# Patient Record
Sex: MALE | Race: OTHER | Employment: OTHER | URBAN - METROPOLITAN AREA
[De-identification: names, ages, dates, MRNs, and addresses within clinical notes are randomized per-mention and may not be internally consistent; named-entity substitution may affect disease eponyms.]

---

## 2018-01-07 ENCOUNTER — HOSPITAL ENCOUNTER (EMERGENCY)
Age: 68
Discharge: HOME | End: 2018-01-07
Attending: STUDENT IN AN ORGANIZED HEALTH CARE EDUCATION/TRAINING PROGRAM
Payer: MEDICARE

## 2018-01-07 VITALS
OXYGEN SATURATION: 94 % | HEART RATE: 96 BPM | BODY MASS INDEX: 37.1 KG/M2 | SYSTOLIC BLOOD PRESSURE: 131 MMHG | HEIGHT: 71 IN | TEMPERATURE: 99.1 F | WEIGHT: 265 LBS | DIASTOLIC BLOOD PRESSURE: 87 MMHG | RESPIRATION RATE: 16 BRPM

## 2018-01-07 DIAGNOSIS — R04.0 EPISTAXIS: Primary | ICD-10-CM

## 2018-01-07 DIAGNOSIS — D69.6 THROMBOCYTOPENIA (HCC): ICD-10-CM

## 2018-01-07 LAB
BASOPHILS # BLD: 0 K/UL (ref 0–0.1)
BASOPHILS NFR BLD: 0 % (ref 0–1)
EOSINOPHIL # BLD: 0 K/UL (ref 0–0.4)
EOSINOPHIL NFR BLD: 0 % (ref 0–7)
ERYTHROCYTE [DISTWIDTH] IN BLOOD BY AUTOMATED COUNT: 14.4 % (ref 11.5–14.5)
HCT VFR BLD AUTO: 46.5 % (ref 36.6–50.3)
HGB BLD-MCNC: 16.4 G/DL (ref 12.1–17)
INR PPP: 1.3 (ref 0.9–1.1)
LYMPHOCYTES # BLD: 1.2 K/UL (ref 0.8–3.5)
LYMPHOCYTES NFR BLD: 9 % (ref 12–49)
MCH RBC QN AUTO: 29.8 PG (ref 26–34)
MCHC RBC AUTO-ENTMCNC: 35.3 G/DL (ref 30–36.5)
MCV RBC AUTO: 84.4 FL (ref 80–99)
MONOCYTES # BLD: 1.3 K/UL (ref 0–1)
MONOCYTES NFR BLD: 9 % (ref 5–13)
NEUTS SEG # BLD: 11.3 K/UL (ref 1.8–8)
NEUTS SEG NFR BLD: 82 % (ref 32–75)
PLATELET # BLD AUTO: 99 K/UL (ref 150–400)
PROTHROMBIN TIME: 13.1 SEC (ref 9–11.1)
RBC # BLD AUTO: 5.51 M/UL (ref 4.1–5.7)
WBC # BLD AUTO: 13.8 K/UL (ref 4.1–11.1)

## 2018-01-07 PROCEDURE — 85025 COMPLETE CBC W/AUTO DIFF WBC: CPT | Performed by: PHYSICIAN ASSISTANT

## 2018-01-07 PROCEDURE — 99283 EMERGENCY DEPT VISIT LOW MDM: CPT

## 2018-01-07 PROCEDURE — 36415 COLL VENOUS BLD VENIPUNCTURE: CPT | Performed by: PHYSICIAN ASSISTANT

## 2018-01-07 PROCEDURE — 85610 PROTHROMBIN TIME: CPT | Performed by: PHYSICIAN ASSISTANT

## 2018-01-07 NOTE — ED PROVIDER NOTES
HPI Comments: Alondra Howard is a 74 y.o. male with PMH significant for Afib on Eliquis (has not taken today's dose yet) presents to emergency room ambulatory with wife for evaluation of R sided epistaxis which began at 0300. He states he awoke with epistaxis. Last nose bleed was \"years ago\" which resulted in nasal packing. Hx of nasal polypectomy \"years ago\" at [facility] where he is followed but has not seen them in a while. HE called their on call ENT provider who told them to apply pressure and spray Afrin which he states he tried 5 different times but still was bleeding. He presents with tissue twisted in his nose but not holding pressure and some dark blood only around the nare, not involving the remaining tissue. Denies SOB, CP, lightheadedness or other bleeding. He states he's had nasal congestion and cough recently. No fever. Uses CPAP at night. PCP: Marcos Tai MD  ENT: [facility]    Surgical hx- nasal polypectomy \"years ago\" at [address] [facility] hx- no tobacco, no ETOH; uses CPAP at night    The patient has no other complaints at this time. Patient is a 74 y.o. male presenting with epistaxis. The history is provided by the patient and the spouse. Epistaxis           Past Medical History:   Diagnosis Date    Atrial fibrillation [facility])        History reviewed. No pertinent surgical history. History reviewed. No pertinent family history. Social History     Social History    Marital status:      Spouse name: N/A    Number of children: N/A    Years of education: N/A     Occupational History    Not on file. Social History Main Topics    Smoking status: Never Smoker    Smokeless tobacco: Never Used    Alcohol use No    Drug use: Not on file    Sexual activity: Not on file     Other Topics Concern    Not on file     Social History Narrative    No narrative on file         ALLERGIES: Pcn [penicillins]    Review of Systems   Constitutional: Negative.   Negative for activity change, chills, fatigue and unexpected weight change. HENT: Positive for nosebleeds, sinus pain and sinus pressure. Respiratory: Negative for cough, chest tightness, shortness of breath and wheezing. Cardiovascular: Negative. Negative for chest pain and palpitations. Gastrointestinal: Negative. Negative for abdominal pain, diarrhea, nausea and vomiting. Genitourinary: Negative. Negative for dysuria, flank pain, frequency and hematuria. Musculoskeletal: Negative. Negative for arthralgias, back pain, neck pain and neck stiffness. Skin: Negative. Negative for color change and rash. Neurological: Negative. Negative for dizziness, numbness and headaches. Psychiatric/Behavioral: Negative. Negative for confusion. All other systems reviewed and are negative. Vitals:    01/07/18 1126 01/07/18 1240 01/07/18 1300   BP: 143/85 111/84 131/87   Pulse: (!) 108 81    Resp: 18 18    Temp: 98.7 °F (37.1 °C) 99.1 °F (37.3 °C)    SpO2: 96% 94% 94%   Weight: 120.2 kg (265 lb)     Height: 5' 11\" (1.803 m)              Physical Exam   Constitutional: He is oriented to person, place, and time. He appears well-developed and well-nourished. He is active. Non-toxic appearance. No distress. HENT:   Head: Normocephalic and atraumatic. Right Ear: External ear normal.   Left Ear: External ear normal.   Mouth/Throat: Oropharynx is clear and moist. No oropharyngeal exudate. R nare with dried blood and large clot removed when I removed tissue patient had placed. No active bleeding noted. L nare congested with clear-white fluid. No posterior oropharyngeal blood noted. Eyes: Conjunctivae are normal. Pupils are equal, round, and reactive to light. Right eye exhibits no discharge. Left eye exhibits no discharge. Neck: Normal range of motion and full passive range of motion without pain. Neck supple. No tracheal tenderness present.    Cardiovascular: Normal rate, regular rhythm, normal heart sounds, intact distal pulses and normal pulses. Exam reveals no gallop and no friction rub. No murmur heard. Pulmonary/Chest: Effort normal and breath sounds normal. No respiratory distress. He has no wheezes. He has no rales. He exhibits no tenderness. Abdominal: Soft. Bowel sounds are normal. He exhibits no distension. There is no tenderness. There is no rebound and no guarding. Musculoskeletal: Normal range of motion. He exhibits no edema or tenderness. Neurological: He is alert and oriented to person, place, and time. He has normal strength. No cranial nerve deficit or sensory deficit. Coordination normal.   Skin: Skin is warm, dry and intact. No abrasion and no rash noted. He is not diaphoretic. No erythema. Psychiatric: He has a normal mood and affect. His speech is normal and behavior is normal. Cognition and memory are normal.   Nursing note and vitals reviewed. MDM  Number of Diagnoses or Management Options  Epistaxis:   Thrombocytopenia ([facility] [address]):   Diagnosis management comments:   Ddx: epistaxis- anterior / posterior, blood loss, thrombocytopenia       Amount and/or Complexity of Data Reviewed  Clinical lab tests: ordered and reviewed  Discuss the patient with other providers: yes (ER attending)    Patient Progress  Patient progress: stable    ED Course       Procedures    I discussed patient's PMH, exam findings as well as careplan with the ER attending who agrees with care plan. Amrita Johnson PA-C      12:30pm   Nose clamp removed. No active epistaxis. No posterior bleeding noted on re-assessment. Will continue to monitor. Amrita Johnosn PA-C    1:55 PM  Patient was re-assessed, a dried spot of blood noted to his external nare and was not fresh blood. Patient was unaware that this even occurred and no active bleeding noted on my re-exam. Discussed if he is not actively bleeding there is no indication for nasal packing at this time. Will continue to monitor.  Discussed to hold today's Eliquis dose as he has not yet taken it today and patient agrees. Devang Aviles PA-C    2:50 PM  Patient has been re-assessed, still no epistaxis. Feels better. Requesting to go home. Discussed not blowing nose for 48 hours, advised to call ENT tomorrow for follow-up and further management. Devang Aviles PA-C          DISCHARGE NOTE:  2:54 PM  The patient's results have been reviewed with them and/or available family. Patient and/or family verbally conveyed their understanding and agreement of the patient's signs, symptoms, diagnosis, treatment and prognosis and additionally agree to follow up as recommended in the discharge instructions or to return to the Emergency Room should their condition change prior to their follow-up appointment. The patient/family verbally agrees with the care-plan and verbally conveys that all of their questions have been answered. The discharge instructions have also been provided to the patient and/or family with some educational information regarding the patient's diagnosis as well a list of reasons why the patient would want to return to the ER prior to their follow-up appointment, should their condition change. Plan:  1. F/U with ENT tomorrow  2. Hold today's dose of Eliquis  3.  Epistaxis precautions discussed  Return precautions discussed and advised to return to ER if worse

## 2018-01-07 NOTE — DISCHARGE INSTRUCTIONS
Nosebleeds: Care Instructions  Your Care Instructions    Nosebleeds are common, especially if you have colds or allergies. Many things can cause a nosebleed. Some nosebleeds stop on their own with pressure. Others need packing. Some get cauterized (sealed). If you have gauze or other packing materials in your nose, you will need to follow up with your doctor to have the packing removed. You may need more treatment if you get nosebleeds a lot. The doctor has checked you carefully, but problems can develop later. If you notice any problems or new symptoms, get medical treatment right away. Follow-up care is a key part of your treatment and safety. Be sure to make and go to all appointments, and call your doctor if you are having problems. It's also a good idea to know your test results and keep a list of the medicines you take. How can you care for yourself at home? · If you get another nosebleed:  ¨ Sit up and tilt your head slightly forward. This keeps blood from going down your throat. ¨ Use your thumb and index finger to pinch your nose shut for 10 minutes. Use a clock. Do not check to see if the bleeding has stopped before the 10 minutes are up. If the bleeding has not stopped, pinch your nose shut for another 10 minutes. ¨ When the bleeding has stopped, try not to pick, rub, or blow your nose for 12 hours. Avoiding these things helps keep your nose from bleeding again. · If your doctor prescribed antibiotics, take them as directed. Do not stop taking them just because you feel better. You need to take the full course of antibiotics. To prevent nosebleeds  · Do not blow your nose too hard. · Try not to lift or strain after a nosebleed. · Raise your head on a pillow while you sleep. · Put a thin layer of a saline- or water-based nasal gel, such as NasoGel, inside your nose. Put it on the septum, which divides your nostrils. This will prevent dryness that can cause nosebleeds.   · Use a vaporizer or humidifier to add moisture to your bedroom. Follow the directions for cleaning the machine. · Do not use aspirin, ibuprofen (Advil, Motrin), or naproxen (Aleve) for 36 to 48 hours after a nosebleed unless your doctor tells you to. You can use acetaminophen (Tylenol) for pain relief. · Talk to your doctor about stopping any other medicines you are taking. Some medicines may make you more likely to get a nosebleed. · Do not use cold medicines or nasal sprays without first talking to your doctor. They can make your nose dry. When should you call for help? Call 911 anytime you think you may need emergency care. For example, call if:  ? · You passed out (lost consciousness). ?Call your doctor now or seek immediate medical care if:  ? · You get another nosebleed and your nose is still bleeding after you have applied pressure 3 times for 10 minutes each time (30 minutes total). ? · There is a lot of blood running down the back of your throat even after you pinch your nose and tilt your head forward. ? · You have a fever. ? · You have sinus pain. ? Watch closely for changes in your health, and be sure to contact your doctor if:  ? · You get nosebleeds often, even if they stop. ? · You do not get better as expected. Where can you learn more? Go to http://jacob-summer.info/. Enter S156 in the search box to learn more about \"Nosebleeds: Care Instructions. \"  Current as of: March 20, 2017  Content Version: 11.4  © 6059-1792 [employer]. Care instructions adapted under license by [employer] (which disclaims liability or warranty for this information). If you have questions about a medical condition or this instruction, always ask your healthcare professional. [address] any warranty or liability for your use of this information.

## 2018-01-07 NOTE — ED TRIAGE NOTES
Pt reports having nose bleed since 0300 this morning. Pt called his ENT @ 0500 this morning ans was told to use affrin to stop the bleeding. Pt states he is currently taking Eliquis for atrial fib.

## 2021-10-24 ENCOUNTER — HOSPITAL ENCOUNTER (INPATIENT)
Age: 71
LOS: 6 days | Discharge: HOME | DRG: 603 | End: 2021-10-31
Attending: EMERGENCY MEDICINE | Admitting: INTERNAL MEDICINE
Payer: MEDICARE

## 2021-10-24 ENCOUNTER — APPOINTMENT (OUTPATIENT)
Dept: ULTRASOUND IMAGING | Age: 71
DRG: 603 | End: 2021-10-24
Attending: EMERGENCY MEDICINE
Payer: MEDICARE

## 2021-10-24 DIAGNOSIS — I48.20 CHRONIC ATRIAL FIBRILLATION (HCC): ICD-10-CM

## 2021-10-24 DIAGNOSIS — L03.116 CELLULITIS OF LEFT LOWER EXTREMITY: Primary | ICD-10-CM

## 2021-10-24 DIAGNOSIS — D72.829 LEUKOCYTOSIS, UNSPECIFIED TYPE: ICD-10-CM

## 2021-10-24 DIAGNOSIS — D69.6 THROMBOCYTOPENIA (HCC): ICD-10-CM

## 2021-10-24 DIAGNOSIS — M50.90 CERVICAL DISC DISORDER: ICD-10-CM

## 2021-10-24 DIAGNOSIS — N18.5 CKD (CHRONIC KIDNEY DISEASE), STAGE V (HCC): ICD-10-CM

## 2021-10-24 DIAGNOSIS — Z88.0 HISTORY OF PENICILLIN ALLERGY: ICD-10-CM

## 2021-10-24 PROBLEM — E87.5 HYPERKALEMIA: Status: ACTIVE | Noted: 2021-10-24

## 2021-10-24 LAB
ALBUMIN SERPL-MCNC: 2.3 G/DL (ref 3.5–5)
ALBUMIN/GLOB SERPL: 0.6 {RATIO} (ref 1.1–2.2)
ALP SERPL-CCNC: 51 U/L (ref 45–117)
ALT SERPL-CCNC: 20 U/L (ref 12–78)
AMPHET UR QL SCN: NEGATIVE
ANION GAP SERPL CALC-SCNC: 12 MMOL/L (ref 5–15)
APPEARANCE UR: CLEAR
AST SERPL-CCNC: 8 U/L (ref 15–37)
BACTERIA URNS QL MICRO: NEGATIVE /HPF
BARBITURATES UR QL SCN: NEGATIVE
BASOPHILS # BLD: 0 K/UL (ref 0–0.1)
BASOPHILS NFR BLD: 0 % (ref 0–1)
BENZODIAZ UR QL: NEGATIVE
BILIRUB SERPL-MCNC: 0.3 MG/DL (ref 0.2–1)
BILIRUB UR QL: NEGATIVE
BUN SERPL-MCNC: 103 MG/DL (ref 6–20)
BUN/CREAT SERPL: 18 (ref 12–20)
CALCIUM SERPL-MCNC: 8.4 MG/DL (ref 8.5–10.1)
CANNABINOIDS UR QL SCN: NEGATIVE
CHLORIDE SERPL-SCNC: 107 MMOL/L (ref 97–108)
CO2 SERPL-SCNC: 21 MMOL/L (ref 21–32)
COCAINE UR QL SCN: NEGATIVE
COLOR UR: ABNORMAL
CREAT SERPL-MCNC: 5.63 MG/DL (ref 0.7–1.3)
CRP SERPL-MCNC: 11.51 MG/DL (ref 0–0.6)
DIFFERENTIAL METHOD BLD: ABNORMAL
DRUG SCRN COMMENT,DRGCM: NORMAL
EOSINOPHIL # BLD: 0 K/UL (ref 0–0.4)
EOSINOPHIL NFR BLD: 0 % (ref 0–7)
EPITH CASTS URNS QL MICRO: ABNORMAL /LPF
ERYTHROCYTE [DISTWIDTH] IN BLOOD BY AUTOMATED COUNT: 15.2 % (ref 11.5–14.5)
GLOBULIN SER CALC-MCNC: 3.8 G/DL (ref 2–4)
GLUCOSE SERPL-MCNC: 155 MG/DL (ref 65–100)
GLUCOSE UR STRIP.AUTO-MCNC: NEGATIVE MG/DL
HCT VFR BLD AUTO: 33.3 % (ref 36.6–50.3)
HGB BLD-MCNC: 10.8 G/DL (ref 12.1–17)
HGB UR QL STRIP: ABNORMAL
IMM GRANULOCYTES # BLD AUTO: 0 K/UL
IMM GRANULOCYTES NFR BLD AUTO: 0 %
KETONES UR QL STRIP.AUTO: NEGATIVE MG/DL
LACTATE SERPL-SCNC: 1 MMOL/L (ref 0.4–2)
LEUKOCYTE ESTERASE UR QL STRIP.AUTO: NEGATIVE
LYMPHOCYTES # BLD: 0.1 K/UL (ref 0.8–3.5)
LYMPHOCYTES NFR BLD: 1 % (ref 12–49)
MCH RBC QN AUTO: 28.7 PG (ref 26–34)
MCHC RBC AUTO-ENTMCNC: 32.4 G/DL (ref 30–36.5)
MCV RBC AUTO: 88.6 FL (ref 80–99)
METHADONE UR QL: NEGATIVE
MONOCYTES # BLD: 1 K/UL (ref 0–1)
MONOCYTES NFR BLD: 7 % (ref 5–13)
MUCOUS THREADS URNS QL MICRO: ABNORMAL /LPF
NEUTS BAND NFR BLD MANUAL: 1 % (ref 0–6)
NEUTS SEG # BLD: 12.8 K/UL (ref 1.8–8)
NEUTS SEG NFR BLD: 91 % (ref 32–75)
NITRITE UR QL STRIP.AUTO: NEGATIVE
NRBC # BLD: 0 K/UL (ref 0–0.01)
NRBC BLD-RTO: 0 PER 100 WBC
OPIATES UR QL: NEGATIVE
PCP UR QL: NEGATIVE
PH UR STRIP: 5.5 [PH] (ref 5–8)
PHOSPHATE SERPL-MCNC: 6.1 MG/DL (ref 2.6–4.7)
PLATELET # BLD AUTO: 102 K/UL (ref 150–400)
PMV BLD AUTO: 12.9 FL (ref 8.9–12.9)
POTASSIUM SERPL-SCNC: 5.6 MMOL/L (ref 3.5–5.1)
PROCALCITONIN SERPL-MCNC: 0.31 NG/ML
PROT SERPL-MCNC: 6.1 G/DL (ref 6.4–8.2)
PROT UR STRIP-MCNC: 100 MG/DL
RBC # BLD AUTO: 3.76 M/UL (ref 4.1–5.7)
RBC #/AREA URNS HPF: ABNORMAL /HPF (ref 0–5)
RBC MORPH BLD: ABNORMAL
SODIUM SERPL-SCNC: 140 MMOL/L (ref 136–145)
SP GR UR REFRACTOMETRY: 1.02 (ref 1–1.03)
URATE SERPL-MCNC: 9.8 MG/DL (ref 3.5–7.2)
UROBILINOGEN UR QL STRIP.AUTO: 0.2 EU/DL (ref 0.2–1)
WBC # BLD AUTO: 13.9 K/UL (ref 4.1–11.1)
WBC URNS QL MICRO: ABNORMAL /HPF (ref 0–4)

## 2021-10-24 PROCEDURE — 36415 COLL VENOUS BLD VENIPUNCTURE: CPT

## 2021-10-24 PROCEDURE — 74011000258 HC RX REV CODE- 258: Performed by: INTERNAL MEDICINE

## 2021-10-24 PROCEDURE — 99284 EMERGENCY DEPT VISIT MOD MDM: CPT

## 2021-10-24 PROCEDURE — 84550 ASSAY OF BLOOD/URIC ACID: CPT

## 2021-10-24 PROCEDURE — 86140 C-REACTIVE PROTEIN: CPT

## 2021-10-24 PROCEDURE — 96376 TX/PRO/DX INJ SAME DRUG ADON: CPT

## 2021-10-24 PROCEDURE — 85025 COMPLETE CBC W/AUTO DIFF WBC: CPT

## 2021-10-24 PROCEDURE — 99218 HC RM OBSERVATION: CPT

## 2021-10-24 PROCEDURE — 84145 PROCALCITONIN (PCT): CPT

## 2021-10-24 PROCEDURE — 74011000250 HC RX REV CODE- 250: Performed by: INTERNAL MEDICINE

## 2021-10-24 PROCEDURE — 87086 URINE CULTURE/COLONY COUNT: CPT

## 2021-10-24 PROCEDURE — 84100 ASSAY OF PHOSPHORUS: CPT

## 2021-10-24 PROCEDURE — 96375 TX/PRO/DX INJ NEW DRUG ADDON: CPT

## 2021-10-24 PROCEDURE — 80053 COMPREHEN METABOLIC PANEL: CPT

## 2021-10-24 PROCEDURE — 83605 ASSAY OF LACTIC ACID: CPT

## 2021-10-24 PROCEDURE — 93971 EXTREMITY STUDY: CPT

## 2021-10-24 PROCEDURE — 87040 BLOOD CULTURE FOR BACTERIA: CPT

## 2021-10-24 PROCEDURE — 80307 DRUG TEST PRSMV CHEM ANLYZR: CPT

## 2021-10-24 PROCEDURE — 96374 THER/PROPH/DIAG INJ IV PUSH: CPT

## 2021-10-24 PROCEDURE — 96365 THER/PROPH/DIAG IV INF INIT: CPT

## 2021-10-24 PROCEDURE — 81001 URINALYSIS AUTO W/SCOPE: CPT

## 2021-10-24 PROCEDURE — 74011250636 HC RX REV CODE- 250/636: Performed by: INTERNAL MEDICINE

## 2021-10-24 RX ORDER — CLONIDINE HYDROCHLORIDE 0.2 MG/1
0.2 TABLET ORAL 2 TIMES DAILY
COMMUNITY

## 2021-10-24 RX ORDER — FLUTICASONE PROPIONATE 50 MCG
2 SPRAY, SUSPENSION (ML) NASAL DAILY
Status: DISCONTINUED | OUTPATIENT
Start: 2021-10-25 | End: 2021-10-31 | Stop reason: HOSPADM

## 2021-10-24 RX ORDER — SODIUM CHLORIDE 0.9 % (FLUSH) 0.9 %
5-40 SYRINGE (ML) INJECTION AS NEEDED
Status: DISCONTINUED | OUTPATIENT
Start: 2021-10-24 | End: 2021-10-31 | Stop reason: HOSPADM

## 2021-10-24 RX ORDER — FUROSEMIDE 40 MG/1
40 TABLET ORAL 2 TIMES DAILY
COMMUNITY
End: 2021-10-31

## 2021-10-24 RX ORDER — ACETAMINOPHEN 325 MG/1
650 TABLET ORAL
Status: DISCONTINUED | OUTPATIENT
Start: 2021-10-24 | End: 2021-10-31 | Stop reason: HOSPADM

## 2021-10-24 RX ORDER — CARVEDILOL 12.5 MG/1
12.5 TABLET ORAL 2 TIMES DAILY WITH MEALS
COMMUNITY

## 2021-10-24 RX ORDER — CLONIDINE HYDROCHLORIDE 0.1 MG/1
0.2 TABLET ORAL 2 TIMES DAILY
Status: DISCONTINUED | OUTPATIENT
Start: 2021-10-25 | End: 2021-10-31 | Stop reason: HOSPADM

## 2021-10-24 RX ORDER — FLUTICASONE PROPIONATE 50 MCG
2 SPRAY, SUSPENSION (ML) NASAL DAILY
COMMUNITY

## 2021-10-24 RX ORDER — ONDANSETRON 4 MG/1
4 TABLET, ORALLY DISINTEGRATING ORAL
Status: DISCONTINUED | OUTPATIENT
Start: 2021-10-24 | End: 2021-10-31 | Stop reason: HOSPADM

## 2021-10-24 RX ORDER — POLYETHYLENE GLYCOL 3350 17 G/17G
17 POWDER, FOR SOLUTION ORAL DAILY PRN
Status: DISCONTINUED | OUTPATIENT
Start: 2021-10-24 | End: 2021-10-31 | Stop reason: HOSPADM

## 2021-10-24 RX ORDER — NIFEDIPINE 30 MG/1
30 TABLET, EXTENDED RELEASE ORAL DAILY
Status: DISCONTINUED | OUTPATIENT
Start: 2021-10-25 | End: 2021-10-26

## 2021-10-24 RX ORDER — CALCITRIOL 0.25 UG/1
0.25 CAPSULE ORAL DAILY
COMMUNITY

## 2021-10-24 RX ORDER — NIFEDIPINE 30 MG/1
30 TABLET, FILM COATED, EXTENDED RELEASE ORAL DAILY
COMMUNITY

## 2021-10-24 RX ORDER — ACETAMINOPHEN 650 MG/1
650 SUPPOSITORY RECTAL
Status: DISCONTINUED | OUTPATIENT
Start: 2021-10-24 | End: 2021-10-31 | Stop reason: HOSPADM

## 2021-10-24 RX ORDER — PREDNISONE 10 MG/1
10 TABLET ORAL
Status: ON HOLD | COMMUNITY
End: 2021-10-31 | Stop reason: SDUPTHER

## 2021-10-24 RX ORDER — CARVEDILOL 12.5 MG/1
12.5 TABLET ORAL 2 TIMES DAILY WITH MEALS
Status: DISCONTINUED | OUTPATIENT
Start: 2021-10-25 | End: 2021-10-31 | Stop reason: HOSPADM

## 2021-10-24 RX ORDER — SODIUM CHLORIDE 0.9 % (FLUSH) 0.9 %
5-40 SYRINGE (ML) INJECTION EVERY 8 HOURS
Status: DISCONTINUED | OUTPATIENT
Start: 2021-10-24 | End: 2021-10-31 | Stop reason: HOSPADM

## 2021-10-24 RX ORDER — ONDANSETRON 2 MG/ML
4 INJECTION INTRAMUSCULAR; INTRAVENOUS
Status: DISCONTINUED | OUTPATIENT
Start: 2021-10-24 | End: 2021-10-31 | Stop reason: HOSPADM

## 2021-10-24 RX ORDER — CALCITRIOL 0.25 UG/1
0.25 CAPSULE ORAL DAILY
Status: DISCONTINUED | OUTPATIENT
Start: 2021-10-25 | End: 2021-10-31 | Stop reason: HOSPADM

## 2021-10-24 RX ADMIN — CEFEPIME HYDROCHLORIDE 1 G: 1 INJECTION, POWDER, FOR SOLUTION INTRAMUSCULAR; INTRAVENOUS at 16:58

## 2021-10-24 RX ADMIN — DOXYCYCLINE 100 MG: 100 INJECTION, POWDER, LYOPHILIZED, FOR SOLUTION INTRAVENOUS at 17:01

## 2021-10-24 NOTE — H&P
[facility]    Hospitalist Admission Note      NAME:  Yamileth Cui   :   1950   MRN:  [id number]     PCP:  Jordyn Cifuentes MD     Date/Time of service:  10/24/2021 5:20 PM          Subjective:     CHIEF COMPLAINT: red leg    HISTORY OF PRESENT ILLNESS:     Mr. jAay Olivia is a 78 y.o.  male who presented to the Emergency Department complaining of red leg. Noted 10 days ago, and Rx Abx by a clinic, with plan for have outpatient [address]. Leg worse since then despite abx. ER workup here is unremarkable, he does not meet SIRS criteria, CRP somewhat elevated. LE dopplers ruled out DVT. He has severe end stage renal disease and so far has refused HD. We will admit him for observation. Past Medical History:   Diagnosis Date    Atrial fibrillation ([address]     Cervical disc disorder     C5,C6 fractured disc and lumbar disc slipped    Chronic atrial fibrillation (HCC)     [facility]    ESRD (end stage renal disease) on dialysis ([address]     Dr Abby Beard with [facility]    Heart failure ([address]     Hypertension     VIK on CPAP     Pulmonary hypertension ([address]     Ramon NP at [facility]        Past Surgical History:   Procedure Laterality Date    HX HEENT      right sinus polyp removal    HX ORTHOPAEDIC      left ACL replacement    HX OTHER SURGICAL      cardioversion for AFIB    HX UROLOGICAL  2015    renal gland removal left       Social History     Tobacco Use    Smoking status: Never Smoker    Smokeless tobacco: Never Used   Substance Use Topics    Alcohol use: No        History reviewed. No pertinent family history. Family hx cannot be fully assessed, since the patient cannot provide information    Allergies   Allergen Reactions    Levofloxacin Other (comments)     Insombnia, negative thoughts. [place] like I was going to die.  Pcn [Penicillins] Rash        Prior to Admission medications    Medication Sig Start Date End Date Taking?  Authorizing Provider   calcitRIOL (ROCALTROL) 0.25 mcg capsule Take 0.25 mcg by mouth daily. Yes Jamee, MD Saurav   carvediloL (COREG) 12.5 mg tablet Take 12.5 mg by mouth two (2) times daily (with meals). Yes Jamee, MD Saurav   furosemide (Lasix) 40 mg tablet Take 40 mg by mouth two (2) times a day. Yes Jamee, MD Saurav   cloNIDine HCL (CATAPRES) 0.2 mg tablet Take 0.2 mg by mouth two (2) times a day. Yes Jamee, MD Saurav   apixaban (Eliquis) 5 mg tablet Take 5 mg by mouth two (2) times a day. Yes Jamee, MD Saurav   NIFEdipine ER (ADALAT CC) 30 mg ER tablet Take 30 mg by mouth daily. Yes Saurav Mancuso MD   predniSONE (DELTASONE) 10 mg tablet Take 10 mg by mouth. Taken as needed for inflammation may have up to 40mg a day   Yes Jamee, MD Saurav   fluticasone propionate (FLONASE) 50 mcg/actuation nasal spray 2 Sprays by Both Nostrils route daily.    Yes Jamee, MD Saurav       Review of Systems:  (bold if positive, if negative)    Gen:  Eyes:  ENT:  CVS:  Pulm:  GI:  :  MS:  Pain, weakness, swelling, arthritisSkin:  erythemaPsych:  Endo:  Hem:  Renal:  Neuro:        Objective:      VITALS:    Vital signs reviewed; most recent are:    Visit Vitals  BP (!) 145/92   Pulse 81   Temp 98.1 °F (36.7 °C)   Resp 16   Ht 5' 10.98\" (1.803 m)   Wt 113.3 kg (249 lb 12.5 oz)   SpO2 99%   BMI 34.85 kg/m²     SpO2 Readings from Last 6 Encounters:   10/24/21 99%   01/07/18 94%        No intake or output data in the 24 hours ending 10/24/21 1720     Exam:     Physical Exam:    Gen:  Obese, in no acute distress  HEENT:  Pink conjunctivae, PERRL, hearing intact to voice, moist mucous membranes  Neck:  Supple, without masses, thyroid non-tender  Resp:  No accessory muscle use, clear breath sounds without wheezes rales or rhonchi  Card:  No murmurs, normal S1, S2 without thrills, bruits or peripheral edema  Abd:  Soft, non-tender, non-distended, normoactive bowel sounds are present, no mass  Lymph:  No cervical or inguinal adenopathy  Musc:  No cyanosis or clubbing  Skin:  Leg with edema and erythema L>R, skin turgor is good  Neuro:  Cranial nerves are grossly intact, no focal motor weakness, follows commands appropriately  Psych:  Poor insight, oriented to person, place and time, alert     Labs:    Recent Labs     10/24/21  1257   WBC 13.9*   HGB 10.8*   HCT 33.3*   *     Recent Labs     10/24/21  1257      K 5.6*      CO2 21   *   *   CREA 5.63*   CA 8.4*   PHOS 6.1*   ALB 2.3*   TBILI 0.3   ALT 20     No results found for: GLUCPOC  No results for input(s): PH, PCO2, PO2, HCO3, FIO2 in the last 72 hours. No results for input(s): INR, INREXT in the last 72 hours. All Micro Results     Procedure Component Value Units Date/Time    CULTURE, BLOOD, PERIPHERAL [[id number]] Collected: 10/24/21 1637    Order Status: Completed Specimen: Blood Updated: 10/24/21 1650    CULTURE, BLOOD, PERIPHERAL [[id number]] Collected: 10/24/21 1628    Order Status: Completed Specimen: Blood Updated: 10/24/21 1649    CULTURE, URINE [[id number]]     Order Status: Sent Specimen: Urine from Clean catch     RESPIRATORY VIRUS PANEL W/COVID-19, PCR [[id number]]     Order Status: Sent Specimen: NASOPHARYNGEAL SWAB           I have reviewed previous records       Assessment and Plan:      Leg erythema - POA. No trauma or wound. I doubt bacterial infection. Does not meet SIRS criteria. Minimal elevation of procalcitonin, CRP and WBC. His leg appears to be suffering from progressive dermatitis related to untreated ESRD, and edema related to his pulmonary HTN. He has always had worse issue with L more than right leg. For now we will give doxycycline, awaiting further labs and cx. US ruled out DVT. ESRD (end stage renal disease) not on dialysis / Hyperkalemia - Followed by Dr Suhail Waters with [facility]. He has elevated BUN and K. He needs dialysis, but doesn't want it. Continue clacitriol    Chronic atrial fibrillation / Chronic anticoagulation - Followed by Dr José Wilhelm at [facility]. Stable.   Continue coreg, nifedipine and Eliquis    Anemia / thrombocytopenia - POA, likely chronic issues related to ESRD and vascular congestion. Monitor. Heart failure / Pulmonary hypertension / Hypertension - Unclear type and severity of CHF, but followed by Ramon PEREZ at [facility]. Fluid control should be managed with HD. Continue corgeg, clonidine, nifedipine    VIK on CPAP - May continue CPAP    Cervical disc disorder - tylenol prn    Telemetry reviewed:   AFIB    Risk of deterioration: high      Total time spent with patient: 46 Minutes I personally reviewed chart, notes, data and current medications in the medical record. I have personally examined and treated the patient at bedside during this period.                  Care Plan discussed with: Patient, Nursing Staff and >50% of time spent in counseling and coordination of care    Discussed:  Care Plan       ___________________________________________________    Attending Physician: Gibran Salcedo MD

## 2021-10-24 NOTE — ED PROVIDER NOTES
63-year-old male with a history of chronic kidney disease presents with leg pain and swelling. [facility] Friday. He has not had fevers but his leg has worsened in appearance and swelling. He did a Doppler but never was able to get it. He has chronic kidney disease and is followed at [facility]. Leg Pain     Skin Problem          Past Medical History:   Diagnosis Date    Atrial fibrillation ([facility] [address]     Chronic kidney disease     [facility] Sonja Alonzo [facility] is nephrologist    Heart failure ([facility] [address]     Hypertension     Ill-defined condition     AFIB cardiologist Dr Rosa M Koo with [facility]    Ill-defined condition     pulmonary HTN, Ramon NP at [address] condition     sleep apnea uses CPAP    Ill-defined condition     C5,C6 fractured disc and lumbar disc slipped       Past Surgical History:   Procedure Laterality Date    HX HEENT      right sinus polyp removal    HX ORTHOPAEDIC  2000    left ACL replacement    HX OTHER SURGICAL      cardioversion for AFIB    HX UROLOGICAL  08/2015    renal gland removal left         History reviewed. No pertinent family history. Social History     Socioeconomic History    Marital status:      Spouse name: Not on file    Number of children: Not on file    Years of education: Not on file    Highest education level: Not on file   Occupational History    Not on file   Tobacco Use    Smoking status: Never Smoker    Smokeless tobacco: Never Used   Substance and Sexual Activity    Alcohol use: No    Drug use: Never    Sexual activity: Not on file   Other Topics Concern    Not on file   Social History Narrative    Not on file     Social Determinants of Health     Financial Resource Strain:     Difficulty of Paying Living Expenses:    Food Insecurity:     Worried About Running Out of Food in the Last Year:     [address] in the Last Year:    Transportation Needs:     Lack of Transportation (Medical):      Lack of Transportation (Non-Medical):    Physical Activity:     Days of Exercise per Week:     Minutes of Exercise per Session:    Stress:     Feeling of Stress :    Social Connections:     Frequency of Communication with Friends and Family:     Frequency of Social Gatherings with Friends and Family:     Attends Je[employer] witness Services:     Active Member of Clubs or Organizations:     Attends Club or Organization Meetings:     Marital Status:    Intimate Partner Violence:     Fear of Current or Ex-Partner:     Emotionally Abused:     Physically Abused:     Sexually Abused: ALLERGIES: Levofloxacin and Pcn [penicillins]    Review of Systems   All other systems reviewed and are negative. Vitals:    10/24/21 1145   BP: (!) 150/86   Pulse: 77   Resp: 18   Temp: 98.1 °F (36.7 °C)   SpO2: 99%   Weight: 113.3 kg (249 lb 12.5 oz)            Physical Exam  Vitals and nursing note reviewed. Constitutional:       General: He is not in acute distress. HENT:      Head: Normocephalic and atraumatic. Eyes:      General: No scleral icterus. Conjunctiva/sclera: Conjunctivae normal.      Pupils: Pupils are equal, round, and reactive to light. Neck:      Trachea: No tracheal deviation. Cardiovascular:      Rate and Rhythm: Normal rate and regular rhythm. Pulmonary:      Effort: Pulmonary effort is normal. No respiratory distress. Breath sounds: Normal breath sounds. No stridor. Abdominal:      General: There is no distension. Palpations: Abdomen is soft. Tenderness: There is no abdominal tenderness. Genitourinary:     Comments: deferred  Musculoskeletal:         General: No deformity. Cervical back: No rigidity. Skin:     General: Skin is warm and dry. Neurological:      General: No focal deficit present. Mental Status: He is alert.    Psychiatric:         Mood and Affect: Mood normal.         Behavior: Behavior normal.              MDM       Procedures          Perfect Serve Consult for Admission  3:24 PM    ED Room Number: WER04/04  Patient Name and age:  Mardene Fothergill 78 y.o.  male  Working Diagnosis:   1. Cellulitis of left lower extremity        COVID-19 Suspicion:  no  Sepsis present:  no  Reassessment needed: N/A  Code Status:  Full Code  Readmission: no  Isolation Requirements:  no  Recommended Level of Care:  med/surg  Department:[facility] ED - [phone]  Other:  Failed outpt treatment.

## 2021-10-24 NOTE — ED TRIAGE NOTES
Pt ambulated to the treatment area with a slight limp accompanied by his wife. \"Pt states \"On Friday I was seen and treated at [facility] urgent care for a cellulitis of my left lower leg. They gave me antibiotics and also a prescription for and U/S to rule out a blood clot. I still need the Ultra sound and my leg is much more swollen now and the redness is spreading up the back of my leg and I have pain in the ankle and calf. \" Pt denies fevers.

## 2021-10-25 PROBLEM — L03.90 CELLULITIS: Status: ACTIVE | Noted: 2021-10-25

## 2021-10-25 LAB
ALBUMIN SERPL-MCNC: 2 G/DL (ref 3.5–5)
ALBUMIN/GLOB SERPL: 0.6 {RATIO} (ref 1.1–2.2)
ALP SERPL-CCNC: 44 U/L (ref 45–117)
ALT SERPL-CCNC: 16 U/L (ref 12–78)
ANION GAP SERPL CALC-SCNC: 13 MMOL/L (ref 5–15)
AST SERPL-CCNC: 6 U/L (ref 15–37)
B PERT DNA SPEC QL NAA+PROBE: NOT DETECTED
BACTERIA SPEC CULT: NORMAL
BILIRUB SERPL-MCNC: 0.3 MG/DL (ref 0.2–1)
BORDETELLA PARAPERTUSSIS PCR, BORPAR: NOT DETECTED
BUN SERPL-MCNC: 98 MG/DL (ref 6–20)
BUN/CREAT SERPL: 21 (ref 12–20)
C PNEUM DNA SPEC QL NAA+PROBE: NOT DETECTED
CALCIUM SERPL-MCNC: 7.6 MG/DL (ref 8.5–10.1)
CHLORIDE SERPL-SCNC: 110 MMOL/L (ref 97–108)
CO2 SERPL-SCNC: 18 MMOL/L (ref 21–32)
CREAT SERPL-MCNC: 4.72 MG/DL (ref 0.7–1.3)
ERYTHROCYTE [DISTWIDTH] IN BLOOD BY AUTOMATED COUNT: 15.3 % (ref 11.5–14.5)
FLUAV H1 2009 PAND RNA SPEC QL NAA+PROBE: NOT DETECTED
FLUAV H1 RNA SPEC QL NAA+PROBE: NOT DETECTED
FLUAV H3 RNA SPEC QL NAA+PROBE: NOT DETECTED
FLUAV SUBTYP SPEC NAA+PROBE: NOT DETECTED
FLUBV RNA SPEC QL NAA+PROBE: NOT DETECTED
GLOBULIN SER CALC-MCNC: 3.5 G/DL (ref 2–4)
GLUCOSE SERPL-MCNC: 98 MG/DL (ref 65–100)
HADV DNA SPEC QL NAA+PROBE: NOT DETECTED
HCOV 229E RNA SPEC QL NAA+PROBE: NOT DETECTED
HCOV HKU1 RNA SPEC QL NAA+PROBE: NOT DETECTED
HCOV NL63 RNA SPEC QL NAA+PROBE: NOT DETECTED
HCOV OC43 RNA SPEC QL NAA+PROBE: NOT DETECTED
HCT VFR BLD AUTO: 34 % (ref 36.6–50.3)
HGB BLD-MCNC: 11.2 G/DL (ref 12.1–17)
HMPV RNA SPEC QL NAA+PROBE: NOT DETECTED
HPIV1 RNA SPEC QL NAA+PROBE: NOT DETECTED
HPIV2 RNA SPEC QL NAA+PROBE: NOT DETECTED
HPIV3 RNA SPEC QL NAA+PROBE: NOT DETECTED
HPIV4 RNA SPEC QL NAA+PROBE: NOT DETECTED
M PNEUMO DNA SPEC QL NAA+PROBE: NOT DETECTED
MAGNESIUM SERPL-MCNC: 2.3 MG/DL (ref 1.6–2.4)
MCH RBC QN AUTO: 29.1 PG (ref 26–34)
MCHC RBC AUTO-ENTMCNC: 32.9 G/DL (ref 30–36.5)
MCV RBC AUTO: 88.3 FL (ref 80–99)
NRBC # BLD: 0.02 K/UL (ref 0–0.01)
NRBC BLD-RTO: 0.2 PER 100 WBC
PLATELET # BLD AUTO: 118 K/UL (ref 150–400)
PMV BLD AUTO: 12.7 FL (ref 8.9–12.9)
POTASSIUM SERPL-SCNC: 4.5 MMOL/L (ref 3.5–5.1)
PROT SERPL-MCNC: 5.5 G/DL (ref 6.4–8.2)
RBC # BLD AUTO: 3.85 M/UL (ref 4.1–5.7)
RSV RNA SPEC QL NAA+PROBE: NOT DETECTED
RV+EV RNA SPEC QL NAA+PROBE: NOT DETECTED
SARS-COV-2 PCR, COVPCR: NOT DETECTED
SERVICE CMNT-IMP: NORMAL
SODIUM SERPL-SCNC: 141 MMOL/L (ref 136–145)
WBC # BLD AUTO: 10.2 K/UL (ref 4.1–11.1)

## 2021-10-25 PROCEDURE — 0202U NFCT DS 22 TRGT SARS-COV-2: CPT

## 2021-10-25 PROCEDURE — 96376 TX/PRO/DX INJ SAME DRUG ADON: CPT

## 2021-10-25 PROCEDURE — 74011250636 HC RX REV CODE- 250/636: Performed by: INTERNAL MEDICINE

## 2021-10-25 PROCEDURE — 99218 HC RM OBSERVATION: CPT

## 2021-10-25 PROCEDURE — 36415 COLL VENOUS BLD VENIPUNCTURE: CPT

## 2021-10-25 PROCEDURE — 83735 ASSAY OF MAGNESIUM: CPT

## 2021-10-25 PROCEDURE — 96375 TX/PRO/DX INJ NEW DRUG ADDON: CPT

## 2021-10-25 PROCEDURE — 80053 COMPREHEN METABOLIC PANEL: CPT

## 2021-10-25 PROCEDURE — 2709999900 HC NON-CHARGEABLE SUPPLY

## 2021-10-25 PROCEDURE — 74011000258 HC RX REV CODE- 258: Performed by: INTERNAL MEDICINE

## 2021-10-25 PROCEDURE — 77030021668 HC NEB PREFIL KT VYRM -A

## 2021-10-25 PROCEDURE — 74011250637 HC RX REV CODE- 250/637: Performed by: INTERNAL MEDICINE

## 2021-10-25 PROCEDURE — 85027 COMPLETE CBC AUTOMATED: CPT

## 2021-10-25 PROCEDURE — 65270000029 HC RM PRIVATE

## 2021-10-25 RX ORDER — HYDROMORPHONE HYDROCHLORIDE 1 MG/ML
0.2 INJECTION, SOLUTION INTRAMUSCULAR; INTRAVENOUS; SUBCUTANEOUS
Status: DISCONTINUED | OUTPATIENT
Start: 2021-10-25 | End: 2021-10-28

## 2021-10-25 RX ORDER — MORPHINE SULFATE 2 MG/ML
0.5 INJECTION, SOLUTION INTRAMUSCULAR; INTRAVENOUS ONCE
Status: COMPLETED | OUTPATIENT
Start: 2021-10-25 | End: 2021-10-25

## 2021-10-25 RX ORDER — MORPHINE SULFATE 10 MG/ML
INJECTION, SOLUTION INTRAMUSCULAR; INTRAVENOUS
Status: DISCONTINUED
Start: 2021-10-25 | End: 2021-10-25 | Stop reason: WASHOUT

## 2021-10-25 RX ORDER — CLINDAMYCIN HYDROCHLORIDE 150 MG/1
450 CAPSULE ORAL EVERY 8 HOURS
Status: DISCONTINUED | OUTPATIENT
Start: 2021-10-25 | End: 2021-10-25

## 2021-10-25 RX ADMIN — CALCITRIOL CAPSULES 0.25 MCG 0.25 MCG: 0.25 CAPSULE ORAL at 10:00

## 2021-10-25 RX ADMIN — APIXABAN 5 MG: 5 TABLET, FILM COATED ORAL at 08:36

## 2021-10-25 RX ADMIN — CARVEDILOL 12.5 MG: 12.5 TABLET, FILM COATED ORAL at 08:47

## 2021-10-25 RX ADMIN — Medication 10 ML: at 22:23

## 2021-10-25 RX ADMIN — ACETAMINOPHEN 650 MG: 325 TABLET ORAL at 15:17

## 2021-10-25 RX ADMIN — DOXYCYCLINE 100 MG: 100 INJECTION, POWDER, LYOPHILIZED, FOR SOLUTION INTRAVENOUS at 03:41

## 2021-10-25 RX ADMIN — MORPHINE SULFATE 0.5 MG: 2 INJECTION, SOLUTION INTRAMUSCULAR; INTRAVENOUS at 03:23

## 2021-10-25 RX ADMIN — CLONIDINE HYDROCHLORIDE 0.2 MG: 0.1 TABLET ORAL at 17:04

## 2021-10-25 RX ADMIN — ACETAMINOPHEN 650 MG: 325 TABLET ORAL at 01:52

## 2021-10-25 RX ADMIN — FLUTICASONE PROPIONATE 2 SPRAY: 50 SPRAY, METERED NASAL at 12:34

## 2021-10-25 RX ADMIN — HYDROMORPHONE HYDROCHLORIDE 0.2 MG: 1 INJECTION, SOLUTION INTRAMUSCULAR; INTRAVENOUS; SUBCUTANEOUS at 17:04

## 2021-10-25 RX ADMIN — CLONIDINE HYDROCHLORIDE 0.2 MG: 0.1 TABLET ORAL at 08:37

## 2021-10-25 RX ADMIN — APIXABAN 5 MG: 5 TABLET, FILM COATED ORAL at 17:05

## 2021-10-25 RX ADMIN — CARVEDILOL 12.5 MG: 12.5 TABLET, FILM COATED ORAL at 17:05

## 2021-10-25 RX ADMIN — NIFEDIPINE 30 MG: 30 TABLET, FILM COATED, EXTENDED RELEASE ORAL at 08:36

## 2021-10-25 RX ADMIN — VANCOMYCIN HYDROCHLORIDE 2500 MG: 10 INJECTION, POWDER, LYOPHILIZED, FOR SOLUTION INTRAVENOUS at 12:32

## 2021-10-25 RX ADMIN — MORPHINE SULFATE 0.5 MG: 2 INJECTION, SOLUTION INTRAMUSCULAR; INTRAVENOUS at 03:44

## 2021-10-25 NOTE — PROGRESS NOTES
Nathanael Bharath [facility] 79  380 [facility], [address] [place] [address]  [phone]      Medical Progress Note      NAME: Yamilka Rolon   :  1950  MRM:  [id number]    Date/Time: 10/25/2021  4:32 PM           Assessment / Plan:     #LLE Cellulitis: P/w leukocytosis, elevated CRP to 11.5 and tender erythema. Placed on cefepime and doxy after failing clinda as an outpt. WBC improved today, but pt notes worsened erythema ascending medial thigh, which is indeed tender. U/S reassuring. Low c/f developing Fornier's but will be cognizant of this   - Change to Vanc   - Elevate   - No NSAIDs in s/o renal disease   - Hold on further imaging for now. #CKD5: Still making urine, K reassuring. Not volume overloaded. No indication for HD at this time. Today he says he agrees to HD. Has followed with Dr. Debra Pollard, will establish with Dr. Zuleima Chun next week. - Renally dose meds, avoid nephrotoxins    #HTN: Cont home nifed, clon, carv    #Afib:    - Cont carvedilol, apix    #CHF? Volume mgmt with HD. Followed closely at [facility] apparently    #VIK: CPAP                   Care Plan discussed with: Patient and Family    Discussed:  Care Plan    Prophylaxis:  apix    Disposition:  Home w/Family           ___________________________________________________    Attending Physician: Morse Boxer, MD        Subjective:     Chief Complaint:  [place] Marky, pain slightly better. Redness worse on L inner thigh, somewhat better distally    ROS:  (bold if positive, if negative)    Tolerating PT  Tolerating Diet          Objective:       Vitals:          Last 24hrs VS reviewed since prior progress note.  Most recent are:    Visit Vitals  BP (!) 160/92 (BP 1 Location: Left lower arm, BP Patient Position: Sitting)   Pulse 76   Temp 98.5 °F (36.9 °C)   Resp 17   Ht 5' 10.98\" (1.803 m)   Wt 113.3 kg (249 lb 12.5 oz)   SpO2 95%   BMI 34.85 kg/m²     SpO2 Readings from Last 6 Encounters:   10/25/21 95%   18 94%        No intake or output data in the 24 hours ending 10/25/21 9662       Exam:     Physical Exam:    Gen:  Well-developed, well-nourished, in no acute distress  HEENT:  Pink conjunctivae, PERRL, hearing intact to voice, moist mucous membranes  Neck:  Supple, without masses, thyroid non-tender  Resp:  No accessory muscle use, clear breath sounds without wheezes rales or rhonchi  Card:  No murmurs, normal S1, S2 without thrills, bruits or peripheral edema  Abd:  Soft, non-tender, non-distended, normoactive bowel sounds are present  Musc:  No cyanosis or clubbing  Skin:  LLE larger than RLE; erythema with weeping; warmth, ttp to distal thigh; no crepitus  Neuro:  Cranial nerves 3-12 are grossly intact,  strength is 5/5 bilaterally and dorsi / plantarflexion is 5/5 bilaterally, follows commands appropriately  Psych:  Good insight, oriented to person, place and time, alert       Medications Reviewed: (see below)    Lab Data Reviewed: (see below)    ______________________________________________________________________    Medications:     Current Facility-Administered Medications   Medication Dose Route Frequency    Vancomycin - Pharmacy to Dose by Levels   Other Rx Dosing/Monitoring    HYDROmorphone (DILAUDID) syringe 0.2 mg  0.2 mg IntraVENous Q3H PRN    apixaban (ELIQUIS) tablet 5 mg  5 mg Oral BID    calcitRIOL (ROCALTROL) capsule 0.25 mcg  0.25 mcg Oral DAILY    carvediloL (COREG) tablet 12.5 mg  12.5 mg Oral BID WITH MEALS    cloNIDine HCL (CATAPRES) tablet 0.2 mg  0.2 mg Oral BID    fluticasone propionate (FLONASE) 50 mcg/actuation nasal spray 2 Spray  2 Spray Both Nostrils DAILY    NIFEdipine ER (PROCARDIA XL) tablet 30 mg  30 mg Oral DAILY    sodium chloride (NS) flush 5-40 mL  5-40 mL IntraVENous Q8H    sodium chloride (NS) flush 5-40 mL  5-40 mL IntraVENous PRN    acetaminophen (TYLENOL) tablet 650 mg  650 mg Oral Q6H PRN    Or    acetaminophen (TYLENOL) suppository 650 mg  650 mg Rectal Q6H PRN    polyethylene glycol (MIRALAX) packet 17 g  17 g Oral DAILY PRN    ondansetron (ZOFRAN ODT) tablet 4 mg  4 mg Oral Q8H PRN    Or    ondansetron (ZOFRAN) injection 4 mg  4 mg IntraVENous Q6H PRN            Lab Review:     Recent Labs     10/25/21  0325 10/24/21  1257   WBC 10.2 13.9*   HGB 11.2* 10.8*   HCT 34.0* 33.3*   * 102*     Recent Labs     10/25/21  0325 10/24/21  1257    140   K 4.5 5.6*   * 107   CO2 18* 21   GLU 98 155*   BUN 98* 103*   CREA 4.72* 5.63*   CA 7.6* 8.4*   MG 2.3  --    PHOS  --  6.1*   ALB 2.0* 2.3*   ALT 16 20     No components found for: [place]

## 2021-10-25 NOTE — ED NOTES
Pt being transferred to [address] [place] [address] Dr. David Marion [facility] [place] by [facility] Pt alert and oriented at transfer.

## 2021-10-25 NOTE — PROGRESS NOTES
Marleni 55 [address] [facility]  YOB: 1950          Assessment & Plan:   CKD 5, stable  LLE cellulitis  HTN  SHPT    Rec:  No acute indication HD  Continue Abx. Resume furosemide in a day or so. RLE with minimal edema. Avoid nephrotoxins. Has f/u with Dr. Felton Donahue in a couple weeks       Subjective:   CC: f/u CKD  HPI: Patient with CKD 5 known to us. He saw Dr. Felton Donahue in August. He has followed with Dr. Meagan Webb at [facility] and is being worked up for transplant there. He is admitted with one week h/o LLE swelling, redness and pain that did not respond to an oral antibiotic. He is being started on IV abx. At home he is on furosemide 40 mg bid and reports good control of edema.    ROS: as above, also no n/v/sob  Current Facility-Administered Medications   Medication Dose Route Frequency    vancomycin (VANCOCIN) 2500 mg in  mL infusion  2,500 mg IntraVENous ONCE    Vancomycin - Pharmacy to Dose by Levels   Other Rx Dosing/Monitoring    apixaban (ELIQUIS) tablet 5 mg  5 mg Oral BID    calcitRIOL (ROCALTROL) capsule 0.25 mcg  0.25 mcg Oral DAILY    carvediloL (COREG) tablet 12.5 mg  12.5 mg Oral BID WITH MEALS    cloNIDine HCL (CATAPRES) tablet 0.2 mg  0.2 mg Oral BID    fluticasone propionate (FLONASE) 50 mcg/actuation nasal spray 2 Spray  2 Spray Both Nostrils DAILY    NIFEdipine ER (PROCARDIA XL) tablet 30 mg  30 mg Oral DAILY    sodium chloride (NS) flush 5-40 mL  5-40 mL IntraVENous Q8H    sodium chloride (NS) flush 5-40 mL  5-40 mL IntraVENous PRN    acetaminophen (TYLENOL) tablet 650 mg  650 mg Oral Q6H PRN    Or    acetaminophen (TYLENOL) suppository 650 mg  650 mg Rectal Q6H PRN    polyethylene glycol (MIRALAX) packet 17 g  17 g Oral DAILY PRN    ondansetron (ZOFRAN ODT) tablet 4 mg  4 mg Oral Q8H PRN    Or    ondansetron (ZOFRAN) injection 4 mg  4 mg IntraVENous Q6H PRN          Objective:     Vitals:  Blood pressure (!) 174/101, pulse 84, temperature 98.1 °F (36.7 °C), resp. rate 18, height 5' 10.98\" (1.803 m), weight 113.3 kg (249 lb 12.5 oz), SpO2 96 %. Temp (24hrs), Av.1 °F (36.7 °C), Min:98.1 °F (36.7 °C), Max:98.2 °F (36.8 °C)      Intake and Output:  No intake/output data recorded. No intake/output data recorded. Physical Exam:               GENERAL ASSESSMENT: NAD  HEENT: Nontraumatic   CHEST: CTA  HEART: S1S2  ABDOMEN: Soft,NT  EXTREMITY: LLE EDEMA, LLE erythema and pain  NEURO: Grossly non focal          ECG/rhythm:    Data Review      No results for input(s): TNIPOC in the last 72 hours. No lab exists for component: ITNL   No results for input(s): CPK, CKMB, TROIQ in the last 72 hours. Recent Labs     10/25/21  0325 10/24/21  1257    140   K 4.5 5.6*   * 107   CO2 18* 21   BUN 98* 103*   CREA 4.72* 5.63*   GLU 98 155*   PHOS  --  6.1*   MG 2.3  --    CA 7.6* 8.4*   ALB 2.0* 2.3*   WBC 10.2 13.9*   HGB 11.2* 10.8*   HCT 34.0* 33.3*   * 102*      No results for input(s): INR, PTP, APTT, INREXT in the last 72 hours. Needs: urine analysis, urine sodium, protein and creatinine  No results found for: JING DENT        : Catrachita Holloway MD  10/25/2021        [facility]:  [email or web address]  Hans [place] office:  [address] [place] [address], [address] [place] [address] [address]  [place], [address] [facility]  Phone: [phone]  Fax :     [phone]    [place] office:  [address] [facility], [address]  Phone - [phone]  Fax - [phone]

## 2021-10-25 NOTE — PROGRESS NOTES
[address] [address] [facility] Pharmacy Progress Note: Antimicrobial Stewardship    Consult for antibiotic dosing of vancomycin by Dr. Joanne Rubio  Indication: SSTI  Day of Therapy: 1    Plan:  Vancomycin therapy:   Start with loading dose of vancomycin 2500 mg IV (25 mg/kg, max 2.5 gm)   Follow with maintenance dose of pharmacy to dose by levels   Dose calculated to approximate a   Target AUC/VILMA of N/A  Trough of <15 mcg/mL. Plan:  Pharmacy to initiate dosing by levels for CrCl less than 30 mL/min per protocol. Patient is not currently receiving HD, but it is a possibility that this could be initiated during this admission (nephrology is consulted). Regardless of plan for RRT, dosing by levels is appropriate per protocol. Normally we would order random level with AM labs tomorrow, but I suspect level would be too high for re-dosing based on degree of SCr elevation. Will plan to place the order for random vancomycin level tomorrow after evaluating nephrology plan and tomorrow serum creatinine. Likely will consider either a 24 or 36 hour level if no HD planned. Will order serum creatinine every other day per protocol as no labs ordered at this time. Pharmacy to follow daily and will make changes to dose and/or frequency based on clinical status. Date Dose & Interval Measured (mcg/mL) Extrapolated (mcg/mL)   ? ? ? ?   ? ? ? ?   ? ? ? ? Other Antimicrobial  (not dosed by pharmacist)   None   Cultures     10/24 - Blood - Pending  10/24 - Blood - Pending  10/24 - Urine - Pending     Serum Creatinine     Lab Results   Component Value Date/Time    Creatinine 4.72 (H) 10/25/2021 03:25 AM       Creatinine Clearance Estimated Creatinine Clearance: 19.2 mL/min (A) (based on SCr of 4.72 mg/dL (H)).      Procalcitonin    Lab Results   Component Value Date/Time    Procalcitonin 0.31 10/24/2021 04:27 PM        Temp   98.1 °F (36.7 °C)    WBC   Lab Results   Component Value Date/Time    WBC 10.2 10/25/2021 03:25 AM       For Antifungals, Metronidazole and Nafcillin: Lab Results   Component Value Date/Time    ALT (SGPT) 16 10/25/2021 03:25 AM    AST (SGOT) 6 (L) 10/25/2021 03:25 AM    Alk.  phosphatase 44 (L) 10/25/2021 03:25 AM    Bilirubin, total 0.3 10/25/2021 03:25 AM         Pharmacist: Signed KASSANDRA GuevaraD

## 2021-10-25 NOTE — PROGRESS NOTES
Physical Therapy Note:    Orders acknowledged, chart reviewed, discussed with RN. PT evaluation deferred. Pt declining participation with PT due to continued severe LLE pain (7/10 at rest and 9/10 with even slight mobility). Pt educated regarding role of PT and PT plan as well as possible benefits of assistive device use to assist with pain management. Will continue to follow and proceed with PT evaluation when appropriate.     Orelia Saint, PT, DPT, Alfred Gross

## 2021-10-25 NOTE — ED NOTES
Patient was provided a hospital bed which would be more comfortable since he was waitng for a long time to get transferred to [facility],

## 2021-10-26 LAB
ALBUMIN SERPL-MCNC: 2.2 G/DL (ref 3.5–5)
ALBUMIN/GLOB SERPL: 0.5 {RATIO} (ref 1.1–2.2)
ALP SERPL-CCNC: 58 U/L (ref 45–117)
ALT SERPL-CCNC: 20 U/L (ref 12–78)
ANION GAP SERPL CALC-SCNC: 8 MMOL/L (ref 5–15)
AST SERPL-CCNC: 9 U/L (ref 15–37)
BASOPHILS # BLD: 0 K/UL (ref 0–0.1)
BASOPHILS NFR BLD: 0 % (ref 0–1)
BILIRUB SERPL-MCNC: 0.6 MG/DL (ref 0.2–1)
BUN SERPL-MCNC: 84 MG/DL (ref 6–20)
BUN/CREAT SERPL: 18 (ref 12–20)
CALCIUM SERPL-MCNC: 8.3 MG/DL (ref 8.5–10.1)
CHLORIDE SERPL-SCNC: 112 MMOL/L (ref 97–108)
CO2 SERPL-SCNC: 18 MMOL/L (ref 21–32)
CREAT SERPL-MCNC: 4.64 MG/DL (ref 0.7–1.3)
CRP SERPL-MCNC: 9.92 MG/DL (ref 0–0.6)
DIFFERENTIAL METHOD BLD: ABNORMAL
EOSINOPHIL # BLD: 0.3 K/UL (ref 0–0.4)
EOSINOPHIL NFR BLD: 3 % (ref 0–7)
ERYTHROCYTE [DISTWIDTH] IN BLOOD BY AUTOMATED COUNT: 15.1 % (ref 11.5–14.5)
GLOBULIN SER CALC-MCNC: 4.6 G/DL (ref 2–4)
GLUCOSE SERPL-MCNC: 108 MG/DL (ref 65–100)
HCT VFR BLD AUTO: 37.7 % (ref 36.6–50.3)
HGB BLD-MCNC: 12.2 G/DL (ref 12.1–17)
IMM GRANULOCYTES # BLD AUTO: 0 K/UL
IMM GRANULOCYTES NFR BLD AUTO: 0 %
LYMPHOCYTES # BLD: 0.5 K/UL (ref 0.8–3.5)
LYMPHOCYTES NFR BLD: 5 % (ref 12–49)
MCH RBC QN AUTO: 28.4 PG (ref 26–34)
MCHC RBC AUTO-ENTMCNC: 32.4 G/DL (ref 30–36.5)
MCV RBC AUTO: 87.7 FL (ref 80–99)
METAMYELOCYTES NFR BLD MANUAL: 2 %
MONOCYTES # BLD: 0.5 K/UL (ref 0–1)
MONOCYTES NFR BLD: 5 % (ref 5–13)
MYELOCYTES NFR BLD MANUAL: 5 %
NEUTS SEG # BLD: 7.2 K/UL (ref 1.8–8)
NEUTS SEG NFR BLD: 80 % (ref 32–75)
NRBC # BLD: 0 K/UL (ref 0–0.01)
NRBC BLD-RTO: 0 PER 100 WBC
PLATELET # BLD AUTO: 128 K/UL (ref 150–400)
PMV BLD AUTO: 12.6 FL (ref 8.9–12.9)
POTASSIUM SERPL-SCNC: 5.2 MMOL/L (ref 3.5–5.1)
PROT SERPL-MCNC: 6.8 G/DL (ref 6.4–8.2)
RBC # BLD AUTO: 4.3 M/UL (ref 4.1–5.7)
RBC MORPH BLD: ABNORMAL
SODIUM SERPL-SCNC: 138 MMOL/L (ref 136–145)
WBC # BLD AUTO: 9 K/UL (ref 4.1–11.1)

## 2021-10-26 PROCEDURE — 74011250636 HC RX REV CODE- 250/636: Performed by: INTERNAL MEDICINE

## 2021-10-26 PROCEDURE — 97165 OT EVAL LOW COMPLEX 30 MIN: CPT

## 2021-10-26 PROCEDURE — 74011000250 HC RX REV CODE- 250: Performed by: INTERNAL MEDICINE

## 2021-10-26 PROCEDURE — 97116 GAIT TRAINING THERAPY: CPT

## 2021-10-26 PROCEDURE — 97161 PT EVAL LOW COMPLEX 20 MIN: CPT

## 2021-10-26 PROCEDURE — 97535 SELF CARE MNGMENT TRAINING: CPT

## 2021-10-26 PROCEDURE — 80053 COMPREHEN METABOLIC PANEL: CPT

## 2021-10-26 PROCEDURE — 36415 COLL VENOUS BLD VENIPUNCTURE: CPT

## 2021-10-26 PROCEDURE — 85025 COMPLETE CBC W/AUTO DIFF WBC: CPT

## 2021-10-26 PROCEDURE — 86140 C-REACTIVE PROTEIN: CPT

## 2021-10-26 PROCEDURE — 74011250637 HC RX REV CODE- 250/637: Performed by: INTERNAL MEDICINE

## 2021-10-26 PROCEDURE — 65270000029 HC RM PRIVATE

## 2021-10-26 RX ORDER — NIFEDIPINE 30 MG/1
60 TABLET, EXTENDED RELEASE ORAL DAILY
Status: DISCONTINUED | OUTPATIENT
Start: 2021-10-27 | End: 2021-10-31 | Stop reason: HOSPADM

## 2021-10-26 RX ADMIN — CARVEDILOL 12.5 MG: 12.5 TABLET, FILM COATED ORAL at 18:38

## 2021-10-26 RX ADMIN — HYDROMORPHONE HYDROCHLORIDE 0.2 MG: 1 INJECTION, SOLUTION INTRAMUSCULAR; INTRAVENOUS; SUBCUTANEOUS at 08:02

## 2021-10-26 RX ADMIN — FLUTICASONE PROPIONATE 2 SPRAY: 50 SPRAY, METERED NASAL at 08:03

## 2021-10-26 RX ADMIN — CEFEPIME HYDROCHLORIDE 2 G: 2 INJECTION, POWDER, FOR SOLUTION INTRAVENOUS at 11:39

## 2021-10-26 RX ADMIN — APIXABAN 5 MG: 5 TABLET, FILM COATED ORAL at 08:02

## 2021-10-26 RX ADMIN — NIFEDIPINE 30 MG: 30 TABLET, FILM COATED, EXTENDED RELEASE ORAL at 08:02

## 2021-10-26 RX ADMIN — CLONIDINE HYDROCHLORIDE 0.2 MG: 0.1 TABLET ORAL at 18:38

## 2021-10-26 RX ADMIN — HYDROMORPHONE HYDROCHLORIDE 0.2 MG: 1 INJECTION, SOLUTION INTRAMUSCULAR; INTRAVENOUS; SUBCUTANEOUS at 02:06

## 2021-10-26 RX ADMIN — Medication 10 ML: at 02:07

## 2021-10-26 RX ADMIN — CARVEDILOL 12.5 MG: 12.5 TABLET, FILM COATED ORAL at 08:02

## 2021-10-26 RX ADMIN — APIXABAN 5 MG: 5 TABLET, FILM COATED ORAL at 18:38

## 2021-10-26 RX ADMIN — HYDROMORPHONE HYDROCHLORIDE 0.2 MG: 1 INJECTION, SOLUTION INTRAMUSCULAR; INTRAVENOUS; SUBCUTANEOUS at 18:39

## 2021-10-26 RX ADMIN — CLONIDINE HYDROCHLORIDE 0.2 MG: 0.1 TABLET ORAL at 08:02

## 2021-10-26 RX ADMIN — CALCITRIOL CAPSULES 0.25 MCG 0.25 MCG: 0.25 CAPSULE ORAL at 08:02

## 2021-10-26 NOTE — PROGRESS NOTES
Problem: Falls - Risk of  Goal: *Absence of Falls  Description: Document Lilliana Callesuro Fall Risk and appropriate interventions in the flowsheet.   Outcome: Progressing Towards Goal  Note: Fall Risk Interventions:                                Problem: Patient Education: Go to Patient Education Activity  Goal: Patient/Family Education  Outcome: Progressing Towards Goal

## 2021-10-26 NOTE — PROGRESS NOTES
Bedside, Verbal and Written shift change report given to [address] [place] [address] (oncoming nurse) by Riaz Guzman (offgoing nurse). Report included the following information SBAR, Kardex, ED Summary, Procedure Summary, Intake/Output, MAR, Recent Results and Med Rec Status.

## 2021-10-26 NOTE — PROGRESS NOTES
Nathanael Bharath [facility]  380 [facility], [address] [place] [address]  [phone]      Medical Progress Note      NAME: Bharath Hammond   :  1950  MRM:  [id number]    Date of service: 10/26/2021  8:27 AM       Assessment and Plan:   1. LLE Cellulitis/ leukocytosis/ elevated CRP to 11. 5. was on cefepime and doxy after failing clinda as an outpt. U/S without clots. Currently on vanc. Will add cefepime for gram neg coverage. Elevate leg            2.  CKD5: Still making urine. Not volume overloaded. No indication for HD at this time. Today he says he agrees to HD. Has followed with Dr. Charisse Wagner, will establish with Dr. Tamara Wheeler next week. Renally dose meds, avoid nephrotoxins. Renal evaluation appreciated.      3. HTN: Cont home nifedipine, clonidine, carvedilol. BP not well controlled. Increased nifedipine dose. 4.  Afib: Cont carvedilol, apix     5. VIK: CPAP          Subjective:     Chief Complaint[de-identified] Patient was seen and examined as a follow up for cellulitis. Chart was reviewed. c/o leg pain     ROS:  (bold if positive, if negative)    Tolerating PT  Tolerating Diet        Objective:     Last 24hrs VS reviewed since prior progress note.  Most recent are:    Visit Vitals  BP (!) 171/98 (BP 1 Location: Right upper arm, BP Patient Position: At rest)   Pulse 75   Temp 98 °F (36.7 °C)   Resp 18   Ht 5' 10.98\" (1.803 m)   Wt 113.3 kg (249 lb 12.5 oz)   SpO2 96%   BMI 34.85 kg/m²     SpO2 Readings from Last 6 Encounters:   10/26/21 96%   18 94%            Intake/Output Summary (Last 24 hours) at 10/26/2021 0827  Last data filed at 10/26/2021 0218  Gross per 24 hour   Intake    Output 650 ml   Net -650 ml        Physical Exam:    Gen:  Well-developed, well-nourished, in no acute distress  HEENT:  Pink conjunctivae, PERRL, hearing intact to voice, moist mucous membranes  Neck:  Supple, without masses, thyroid non-tender  Resp:  No accessory muscle use, clear breath sounds without wheezes rales or rhonchi  Card:  No murmurs, normal S1, S2 without thrills, bruits or peripheral edema  Abd:  Soft, non-tender, non-distended, normoactive bowel sounds are present, no palpable organomegaly and no detectable hernias  Lymph:  No cervical or inguinal adenopathy  Musc:  RT leg hyperemia, tender.    Skin:  No rashes or ulcers, skin turgor is good  Neuro:  Cranial nerves are grossly intact, no focal motor weakness, follows commands appropriately  Psych:  Good insight, oriented to person, place and time, alert  __________________________________________________________________  Medications Reviewed: (see below)  Medications:     Current Facility-Administered Medications   Medication Dose Route Frequency    Vancomycin - Pharmacy to Dose by Levels   Other Rx Dosing/Monitoring    HYDROmorphone (DILAUDID) syringe 0.2 mg  0.2 mg IntraVENous Q3H PRN    apixaban (ELIQUIS) tablet 5 mg  5 mg Oral BID    calcitRIOL (ROCALTROL) capsule 0.25 mcg  0.25 mcg Oral DAILY    carvediloL (COREG) tablet 12.5 mg  12.5 mg Oral BID WITH MEALS    cloNIDine HCL (CATAPRES) tablet 0.2 mg  0.2 mg Oral BID    fluticasone propionate (FLONASE) 50 mcg/actuation nasal spray 2 Spray  2 Spray Both Nostrils DAILY    NIFEdipine ER (PROCARDIA XL) tablet 30 mg  30 mg Oral DAILY    sodium chloride (NS) flush 5-40 mL  5-40 mL IntraVENous Q8H    sodium chloride (NS) flush 5-40 mL  5-40 mL IntraVENous PRN    acetaminophen (TYLENOL) tablet 650 mg  650 mg Oral Q6H PRN    Or    acetaminophen (TYLENOL) suppository 650 mg  650 mg Rectal Q6H PRN    polyethylene glycol (MIRALAX) packet 17 g  17 g Oral DAILY PRN    ondansetron (ZOFRAN ODT) tablet 4 mg  4 mg Oral Q8H PRN    Or    ondansetron (ZOFRAN) injection 4 mg  4 mg IntraVENous Q6H PRN        Lab Data Reviewed: (see below)  Lab Review:     Recent Labs     10/26/21  0156 10/25/21  9564 10/24/21  1257   WBC 9.0 10.2 13.9*   HGB 12.2 11.2* 10.8*   HCT 37.7 34.0* 33.3*   * 118* 102* Recent Labs     10/26/21  0156 10/25/21  0325 10/24/21  1257    141 140   K 5.2* 4.5 5.6*   * 110* 107   CO2 18* 18* 21   * 98 155*   BUN 84* 98* 103*   CREA 4.64* 4.72* 5.63*   CA 8.3* 7.6* 8.4*   MG  --  2.3  --    PHOS  --   --  6.1*   ALB 2.2* 2.0* 2.3*   TBILI 0.6 0.3 0.3   ALT 20 16 20     No results found for: GLUCPOC  No results for input(s): PH, PCO2, PO2, HCO3, FIO2 in the last 72 hours. No results for input(s): INR, INREXT in the last 72 hours.   All Micro Results     Procedure Component Value Units Date/Time    CULTURE, BLOOD, PERIPHERAL [[id number]] Collected: 10/24/21 1637    Order Status: Completed Specimen: Blood Updated: 10/26/21 0529     Special Requests: NO SPECIAL REQUESTS        Culture result: NO GROWTH 2 DAYS       CULTURE, BLOOD, PERIPHERAL [[id number]] Collected: 10/24/21 1628    Order Status: Completed Specimen: Blood Updated: 10/26/21 0529     Special Requests: NO SPECIAL REQUESTS        Culture result: NO GROWTH 2 DAYS       CULTURE, URINE [[id number]] Collected: 10/24/21 1807    Order Status: Completed Specimen: Urine from Clean catch Updated: 10/25/21 1845     Special Requests: NO SPECIAL REQUESTS        Culture result: No growth (<1,000 CFU/ML)       RESPIRATORY VIRUS PANEL W/COVID-19, PCR [[id number]] Collected: 10/25/21 0325    Order Status: Completed Specimen: Nasopharyngeal Updated: 10/25/21 0847     Adenovirus Not detected        Coronavirus 229E Not detected        Coronavirus HKU1 Not detected        Coronavirus CVNL63 Not detected        Coronavirus OC43 Not detected        SARS-CoV-2, PCR Not detected        Metapneumovirus Not detected        Rhinovirus and Enterovirus Not detected        Influenza A Not detected        Influenza A, subtype H1 Not detected        Influenza A, subtype H3 Not detected        INFLUENZA A H1N1 PCR Not detected        Influenza B Not detected        Parainfluenza 1 Not detected        Parainfluenza 2 Not detected Parainfluenza 3 Not detected        Parainfluenza virus 4 Not detected        RSV by PCR Not detected        B. parapertussis, PCR Not detected        Bordetella pertussis - PCR Not detected        Chlamydophila pneumoniae DNA, QL, PCR Not detected        Mycoplasma pneumoniae DNA, QL, PCR Not detected             I have reviewed notes of prior 24hr. Other pertinent lab: Total time spent with patient: 28 I personally reviewed chart, notes, data and current medications in the medical record. I have personally examined and treated the patient at bedside during this period.                  Care Plan discussed with: Patient, Nursing Staff and >50% of time spent in counseling and coordination of care    Discussed:  Care Plan    Prophylaxis:  eliquis    Disposition:  Home w/Family           ___________________________________________________    Attending Physician: Logan Monroe MD

## 2021-10-26 NOTE — PROGRESS NOTES
[facility] Dosing Services: Antimicrobial Stewardship Daily Doc    Consult for antibiotic dosing of vancomycin by Dr. Magdalena Miguel  Indication: SSTI - LLE cellulitis  Day of Therapy: 2    Ht Readings from Last 1 Encounters:   10/24/21 180.3 cm (70.98\")        Wt Readings from Last 1 Encounters:   10/24/21 113.3 kg (249 lb 12.5 oz)        Vancomycin therapy:  Current maintenance dose: Pharmacy to dose vancomycin by levels    Dose calculated to approximate a           a. Target AUC/VILMA of N/A          b. Trough of <15 mcg/mL     Assessment/Plan:  SCr likely at baseline now at 4.64 mg/dL (was elevated at 5.63 mg/dL on admission), no leukocytosis, procalcitonin on admission = 0.31, afebrile, no growth on cultures so far, minimal urine output documentation  Continue dosing by levels for now. No plan for HD at this time. I suspect a 24 hour level will still be high after receiving 25 mg/kg dose per protocol, so will order a ~36 hour level with AM labs tomorrow 10/27 (could be slightly more than 36 hours depending on when AM labs are drawn)  SCr every other day per protocol  Dose administration notes:   Doses given appropriately as scheduled    Date Dose & Interval Measured (mcg/mL) Extrapolated (mcg/mL)   ? ? ? ?   ? ? ? ?   ? ? ? ? Other Antimicrobial   (not dosed by pharmacist) None   Cultures 10/24 - Blood - NGTD x 2 days  10/24 - Blood - NGTD x 2 days  10/24 - Urine - No growth   Serum Creatinine Lab Results   Component Value Date/Time    Creatinine 4.64 (H) 10/26/2021 01:56 AM         Creatinine Clearance Estimated Creatinine Clearance: 19.5 mL/min (A) (based on SCr of 4.64 mg/dL (H)).      Temp Temp: 98 °F (36.7 °C)       WBC Lab Results   Component Value Date/Time    WBC 9.0 10/26/2021 01:56 AM        Procalcitonin Lab Results   Component Value Date/Time    Procalcitonin 0.31 10/24/2021 04:27 PM        For Antifungals, Metronidazole and Nafcillin: Lab Results   Component Value Date/Time    ALT (SGPT) 20 10/26/2021 01:56 AM    AST (SGOT) 9 (L) 10/26/2021 01:56 AM    Alk.  phosphatase 58 10/26/2021 01:56 AM    Bilirubin, total 0.6 10/26/2021 01:56 AM        Pharmacist Shruthi Parnell, PHARMD

## 2021-10-26 NOTE — PROGRESS NOTES
OCCUPATIONAL THERAPY EVALUATION/DISCHARGE  Patient: Emre Carrera (72 y.o. male)  Date: 10/26/2021  Primary Diagnosis: ESRD (end stage renal disease) on dialysis ([facility] [address] [N18.6, Z99.2]  Cellulitis [L03.90]       Precautions: fall       ASSESSMENT  Based on the objective data described below, the patient presents with hospital admission secondary to ESRD and cellulitis. Patient with LLE pain and swelling, worse with mobility. Patient reports having been up to bathroom multiple times and notes increased pain. Patient provided with RW by PT and able to perform task with SBA. Patient with difficulty managing distal LE dressing, but reports will have assistance of spouse for tasks as needed. Patient left in chair at end of session, wife present. Patient without need for further acute care OT services. Functional Outcome Measure: The patient scored 75/100 on the Barthel INDex  outcome measure. Other factors to consider for discharge:      PLAN :  Recommend with staff: OOB for meals, commode transfers     Recommendation for discharge: (in order for the patient to meet his/her long term goals)  No skilled occupational therapy/ follow up rehabilitation needs identified at this time. This discharge recommendation:  Has not yet been discussed the attending provider and/or case management    IF patient discharges home will need the following DME: none       SUBJECTIVE:   Patient stated I was doing it earlier with no walker. It hurt but I did it.     OBJECTIVE DATA SUMMARY:   HISTORY:   Past Medical History:   Diagnosis Date    Atrial fibrillation ([facility] [address]     Cervical disc disorder     C5,C6 fractured disc and lumbar disc slipped    Chronic atrial fibrillation (HCC)     [facility]    ESRD (end stage renal disease) on dialysis [facility])     [facility]    Heart failure ([facility] [address]     Hypertension     VIK on CPAP     Pulmonary hypertension ([facility] [address]     Ramon NP at [facility]     Past Surgical History:   Procedure Laterality Date    HX HEENT      right sinus polyp removal    HX ORTHOPAEDIC  2000    left ACL replacement    HX OTHER SURGICAL      cardioversion for AFIB    HX UROLOGICAL  08/2015    renal gland removal left       Prior Level of Function/Environment/Context: independent  Expanded or extensive additional review of patient history:   Home Situation  Home Environment: Apartment  # Steps to Enter: 0 (elevator access)  One/Two Story Residence: One story  Living Alone: No  Support Systems: Spouse/Significant Other  Patient Expects to be Discharged to[de-identified] Apartment  Current DME Used/Available at Home: None  Tub or Shower Type: Tub/Shower combination    Hand dominance: Right    EXAMINATION OF PERFORMANCE DEFICITS:  Cognitive/Behavioral Status:  Neurologic State: Alert  Orientation Level: Oriented X4  Cognition: Appropriate decision making; Follows commands  Perception: Appears intact  Perseveration: No perseveration noted  Safety/Judgement: Awareness of environment    Skin: intact as seen    Edema: LLE    Hearing: Auditory  Auditory Impairment: None    Vision/Perceptual:                                     Range of Motion:  AROM: Within functional limits                         Strength:  Strength:  Within functional limits                Coordination:  Coordination: Within functional limits            Tone & Sensation:  Tone: Normal  Sensation: Intact                      Balance:  Sitting: Intact  Standing: Impaired  Standing - Static: Good;Constant support  Standing - Dynamic : Fair;Constant support    Functional Mobility and Transfers for ADLs:  Bed Mobility:  Rolling: Independent  Supine to Sit: Supervision  Scooting: Supervision    Transfers:  Sit to Stand: Stand-by assistance  Stand to Sit: Stand-by assistance  Bed to Chair: Stand-by assistance  Toilet Transfer : Stand-by assistance  Assistive Device : Walker, rolling    ADL Assessment:  Feeding: Independent    Oral Facial Hygiene/Grooming: Setup    Bathing: Minimum assistance    Upper Body Dressing: Supervision    Lower Body Dressing: Minimum assistance    Toileting: Stand by assistance                ADL Intervention and task modifications:                                     Cognitive Retraining  Safety/Judgement: Awareness of environment    Therapeutic Exercise:     Functional Measure:    Barthel Index:  Bathin  Bladder: 10  Bowels: 10  Groomin  Dressing: 10  Feeding: 10  Mobility: 5  Stairs: 5  Toilet Use: 10  Transfer (Bed to Chair and Back): 10  Total: 75/100      The Barthel ADL Index: Guidelines  1. The index should be used as a record of what a patient does, not as a record of what a patient could do. 2. The main aim is to establish degree of independence from any help, physical or verbal, however minor and for whatever reason. 3. The need for supervision renders the patient not independent. 4. A patient's performance should be established using the best available evidence. Asking the patient, friends/relatives and nurses are the usual sources, but direct observation and common sense are also important. However direct testing is not needed. 5. Usually the patient's performance over the preceding 24-48 hours is important, but occasionally longer periods will be relevant. 6. Middle categories imply that the patient supplies over 50 per cent of the effort. 7. Use of aids to be independent is allowed. Score Interpretation (from [address] [address])    Independent   60-79 Minimally independent   40-59 Partially dependent   20-39 Very dependent   <20 Totally dependent     -Benito Darby., Barthel, D.W. (1965). Functional evaluation: the Barthel Index. [facility] ([address] (1997). The Barthel activities of daily living index: self-reporting versus actual performance in the old (> or = 75 years). Journal of [address] [place] [address] 45(7), [facility], J.J.NAKIA.F, Cathy Zapata., Toy Copeland. (1999). Measuring the change in disability after inpatient rehabilitation; comparison of the responsiveness of the Barthel Index and Functional [place] Measure. Journal of Neurology, Neurosurgery, and Psychiatry, 66(4), 485-584. MAXIMINO Earl, TRACEE Alex, & Trihn Campos M.A. (2004) Assessment of post-stroke quality of life in cost-effectiveness studies: The usefulness of the Barthel Index and the EuroQoL-5D. Quality of Life Research, 15, 526-44         Occupational Therapy Evaluation Charge Determination   History Examination Decision-Making   LOW Complexity : Brief history review  LOW Complexity : 1-3 performance deficits relating to physical, cognitive , or psychosocial skils that result in activity limitations and / or participation restrictions  LOW Complexity : No comorbidities that affect functional and no verbal or physical assistance needed to complete eval tasks       Based on the above components, the patient evaluation is determined to be of the following complexity level: LOW   Pain Rating:      Activity Tolerance:   Good    After treatment patient left in no apparent distress:    Sitting in chair and Call bell within reach    COMMUNICATION/EDUCATION:   The patients plan of care was discussed with: Physical therapist and Registered nurse.      Thank you for this referral.  CHEYENNE Balderrama/ZAC  Time Calculation: 24 mins

## 2021-10-26 NOTE — PROGRESS NOTES
10/26/21  10:36 AM    CM reviewed EMR and met with patient and spouse to complete the initial evaluation. Confirmed charted demographics    Reason for Admission:  ESRD                     RUR Score:    12%                 Plan for utilizing home health:    Has not used home health in the past- [facility]ends outpatient PT in [facility]      PCP: First and Last name:  Frank Willson MD     Name of Practice: [facility]   Are you a current patient: Yes/No: Yes   Approximate date of last visit: within the last three months   Can you participate in a virtual visit with your PCP:                     Current Advanced Directive/Advance Care Plan: Full Code      Healthcare Decision Maker:   Click here to complete [address] including selection of the Healthcare Decision Maker Relationship (ie \"Primary\")  Spouse- Luz Keys:  [phone]                         Transition of Care Plan:                    Home:  Resides in an apartment with an elevator  DME: CPAP machine  PTA:  Independent with ADL's and iADLs- drives, cooks, cleans    1). Patient admitted for medical management  2). Nephrology consulted- outpatient follow up  3). PT consulted- deferred today due to leg pain  4). Spouse will transport at dc  5). CM will follow for dc needs    MICKY Hannah  Care Manager    Care Management Interventions  PCP Verified by CM: Yes (last saw within the three months)  Mode of Transport at Discharge:  Other (see comment) (Spouse will transport at [employer])  Transition of Care Consult (CM Consult): Discharge Planning  Discharge Durable Medical Equipment: No  Physical Therapy Consult: Yes  Occupational Therapy Consult: Yes  Speech Therapy Consult: No  Support Systems: Spouse/Significant Other  Discharge Location  Discharge Placement: Home with outpatient services

## 2021-10-26 NOTE — PROGRESS NOTES
Problem: Mobility Impaired (Adult and Pediatric)  Goal: *Acute Goals and Plan of Care (Insert Text)  Description: FUNCTIONAL STATUS PRIOR TO ADMISSION: Patient was independent and active without use of DME.    HOME SUPPORT PRIOR TO ADMISSION: The patient lived with his wife but did not require assist.    Physical Therapy Goals  Initiated 10/26/2021  1. Patient will move from supine to sit and sit to supine , scoot up and down, and roll side to side in bed with independence within 7 day(s). 2.  Patient will transfer from bed to chair and chair to bed with modified independence using the least restrictive device within 7 day(s). 3.  Patient will perform sit to stand with modified independence within 7 day(s). 4.  Patient will ambulate with modified independence for 150 feet with the least restrictive device within 7 day(s). 5.  Patient will ascend/descend 2 stairs with 2 handrail(s) with modified independence within 7 day(s). Outcome: Not Met   PHYSICAL THERAPY EVALUATION  Patient: Celena Calderon (73 y.o. male)  Date: 10/26/2021  Primary Diagnosis: ESRD (end stage renal disease) on dialysis ([facility] [address] [N18.6, Z99.2]  Cellulitis [L03.90]        Precautions:          ASSESSMENT  Based on the objective data described below, the patient presents with LLE pain and swelling, antalgic gait and decreased standing and activity tolerance limiting his functional mobility from baseline level s/p admission for LLE cellulitis. Patient moving well in bed but slow, reports being up ad carlos enrique to bathroom but gait painful. Instructed patient in safe RW technique, improved antalgia noted with proper sequencing and patient steady without safety concerns. At baseline patient is fully indep and active, plays in a band. Will benefit from 1-2 more visits to work on [facility] training more and improving endurance. RW order placed for home use. Current Level of Function Impacting Discharge (mobility/balance):  SBA for transfers and gait with RW    Functional Outcome Measure: The patient scored Total: 75/100 on the Barthel Index which is indicative of 25% impaired ability to care for basic self needs/dependency on others. Other factors to consider for discharge:      Patient will benefit from skilled therapy intervention to address the above noted impairments. PLAN :  Recommendations and Planned Interventions: bed mobility training, transfer training, gait training, therapeutic exercises, neuromuscular re-education, patient and family training/education, and therapeutic activities      Frequency/Duration: Patient will be followed by physical therapy:  2 times a week to address goals. Recommendation for discharge: (in order for the patient to meet his/her long term goals)  No skilled physical therapy/ follow up rehabilitation needs identified at this time. This discharge recommendation:  Has been made in collaboration with the attending provider and/or case management    IF patient discharges home will need the following DME: rolling walker         SUBJECTIVE:   Patient stated I've never had anything like this before.     OBJECTIVE DATA SUMMARY:   HISTORY:    Past Medical History:   Diagnosis Date    Atrial fibrillation ([facility] [address]     Cervical disc disorder     C5,C6 fractured disc and lumbar disc slipped    Chronic atrial fibrillation (HCC)     [facility]    ESRD (end stage renal disease) on dialysis ([facility] [address]     Dr [facility]iliya Mejia with [facility]    Heart failure ([facility] [address]     Hypertension     VIK on CPAP     Pulmonary hypertension ([facility] [address]     Ramon NP at [facility]     Past Surgical History:   Procedure Laterality Date    HX HEENT      right sinus polyp removal    HX ORTHOPAEDIC  2000    left ACL replacement    HX OTHER SURGICAL      cardioversion for AFIB    HX UROLOGICAL  08/2015    renal gland removal left       Personal factors and/or comorbidities impacting plan of care:     Home Situation  Home Environment: Apartment  # Steps to Enter: 0 (elevator access)  One/Two Story Residence: One story  Living Alone: No  Support Systems: Spouse/Significant Other  Patient Expects to be Discharged to[de-identified] Apartment  Current DME Used/Available at Home: None  Tub or Shower Type: Tub/Shower combination    EXAMINATION/PRESENTATION/DECISION MAKING:   Critical Behavior:  Neurologic State: Alert  Orientation Level: Oriented X4  Cognition: Appropriate decision making, Follows commands  Safety/Judgement: Awareness of environment  Hearing: Auditory  Auditory Impairment: None    Range Of Motion:  AROM: Within functional limits                       Strength:    Strength: Within functional limits                    Tone & Sensation:   Tone: Normal              Sensation: Intact               Coordination:  Coordination: Within functional limits  Vision:      Functional Mobility:  Bed Mobility:  Rolling: Independent  Supine to Sit: Supervision     Scooting: Supervision  Transfers:  Sit to Stand: Stand-by assistance  Stand to Sit: Stand-by assistance        Bed to Chair: Stand-by assistance              Balance:   Sitting: Intact  Standing: Impaired  Standing - Static: Good;Constant support  Standing - Dynamic : Fair;Constant support  Ambulation/Gait Training:  Distance (ft): 100 Feet (ft)  Assistive Device: Gait belt;Walker, rolling  Ambulation - Level of Assistance: Stand-by assistance        Gait Abnormalities: Antalgic        Base of Support: Shift to right     Speed/Elizabeth: Pace decreased (<100 feet/min)  Step Length: Right shortened;Left shortened                  Functional Measure:  Barthel Index:    Bathin  Bladder: 10  Bowels: 10  Groomin  Dressing: 10  Feeding: 10  Mobility: 5  Stairs: 5  Toilet Use: 10  Transfer (Bed to Chair and Back): 10  Total: 75/100       The Barthel ADL Index: Guidelines  1. The index should be used as a record of what a patient does, not as a record of what a patient could do.   2. The main aim is to establish degree of independence from any help, physical or verbal, however minor and for whatever reason. 3. The need for supervision renders the patient not independent. 4. A patient's performance should be established using the best available evidence. Asking the patient, friends/relatives and nurses are the usual sources, but direct observation and common sense are also important. However direct testing is not needed. 5. Usually the patient's performance over the preceding 24-48 hours is important, but occasionally longer periods will be relevant. 6. Middle categories imply that the patient supplies over 50 per cent of the effort. 7. Use of aids to be independent is allowed. Deborah Stern., BarthelLILO. (4629). Functional evaluation: the Barthel Index. [facility] (14)2. DAVEY Andino, Dequan Daley., Chantelle Russell, [place], [facility] (1999). Measuring the change indisability after inpatient rehabilitation; comparison of the responsiveness of the Barthel Index and Functional [place] Measure. Journal of Neurology, Neurosurgery, and Psychiatry, 66(4), 355-645. Alicia Mares, N.J.A, TRACEE Alex, & Gonzalo Pozo, MCortneyA. (2004.) Assessment of post-stroke quality of life in cost-effectiveness studies: The usefulness of the Barthel Index and the EuroQoL-5D.  Quality of Life Research, 15, 572-04        Physical Therapy Evaluation Charge Determination   History Examination Presentation Decision-Making   HIGH Complexity :3+ comorbidities / personal factors will impact the outcome/ POC  MEDIUM Complexity : 3 Standardized tests and measures addressing body structure, function, activity limitation and / or participation in recreation  MEDIUM Complexity : Evolving with changing characteristics  Other outcome measures Barthel 75  LOW       Based on the above components, the patient evaluation is determined to be of the following complexity level: LOW     Pain Rating:  3-4/10 LLE    Activity Tolerance:   Fair and limited by pain    After treatment patient left in no apparent distress:   Sitting in chair, Call bell within reach, and Caregiver / family present    COMMUNICATION/EDUCATION:   The patients plan of care was discussed with: Occupational therapist and Registered nurse. Fall prevention education was provided and the patient/caregiver indicated understanding. and Patient/family agree to work toward stated goals and plan of care.     Thank you for this referral.  Nkechi Ko, PT   Time Calculation: 24 mins

## 2021-10-26 NOTE — PROGRESS NOTES
Marleni 55 [address] [facility]  YOB: 1950          Assessment & Plan:   CKD 5, stable  LLE cellulitis, no improvement yet  HTN  SHPT    Rec:  No acute indication HD  Adding cefepime per Dr. Minesh Frost diet  Avoid nephrotoxins. Has f/u with Dr. Marcelle Agustin in a couple weeks       Subjective:   CC: f/u CKD  HPI: Renal function stable. K minimally high. No improvement in cellulitis, abx being broadened. ROS: no n/v/sob  Current Facility-Administered Medications   Medication Dose Route Frequency    [START ON 10/27/2021] NIFEdipine ER (PROCARDIA XL) tablet 60 mg  60 mg Oral DAILY    [START ON 10/27/2021] Vancomycin - Please draw random level with AM labs on 10/27. Thanks! Other ONCE    cefepime (MAXIPIME) 2 g in sterile water (preservative free) 10 mL IV syringe  2 g IntraVENous Q24H    Vancomycin - Pharmacy to Dose by Levels   Other Rx Dosing/Monitoring    HYDROmorphone (DILAUDID) syringe 0.2 mg  0.2 mg IntraVENous Q3H PRN    apixaban (ELIQUIS) tablet 5 mg  5 mg Oral BID    calcitRIOL (ROCALTROL) capsule 0.25 mcg  0.25 mcg Oral DAILY    carvediloL (COREG) tablet 12.5 mg  12.5 mg Oral BID WITH MEALS    cloNIDine HCL (CATAPRES) tablet 0.2 mg  0.2 mg Oral BID    fluticasone propionate (FLONASE) 50 mcg/actuation nasal spray 2 Spray  2 Spray Both Nostrils DAILY    sodium chloride (NS) flush 5-40 mL  5-40 mL IntraVENous Q8H    sodium chloride (NS) flush 5-40 mL  5-40 mL IntraVENous PRN    acetaminophen (TYLENOL) tablet 650 mg  650 mg Oral Q6H PRN    Or    acetaminophen (TYLENOL) suppository 650 mg  650 mg Rectal Q6H PRN    polyethylene glycol (MIRALAX) packet 17 g  17 g Oral DAILY PRN    ondansetron (ZOFRAN ODT) tablet 4 mg  4 mg Oral Q8H PRN    Or    ondansetron (ZOFRAN) injection 4 mg  4 mg IntraVENous Q6H PRN          Objective:     Vitals:  Blood pressure (!) 141/86, pulse 82, temperature 98 °F (36.7 °C), resp.  rate 18, height 5' 10.98\" (1.803 m), weight 113.3 kg (249 lb 12.5 oz), SpO2 97 %. Temp (24hrs), Av.5 °F (36.9 °C), Min:98 °F (36.7 °C), Max:99 °F (37.2 °C)      Intake and Output:  No intake/output data recorded. 10/24 1901 - 10/26 0700  In: -   Out: 650 [Urine:650]    Physical Exam:               GENERAL ASSESSMENT: NAD  HEENT: Nontraumatic   CHEST: CTA  HEART: S1S2  ABDOMEN: Soft,NT  EXTREMITY: LLE EDEMA, LLE erythema and pain  NEURO: Grossly non focal          ECG/rhythm:    Data Review      No results for input(s): TNIPOC in the last 72 hours. No lab exists for component: ITNL   No results for input(s): CPK, CKMB, TROIQ in the last 72 hours. Recent Labs     10/26/21  0156 10/25/21  0325 10/24/21  1257    141 140   K 5.2* 4.5 5.6*   * 110* 107   CO2 18* 18* 21   BUN 84* 98* 103*   CREA 4.64* 4.72* 5.63*   * 98 155*   PHOS  --   --  6.1*   MG  --  2.3  --    CA 8.3* 7.6* 8.4*   ALB 2.2* 2.0* 2.3*   WBC 9.0 10.2 13.9*   HGB 12.2 11.2* 10.8*   HCT 37.7 34.0* 33.3*   * 118* 102*      No results for input(s): INR, PTP, APTT, INREXT, INREXT in the last 72 hours. Needs: urine analysis, urine sodium, protein and creatinine  No results found for: JING DENT        : Margo Huang MD  10/26/2021        [facility]:  [email or web address]  Farrah King office:  [address] [place] [address], [address] [place] [address] [address]  [place], [address] [place] [address]  Phone: [phone]  Fax :     [phone]    [place] office:  [address] [facility]  [place], [address]  Phone - [phone]  Fax - [phone]

## 2021-10-27 ENCOUNTER — APPOINTMENT (OUTPATIENT)
Dept: CT IMAGING | Age: 71
DRG: 603 | End: 2021-10-27
Attending: INTERNAL MEDICINE
Payer: MEDICARE

## 2021-10-27 ENCOUNTER — APPOINTMENT (OUTPATIENT)
Dept: VASCULAR SURGERY | Age: 71
DRG: 603 | End: 2021-10-27
Attending: NURSE PRACTITIONER
Payer: MEDICARE

## 2021-10-27 LAB
ANION GAP SERPL CALC-SCNC: 9 MMOL/L (ref 5–15)
BASOPHILS # BLD: 0 K/UL (ref 0–0.1)
BASOPHILS NFR BLD: 0 % (ref 0–1)
BUN SERPL-MCNC: 80 MG/DL (ref 6–20)
BUN/CREAT SERPL: 18 (ref 12–20)
CALCIUM SERPL-MCNC: 8.3 MG/DL (ref 8.5–10.1)
CHLORIDE SERPL-SCNC: 111 MMOL/L (ref 97–108)
CO2 SERPL-SCNC: 18 MMOL/L (ref 21–32)
CREAT SERPL-MCNC: 4.47 MG/DL (ref 0.7–1.3)
DIFFERENTIAL METHOD BLD: ABNORMAL
EOSINOPHIL # BLD: 0 K/UL (ref 0–0.4)
EOSINOPHIL NFR BLD: 0 % (ref 0–7)
ERYTHROCYTE [DISTWIDTH] IN BLOOD BY AUTOMATED COUNT: 15 % (ref 11.5–14.5)
GLUCOSE SERPL-MCNC: 133 MG/DL (ref 65–100)
HCT VFR BLD AUTO: 36.4 % (ref 36.6–50.3)
HGB BLD-MCNC: 11.8 G/DL (ref 12.1–17)
IMM GRANULOCYTES # BLD AUTO: 0 K/UL
IMM GRANULOCYTES NFR BLD AUTO: 0 %
LYMPHOCYTES # BLD: 0.6 K/UL (ref 0.8–3.5)
LYMPHOCYTES NFR BLD: 8 % (ref 12–49)
MCH RBC QN AUTO: 28.6 PG (ref 26–34)
MCHC RBC AUTO-ENTMCNC: 32.4 G/DL (ref 30–36.5)
MCV RBC AUTO: 88.1 FL (ref 80–99)
MONOCYTES # BLD: 0.4 K/UL (ref 0–1)
MONOCYTES NFR BLD: 5 % (ref 5–13)
NEUTS SEG # BLD: 6.9 K/UL (ref 1.8–8)
NEUTS SEG NFR BLD: 87 % (ref 32–75)
NRBC # BLD: 0 K/UL (ref 0–0.01)
NRBC BLD-RTO: 0 PER 100 WBC
PLATELET # BLD AUTO: 106 K/UL (ref 150–400)
PMV BLD AUTO: 12.3 FL (ref 8.9–12.9)
POTASSIUM SERPL-SCNC: 5 MMOL/L (ref 3.5–5.1)
RBC # BLD AUTO: 4.13 M/UL (ref 4.1–5.7)
RBC MORPH BLD: ABNORMAL
SODIUM SERPL-SCNC: 138 MMOL/L (ref 136–145)
VANCOMYCIN SERPL-MCNC: 14.2 UG/ML
WBC # BLD AUTO: 7.9 K/UL (ref 4.1–11.1)

## 2021-10-27 PROCEDURE — 74011000250 HC RX REV CODE- 250: Performed by: INTERNAL MEDICINE

## 2021-10-27 PROCEDURE — 74011250636 HC RX REV CODE- 250/636: Performed by: INTERNAL MEDICINE

## 2021-10-27 PROCEDURE — 99223 1ST HOSP IP/OBS HIGH 75: CPT | Performed by: INTERNAL MEDICINE

## 2021-10-27 PROCEDURE — 73700 CT LOWER EXTREMITY W/O DYE: CPT

## 2021-10-27 PROCEDURE — 80202 ASSAY OF VANCOMYCIN: CPT

## 2021-10-27 PROCEDURE — 65270000029 HC RM PRIVATE

## 2021-10-27 PROCEDURE — 93922 UPR/L XTREMITY ART 2 LEVELS: CPT

## 2021-10-27 PROCEDURE — 80048 BASIC METABOLIC PNL TOTAL CA: CPT

## 2021-10-27 PROCEDURE — 36415 COLL VENOUS BLD VENIPUNCTURE: CPT

## 2021-10-27 PROCEDURE — 85025 COMPLETE CBC W/AUTO DIFF WBC: CPT

## 2021-10-27 PROCEDURE — 74011250637 HC RX REV CODE- 250/637: Performed by: INTERNAL MEDICINE

## 2021-10-27 RX ORDER — L. ACIDOPHILUS/L.BULGARICUS 100MM CELL
1 GRANULES IN PACKET (EA) ORAL 2 TIMES DAILY
Status: DISCONTINUED | OUTPATIENT
Start: 2021-10-27 | End: 2021-10-31 | Stop reason: HOSPADM

## 2021-10-27 RX ORDER — VANCOMYCIN 1.75 GRAM/500 ML IN 0.9 % SODIUM CHLORIDE INTRAVENOUS
1750 ONCE
Status: DISCONTINUED | OUTPATIENT
Start: 2021-10-27 | End: 2021-10-27

## 2021-10-27 RX ORDER — CLINDAMYCIN PHOSPHATE 900 MG/50ML
900 INJECTION, SOLUTION INTRAVENOUS EVERY 8 HOURS
Status: DISCONTINUED | OUTPATIENT
Start: 2021-10-27 | End: 2021-10-31 | Stop reason: HOSPADM

## 2021-10-27 RX ADMIN — HYDROMORPHONE HYDROCHLORIDE 0.2 MG: 1 INJECTION, SOLUTION INTRAMUSCULAR; INTRAVENOUS; SUBCUTANEOUS at 17:46

## 2021-10-27 RX ADMIN — CLONIDINE HYDROCHLORIDE 0.2 MG: 0.1 TABLET ORAL at 17:34

## 2021-10-27 RX ADMIN — CEFEPIME HYDROCHLORIDE 2 G: 2 INJECTION, POWDER, FOR SOLUTION INTRAVENOUS at 11:16

## 2021-10-27 RX ADMIN — APIXABAN 5 MG: 5 TABLET, FILM COATED ORAL at 11:06

## 2021-10-27 RX ADMIN — Medication 10 ML: at 04:32

## 2021-10-27 RX ADMIN — Medication 10 ML: at 21:26

## 2021-10-27 RX ADMIN — Medication 10 ML: at 17:36

## 2021-10-27 RX ADMIN — NIFEDIPINE 60 MG: 30 TABLET, FILM COATED, EXTENDED RELEASE ORAL at 11:33

## 2021-10-27 RX ADMIN — HYDROMORPHONE HYDROCHLORIDE 0.2 MG: 1 INJECTION, SOLUTION INTRAMUSCULAR; INTRAVENOUS; SUBCUTANEOUS at 08:48

## 2021-10-27 RX ADMIN — CLINDAMYCIN PHOSPHATE 900 MG: 900 INJECTION, SOLUTION INTRAVENOUS at 12:54

## 2021-10-27 RX ADMIN — CLONIDINE HYDROCHLORIDE 0.2 MG: 0.1 TABLET ORAL at 11:06

## 2021-10-27 RX ADMIN — CARVEDILOL 12.5 MG: 12.5 TABLET, FILM COATED ORAL at 11:06

## 2021-10-27 RX ADMIN — CALCITRIOL CAPSULES 0.25 MCG 0.25 MCG: 0.25 CAPSULE ORAL at 11:06

## 2021-10-27 RX ADMIN — VANCOMYCIN HYDROCHLORIDE 1750 MG: 10 INJECTION, POWDER, LYOPHILIZED, FOR SOLUTION INTRAVENOUS at 11:16

## 2021-10-27 RX ADMIN — CLINDAMYCIN PHOSPHATE 900 MG: 900 INJECTION, SOLUTION INTRAVENOUS at 21:26

## 2021-10-27 RX ADMIN — DAPTOMYCIN 700 MG: 500 INJECTION, POWDER, LYOPHILIZED, FOR SOLUTION INTRAVENOUS at 17:34

## 2021-10-27 RX ADMIN — HYDROMORPHONE HYDROCHLORIDE 0.2 MG: 1 INJECTION, SOLUTION INTRAMUSCULAR; INTRAVENOUS; SUBCUTANEOUS at 23:57

## 2021-10-27 RX ADMIN — FLUTICASONE PROPIONATE 2 SPRAY: 50 SPRAY, METERED NASAL at 11:33

## 2021-10-27 RX ADMIN — HYDROMORPHONE HYDROCHLORIDE 0.2 MG: 1 INJECTION, SOLUTION INTRAMUSCULAR; INTRAVENOUS; SUBCUTANEOUS at 13:00

## 2021-10-27 RX ADMIN — CARVEDILOL 12.5 MG: 12.5 TABLET, FILM COATED ORAL at 17:34

## 2021-10-27 RX ADMIN — APIXABAN 5 MG: 5 TABLET, FILM COATED ORAL at 17:34

## 2021-10-27 RX ADMIN — HYDROMORPHONE HYDROCHLORIDE 0.2 MG: 1 INJECTION, SOLUTION INTRAMUSCULAR; INTRAVENOUS; SUBCUTANEOUS at 04:33

## 2021-10-27 NOTE — CONSULTS
Infectious Disease Consult    Impression/Plan   · LLE cellulitis in the setting of chronic lymphedema and venous stasis. Patient is not a diabetic. No concerning recent animal or water exposures. He does take prednisone on a as needed basis for joint and back pain however it does not appear he takes this often enough to cause significant immunosuppression. CT negative for abscess. Cellulitis and purulence consistent with a streptococcal infection. I suspect he is just slow to respond to the antibiotics. Will change vancomycin to daptomycin with history of advanced CKD. Continue cefepime. Add clindamycin for toxin inhibition. Keep leg elevated. · CKD  · History of penicillin and levofloxacin allergies        Anti-infectives:   1. Daptomycin  2. Cefepime  3. Clindamycin    Subjective:   Date of Consultation:  October 27, 2021  Date of Admission: 10/24/2021   Referring Physician:     Patient is a 78 y.o. male with a past medical history significant for pulmonary hypertension, stage V chronic kidney disease, and atrial fibrillation who is being seen for LLE cellulitis. Patient states that he has a history of chronic lymphedema and discoloration of the lower extremities due to chronic venous stasis disease. Approximately 10 days ago he developed increasing erythema, edema, and pain involving the right leg. He denies any trauma to the extremity. No animal or water exposure. He was started on clindamycin in the outpatient setting with no improvement. Due to progressive symptoms he was seen in the ER and admitted for IV antibiotics. Dopplers are negative for DVT. He has developed progressive erythema despite being on vancomycin and cefepime. The infectious diseases service has been asked to assist with antibiotic management.      Patient Active Problem List   Diagnosis Code    Pulmonary hypertension ([address] I27.20    VIK on CPAP G47.33, Z99.89    Hypertension I10    Heart failure ([facility] [address] I50.9    ESRD (end stage renal disease) on dialysis (HCC) N18.6, Z99.2    Chronic atrial fibrillation (HCC) I48.20    Cervical disc disorder M50.90    Atrial fibrillation (HCC) I48.91    Hyperkalemia E87.5    Cellulitis L03.90     Past Medical History:   Diagnosis Date    Atrial fibrillation (HCC)     Cervical disc disorder     C5,C6 fractured disc and lumbar disc slipped    Chronic atrial fibrillation (HCC)     [facility]    ESRD (end stage renal disease) on dialysis [facility])     [facility] Frazier [facility]    Heart failure ([facility] [address] [facility]     Hypertension     VIK on CPAP     Pulmonary hypertension ([facility] [address]     R[facility]      History reviewed. No pertinent family history. Social History     Tobacco Use    Smoking status: Never Smoker    Smokeless tobacco: Never Used   Substance Use Topics    Alcohol use: No     Past Surgical History:   Procedure Laterality Date    HX HEENT      right sinus polyp removal    HX ORTHOPAEDIC  2000    left ACL replacement    HX OTHER SURGICAL      cardioversion for AFIB    HX UROLOGICAL  08/2015    renal gland removal left      Prior to Admission medications    Medication Sig Start Date End Date Taking? Authorizing Provider   calcitRIOL (ROCALTROL) 0.25 mcg capsule Take 0.25 mcg by mouth daily. Yes Jamee, MD Saurav   carvediloL (COREG) 12.5 mg tablet Take 12.5 mg by mouth two (2) times daily (with meals). Yes Jamee, MD Saurav   furosemide (Lasix) 40 mg tablet Take 40 mg by mouth two (2) times a day. Yes Jamee, MD Saurav   cloNIDine HCL (CATAPRES) 0.2 mg tablet Take 0.2 mg by mouth two (2) times a day. Yes Jamee, MD Saurav   apixaban (Eliquis) 5 mg tablet Take 5 mg by mouth two (2) times a day. Yes Jamee, MD Saurav   NIFEdipine ER (ADALAT CC) 30 mg ER tablet Take 30 mg by mouth daily. Yes Saurav Mancuso MD   predniSONE (DELTASONE) 10 mg tablet Take 10 mg by mouth.  Taken as needed for inflammation may have up to 40mg a day   Yes Jamee, MD Saurav   fluticasone propionate (FLONASE) 50 mcg/actuation nasal spray 2 Sprays by Both Nostrils route daily. Yes Other, MD Saurav     Allergies   Allergen Reactions    Levofloxacin Other (comments)     Insombnia, negative thoughts. [place] like I was going to die.  Pcn [Penicillins] Rash        Review of Systems:  A comprehensive review of systems was negative except for that written in the History of Present Illness. Objective:   Blood pressure (!) 157/91, pulse 77, temperature 98.2 °F (36.8 °C), resp. rate 18, height 5' 10.98\" (1.803 m), weight 249 lb 12.5 oz (113.3 kg), SpO2 98 %. Temp (24hrs), Av.2 °F (36.8 °C), Min:98 °F (36.7 °C), Max:98.4 °F (36.9 °C)       Exam:           General:  Alert, cooperative, well noursished, well developed, appears stated age   Eyes:  Sclera anicteric. Pupils equally round and reactive to light. Mouth/Throat: Mucous membranes normal   Neck: Supple   Lungs:   Clear to auscultation bilaterally   CV:   Irregularly irregular   Abdomen:    Obese, nontender   Extremities:  See image above. That was taken from 10/24.   Patient has developed proximal streaking erythema and edema involving the right inner thigh   Skin:  No rash   Lymph nodes:    Musculoskeletal:  Moves all   Lines/Devices:   Peripheral   Psych: Alert and oriented, normal mood affect given the setting       Data Review:   Recent Results (from the past 24 hour(s))   CBC WITH AUTOMATED DIFF    Collection Time: 10/27/21  4:36 AM   Result Value Ref Range    WBC 7.9 4.1 - 11.1 K/uL    RBC 4.13 4.10 - 5.70 M/uL    HGB 11.8 (L) 12.1 - 17.0 g/dL    HCT 36.4 (L) 36.6 - 50.3 %    MCV 88.1 80.0 - 99.0 FL    MCH 28.6 26.0 - 34.0 PG    MCHC 32.4 30.0 - 36.5 g/dL    RDW 15.0 (H) 11.5 - 14.5 %    PLATELET 228 (L) 368 - 400 K/uL    MPV 12.3 8.9 - 12.9 FL    NRBC 0.0 0  WBC    ABSOLUTE NRBC 0.00 0.00 - 0.01 K/uL    NEUTROPHILS 87 (H) 32 - 75 %    LYMPHOCYTES 8 (L) 12 - 49 %    MONOCYTES 5 5 - 13 %    EOSINOPHILS 0 0 - 7 %    BASOPHILS 0 0 - 1 %    IMMATURE GRANULOCYTES 0 % ABS. NEUTROPHILS 6.9 1.8 - 8.0 K/UL    ABS. LYMPHOCYTES 0.6 (L) 0.8 - 3.5 K/UL    ABS. MONOCYTES 0.4 0.0 - 1.0 K/UL    ABS. EOSINOPHILS 0.0 0.0 - 0.4 K/UL    ABS. BASOPHILS 0.0 0.0 - 0.1 K/UL    ABS. IMM.  GRANS. 0.0 K/UL    DF MANUAL      RBC COMMENTS NORMOCYTIC, NORMOCHROMIC     METABOLIC PANEL, BASIC    Collection Time: 10/27/21  4:36 AM   Result Value Ref Range    Sodium 138 136 - 145 mmol/L    Potassium 5.0 3.5 - 5.1 mmol/L    Chloride 111 (H) 97 - 108 mmol/L    CO2 18 (L) 21 - 32 mmol/L    Anion gap 9 5 - 15 mmol/L    Glucose 133 (H) 65 - 100 mg/dL    BUN 80 (H) 6 - 20 MG/DL    Creatinine 4.47 (H) 0.70 - 1.30 MG/DL    BUN/Creatinine ratio 18 12 - 20      GFR est AA 16 (L) >60 ml/min/1.73m2    GFR est non-AA 13 (L) >60 ml/min/1.73m2    Calcium 8.3 (L) 8.5 - 10.1 MG/DL   VANCOMYCIN, RANDOM    Collection Time: 10/27/21  4:36 AM   Result Value Ref Range    Vancomycin, random 14.2 UG/ML        Microbiology:      Studies:      Signed By: Lisa Pierce DO     October 27, 2021

## 2021-10-27 NOTE — PROGRESS NOTES
[address] [facility]  YOB: 1950          Assessment & Plan:   CKD 5, stable  LLE cellulitis, no improvement yet  HTN  SHPT    Rec:  No acute indication HD  ID and surgery eval pending  Low K diet  Avoid nephrotoxins. Has f/u with Dr. Zuleima Chun in a couple weeks       Subjective:   CC: f/u CKD  HPI: Renal function stable. No improvement in cellulitis. ROS: no n/v/sob  Current Facility-Administered Medications   Medication Dose Route Frequency    vancomycin (VANCOCIN) 1750 mg in  ml infusion  1,750 mg IntraVENous ONCE    NIFEdipine ER (PROCARDIA XL) tablet 60 mg  60 mg Oral DAILY    cefepime (MAXIPIME) 2 g in sterile water (preservative free) 10 mL IV syringe  2 g IntraVENous Q24H    Vancomycin - Pharmacy to Dose by Levels   Other Rx Dosing/Monitoring    HYDROmorphone (DILAUDID) syringe 0.2 mg  0.2 mg IntraVENous Q3H PRN    apixaban (ELIQUIS) tablet 5 mg  5 mg Oral BID    calcitRIOL (ROCALTROL) capsule 0.25 mcg  0.25 mcg Oral DAILY    carvediloL (COREG) tablet 12.5 mg  12.5 mg Oral BID WITH MEALS    cloNIDine HCL (CATAPRES) tablet 0.2 mg  0.2 mg Oral BID    fluticasone propionate (FLONASE) 50 mcg/actuation nasal spray 2 Spray  2 Spray Both Nostrils DAILY    sodium chloride (NS) flush 5-40 mL  5-40 mL IntraVENous Q8H    sodium chloride (NS) flush 5-40 mL  5-40 mL IntraVENous PRN    acetaminophen (TYLENOL) tablet 650 mg  650 mg Oral Q6H PRN    Or    acetaminophen (TYLENOL) suppository 650 mg  650 mg Rectal Q6H PRN    polyethylene glycol (MIRALAX) packet 17 g  17 g Oral DAILY PRN    ondansetron (ZOFRAN ODT) tablet 4 mg  4 mg Oral Q8H PRN    Or    ondansetron (ZOFRAN) injection 4 mg  4 mg IntraVENous Q6H PRN          Objective:     Vitals:  Blood pressure (!) 157/91, pulse 77, temperature 98.2 °F (36.8 °C), resp. rate 18, height 5' 10.98\" (1.803 m), weight 113.3 kg (249 lb 12.5 oz), SpO2 98 %.   Temp (24hrs), Av.2 °F (36.8 °C), Min:98 °F (36.7 °C), Max:98.4 °F (36.9 °C)      Intake and Output:  10/27 0701 - 10/27 1900  In: -   Out: 425 [Urine:425]  10/25 1901 - 10/27 0700  In: -   Out: 719 [Urine:650]    Physical Exam:               GENERAL ASSESSMENT: NAD  HEENT: Nontraumatic   CHEST: unlabored  EXTREMITY: LLE EDEMA, LLE erythema and pain  NEURO: Grossly non focal          ECG/rhythm:    Data Review      No results for input(s): TNIPOC in the last 72 hours. No lab exists for component: ITNL   No results for input(s): CPK, CKMB, TROIQ in the last 72 hours. Recent Labs     10/27/21  0436 10/26/21  0156 10/25/21  0325 10/24/21  1257 10/24/21  1257    138 141   < > 140   K 5.0 5.2* 4.5   < > 5.6*   * 112* 110*   < > 107   CO2 18* 18* 18*   < > 21   BUN 80* 84* 98*   < > 103*   CREA 4.47* 4.64* 4.72*   < > 5.63*   * 108* 98   < > 155*   PHOS  --   --   --   --  6.1*   MG  --   --  2.3  --   --    CA 8.3* 8.3* 7.6*   < > 8.4*   ALB  --  2.2* 2.0*  --  2.3*   WBC 7.9 9.0 10.2   < > 13.9*   HGB 11.8* 12.2 11.2*   < > 10.8*   HCT 36.4* 37.7 34.0*   < > 33.3*   * 128* 118*   < > 102*    < > = values in this interval not displayed. No results for input(s): INR, PTP, APTT, INREXT, INREXT in the last 72 hours. Needs: urine analysis, urine sodium, protein and creatinine  No results found for: JING DENT        : Keith Madrid MD  10/27/2021        [facility]:  [email or web address] [employer]  [place] office:  [address] [place] [address], [address] [place] [address],[address]  [place], [address] [facility]  Phone: [phone]  Fax :     [phone]    [place] office:  [address] [facility], [address]  Phone - [phone]  Fax - [phone]

## 2021-10-27 NOTE — PROGRESS NOTES
Bedside and Verbal shift change report given to Gopi Alfredo (oncoming nurse) by Ratna Cage (offgoing nurse). Report included the following information SBAR, Kardex, Intake/Output, MAR and Recent Results.

## 2021-10-27 NOTE — PROGRESS NOTES
[address] [place] [address], [place], [address] [place] [address]  [phone]      Medical Progress Note      NAME: Damon Chatterjee   :  1950  MRM:  [id number]    Date of service: 10/27/2021  8:27 AM       Assessment and Plan:   1. LLE Cellulitis/ leukocytosis/ elevated CRP to 11. 5. not sure if there is an abscess. Not improving with multiple ABx( per pt, it is getting worse). was on cefepime and doxy after failing clinda as an outpt. U/S without clots. Currently on vanc and cefepime. Elevate leg. Couldn't go CT scan of the leg due to stage 5 CKD. Will consult ID and general surgery             2.  CKD5: Still making urine. Not volume overloaded. No indication for HD at this time. Today he says he agrees to HD. Has followed with Dr. Kim Diana, will establish with Dr. Pancho Mayers next week. Renally dose meds, avoid nephrotoxins. Renal evaluation appreciated.      3. HTN: Cont home nifedipine, clonidine, carvedilol. BP not well controlled. Increased nifedipine dose. 4.  Afib: Cont carvedilol, apix     5. VIK: CPAP          Subjective:     Chief Complaint[de-identified] Patient was seen and examined as a follow up for cellulitis. Chart was reviewed. c/o leg pain and worsening hyperemia     ROS:  (bold if positive, if negative)    Tolerating PT  Tolerating Diet        Objective:     Last 24hrs VS reviewed since prior progress note.  Most recent are:    Visit Vitals  BP (!) 160/103 (BP 1 Location: Left upper arm, BP Patient Position: At rest)   Pulse 77   Temp 98.2 °F (36.8 °C)   Resp 18   Ht 5' 10.98\" (1.803 m)   Wt 113.3 kg (249 lb 12.5 oz)   SpO2 98%   BMI 34.85 kg/m²     SpO2 Readings from Last 6 Encounters:   10/27/21 98%   18 94%            Intake/Output Summary (Last 24 hours) at 10/27/2021 5550  Last data filed at 10/27/2021 0740  Gross per 24 hour   Intake    Output 425 ml   Net -425 ml        Physical Exam:    Gen:  Well-developed, well-nourished, in no acute distress  HEENT:  Pink conjunctivae, PERRL, hearing intact to voice, moist mucous membranes  Neck:  Supple, without masses, thyroid non-tender  Resp:  No accessory muscle use, clear breath sounds without wheezes rales or rhonchi  Card:  No murmurs, normal S1, S2 without thrills, bruits or peripheral edema  Abd:  Soft, non-tender, non-distended, normoactive bowel sounds are present, no palpable organomegaly and no detectable hernias  Lymph:  No cervical or inguinal adenopathy  Musc:  RT leg hyperemia, tender.    Skin:  No rashes or ulcers, skin turgor is good  Neuro:  Cranial nerves are grossly intact, no focal motor weakness, follows commands appropriately  Psych:  Good insight, oriented to person, place and time, alert  __________________________________________________________________  Medications Reviewed: (see below)  Medications:     Current Facility-Administered Medications   Medication Dose Route Frequency    vancomycin (VANCOCIN) 1750 mg in  ml infusion  1,750 mg IntraVENous ONCE    NIFEdipine ER (PROCARDIA XL) tablet 60 mg  60 mg Oral DAILY    cefepime (MAXIPIME) 2 g in sterile water (preservative free) 10 mL IV syringe  2 g IntraVENous Q24H    Vancomycin - Pharmacy to Dose by Levels   Other Rx Dosing/Monitoring    HYDROmorphone (DILAUDID) syringe 0.2 mg  0.2 mg IntraVENous Q3H PRN    apixaban (ELIQUIS) tablet 5 mg  5 mg Oral BID    calcitRIOL (ROCALTROL) capsule 0.25 mcg  0.25 mcg Oral DAILY    carvediloL (COREG) tablet 12.5 mg  12.5 mg Oral BID WITH MEALS    cloNIDine HCL (CATAPRES) tablet 0.2 mg  0.2 mg Oral BID    fluticasone propionate (FLONASE) 50 mcg/actuation nasal spray 2 Spray  2 Spray Both Nostrils DAILY    sodium chloride (NS) flush 5-40 mL  5-40 mL IntraVENous Q8H    sodium chloride (NS) flush 5-40 mL  5-40 mL IntraVENous PRN    acetaminophen (TYLENOL) tablet 650 mg  650 mg Oral Q6H PRN    Or    acetaminophen (TYLENOL) suppository 650 mg  650 mg Rectal Q6H PRN    polyethylene glycol (MIRALAX) packet 17 g  17 g Oral DAILY PRN    ondansetron (ZOFRAN ODT) tablet 4 mg  4 mg Oral Q8H PRN    Or    ondansetron (ZOFRAN) injection 4 mg  4 mg IntraVENous Q6H PRN        Lab Data Reviewed: (see below)  Lab Review:     Recent Labs     10/27/21  0436 10/26/21  0156 10/25/21  0325   WBC 7.9 9.0 10.2   HGB 11.8* 12.2 11.2*   HCT 36.4* 37.7 34.0*   * 128* 118*     Recent Labs     10/27/21  0436 10/26/21  0156 10/25/21  0325 10/24/21  1257 10/24/21  1257    138 141   < > 140   K 5.0 5.2* 4.5   < > 5.6*   * 112* 110*   < > 107   CO2 18* 18* 18*   < > 21   * 108* 98   < > 155*   BUN 80* 84* 98*   < > 103*   CREA 4.47* 4.64* 4.72*   < > 5.63*   CA 8.3* 8.3* 7.6*   < > 8.4*   MG  --   --  2.3  --   --    PHOS  --   --   --   --  6.1*   ALB  --  2.2* 2.0*  --  2.3*   TBILI  --  0.6 0.3  --  0.3   ALT  --  20 16  --  20    < > = values in this interval not displayed. No results found for: GLUCPOC  No results for input(s): PH, PCO2, PO2, HCO3, FIO2 in the last 72 hours. No results for input(s): INR, INREXT, INREXT in the last 72 hours.   All Micro Results     Procedure Component Value Units Date/Time    CULTURE, BLOOD, PERIPHERAL [[id number]] Collected: 10/24/21 1628    Order Status: Completed Specimen: Blood Updated: 10/27/21 0521     Special Requests: NO SPECIAL REQUESTS        Culture result: NO GROWTH 3 DAYS       CULTURE, BLOOD, PERIPHERAL [[id number]] Collected: 10/24/21 1637    Order Status: Completed Specimen: Blood Updated: 10/27/21 0521     Special Requests: NO SPECIAL REQUESTS        Culture result: NO GROWTH 3 DAYS       CULTURE, URINE [[id number]] Collected: 10/24/21 1807    Order Status: Completed Specimen: Urine from Clean catch Updated: 10/25/21 1845     Special Requests: NO SPECIAL REQUESTS        Culture result: No growth (<1,000 CFU/ML)       RESPIRATORY VIRUS PANEL W/COVID-19, PCR [[id number]] Collected: 10/25/21 0325    Order Status: Completed Specimen: Nasopharyngeal Updated: 10/25/21 0847     Adenovirus Not detected        Coronavirus 229E Not detected        Coronavirus HKU1 Not detected        Coronavirus CVNL63 Not detected        Coronavirus OC43 Not detected        SARS-CoV-2, PCR Not detected        Metapneumovirus Not detected        Rhinovirus and Enterovirus Not detected        Influenza A Not detected        Influenza A, subtype H1 Not detected        Influenza A, subtype H3 Not detected        INFLUENZA A H1N1 PCR Not detected        Influenza B Not detected        Parainfluenza 1 Not detected        Parainfluenza 2 Not detected        Parainfluenza 3 Not detected        Parainfluenza virus 4 Not detected        RSV by PCR Not detected        B. parapertussis, PCR Not detected        Bordetella pertussis - PCR Not detected        Chlamydophila pneumoniae DNA, QL, PCR Not detected        Mycoplasma pneumoniae DNA, QL, PCR Not detected             I have reviewed notes of prior 24hr. Other pertinent lab: Total time spent with patient: 28 I personally reviewed chart, notes, data and current medications in the medical record. I have personally examined and treated the patient at bedside during this period.                  Care Plan discussed with: Patient, Nursing Staff and >50% of time spent in counseling and coordination of care    Discussed:  Care Plan    Prophylaxis:  eliquis    Disposition:  Home w/Family           ___________________________________________________    Attending Physician: Ama Magdaleno MD

## 2021-10-27 NOTE — ADVANCED PRACTICE NURSE
Surgery Consult    Subjective:      Emre Carrera is a 78 y.o. male with past medical history of CKD stage V, HTN, A. fib, CHF presented to the ER yesterday with concerns of progressing cellulitis resistant to oral antibiotics. Patient stated that he presented to outpatient facility with concerns of left lower extremity redness and swelling approximately 10 days ago. There was no known trauma to the area. Patient stated that he was given oral antibiotics but after no improvement over a week he decided to come to the ER with concerns that his leg was worsening. Patient noticed that pain, erythema and swelling was not only in his lower left extremity but now extending up to mid medial thigh. Patient with leukocytosis and elevated CRP- was placed on IV antibiotics. General Surgery consulted for evaluation/concern for abscess, cellulitis, other etiology. Past Medical History:   Diagnosis Date    Atrial fibrillation ([address]     Cervical disc disorder     C5,C6 fractured disc and lumbar disc slipped    Chronic [facility] fibrillation (HCC)     [facility]    ESRD (end stage renal disease) on dialysis ([address]     Dr [facility] Spatz with [facility]    Heart failure ([address]     Hypertension     VIK on CPAP     Pulmonary hypertension ([address]     Ramon NP at [facility]     Past Surgical History:   Procedure Laterality Date    HX HEENT      right sinus polyp removal    HX ORTHOPAEDIC  2000    left ACL replacement    HX OTHER SURGICAL      cardioversion for AFIB    HX UROLOGICAL  08/2015    renal gland removal left      History reviewed. No pertinent family history.   Social History     Socioeconomic History    Marital status:      Spouse name: Not on file    Number of children: Not on file    Years of education: Not on file    Highest education level: Not on file   Tobacco Use    Smoking status: Never Smoker    Smokeless tobacco: Never Used   Substance and Sexual Activity    Alcohol use: No    Drug use: Never     Social Determinants of Health     Financial Resource Strain:     Difficulty of Paying Living Expenses:    Food Insecurity:     Worried About Running Out of Food in the Last Year:     [address] in the Last Year:    Transportation Needs:     Lack of Transportation (Medical):      Lack of Transportation (Non-Medical):    Physical Activity:     Days of Exercise per Week:     Minutes of Exercise per Session:    Stress:     Feeling of Stress :    Social Connections:     Frequency of Communication with Friends and Family:     Frequency of Social Gatherings with Friends and Family:     Attends Taoist Services:     Active Member of Clubs or Organizations:     Attends Club or Organization Meetings:     Marital Status:       Current Facility-Administered Medications   Medication Dose Route Frequency    DAPTOmycin (CUBICIN) 700 mg in 0.9% sodium chloride 14 mL IV Syringe  700 mg IntraVENous Q48H    clindamycin (CLEOCIN) 900mg NS 50mL IVPB (premix)  900 mg IntraVENous Q8H    lactobacillus-acidophilus (LACTINEX) 1 Packet  1 Packet Oral BID    NIFEdipine ER (PROCARDIA XL) tablet 60 mg  60 mg Oral DAILY    cefepime (MAXIPIME) 2 g in sterile water (preservative free) 10 mL IV syringe  2 g IntraVENous Q24H    HYDROmorphone (DILAUDID) syringe 0.2 mg  0.2 mg IntraVENous Q3H PRN    apixaban (ELIQUIS) tablet 5 mg  5 mg Oral BID    calcitRIOL (ROCALTROL) capsule 0.25 mcg  0.25 mcg Oral DAILY    carvediloL (COREG) tablet 12.5 mg  12.5 mg Oral BID WITH MEALS    cloNIDine HCL (CATAPRES) tablet 0.2 mg  0.2 mg Oral BID    fluticasone propionate (FLONASE) 50 mcg/actuation nasal spray 2 Spray  2 Spray Both Nostrils DAILY    sodium chloride (NS) flush 5-40 mL  5-40 mL IntraVENous Q8H    sodium chloride (NS) flush 5-40 mL  5-40 mL IntraVENous PRN    acetaminophen (TYLENOL) tablet 650 mg  650 mg Oral Q6H PRN    Or    acetaminophen (TYLENOL) suppository 650 mg  650 mg Rectal Q6H PRN    polyethylene glycol (MIRALAX) packet 17 g  17 g Oral DAILY PRN    ondansetron (ZOFRAN ODT) tablet 4 mg  4 mg Oral Q8H PRN    Or    ondansetron (ZOFRAN) injection 4 mg  4 mg IntraVENous Q6H PRN      Allergies   Allergen Reactions    Levofloxacin Other (comments)     Insombnia, negative thoughts. [place] like I was going to die.  Pcn [Penicillins] Rash       Review of Systems:REVIEW OF SYSTEMS:     []     Unable to obtain  ROS due to  []    mental status change  []    sedated   []    intubated   [x]    Total of 12 systems reviewed as follows:    Constitutional: neg for fevers, chills, weight loss, malaise  Eyes: negative for blurry vision or double vision  Ears, nose, mouth, throat, and face: negative for sore throat, negative for lip swelling  Respiratory: negative for SOB and cough  Cardiovascular: negative for CP, negative for palpitations  Gastrointestinal: negative for nausea, vomiting, abdominal pain, diarrhea, constipation, melena, hematochezia  Genitourinary: negative for dysuria  Integument/breast: negative for skin rash  Hematologic/lymphatic: negative for bruising  Musculoskeletal/ Extrem: see HPI  Neurological: no dizziness or h/a    Objective:        Patient Vitals for the past 8 hrs:   BP Temp Pulse Resp SpO2   10/27/21 1135 (!) 167/108 98.9 °F (37.2 °C) 82 20 97 %   10/27/21 1100 (!) 157/91  77     10/27/21 0915 (!) 153/96       10/27/21 0737 (!) 160/103 98.2 °F (36.8 °C) 77 18 98 %       Temp (24hrs), Av.3 °F (36.8 °C), Min:98 °F (36.7 °C), Max:98.9 °F (37.2 °C)      Physical Exam:  General:  Alert, cooperative, no distress, appears stated age. Eyes:  Conjunctivae/corneas clear. Nose: Nares normal. Septum midline   Mouth/Throat: Lips, mucosa, and tongue normal.    Neck: Supple, symmetrical, trachea midline   Lungs:   Clear to auscultation bilaterally.    Heart:  Regular rate and rhythm   Abdomen:   Soft, non-tender, non-distended, no rebound, no guarding, normal bowel sounds   Extremities: Upper extrem normal, bilat lower extremities appear to be discolored from upper shin to ankles with bilat pitting edema. LLE appearing much more erythematous and swollen as well as some light pink discoloring extends up to mid medial thigh, extrem tenderness throughout LLE up to mid thigh. Skin: Skin color, texture, turgor normal. No rashes or lesions   Neuro: Alert, oriented, speech clear     Labs:   Recent Labs     10/27/21  0436   WBC 7.9   HGB 11.8*   HCT 36.4*   *     Recent Labs     10/27/21  0436 10/26/21  0156 10/26/21  0156 10/25/21  0325 10/25/21  0325      < > 138   < > 141   K 5.0   < > 5.2*   < > 4.5   *   < > 112*   < > 110*   CO2 18*   < > 18*   < > 18*   *   < > 108*   < > 98   BUN 80*   < > 84*   < > 98*   CREA 4.47*   < > 4.64*   < > 4.72*   CA 8.3*   < > 8.3*   < > 7.6*   MG  --   --   --   --  2.3   ALB  --   --  2.2*   < > 2.0*   TBILI  --   --  0.6   < > 0.3   ALT  --   --  20   < > 16    < > = values in this interval not displayed. No results for input(s): INR, INREXT in the last 72 hours. Venous doppler results- neg  Assessment and Plan:     Concern for poss abscess- cellulitis resistant to PO antibx  CT left leg pending  Venous dopplers complete- neg  Obtain SANJEVE/PVR for arterial work-up  Cont IV antibx      Discussed with Dr Dior Allen.      Signed By: Razia Simms NP     October 27, 2021

## 2021-10-27 NOTE — CONSULTS
Surgery Consult     Subjective:      Adarsh Benavides is a 78 y.o. male with past medical history of CKD stage V, HTN, A. fib, CHF presented to the ER yesterday with concerns of progressing cellulitis resistant to oral antibiotics.     Patient stated that he presented to outpatient facility with concerns of left lower extremity redness and swelling approximately 10 days ago. There was no known trauma to the area. Patient stated that he was given oral antibiotics but after no improvement over a week he decided to come to the ER with concerns that his leg was worsening.     Patient noticed that pain, erythema and swelling was not only in his lower left extremity but now extending up to mid medial thigh. Patient with leukocytosis and elevated CRP- was placed on IV antibiotics. General Surgery consulted for evaluation/concern for abscess, cellulitis, other etiology. Pt reports that he has had some BLE edema especially after long car rides. The edema and skin discoloration began after L ACL repair several years ago. He denies any chronic pain or noticing any varicose veins. He does report having \"fragile\" skin with multiple bumps and small scrapes and puncture wounds routinely.   The pain and swelling with the erythema he is now noticing is not typical for him.          Past Medical History:   Diagnosis Date    Atrial fibrillation (HCC)      Cervical disc disorder       C5,C6 fractured disc and lumbar disc slipped    Chronic atrial fibrillation (HCC)       [facility]    ESRD (end stage renal disease) on dialysis [facility])       [facility]    [facility]      Hypertension      VIK on CPAP      Pulmonary hypertension ([facility]       [facility]            Past Surgical History:   Procedure Laterality Date    HX HEENT         right sinus polyp removal    HX ORTHOPAEDIC   2000     left ACL replacement    HX OTHER SURGICAL         cardioversion for AFIB    HX UROLOGICAL   08/2015     renal gland removal left      History reviewed. No pertinent family history. Social History         Socioeconomic History    Marital status:        Spouse name: Not on file    Number of children: Not on file    Years of education: Not on file    Highest education level: Not on file   Tobacco Use    Smoking status: Never Smoker    Smokeless tobacco: Never Used   Substance and Sexual Activity    Alcohol use: No    Drug use: Never      Social Determinants of Health          Financial Resource Strain:     Difficulty of Paying Living Expenses:    Food Insecurity:     Worried About Running Out of Food in the Last Year:     [address] in the Last Year:    Transportation Needs:     Lack of Transportation (Medical):      Lack of Transportation (Non-Medical):    Physical Activity:     Days of Exercise per Week:     Minutes of Exercise per Session:    Stress:     Feeling of Stress :    Social Connections:     Frequency of Communication with Friends and Family:     Frequency of Social Gatherings with Friends and Family:     Attends Advent Services:     Active Member of Clubs or Organizations:     Attends Club or Organization Meetings:     Marital Status:                 Current Facility-Administered Medications   Medication Dose Route Frequency    DAPTOmycin (CUBICIN) 700 mg in 0.9% sodium chloride 14 mL IV Syringe  700 mg IntraVENous Q48H    clindamycin (CLEOCIN) 900mg NS 50mL IVPB (premix)  900 mg IntraVENous Q8H    lactobacillus-acidophilus (LACTINEX) 1 Packet  1 Packet Oral BID    NIFEdipine ER (PROCARDIA XL) tablet 60 mg  60 mg Oral DAILY    cefepime (MAXIPIME) 2 g in sterile water (preservative free) 10 mL IV syringe  2 g IntraVENous Q24H    HYDROmorphone (DILAUDID) syringe 0.2 mg  0.2 mg IntraVENous Q3H PRN    apixaban (ELIQUIS) tablet 5 mg  5 mg Oral BID    calcitRIOL (ROCALTROL) capsule 0.25 mcg  0.25 mcg Oral DAILY    carvediloL (COREG) tablet 12.5 mg  12.5 mg Oral BID WITH MEALS    cloNIDine HCL (CATAPRES) tablet 0.2 mg  0.2 mg Oral BID    fluticasone propionate (FLONASE) 50 mcg/actuation nasal spray 2 Spray  2 Spray Both Nostrils DAILY    sodium chloride (NS) flush 5-40 mL  5-40 mL IntraVENous Q8H    sodium chloride (NS) flush 5-40 mL  5-40 mL IntraVENous PRN    acetaminophen (TYLENOL) tablet 650 mg  650 mg Oral Q6H PRN     Or    acetaminophen (TYLENOL) suppository 650 mg  650 mg Rectal Q6H PRN    polyethylene glycol (MIRALAX) packet 17 g  17 g Oral DAILY PRN    ondansetron (ZOFRAN ODT) tablet 4 mg  4 mg Oral Q8H PRN     Or    ondansetron (ZOFRAN) injection 4 mg  4 mg IntraVENous Q6H PRN            Allergies   Allergen Reactions    Levofloxacin Other (comments)       Insombnia, negative thoughts. [place] like I was going to die.  Pcn [Penicillins] Rash         Review of Systems:REVIEW OF SYSTEMS:     []? Unable to obtain  ROS due to  []?    mental status change  []? sedated   []? intubated   [x]?     Total of 12 systems reviewed as follows:     Constitutional: neg for fevers, chills, weight loss, malaise  Eyes: negative for blurry vision or double vision  Ears, nose, mouth, throat, and face: negative for sore throat, negative for lip swelling  Respiratory: negative for SOB and cough  Cardiovascular: negative for CP, negative for palpitations  Gastrointestinal: negative for nausea, vomiting, abdominal pain, diarrhea, constipation, melena, hematochezia  Genitourinary: negative for dysuria  Integument/breast: negative for skin rash  Hematologic/lymphatic: negative for bruising  Musculoskeletal/ Extrem: see HPI  Neurological: no dizziness or h/a     Objective:         Patient Vitals for the past 8 hrs:    BP Temp Pulse Resp SpO2   10/27/21 1135 (!) 167/108 98.9 °F (37.2 °C) 82 20 97 %   10/27/21 1100 (!) 157/91  77     10/27/21 0915 (!) 153/96       10/27/21 0737 (!) 160/103 98.2 °F (36.8 °C) 77 18 98 %         Temp (24hrs), Av.3 °F (36.8 °C), Min:98 °F (36.7 °C), Max:98.9 °F (37.2 °C)        Physical Exam:  General:  Alert, cooperative, no distress, appears stated age. Eyes:  Conjunctivae/corneas clear. Nose: Nares normal. Septum midline   Mouth/Throat: Lips, mucosa, and tongue normal.    Neck: Supple, symmetrical, trachea midline   Lungs:   Clear to auscultation bilaterally. Heart:  Regular rate and rhythm   Abdomen:   Soft, non-tender, non-distended, no rebound, no guarding, normal bowel sounds   Extremities: Upper extrem normal, bilat lower extremities appear to be discolored from upper shin to ankles with bilat pitting edema. LLE appearing much more erythematous and swollen as well as some light pink discoloring extends up to mid medial thigh, extrem tenderness throughout LLE up to mid thigh.    Skin: Skin color, texture, turgor normal. No rashes or lesions   Neuro: Alert, oriented, speech clear      Labs:       Recent Labs     10/27/21  0436   WBC 7.9   HGB 11.8*   HCT 36.4*   *              Recent Labs     10/27/21  0436 10/26/21  0156 10/26/21  0156 10/25/21  0325 10/25/21  0325      < > 138   < > 141   K 5.0   < > 5.2*   < > 4.5   *   < > 112*   < > 110*   CO2 18*   < > 18*   < > 18*   *   < > 108*   < > 98   BUN 80*   < > 84*   < > 98*   CREA 4.47*   < > 4.64*   < > 4.72*   CA 8.3*   < > 8.3*   < > 7.6*   MG  --   --   --   --  2.3   ALB  --   --  2.2*   < > 2.0*   TBILI  --   --  0.6   < > 0.3   ALT  --   --  20   < > 16    < > = values in this interval not displayed.      No results for input(s): INR, INREXT in the last 72 hours.     Venous doppler results- neg  Assessment and Plan:      Concern for poss abscess- cellulitis resistant to PO antibx  CT left leg with cellulitis no drainable fluid collection  Venous dopplers complete- neg  SANJEEV arterial duplex without insufficiency  Cont IV antibx  Elevate the LLE above the heart  Will continue to monitor with you

## 2021-10-27 NOTE — PROGRESS NOTES
10/27/2021   CARE MANAGEMENT NOTE:  CM reviewed EMR for clinical updates and handoff received from previous  Gaetano Alas). Pt was admitted with CKD5, ESRD, and LLE cellulitis. Reportedly, pt resides with his wife Jay Navarrete ([phone]). RUR 12%    Transition Plan of Care:  1. Nephrology, General Surgery, and ID following for medical management  2. Plan is for pt to return home  3. Rolling walker order for home use received - a referral was sent to [facility] and this was approved. Delivery of RW will be closer to pt's discharge. 4.  Outpt f/u  5. Wife will transport pt home    CM will continue to follow pt until discharged.   Judith

## 2021-10-27 NOTE — PROGRESS NOTES
Physical Therapy    Attempted to see patient this morning but currently MILAGRO for testing. Will f/u as able later today.     Miley Barker, MS, PT

## 2021-10-28 ENCOUNTER — TELEPHONE (OUTPATIENT)
Dept: FAMILY MEDICINE CLINIC | Age: 71
End: 2021-10-28

## 2021-10-28 LAB
ANION GAP SERPL CALC-SCNC: 7 MMOL/L (ref 5–15)
BASOPHILS # BLD: 0.1 K/UL (ref 0–0.1)
BASOPHILS NFR BLD: 1 % (ref 0–1)
BUN SERPL-MCNC: 79 MG/DL (ref 6–20)
BUN/CREAT SERPL: 16 (ref 12–20)
CALCIUM SERPL-MCNC: 8.2 MG/DL (ref 8.5–10.1)
CHLORIDE SERPL-SCNC: 113 MMOL/L (ref 97–108)
CK SERPL-CCNC: 20 U/L (ref 39–308)
CO2 SERPL-SCNC: 18 MMOL/L (ref 21–32)
CREAT SERPL-MCNC: 4.81 MG/DL (ref 0.7–1.3)
DIFFERENTIAL METHOD BLD: ABNORMAL
EOSINOPHIL # BLD: 0 K/UL (ref 0–0.4)
EOSINOPHIL NFR BLD: 0 % (ref 0–7)
ERYTHROCYTE [DISTWIDTH] IN BLOOD BY AUTOMATED COUNT: 14.8 % (ref 11.5–14.5)
GLUCOSE SERPL-MCNC: 120 MG/DL (ref 65–100)
HCT VFR BLD AUTO: 32.2 % (ref 36.6–50.3)
HGB BLD-MCNC: 10.6 G/DL (ref 12.1–17)
IMM GRANULOCYTES # BLD AUTO: 0 K/UL
IMM GRANULOCYTES NFR BLD AUTO: 0 %
LEFT ABI: 1.35
LEFT ANTERIOR TIBIAL: 198 MMHG
LEFT ARM BP: 140 MMHG
LEFT POSTERIOR TIBIAL: 188 MMHG
LEFT TBI: 1.12
LEFT TOE PRESSURE: 164 MMHG
LYMPHOCYTES # BLD: 0.4 K/UL (ref 0.8–3.5)
LYMPHOCYTES NFR BLD: 5 % (ref 12–49)
MCH RBC QN AUTO: 29.4 PG (ref 26–34)
MCHC RBC AUTO-ENTMCNC: 32.9 G/DL (ref 30–36.5)
MCV RBC AUTO: 89.4 FL (ref 80–99)
MONOCYTES # BLD: 0.3 K/UL (ref 0–1)
MONOCYTES NFR BLD: 4 % (ref 5–13)
NEUTS SEG # BLD: 7.4 K/UL (ref 1.8–8)
NEUTS SEG NFR BLD: 90 % (ref 32–75)
NRBC # BLD: 0 K/UL (ref 0–0.01)
NRBC BLD-RTO: 0 PER 100 WBC
PLATELET # BLD AUTO: 110 K/UL (ref 150–400)
PMV BLD AUTO: 12.6 FL (ref 8.9–12.9)
POTASSIUM SERPL-SCNC: 5.1 MMOL/L (ref 3.5–5.1)
RBC # BLD AUTO: 3.6 M/UL (ref 4.1–5.7)
RBC MORPH BLD: ABNORMAL
RIGHT ABI: 1.36
RIGHT ANTERIOR TIBIAL: 189 MMHG
RIGHT ARM BP: 147 MMHG
RIGHT POSTERIOR TIBIAL: 200 MMHG
RIGHT TBI: 1.26
RIGHT TOE PRESSURE: 185 MMHG
SODIUM SERPL-SCNC: 138 MMOL/L (ref 136–145)
WBC # BLD AUTO: 8.2 K/UL (ref 4.1–11.1)

## 2021-10-28 PROCEDURE — 74011250637 HC RX REV CODE- 250/637: Performed by: INTERNAL MEDICINE

## 2021-10-28 PROCEDURE — 85025 COMPLETE CBC W/AUTO DIFF WBC: CPT

## 2021-10-28 PROCEDURE — 36415 COLL VENOUS BLD VENIPUNCTURE: CPT

## 2021-10-28 PROCEDURE — 74011000250 HC RX REV CODE- 250: Performed by: INTERNAL MEDICINE

## 2021-10-28 PROCEDURE — 74011250636 HC RX REV CODE- 250/636: Performed by: INTERNAL MEDICINE

## 2021-10-28 PROCEDURE — 99232 SBSQ HOSP IP/OBS MODERATE 35: CPT | Performed by: INTERNAL MEDICINE

## 2021-10-28 PROCEDURE — 2709999900 HC NON-CHARGEABLE SUPPLY

## 2021-10-28 PROCEDURE — 65270000029 HC RM PRIVATE

## 2021-10-28 PROCEDURE — 80048 BASIC METABOLIC PNL TOTAL CA: CPT

## 2021-10-28 PROCEDURE — 82550 ASSAY OF CK (CPK): CPT

## 2021-10-28 RX ORDER — OXYCODONE AND ACETAMINOPHEN 5; 325 MG/1; MG/1
2 TABLET ORAL
Status: DISCONTINUED | OUTPATIENT
Start: 2021-10-28 | End: 2021-10-31 | Stop reason: HOSPADM

## 2021-10-28 RX ADMIN — HYDROMORPHONE HYDROCHLORIDE 0.2 MG: 1 INJECTION, SOLUTION INTRAMUSCULAR; INTRAVENOUS; SUBCUTANEOUS at 10:27

## 2021-10-28 RX ADMIN — CARVEDILOL 12.5 MG: 12.5 TABLET, FILM COATED ORAL at 17:13

## 2021-10-28 RX ADMIN — Medication 10 ML: at 20:37

## 2021-10-28 RX ADMIN — CALCITRIOL CAPSULES 0.25 MCG 0.25 MCG: 0.25 CAPSULE ORAL at 10:27

## 2021-10-28 RX ADMIN — CLINDAMYCIN PHOSPHATE 900 MG: 900 INJECTION, SOLUTION INTRAVENOUS at 20:36

## 2021-10-28 RX ADMIN — OXYCODONE HYDROCHLORIDE AND ACETAMINOPHEN 2 TABLET: 5; 325 TABLET ORAL at 12:39

## 2021-10-28 RX ADMIN — CLINDAMYCIN PHOSPHATE 900 MG: 900 INJECTION, SOLUTION INTRAVENOUS at 12:39

## 2021-10-28 RX ADMIN — OXYCODONE HYDROCHLORIDE AND ACETAMINOPHEN 2 TABLET: 5; 325 TABLET ORAL at 19:04

## 2021-10-28 RX ADMIN — Medication 10 ML: at 04:15

## 2021-10-28 RX ADMIN — CLONIDINE HYDROCHLORIDE 0.2 MG: 0.1 TABLET ORAL at 10:26

## 2021-10-28 RX ADMIN — CARVEDILOL 12.5 MG: 12.5 TABLET, FILM COATED ORAL at 10:27

## 2021-10-28 RX ADMIN — NIFEDIPINE 60 MG: 30 TABLET, FILM COATED, EXTENDED RELEASE ORAL at 10:26

## 2021-10-28 RX ADMIN — HYDROMORPHONE HYDROCHLORIDE 0.2 MG: 1 INJECTION, SOLUTION INTRAMUSCULAR; INTRAVENOUS; SUBCUTANEOUS at 04:14

## 2021-10-28 RX ADMIN — CEFEPIME HYDROCHLORIDE 2 G: 2 INJECTION, POWDER, FOR SOLUTION INTRAVENOUS at 10:27

## 2021-10-28 RX ADMIN — APIXABAN 5 MG: 5 TABLET, FILM COATED ORAL at 10:27

## 2021-10-28 RX ADMIN — CLINDAMYCIN PHOSPHATE 900 MG: 900 INJECTION, SOLUTION INTRAVENOUS at 04:14

## 2021-10-28 RX ADMIN — FLUTICASONE PROPIONATE 2 SPRAY: 50 SPRAY, METERED NASAL at 10:27

## 2021-10-28 RX ADMIN — APIXABAN 5 MG: 5 TABLET, FILM COATED ORAL at 17:13

## 2021-10-28 RX ADMIN — CLONIDINE HYDROCHLORIDE 0.2 MG: 0.1 TABLET ORAL at 17:13

## 2021-10-28 NOTE — PROGRESS NOTES
Marleni [address] [facility]  YOB: 1950          Assessment & Plan:   CKD 5, stable  LLE cellulitis, On IV abx  HTN  SHPT    Rec:  No acute indication HD  Finally starting to improve  Low K diet  Avoid nephrotoxins. Has f/u with Dr. Jasen Mas in a couple weeks       Subjective:   CC: f/u CKD  HPI: Leg starting to improve. Renal function stable. ROS: no n/v/sob  Current Facility-Administered Medications   Medication Dose Route Frequency    DAPTOmycin (CUBICIN) 700 mg in 0.9% sodium chloride 14 mL IV Syringe  700 mg IntraVENous Q48H    clindamycin (CLEOCIN) 900mg NS 50mL IVPB (premix)  900 mg IntraVENous Q8H    lactobacillus-acidophilus (LACTINEX) 1 Packet  1 Packet Oral BID    NIFEdipine ER (PROCARDIA XL) tablet 60 mg  60 mg Oral DAILY    cefepime (MAXIPIME) 2 g in sterile water (preservative free) 10 mL IV syringe  2 g IntraVENous Q24H    HYDROmorphone (DILAUDID) syringe 0.2 mg  0.2 mg IntraVENous Q3H PRN    apixaban (ELIQUIS) tablet 5 mg  5 mg Oral BID    calcitRIOL (ROCALTROL) capsule 0.25 mcg  0.25 mcg Oral DAILY    carvediloL (COREG) tablet 12.5 mg  12.5 mg Oral BID WITH MEALS    cloNIDine HCL (CATAPRES) tablet 0.2 mg  0.2 mg Oral BID    fluticasone propionate (FLONASE) 50 mcg/actuation nasal spray 2 Spray  2 Spray Both Nostrils DAILY    sodium chloride (NS) flush 5-40 mL  5-40 mL IntraVENous Q8H    sodium chloride (NS) flush 5-40 mL  5-40 mL IntraVENous PRN    acetaminophen (TYLENOL) tablet 650 mg  650 mg Oral Q6H PRN    Or    acetaminophen (TYLENOL) suppository 650 mg  650 mg Rectal Q6H PRN    polyethylene glycol (MIRALAX) packet 17 g  17 g Oral DAILY PRN    ondansetron (ZOFRAN ODT) tablet 4 mg  4 mg Oral Q8H PRN    Or    ondansetron (ZOFRAN) injection 4 mg  4 mg IntraVENous Q6H PRN          Objective:     Vitals:  Blood pressure (!) 145/80, pulse 90, temperature 98.4 °F (36.9 °C), resp.  rate 22, height 5' 10.98\" (1.803 m), weight 113.3 kg (249 lb 12.5 oz), SpO2 96 %. Temp (24hrs), Av.6 °F (37 °C), Min:98 °F (36.7 °C), Max:99.1 °F (37.3 °C)      Intake and Output:  No intake/output data recorded. 10/26 1901 - 10/28 0700  In: 300 [P.O.:200; I.V.:100]  Out: 425 [Urine:425]    Physical Exam:               GENERAL ASSESSMENT: NAD  HEENT: Nontraumatic   CV Reg  CHEST: CTAB  EXTREMITY: LLE EDEMA, LLE erythema and pain  NEURO: Grossly non focal          ECG/rhythm:    Data Review      No results for input(s): TNIPOC in the last 72 hours. No lab exists for component: ITNL   Recent Labs     10/28/21  0428   CPK 20*     Recent Labs     10/28/21  0428 10/27/21  0436 10/26/21  0156    138 138   K 5.1 5.0 5.2*   * 111* 112*   CO2 18* 18* 18*   BUN 79* 80* 84*   CREA 4.81* 4.47* 4.64*   * 133* 108*   CA 8.2* 8.3* 8.3*   ALB  --   --  2.2*   WBC 8.2 7.9 9.0   HGB 10.6* 11.8* 12.2   HCT 32.2* 36.4* 37.7   * 106* 128*      No results for input(s): INR, PTP, APTT, INREXT, INREXT in the last 72 hours. Needs: urine analysis, urine sodium, protein and creatinine  No results found for: JING DENT        : Ivett Santana MD  10/28/2021        [facility]:  [email or web address]  Grahamjeane Dan office:  [address] [place] [address], [address] [facility] [address]  [place], [address] [facility]  Phone: [phone]  Fax :     [phone]    [facility] office:  [address] [facility], [address]  Phone - [phone]  Fax - [phone]

## 2021-10-28 NOTE — PROGRESS NOTES
Bedside and Verbal shift change report given to Julia Graham (oncoming nurse) by Slava Reilly (offgoing nurse). Report included the following information SBAR, Kardex, Intake/Output, MAR and Recent Results.

## 2021-10-28 NOTE — PROGRESS NOTES
10/28/2021   CARE MANAGEMENT NOTE:  CM reviewed EMR for clinical updates and handoff received from previous  Nathaly Flores). Pt was admitted with CKD5, ESRD, and LLE cellulitis. Reportedly, pt resides with his wife Ted Triana ([phone]).    RUR 13%     Transition Plan of Care:  1. Nephrology, General Surgery, and ID following for medical management  2. Plan is for pt to return home  3. Rolling walker was approved by [place] and it will be delivered to pt prior to discharge  4. Outpt f/u  5. Wife will transport pt home     CM will continue to follow pt until discharged.   Judith

## 2021-10-28 NOTE — TELEPHONE ENCOUNTER
PA for Baylee Thao has been submitted: Your information has been submitted to [facility] Medicare Part D. [facility] Medicare Part D will review the request and will issue a decision, typically within 1-3 days from your submission. You can check the updated outcome later by reopening this request.    If [employer] Medicare Part D has not responded in 1-3 days or if you have any questions about your ePA request, please contact [facility] Medicare Part D at [phone]. If you think there may be a problem with your PA request, use our live chat feature at the bottom right.

## 2021-10-28 NOTE — PROGRESS NOTES
Spiritual Care Assessment/Progress Note  [address]      NAME: Emre Carrera      MRN: [id number]  AGE: 78 y.o. SEX: male  Rastafarian Affiliation: Samaritan   Language: English     10/28/2021     Total Time (in minutes): 12     Spiritual Assessment begun in [facility]  MED SURG 2 through conversation with:         [x]Patient        [x] Family    [] Friend(s)        Reason for Consult: Initial/Spiritual assessment, patient floor     Spiritual beliefs: (Please include comment if needed)     [] Identifies with a adria tradition:         [] Supported by a adria community:            [] Claims no spiritual orientation:           [] Seeking spiritual identity:                [] Adheres to an individual form of spirituality:           [x] Not able to assess:                           Identified resources for coping:      [] Prayer                               [] Music                  [] Guided Imagery     [x] Family/friends                 [] Pet visits     [] Devotional reading                         [] Unknown     [] Other:                                           Interventions offered during this visit: (See comments for more details)    Patient Interventions: Catharsis/review of pertinent events in supportive environment           Plan of Care:     [] Support spiritual and/or cultural needs    [] Support AMD and/or advance care planning process      [] Support grieving process   [] Coordinate Rites and/or Rituals    [] Coordination with community clergy   [] No spiritual needs identified at this time   [] Detailed Plan of Care below (See Comments)  [] Make referral to Music Therapy  [] Make referral to Pet Therapy     [] Make referral to Addiction services  [] Make referral to [facility]  [] Make referral to Spiritual Care Partner  [] No future visits requested        [x] Follow up upon further referrals     Comments:  visited Mr. Sedrick Carpenter for an initial spiritual assessment on the Med. Surgical Unit. Mr. Ander Dickerson was awake, alert, and lying in bed when the  came into the room. His wife, Ramon Reyes, was standing next to the bedside.  introduced herself and extended support. Mr. Ander Dickerson briefly shared that he was doing alright and appeared to be moving in the right direction, before politely disengaging in the conversation to sleep. Mr. Ander Dickerson and Ramon Reyes thanked the  for her visit and are aware of the 's availability. 's are available for further support upon referral  Judi Vick. Kelby Lerma.      Paging Service: [phone])

## 2021-10-28 NOTE — PROGRESS NOTES
Nathanael Mesa Badger 81  [address] [facility], [address] [place] [address]  [phone]      Medical Progress Note      NAME: Rosa Saldana   :  1950  MRM:  [id number]    Date of service: 10/28/2021  8:27 AM       Assessment and Plan:   1. LLE Cellulitis/ venous stasis. Not improving with multiple ABx( per pt, it is getting worse). was on cefepime and doxy after failing clinda as an outpt. U/S without clots. Was on vanc and cefepime here. ABx changed to daptomycin, clinda and to continue cefepime per ID recommendation. Elevate leg. CT scan of the leg without contrast: soft tissue swelling due to cellulitis, venous stasis, or fluid overload. No drainable abscess. Appreciated ID and general surgery consult              2. CKD5: Still making urine. Not volume overloaded. No indication for HD at this time. Today he says he agrees to HD. Has followed with Dr. Eris Hull, will establish with Dr. Tatiana Lang next week. Renally dose meds, avoid nephrotoxins. Renal evaluation appreciated.      3. HTN: Cont home nifedipine, clonidine, carvedilol. BP not well controlled. Increased nifedipine dose. 4.  Afib: Cont carvedilol, apix     5. VIK: CPAP          Subjective:     Chief Complaint[de-identified] Patient was seen and examined as a follow up for cellulitis. Chart was reviewed. pt felt better and able to move his leg and thought the swelling is improving     ROS:  (bold if positive, if negative)    Tolerating PT  Tolerating Diet        Objective:     Last 24hrs VS reviewed since prior progress note.  Most recent are:    Visit Vitals  BP (!) 142/80 (BP 1 Location: Right upper arm, BP Patient Position: At rest)   Pulse 77   Temp 98.7 °F (37.1 °C)   Resp 18   Ht 5' 10.98\" (1.803 m)   Wt 113.3 kg (249 lb 12.5 oz)   SpO2 97%   BMI 34.85 kg/m²     SpO2 Readings from Last 6 Encounters:   10/28/21 97%   18 94%            Intake/Output Summary (Last 24 hours) at 10/28/2021 0802  Last data filed at 10/28/2021 7600  Gross per 24 hour   Intake 300 ml   Output 0 ml   Net 300 ml        Physical Exam:    Gen:  Well-developed, well-nourished, in no acute distress  HEENT:  Pink conjunctivae, PERRL, hearing intact to voice, moist mucous membranes  Neck:  Supple, without masses, thyroid non-tender  Resp:  No accessory muscle use, clear breath sounds without wheezes rales or rhonchi  Card:  No murmurs, normal S1, S2 without thrills, bruits or peripheral edema  Abd:  Soft, non-tender, non-distended, normoactive bowel sounds are present, no palpable organomegaly and no detectable hernias  Lymph:  No cervical or inguinal adenopathy  Musc:  RT leg hyperemia, tender.    Skin:  No rashes or ulcers, skin turgor is good  Neuro:  Cranial nerves are grossly intact, no focal motor weakness, follows commands appropriately  Psych:  Good insight, oriented to person, place and time, alert  __________________________________________________________________  Medications Reviewed: (see below)  Medications:     Current Facility-Administered Medications   Medication Dose Route Frequency    DAPTOmycin (CUBICIN) 700 mg in 0.9% sodium chloride 14 mL IV Syringe  700 mg IntraVENous Q48H    clindamycin (CLEOCIN) 900mg NS 50mL IVPB (premix)  900 mg IntraVENous Q8H    lactobacillus-acidophilus (LACTINEX) 1 Packet  1 Packet Oral BID    NIFEdipine ER (PROCARDIA XL) tablet 60 mg  60 mg Oral DAILY    cefepime (MAXIPIME) 2 g in sterile water (preservative free) 10 mL IV syringe  2 g IntraVENous Q24H    HYDROmorphone (DILAUDID) syringe 0.2 mg  0.2 mg IntraVENous Q3H PRN    apixaban (ELIQUIS) tablet 5 mg  5 mg Oral BID    calcitRIOL (ROCALTROL) capsule 0.25 mcg  0.25 mcg Oral DAILY    carvediloL (COREG) tablet 12.5 mg  12.5 mg Oral BID WITH MEALS    cloNIDine HCL (CATAPRES) tablet 0.2 mg  0.2 mg Oral BID    fluticasone propionate (FLONASE) 50 mcg/actuation nasal spray 2 Spray  2 Spray Both Nostrils DAILY    sodium chloride (NS) flush 5-40 mL  5-40 mL IntraVENous Q8H    sodium chloride (NS) flush 5-40 mL  5-40 mL IntraVENous PRN    acetaminophen (TYLENOL) tablet 650 mg  650 mg Oral Q6H PRN    Or    acetaminophen (TYLENOL) suppository 650 mg  650 mg Rectal Q6H PRN    polyethylene glycol (MIRALAX) packet 17 g  17 g Oral DAILY PRN    ondansetron (ZOFRAN ODT) tablet 4 mg  4 mg Oral Q8H PRN    Or    ondansetron (ZOFRAN) injection 4 mg  4 mg IntraVENous Q6H PRN        Lab Data Reviewed: (see below)  Lab Review:     Recent Labs     10/28/21  0428 10/27/21  0436 10/26/21  0156   WBC 8.2 7.9 9.0   HGB 10.6* 11.8* 12.2   HCT 32.2* 36.4* 37.7   * 106* 128*     Recent Labs     10/28/21  0428 10/27/21  0436 10/26/21  0156    138 138   K 5.1 5.0 5.2*   * 111* 112*   CO2 18* 18* 18*   * 133* 108*   BUN 79* 80* 84*   CREA 4.81* 4.47* 4.64*   CA 8.2* 8.3* 8.3*   ALB  --   --  2.2*   TBILI  --   --  0.6   ALT  --   --  20     No results found for: GLUCPOC  No results for input(s): PH, PCO2, PO2, HCO3, FIO2 in the last 72 hours. No results for input(s): INR, INREXT, INREXT in the last 72 hours.   All Micro Results     Procedure Component Value Units Date/Time    CULTURE, BLOOD, PERIPHERAL [[id number]] Collected: 10/24/21 1637    Order Status: Completed Specimen: Blood Updated: 10/28/21 0515     Special Requests: NO SPECIAL REQUESTS        Culture result: NO GROWTH 4 DAYS       CULTURE, BLOOD, PERIPHERAL [[id number]] Collected: 10/24/21 1628    Order Status: Completed Specimen: Blood Updated: 10/28/21 0515     Special Requests: NO SPECIAL REQUESTS        Culture result: NO GROWTH 4 DAYS       CULTURE, URINE [[id number]] Collected: 10/24/21 1807    Order Status: Completed Specimen: Urine from Clean catch Updated: 10/25/21 9292     Special Requests: NO SPECIAL REQUESTS        Culture result: No growth (<1,000 CFU/ML)       RESPIRATORY VIRUS PANEL W/COVID-19, PCR [[id number]] Collected: 10/25/21 7142    Order Status: Completed Specimen: Nasopharyngeal Updated: 10/25/21 0847     Adenovirus Not detected        Coronavirus 229E Not detected        Coronavirus HKU1 Not detected        Coronavirus CVNL63 Not detected        Coronavirus OC43 Not detected        SARS-CoV-2, PCR Not detected        Metapneumovirus Not detected        Rhinovirus and Enterovirus Not detected        Influenza A Not detected        Influenza A, subtype H1 Not detected        Influenza A, subtype H3 Not detected        INFLUENZA A H1N1 PCR Not detected        Influenza B Not detected        Parainfluenza 1 Not detected        Parainfluenza 2 Not detected        Parainfluenza 3 Not detected        Parainfluenza virus 4 Not detected        RSV by PCR Not detected        B. parapertussis, PCR Not detected        Bordetella pertussis - PCR Not detected        Chlamydophila pneumoniae DNA, QL, PCR Not detected        Mycoplasma pneumoniae DNA, QL, PCR Not detected             I have reviewed notes of prior 24hr. Other pertinent lab: Total time spent with patient: 28 I personally reviewed chart, notes, data and current medications in the medical record. I have personally examined and treated the patient at bedside during this period.                  Care Plan discussed with: Patient, Nursing Staff and >50% of time spent in counseling and coordination of care    Discussed:  Care Plan    Prophylaxis:  eliquis    Disposition:  Home w/Family           ___________________________________________________    Attending Physician: Mat Durbin MD

## 2021-10-28 NOTE — PROGRESS NOTES
[facility] Progress Note           Fabiola Nesbitt DO    [phone] Office  [phone]  Fax    10/28/2021      Assessment & Plan:   · LLE cellulitis in the setting of chronic lymphedema and venous stasis. No history of diabetes. Failed clindamycin in the outpatient setting. Treatment options complicated by penicillin and levofloxacin allergies. Unable to take linezolid due to thrombocytopenia. Stage V CKD is also a concern as far as antibiotic options. Improving on daptomycin, cefepime, and high-dose intravenous clindamycin. Will check antibiotic options for omadacycline as a p.o. option at the time of discharge. · Cervical spine and peripheral arthritis. Patient takes prednisone on a as needed basis for joint and back pain however it does not appear he takes this often enough to cause significant immunosuppression. I have advised him to hold steroids while we are treating this infection. · CKD. Stage V  · History of penicillin and levofloxacin allergies          Subjective:     Leg slightly better today. Complaining of bilateral elbow pain    Objective:     Vitals:   Visit Vitals  BP (!) 143/97 (BP 1 Location: Left upper arm, BP Patient Position: At rest)   Pulse 83   Temp 98.8 °F (37.1 °C)   Resp 24   Ht 5' 10.98\" (1.803 m)   Wt 249 lb 12.5 oz (113.3 kg)   SpO2 99%   BMI 34.85 kg/m²        Tmax:  Temp (24hrs), Av.6 °F (37 °C), Min:98 °F (36.7 °C), Max:99.1 °F (37.3 °C)      Exam:   Patient is intubated:  no    Physical Examination:   General:  Alert, cooperative, no distress   Head:  Normocephalic, atraumatic. Eyes:  Conjunctivae clear   Neck: Supple       Lungs:   No distress   Chest wall:     Heart:     Abdomen:   non-distended   Extremities: Moves all. See image above   Skin:  No rash. Neurologic: CNII-XII intact.  Normal strength     Labs:        No lab exists for component: ITNL   Recent Labs     10/28/21  0428   CPK 20*     Recent Labs     10/28/21  0428 10/27/21  0436 10/26/21  0156    138 138   K 5.1 5.0 5.2*   * 111* 112*   CO2 18* 18* 18*   BUN 79* 80* 84*   CREA 4.81* 4.47* 4.64*   * 133* 108*   ALB  --   --  2.2*   WBC 8.2 7.9 9.0   HGB 10.6* 11.8* 12.2   HCT 32.2* 36.4* 37.7   * 106* 128*     No results for input(s): INR, PTP, APTT, INREXT in the last 72 hours.   Needs: urine analysis, urine sodium, protein and creatinine  No results found for: JAILENE, CREAU      Cultures:     No results found for: SDES  Lab Results   Component Value Date/Time    Culture result: No growth (<1,000 CFU/ML) 10/24/2021 06:07 PM    Culture result: NO GROWTH 4 DAYS 10/24/2021 04:37 PM    Culture result: NO GROWTH 4 DAYS 10/24/2021 04:28 PM       Radiology:     Medications       Current Facility-Administered Medications   Medication Dose Route Frequency Last Admin    oxyCODONE-acetaminophen (PERCOCET) 5-325 mg per tablet 2 Tablet  2 Tablet Oral Q6H PRN 2 Tablet at 10/28/21 1239    DAPTOmycin (CUBICIN) 700 mg in 0.9% sodium chloride 14 mL IV Syringe  700 mg IntraVENous Q48H 700 mg at 10/27/21 1734    clindamycin (CLEOCIN) 900mg NS 50mL IVPB (premix)  900 mg IntraVENous Q8H 900 mg at 10/28/21 1239    lactobacillus-acidophilus (LACTINEX) 1 Packet  1 Packet Oral BID      NIFEdipine ER (PROCARDIA XL) tablet 60 mg  60 mg Oral DAILY 60 mg at 10/28/21 1026    cefepime (MAXIPIME) 2 g in sterile water (preservative free) 10 mL IV syringe  2 g IntraVENous Q24H 2 g at 10/28/21 1027    apixaban (ELIQUIS) tablet 5 mg  5 mg Oral BID 5 mg at 10/28/21 1027    calcitRIOL (ROCALTROL) capsule 0.25 mcg  0.25 mcg Oral DAILY 0.25 mcg at 10/28/21 1027    carvediloL (COREG) tablet 12.5 mg  12.5 mg Oral BID WITH MEALS 12.5 mg at 10/28/21 1027    cloNIDine HCL (CATAPRES) tablet 0.2 mg  0.2 mg Oral BID 0.2 mg at 10/28/21 1026    fluticasone propionate (FLONASE) 50 mcg/actuation nasal spray 2 Spray  2 Spray Both Nostrils DAILY 2 Lawrence at 10/28/21 1027    sodium chloride (NS) flush 5-40 mL  5-40 mL IntraVENous Q8H 10 mL at 10/28/21 0415    sodium chloride (NS) flush 5-40 mL  5-40 mL IntraVENous PRN      acetaminophen (TYLENOL) tablet 650 mg  650 mg Oral Q6H  mg at 10/25/21 1517    Or    acetaminophen (TYLENOL) suppository 650 mg  650 mg Rectal Q6H PRN      polyethylene glycol (MIRALAX) packet 17 g  17 g Oral DAILY PRN      ondansetron (ZOFRAN ODT) tablet 4 mg  4 mg Oral Q8H PRN      Or    ondansetron (ZOFRAN) injection 4 mg  4 mg IntraVENous Q6H PRN             Case discussed with:      Alejandra Gaines DO

## 2021-10-28 NOTE — TELEPHONE ENCOUNTER
NUZYRA Tablet  Type of coverage approved: Non-Formulary  This approval authorizes your coverage from 07/30/2021 - 10/28/2022, unless we notify you  otherwise,

## 2021-10-29 LAB
ANION GAP SERPL CALC-SCNC: 9 MMOL/L (ref 5–15)
BASOPHILS # BLD: 0 K/UL (ref 0–0.1)
BASOPHILS NFR BLD: 0 % (ref 0–1)
BUN SERPL-MCNC: 81 MG/DL (ref 6–20)
BUN/CREAT SERPL: 15 (ref 12–20)
CALCIUM SERPL-MCNC: 8 MG/DL (ref 8.5–10.1)
CHLORIDE SERPL-SCNC: 112 MMOL/L (ref 97–108)
CO2 SERPL-SCNC: 17 MMOL/L (ref 21–32)
CREAT SERPL-MCNC: 5.29 MG/DL (ref 0.7–1.3)
DIFFERENTIAL METHOD BLD: ABNORMAL
EOSINOPHIL # BLD: 0.2 K/UL (ref 0–0.4)
EOSINOPHIL NFR BLD: 3 % (ref 0–7)
ERYTHROCYTE [DISTWIDTH] IN BLOOD BY AUTOMATED COUNT: 14.6 % (ref 11.5–14.5)
GLUCOSE BLD STRIP.AUTO-MCNC: 110 MG/DL (ref 65–117)
GLUCOSE SERPL-MCNC: 125 MG/DL (ref 65–100)
HCT VFR BLD AUTO: 30.3 % (ref 36.6–50.3)
HGB BLD-MCNC: 10.1 G/DL (ref 12.1–17)
IMM GRANULOCYTES # BLD AUTO: 0.1 K/UL (ref 0–0.04)
IMM GRANULOCYTES NFR BLD AUTO: 2 % (ref 0–0.5)
LYMPHOCYTES # BLD: 0.4 K/UL (ref 0.8–3.5)
LYMPHOCYTES NFR BLD: 5 % (ref 12–49)
MCH RBC QN AUTO: 29.8 PG (ref 26–34)
MCHC RBC AUTO-ENTMCNC: 33.3 G/DL (ref 30–36.5)
MCV RBC AUTO: 89.4 FL (ref 80–99)
MONOCYTES # BLD: 0.5 K/UL (ref 0–1)
MONOCYTES NFR BLD: 8 % (ref 5–13)
NEUTS SEG # BLD: 5.7 K/UL (ref 1.8–8)
NEUTS SEG NFR BLD: 83 % (ref 32–75)
NRBC # BLD: 0 K/UL (ref 0–0.01)
NRBC BLD-RTO: 0 PER 100 WBC
PLATELET # BLD AUTO: 102 K/UL (ref 150–400)
PMV BLD AUTO: 12.4 FL (ref 8.9–12.9)
POTASSIUM SERPL-SCNC: 5 MMOL/L (ref 3.5–5.1)
RBC # BLD AUTO: 3.39 M/UL (ref 4.1–5.7)
SERVICE CMNT-IMP: NORMAL
SODIUM SERPL-SCNC: 138 MMOL/L (ref 136–145)
WBC # BLD AUTO: 6.9 K/UL (ref 4.1–11.1)

## 2021-10-29 PROCEDURE — 65270000029 HC RM PRIVATE

## 2021-10-29 PROCEDURE — 36415 COLL VENOUS BLD VENIPUNCTURE: CPT

## 2021-10-29 PROCEDURE — 74011250636 HC RX REV CODE- 250/636: Performed by: INTERNAL MEDICINE

## 2021-10-29 PROCEDURE — 82962 GLUCOSE BLOOD TEST: CPT

## 2021-10-29 PROCEDURE — 80048 BASIC METABOLIC PNL TOTAL CA: CPT

## 2021-10-29 PROCEDURE — 74011000250 HC RX REV CODE- 250: Performed by: INTERNAL MEDICINE

## 2021-10-29 PROCEDURE — 74011250637 HC RX REV CODE- 250/637: Performed by: INTERNAL MEDICINE

## 2021-10-29 PROCEDURE — 99232 SBSQ HOSP IP/OBS MODERATE 35: CPT | Performed by: INTERNAL MEDICINE

## 2021-10-29 PROCEDURE — 85025 COMPLETE CBC W/AUTO DIFF WBC: CPT

## 2021-10-29 RX ADMIN — CLINDAMYCIN PHOSPHATE 900 MG: 900 INJECTION, SOLUTION INTRAVENOUS at 03:14

## 2021-10-29 RX ADMIN — CALCITRIOL CAPSULES 0.25 MCG 0.25 MCG: 0.25 CAPSULE ORAL at 08:30

## 2021-10-29 RX ADMIN — CEFEPIME HYDROCHLORIDE 2 G: 2 INJECTION, POWDER, FOR SOLUTION INTRAVENOUS at 09:04

## 2021-10-29 RX ADMIN — OXYCODONE HYDROCHLORIDE AND ACETAMINOPHEN 2 TABLET: 5; 325 TABLET ORAL at 23:34

## 2021-10-29 RX ADMIN — OXYCODONE HYDROCHLORIDE AND ACETAMINOPHEN 2 TABLET: 5; 325 TABLET ORAL at 17:36

## 2021-10-29 RX ADMIN — DAPTOMYCIN 700 MG: 500 INJECTION, POWDER, LYOPHILIZED, FOR SOLUTION INTRAVENOUS at 17:36

## 2021-10-29 RX ADMIN — APIXABAN 5 MG: 5 TABLET, FILM COATED ORAL at 17:26

## 2021-10-29 RX ADMIN — Medication 10 ML: at 21:07

## 2021-10-29 RX ADMIN — CLONIDINE HYDROCHLORIDE 0.2 MG: 0.1 TABLET ORAL at 08:30

## 2021-10-29 RX ADMIN — NIFEDIPINE 60 MG: 30 TABLET, FILM COATED, EXTENDED RELEASE ORAL at 09:03

## 2021-10-29 RX ADMIN — CARVEDILOL 12.5 MG: 12.5 TABLET, FILM COATED ORAL at 08:30

## 2021-10-29 RX ADMIN — APIXABAN 5 MG: 5 TABLET, FILM COATED ORAL at 08:30

## 2021-10-29 RX ADMIN — CARVEDILOL 12.5 MG: 12.5 TABLET, FILM COATED ORAL at 17:26

## 2021-10-29 RX ADMIN — Medication 10 ML: at 03:15

## 2021-10-29 RX ADMIN — CLINDAMYCIN PHOSPHATE 900 MG: 900 INJECTION, SOLUTION INTRAVENOUS at 12:54

## 2021-10-29 RX ADMIN — OXYCODONE HYDROCHLORIDE AND ACETAMINOPHEN 2 TABLET: 5; 325 TABLET ORAL at 08:30

## 2021-10-29 RX ADMIN — CLINDAMYCIN PHOSPHATE 900 MG: 900 INJECTION, SOLUTION INTRAVENOUS at 21:07

## 2021-10-29 RX ADMIN — CLONIDINE HYDROCHLORIDE 0.2 MG: 0.1 TABLET ORAL at 17:26

## 2021-10-29 RX ADMIN — Medication 10 ML: at 17:26

## 2021-10-29 RX ADMIN — FLUTICASONE PROPIONATE 2 SPRAY: 50 SPRAY, METERED NASAL at 09:03

## 2021-10-29 NOTE — PROGRESS NOTES
Assessment / Plan:   Edema and pain improved  No evidence of abscess  Continue IV abx per ID  Will sign off please call with further questions or concerns    Lore Munroe MD  [facility]  [phone]        General Surgery Daily Progress Note      Patient: Frankey Freer MRN: [id number]  SSN: [id number]    YOB: 1950  Age: 78 y.o. Sex: male          Subjective:   Patient feels better    Current Facility-Administered Medications   Medication Dose Route Frequency    oxyCODONE-acetaminophen (PERCOCET) 5-325 mg per tablet 2 Tablet  2 Tablet Oral Q6H PRN    DAPTOmycin (CUBICIN) 700 mg in 0.9% sodium chloride 14 mL IV Syringe  700 mg IntraVENous Q48H    clindamycin (CLEOCIN) 900mg NS 50mL IVPB (premix)  900 mg IntraVENous Q8H    lactobacillus-acidophilus (LACTINEX) 1 Packet  1 Packet Oral BID    NIFEdipine ER (PROCARDIA XL) tablet 60 mg  60 mg Oral DAILY    cefepime (MAXIPIME) 2 g in sterile water (preservative free) 10 mL IV syringe  2 g IntraVENous Q24H    apixaban (ELIQUIS) tablet 5 mg  5 mg Oral BID    calcitRIOL (ROCALTROL) capsule 0.25 mcg  0.25 mcg Oral DAILY    carvediloL (COREG) tablet 12.5 mg  12.5 mg Oral BID WITH MEALS    cloNIDine HCL (CATAPRES) tablet 0.2 mg  0.2 mg Oral BID    fluticasone propionate (FLONASE) 50 mcg/actuation nasal spray 2 Spray  2 Spray Both Nostrils DAILY    sodium chloride (NS) flush 5-40 mL  5-40 mL IntraVENous Q8H    sodium chloride (NS) flush 5-40 mL  5-40 mL IntraVENous PRN    acetaminophen (TYLENOL) tablet 650 mg  650 mg Oral Q6H PRN    Or    acetaminophen (TYLENOL) suppository 650 mg  650 mg Rectal Q6H PRN    polyethylene glycol (MIRALAX) packet 17 g  17 g Oral DAILY PRN    ondansetron (ZOFRAN ODT) tablet 4 mg  4 mg Oral Q8H PRN    Or    ondansetron (ZOFRAN) injection 4 mg  4 mg IntraVENous Q6H PRN        Objective:   No intake/output data recorded. 10/27 1901 - 10/29 0700  In: 1000 [P.O.:700;  I.V.:300]  Out: 300 [Urine:300]  Patient Vitals for the past 8 hrs:   BP Temp Pulse Resp SpO2   10/29/21 0758 110/69 98.1 °F (36.7 °C) 67 18 96 %   10/29/21 0426 113/66 97.6 °F (36.4 °C) 69 18 97 %       Physical Exam:  General: Alert, cooperative, no distress, appears stated age. Neck:  Supple, symmetrical, trachea midline,   Lungs: Aerating well, no distress  Heart:  Regular rate and rhythm  Abdomen: Soft, non tender. Bowel sounds normal. No masses,  No organomegaly.   Extremities: LLE improved edema, pain improved, still with dark maurisio discoloration to knee and posteriorly to thigh  Skin:  Skin color, texture, turgor normal. No rashes or lesions    Labs:   Recent Labs     10/29/21  0312   WBC 6.9   HGB 10.1*   HCT 30.3*   *     Recent Labs     10/29/21  0312      K 5.0   *   CO2 17*   *   BUN 81*   CREA 5.29*   CA 8.0*     ·     Principal Problem:    ESRD (end stage renal disease) on dialysis ([facility]) ()      Overview: Dr Brandon Torres with [facility]    Active Problems:    Pulmonary hypertension ([facility] [address] ()      Overview: Ramon NP at [facility]      VIK on CPAP ()      Hypertension ()      Heart failure ([facility]) ()      Chronic atrial fibrillation ([facility]) ()      Overview: Kendal [facility]      Cervical disc disorder ()      Overview: C5,C6 fractured disc and lumbar disc slipped      Atrial fibrillation ([facility]) ()      Hyperkalemia (10/24/2021)      Cellulitis (10/25/2021)        Problem List Items Addressed This Visit     Chronic atrial fibrillation (HCC)    Relevant Medications    carvediloL (COREG) 12.5 mg tablet    furosemide (Lasix) 40 mg tablet    cloNIDine HCL (CATAPRES) 0.2 mg tablet    apixaban (Eliquis) 5 mg tablet    NIFEdipine ER (ADALAT CC) 30 mg ER tablet    Cervical disc disorder    Relevant Medications    calcitRIOL (ROCALTROL) 0.25 mcg capsule    predniSONE (DELTASONE) 10 mg tablet      Other Visit Diagnoses     Cellulitis of left lower extremity    -  Primary    CKD (chronic kidney disease), stage V ([facility] [address]        Relevant Medications    calcitRIOL (ROCALTROL) 0.25 mcg capsule    furosemide (Lasix) 40 mg tablet    predniSONE (DELTASONE) 10 mg tablet    Leukocytosis, unspecified type        Relevant Medications    calcitRIOL (ROCALTROL) 0.25 mcg capsule    History of penicillin allergy        Thrombocytopenia (HCC)

## 2021-10-29 NOTE — PROGRESS NOTES
Nutrition Assessment     Type and Reason for Visit: Initial, RD nutrition re-screen/LOS    Nutrition Recommendations/Plan:   1. Continue current diet order. 2. No BM x 4 days - consider modification of bowel regimen. 3. Begin Nepro once daily to increase kcal/protein intake (420 kcal, 38 g Carbs, 19 g Protein)     Nutrition Assessment:    Pt is a 78year old male admitted with ESRD on dialysis; Cellulitis. He has a past medical history of Atrial fibrillation, Cervical disc disorder, Chronic atrial fibrillation, Heart failure, Hypertension, and Pulmonary hypertension. Patient states #, with no recent weight or appetite changes. No chewing/swallowing problems. No N/V/D at this time. Voiced acceptance of protein supplement at this time. Patient Vitals for the past 168 hrs:   % Diet Eaten   10/28/21 1441 76 - 100%   10/28/21 1037 51 - 75%   10/28/21 0621 0%       Wt Readings from Last 10 Encounters:   10/24/21 113.3 kg (249 lb 12.5 oz)   01/07/18 120.2 kg (265 lb)       Malnutrition Assessment:  Malnutrition Status:  Mild malnutrition    Context:  Chronic illness     Findings of the 6 clinical characteristics of malnutrition:   Energy Intake:  No significant decrease in energy intake  Weight Loss:  No significant weight loss     Body Fat Loss:  No significant body fat loss,     Muscle Mass Loss:  No significant muscle mass loss,    Fluid Accumulation:  7 - Severe,     Strength:  Normal  strength     Estimated Daily Nutrient Needs:  Energy (kcal):  0576-5562 (25-35)  Protein (g):  113-135 (1.0-1.2)       Fluid (ml/day):  6213-3576    Nutrition Related Findings:    ABD: WNL 10/28  Last BM: 10/25  Edema:  +4 pitting LLE; +2 pitting RUE; +3 pitting RLE noted 10/28    Nutr.  Labs:  Lab Results   Component Value Date/Time    GFR est AA 13 (L) 10/29/2021 03:12 AM    GFR est non-AA 11 (L) 10/29/2021 03:12 AM    Creatinine 5.29 (H) 10/29/2021 03:12 AM    BUN 81 (H) 10/29/2021 03:12 AM    Sodium 138 10/29/2021 03:12 AM    Potassium 5.0 10/29/2021 03:12 AM    Chloride 112 (H) 10/29/2021 03:12 AM    CO2 17 (L) 10/29/2021 03:12 AM     Lab Results   Component Value Date/Time    Glucose 125 (H) 10/29/2021 03:12 AM     No results found for: HBA1C, UAT8SXRW, ENU9IBST, IWO2DUMG    Nutr. Meds:  Eliquis  Coreg  Catapres  Lactinex  Miralax PRN    Current Nutrition Therapies:  ADULT DIET Regular; Low Potassium (Less than 3000 mg/day); Low Phosphorus (Less than 1000 mg); 1500 ml    Anthropometric Measures:  · Height:  5' 10.98\" (180.3 cm)  · Current Body Wt:  113.3 kg (249 lb 12.5 oz)  · BMI: 34.9    Nutrition Diagnosis:   · Mild malnutrition related to renal dysfunction as evidenced by ESRD    Nutrition Intervention:  Food and/or Nutrient Delivery: Continue current diet, Start oral nutrition supplement  Nutrition Education and Counseling: No recommendations at this time  Coordination of Nutrition Care: Continue to monitor while inpatient, Interdisciplinary rounds    Goals:  PO intake continues >/= 75% of all meals and supplements within 5 - 7 days       Nutrition Monitoring and Evaluation:   Behavioral-Environmental Outcomes: None identified  Food/Nutrient Intake Outcomes: Food and nutrient intake, Supplement intake  Physical Signs/Symptoms Outcomes: Biochemical data, Weight    Discharge Planning:     Too soon to determine     Electronically signed by Shelley Yousif RD on 98/38/6025  Contact Number: [phone] or via [employer]

## 2021-10-29 NOTE — PROGRESS NOTES
10/29/2021   CARE MANAGEMENT NOTE:  CM reviewed EMR.  Pt was admitted with CKD5, ESRD, and LLE cellulitis. Reportedly, pt resides with his wife Gonzalez Benitez ([phone]).    RUR 13%     Transition Plan of Care:  1.  Nephrology, General Surgery, and ID following for medical management - per ID pt will discharge home on p.o omadacycline (samples thru pharmaceutical company)  2. [employer] is for pt to return home  3.  Rolling walker was approved by [employer] and it will be delivered to pt prior to discharge  4.  Outpt f/u  5.  Wife will transport pt home     CM will continue to follow pt until discharged.   Judith

## 2021-10-29 NOTE — PROGRESS NOTES
[facility] Special[facility] Progress Note           Gely Steele DO    [phone] Office  [phone]  Fax    10/29/2021      Assessment & Plan:   · LLE cellulitis in the setting of chronic lymphedema and venous stasis. Improving on daptomycin, cefepime, and high-dose intravenous clindamycin. Recommend continuing intravenous antibiotics through 10/30 then discharge tomorrow morning on omadacycline. Patient will be given sample and pharmaceutical company will mail the remaining antibiotics to him. He does not need a prescription  · Cervical spine and peripheral arthritis. Patient takes prednisone on a as needed basis for joint and back pain however it does not appear he takes this often enough to cause significant immunosuppression. I have advised him to hold steroids while we are treating this infection. · CKD. Stage V  · History of penicillin and levofloxacin allergies          Subjective:     Leg slowly improving    Objective:     Vitals:   Visit Vitals  /69 (BP 1 Location: Left upper arm, BP Patient Position: At rest)   Pulse 67   Temp 98.1 °F (36.7 °C)   Resp 18   Ht 5' 10.98\" (1.803 m)   Wt 249 lb 12.5 oz (113.3 kg)   SpO2 96%   BMI 34.85 kg/m²        Tmax:  Temp (24hrs), Av.2 °F (36.8 °C), Min:97.6 °F (36.4 °C), Max:98.8 °F (37.1 °C)      Exam:   Patient is intubated:  no    Physical Examination:   General:  Alert, cooperative, no distress   Head:  Normocephalic, atraumatic. Eyes:  Conjunctivae clear   Neck: Supple       Lungs:   No distress   Chest wall:     Heart:     Abdomen:   non-distended   Extremities: Moves all. LLE erythema and edema improving   Skin:  No rash. Neurologic: CNII-XII intact.  Normal strength     Labs:        No lab exists for component: ITNL   Recent Labs     10/28/21  0428   CPK 20*     Recent Labs     10/29/21  0312 10/28/21  0428 10/27/21  0436    138 138   K 5.0 5.1 5.0   * 113* 111*   CO2 17* 18* 18*   BUN 81* 79* 80*   CREA 5.29* 4.81* 4.47*   * 120* 133*   WBC 6.9 8.2 7.9   HGB 10.1* 10.6* 11.8*   HCT 30.3* 32.2* 36.4*   * 110* 106*     No results for input(s): INR, PTP, APTT, INREXT, INREXT in the last 72 hours.   Needs: urine analysis, urine sodium, protein and creatinine  No results found for: JAILENE, CREAU      Cultures:     No results found for: SDES  Lab Results   Component Value Date/Time    Culture result: No growth (<1,000 CFU/ML) 10/24/2021 06:07 PM    Culture result: NO GROWTH 5 DAYS 10/24/2021 04:37 PM    Culture result: NO GROWTH 5 DAYS 10/24/2021 04:28 PM       Radiology:     Medications       Current Facility-Administered Medications   Medication Dose Route Frequency Last Admin    oxyCODONE-acetaminophen (PERCOCET) 5-325 mg per tablet 2 Tablet  2 Tablet Oral Q6H PRN 2 Tablet at 10/29/21 0830    DAPTOmycin (CUBICIN) 700 mg in 0.9% sodium chloride 14 mL IV Syringe  700 mg IntraVENous Q48H 700 mg at 10/27/21 1734    clindamycin (CLEOCIN) 900mg NS 50mL IVPB (premix)  900 mg IntraVENous Q8H 900 mg at 10/29/21 0314    lactobacillus-acidophilus (LACTINEX) 1 Packet  1 Packet Oral BID      NIFEdipine ER (PROCARDIA XL) tablet 60 mg  60 mg Oral DAILY 60 mg at 10/29/21 0903    cefepime (MAXIPIME) 2 g in sterile water (preservative free) 10 mL IV syringe  2 g IntraVENous Q24H 2 g at 10/29/21 0904    apixaban (ELIQUIS) tablet 5 mg  5 mg Oral BID 5 mg at 10/29/21 0830    calcitRIOL (ROCALTROL) capsule 0.25 mcg  0.25 mcg Oral DAILY 0.25 mcg at 10/29/21 0830    carvediloL (COREG) tablet 12.5 mg  12.5 mg Oral BID WITH MEALS 12.5 mg at 10/29/21 0830    cloNIDine HCL (CATAPRES) tablet 0.2 mg  0.2 mg Oral BID 0.2 mg at 10/29/21 0830    fluticasone propionate (FLONASE) 50 mcg/actuation nasal spray 2 Spray  2 Spray Both Nostrils DAILY 2 [place] at 10/29/21 0903    sodium chloride (NS) flush 5-40 mL  5-40 mL IntraVENous Q8H 10 mL at 10/29/21 0315    sodium chloride (NS) flush 5-40 mL  5-40 mL IntraVENous PRN      acetaminophen (TYLENOL) tablet 650 mg  650 mg Oral Q6H  mg at 10/25/21 1517    Or    acetaminophen (TYLENOL) suppository 650 mg  650 mg Rectal Q6H PRN      polyethylene glycol (MIRALAX) packet 17 g  17 g Oral DAILY PRN      ondansetron (ZOFRAN ODT) tablet 4 mg  4 mg Oral Q8H PRN      Or    ondansetron (ZOFRAN) injection 4 mg  4 mg IntraVENous Q6H PRN             Case discussed with:      Connie Adams DO

## 2021-10-29 NOTE — PROGRESS NOTES
Marleni 55 [address] [facility]  YOB: 1950          Assessment & Plan:   CKD 5, stable  LLE cellulitis, On IV abx  HTN  SHPT    Rec:  No acute indication HD. Cr higher but in baseline range. Cellulitis improving  Low K diet  Avoid nephrotoxins.    Keep f/u with Dr. Sp Medina in a couple weeks if d/c over weekend       Subjective:   CC: f/u CKD  HPI: Cellulitis improving, Cr a bit higher today  ROS: no n/v/sob  Current Facility-Administered Medications   Medication Dose Route Frequency    oxyCODONE-acetaminophen (PERCOCET) 5-325 mg per tablet 2 Tablet  2 Tablet Oral Q6H PRN    DAPTOmycin (CUBICIN) 700 mg in 0.9% sodium chloride 14 mL IV Syringe  700 mg IntraVENous Q48H    clindamycin (CLEOCIN) 900mg NS 50mL IVPB (premix)  900 mg IntraVENous Q8H    lactobacillus-acidophilus (LACTINEX) 1 Packet  1 Packet Oral BID    NIFEdipine ER (PROCARDIA XL) tablet 60 mg  60 mg Oral DAILY    cefepime (MAXIPIME) 2 g in sterile water (preservative free) 10 mL IV syringe  2 g IntraVENous Q24H    apixaban (ELIQUIS) tablet 5 mg  5 mg Oral BID    calcitRIOL (ROCALTROL) capsule 0.25 mcg  0.25 mcg Oral DAILY    carvediloL (COREG) tablet 12.5 mg  12.5 mg Oral BID WITH MEALS    cloNIDine HCL (CATAPRES) tablet 0.2 mg  0.2 mg Oral BID    fluticasone propionate (FLONASE) 50 mcg/actuation nasal spray 2 Spray  2 Spray Both Nostrils DAILY    sodium chloride (NS) flush 5-40 mL  5-40 mL IntraVENous Q8H    sodium chloride (NS) flush 5-40 mL  5-40 mL IntraVENous PRN    acetaminophen (TYLENOL) tablet 650 mg  650 mg Oral Q6H PRN    Or    acetaminophen (TYLENOL) suppository 650 mg  650 mg Rectal Q6H PRN    polyethylene glycol (MIRALAX) packet 17 g  17 g Oral DAILY PRN    ondansetron (ZOFRAN ODT) tablet 4 mg  4 mg Oral Q8H PRN    Or    ondansetron (ZOFRAN) injection 4 mg  4 mg IntraVENous Q6H PRN          Objective:     Vitals:  Blood pressure 110/69, pulse 67, temperature 98.1 °F (36.7 °C), resp. rate 18, height 5' 10.98\" (1.803 m), weight 113.3 kg (249 lb 12.5 oz), SpO2 96 %. Temp (24hrs), Av.2 °F (36.8 °C), Min:97.6 °F (36.4 °C), Max:98.8 °F (37.1 °C)      Intake and Output:  No intake/output data recorded. 10/27 1901 - 10/29 0700  In: 1000 [P.O.:700; I.V.:300]  Out: 300 [Urine:300]    Physical Exam:               GENERAL ASSESSMENT: NAD  HEENT: Nontraumatic   CHEST: No distress, on CPAP  EXTREMITY: LLE EDEMA, LLE erythema and pain  NEURO: Grossly non focal          ECG/rhythm:    Data Review      No results for input(s): TNIPOC in the last 72 hours. No lab exists for component: ITNL   Recent Labs     10/28/21  0428   CPK 20*     Recent Labs     10/29/21  0312 10/28/21  0428 10/27/21  0436    138 138   K 5.0 5.1 5.0   * 113* 111*   CO2 17* 18* 18*   BUN 81* 79* 80*   CREA 5.29* 4.81* 4.47*   * 120* 133*   CA 8.0* 8.2* 8.3*   WBC 6.9 8.2 7.9   HGB 10.1* 10.6* 11.8*   HCT 30.3* 32.2* 36.4*   * 110* 106*      No results for input(s): INR, PTP, APTT, INREXT, INREXT in the last 72 hours. Needs: urine analysis, urine sodium, protein and creatinine  No results found for: JING DENT        : Geetha Santana MD  10/29/2021        [facility]:  [email or web address]  [employer] office:  [address] [place] [address], [address] [place] [address] [address]  [place], [address] [place] [address]  Phone: [phone]  Fax :     [phone]    [place] office:  [address] [facility], [address]  Phone - [phone]  Fax - [phone]

## 2021-10-29 NOTE — PROGRESS NOTES
Nathanael Sinha leonel [address]  [address] [facility], [address] [place] [address]  [phone]      Medical Progress Note      NAME: Rosa Saldana   :  1950  MRM:  [id number]    Date of service: 10/29/2021  8:27 AM       Assessment and Plan:   1. LLE Cellulitis/ venous stasis. Not improving with multiple ABx( per pt, it is getting worse). was on cefepime and doxy after failing clinda as an outpt. U/S without clots. Was on vanc and cefepime here. ABx changed to daptomycin, clinda and to continue cefepime per ID recommendation. Elevate leg. CT scan of the leg without contrast: soft tissue swelling due to cellulitis, venous stasis, or fluid overload. No drainable abscess. Appreciated ID and general surgery consult              2. CKD5: Still making urine. Not volume overloaded. No indication for HD at this time. Today he says he agrees to HD. Has followed with Dr. Eris Hull, will establish with Dr. Tatiana Lang next week. Renally dose meds, avoid nephrotoxins. Renal evaluation appreciated.      3. HTN: Cont home nifedipine, clonidine, carvedilol. BP not well controlled. Increased nifedipine dose. 4.  Afib: Cont carvedilol, apix     5. VIK: CPAP    6. Thrombocytopenia. This is chronic since 2018. Outpatient FU           Subjective:     Chief Complaint[de-identified] Patient was seen and examined as a follow up for cellulitis. Chart was reviewed. thought the swelling is improving and pain is getting better     ROS:  (bold if positive, if negative)    Tolerating PT  Tolerating Diet        Objective:     Last 24hrs VS reviewed since prior progress note.  Most recent are:    Visit Vitals  /66 (BP 1 Location: Left arm, BP Patient Position: At rest)   Pulse 69   Temp 97.6 °F (36.4 °C)   Resp 18   Ht 5' 10.98\" (1.803 m)   Wt 113.3 kg (249 lb 12.5 oz)   SpO2 97%   BMI 34.85 kg/m²     SpO2 Readings from Last 6 Encounters:   10/29/21 97%   18 94%            Intake/Output Summary (Last 24 hours) at 10/29/2021 4877  Last data filed at 10/29/2021 0313  Gross per 24 hour   Intake 700 ml   Output 300 ml   Net 400 ml        Physical Exam:    Gen:  Well-developed, well-nourished, in no acute distress  HEENT:  Pink conjunctivae, PERRL, hearing intact to voice, moist mucous membranes  Neck:  Supple, without masses, thyroid non-tender  Resp:  No accessory muscle use, clear breath sounds without wheezes rales or rhonchi  Card:  No murmurs, normal S1, S2 without thrills, bruits or peripheral edema  Abd:  Soft, non-tender, non-distended, normoactive bowel sounds are present, no palpable organomegaly and no detectable hernias  Lymph:  No cervical or inguinal adenopathy  Musc:  RT leg hyperemia, tender.    Skin:  No rashes or ulcers, skin turgor is good  Neuro:  Cranial nerves are grossly intact, no focal motor weakness, follows commands appropriately  Psych:  Good insight, oriented to person, place and time, alert  __________________________________________________________________  Medications Reviewed: (see below)  Medications:     Current Facility-Administered Medications   Medication Dose Route Frequency    oxyCODONE-acetaminophen (PERCOCET) 5-325 mg per tablet 2 Tablet  2 Tablet Oral Q6H PRN    DAPTOmycin (CUBICIN) 700 mg in 0.9% sodium chloride 14 mL IV Syringe  700 mg IntraVENous Q48H    clindamycin (CLEOCIN) 900mg NS 50mL IVPB (premix)  900 mg IntraVENous Q8H    lactobacillus-acidophilus (LACTINEX) 1 Packet  1 Packet Oral BID    NIFEdipine ER (PROCARDIA XL) tablet 60 mg  60 mg Oral DAILY    cefepime (MAXIPIME) 2 g in sterile water (preservative free) 10 mL IV syringe  2 g IntraVENous Q24H    apixaban (ELIQUIS) tablet 5 mg  5 mg Oral BID    calcitRIOL (ROCALTROL) capsule 0.25 mcg  0.25 mcg Oral DAILY    carvediloL (COREG) tablet 12.5 mg  12.5 mg Oral BID WITH MEALS    cloNIDine HCL (CATAPRES) tablet 0.2 mg  0.2 mg Oral BID    fluticasone propionate (FLONASE) 50 mcg/actuation nasal spray 2 Spray  2 Spray Both Nostrils DAILY    sodium chloride (NS) flush 5-40 mL  5-40 mL IntraVENous Q8H    sodium chloride (NS) flush 5-40 mL  5-40 mL IntraVENous PRN    acetaminophen (TYLENOL) tablet 650 mg  650 mg Oral Q6H PRN    Or    acetaminophen (TYLENOL) suppository 650 mg  650 mg Rectal Q6H PRN    polyethylene glycol (MIRALAX) packet 17 g  17 g Oral DAILY PRN    ondansetron (ZOFRAN ODT) tablet 4 mg  4 mg Oral Q8H PRN    Or    ondansetron (ZOFRAN) injection 4 mg  4 mg IntraVENous Q6H PRN        Lab Data Reviewed: (see below)  Lab Review:     Recent Labs     10/29/21  0312 10/28/21  0428 10/27/21  0436   WBC 6.9 8.2 7.9   HGB 10.1* 10.6* 11.8*   HCT 30.3* 32.2* 36.4*   * 110* 106*     Recent Labs     10/29/21  0312 10/28/21  0428 10/27/21  0436    138 138   K 5.0 5.1 5.0   * 113* 111*   CO2 17* 18* 18*   * 120* 133*   BUN 81* 79* 80*   CREA 5.29* 4.81* 4.47*   CA 8.0* 8.2* 8.3*     No results found for: GLUCPOC  No results for input(s): PH, PCO2, PO2, HCO3, FIO2 in the last 72 hours. No results for input(s): INR, INREXT, INREXT in the last 72 hours.   All Micro Results     Procedure Component Value Units Date/Time    CULTURE, BLOOD, PERIPHERAL [[id number]] Collected: 10/24/21 1628    Order Status: Completed Specimen: Blood Updated: 10/29/21 0531     Special Requests: NO SPECIAL REQUESTS        Culture result: NO GROWTH 5 DAYS       CULTURE, BLOOD, PERIPHERAL [[id number]] Collected: 10/24/21 1637    Order Status: Completed Specimen: Blood Updated: 10/29/21 0531     Special Requests: NO SPECIAL REQUESTS        Culture result: NO GROWTH 5 DAYS       CULTURE, URINE [[id number]] Collected: 10/24/21 1807    Order Status: Completed Specimen: Urine from Clean catch Updated: 10/25/21 1845     Special Requests: NO SPECIAL REQUESTS        Culture result: No growth (<1,000 CFU/ML)       RESPIRATORY VIRUS PANEL W/COVID-19, PCR [[id number]] Collected: 10/25/21 0325    Order Status: Completed Specimen: Nasopharyngeal Updated: 10/25/21 0847     Adenovirus Not detected        Coronavirus 229E Not detected        Coronavirus HKU1 Not detected        Coronavirus CVNL63 Not detected        Coronavirus OC43 Not detected        SARS-CoV-2, PCR Not detected        Metapneumovirus Not detected        Rhinovirus and Enterovirus Not detected        Influenza A Not detected        Influenza A, subtype H1 Not detected        Influenza A, subtype H3 Not detected        INFLUENZA A H1N1 PCR Not detected        Influenza B Not detected        Parainfluenza 1 Not detected        Parainfluenza 2 Not detected        Parainfluenza 3 Not detected        Parainfluenza virus 4 Not detected        RSV by PCR Not detected        B. parapertussis, PCR Not detected        Bordetella pertussis - PCR Not detected        Chlamydophila pneumoniae DNA, QL, PCR Not detected        Mycoplasma pneumoniae DNA, QL, PCR Not detected             I have reviewed notes of prior 24hr. Other pertinent lab: Total time spent with patient: 28 I personally reviewed chart, notes, data and current medications in the medical record. I have personally examined and treated the patient at bedside during this period.                  Care Plan discussed with: Patient, Nursing Staff and >50% of time spent in counseling and coordination of care    Discussed:  Care Plan    Prophylaxis:  eliquis    Disposition:  Home w/Family           ___________________________________________________    Attending Physician: Carter Carmona MD

## 2021-10-29 NOTE — PROGRESS NOTES
Bedside, Verbal and Written shift change report given to [address] (oncoming nurse) by Mariely Rosado (offgoing nurse). Report included the following information SBAR, Kardex, ED Summary, Procedure Summary, Intake/Output, MAR, Recent Results and Med Rec Status.

## 2021-10-30 ENCOUNTER — APPOINTMENT (OUTPATIENT)
Dept: VASCULAR SURGERY | Age: 71
DRG: 603 | End: 2021-10-30
Attending: INTERNAL MEDICINE
Payer: MEDICARE

## 2021-10-30 PROBLEM — E87.5 HYPERKALEMIA: Status: RESOLVED | Noted: 2021-10-24 | Resolved: 2021-10-30

## 2021-10-30 LAB
BACTERIA SPEC CULT: NORMAL
BACTERIA SPEC CULT: NORMAL
CREAT SERPL-MCNC: 5.64 MG/DL (ref 0.7–1.3)
SERVICE CMNT-IMP: NORMAL
SERVICE CMNT-IMP: NORMAL

## 2021-10-30 PROCEDURE — 97530 THERAPEUTIC ACTIVITIES: CPT

## 2021-10-30 PROCEDURE — 74011250637 HC RX REV CODE- 250/637: Performed by: INTERNAL MEDICINE

## 2021-10-30 PROCEDURE — 82565 ASSAY OF CREATININE: CPT

## 2021-10-30 PROCEDURE — 65270000029 HC RM PRIVATE

## 2021-10-30 PROCEDURE — 74011250636 HC RX REV CODE- 250/636: Performed by: INTERNAL MEDICINE

## 2021-10-30 PROCEDURE — 93971 EXTREMITY STUDY: CPT

## 2021-10-30 PROCEDURE — 74011000250 HC RX REV CODE- 250: Performed by: INTERNAL MEDICINE

## 2021-10-30 PROCEDURE — 36415 COLL VENOUS BLD VENIPUNCTURE: CPT

## 2021-10-30 RX ORDER — SODIUM BICARBONATE 650 MG/1
1300 TABLET ORAL 2 TIMES DAILY
Status: DISCONTINUED | OUTPATIENT
Start: 2021-10-30 | End: 2021-10-31 | Stop reason: HOSPADM

## 2021-10-30 RX ADMIN — SODIUM BICARBONATE 1300 MG: 650 TABLET ORAL at 17:36

## 2021-10-30 RX ADMIN — SODIUM BICARBONATE 1300 MG: 650 TABLET ORAL at 14:04

## 2021-10-30 RX ADMIN — Medication 10 ML: at 05:27

## 2021-10-30 RX ADMIN — CLINDAMYCIN PHOSPHATE 900 MG: 900 INJECTION, SOLUTION INTRAVENOUS at 03:40

## 2021-10-30 RX ADMIN — Medication 10 ML: at 20:44

## 2021-10-30 RX ADMIN — CLONIDINE HYDROCHLORIDE 0.2 MG: 0.1 TABLET ORAL at 09:03

## 2021-10-30 RX ADMIN — CLONIDINE HYDROCHLORIDE 0.2 MG: 0.1 TABLET ORAL at 17:36

## 2021-10-30 RX ADMIN — CLINDAMYCIN PHOSPHATE 900 MG: 900 INJECTION, SOLUTION INTRAVENOUS at 12:32

## 2021-10-30 RX ADMIN — CEFEPIME HYDROCHLORIDE 2 G: 2 INJECTION, POWDER, FOR SOLUTION INTRAVENOUS at 09:04

## 2021-10-30 RX ADMIN — APIXABAN 5 MG: 5 TABLET, FILM COATED ORAL at 09:04

## 2021-10-30 RX ADMIN — METHYLPREDNISOLONE SODIUM SUCCINATE 60 MG: 40 INJECTION, POWDER, FOR SOLUTION INTRAMUSCULAR; INTRAVENOUS at 14:04

## 2021-10-30 RX ADMIN — APIXABAN 5 MG: 5 TABLET, FILM COATED ORAL at 17:36

## 2021-10-30 RX ADMIN — ONDANSETRON 4 MG: 2 INJECTION INTRAMUSCULAR; INTRAVENOUS at 09:08

## 2021-10-30 RX ADMIN — FLUTICASONE PROPIONATE 2 SPRAY: 50 SPRAY, METERED NASAL at 09:04

## 2021-10-30 RX ADMIN — CARVEDILOL 12.5 MG: 12.5 TABLET, FILM COATED ORAL at 09:04

## 2021-10-30 RX ADMIN — CARVEDILOL 12.5 MG: 12.5 TABLET, FILM COATED ORAL at 17:36

## 2021-10-30 RX ADMIN — CLINDAMYCIN PHOSPHATE 900 MG: 900 INJECTION, SOLUTION INTRAVENOUS at 20:44

## 2021-10-30 RX ADMIN — NIFEDIPINE 60 MG: 30 TABLET, FILM COATED, EXTENDED RELEASE ORAL at 09:04

## 2021-10-30 RX ADMIN — Medication 10 ML: at 14:07

## 2021-10-30 RX ADMIN — CALCITRIOL CAPSULES 0.25 MCG 0.25 MCG: 0.25 CAPSULE ORAL at 09:04

## 2021-10-30 RX ADMIN — METHYLPREDNISOLONE SODIUM SUCCINATE 40 MG: 40 INJECTION, POWDER, FOR SOLUTION INTRAMUSCULAR; INTRAVENOUS at 20:44

## 2021-10-30 NOTE — PROGRESS NOTES
Problem: Mobility Impaired (Adult and Pediatric)  Goal: *Acute Goals and Plan of Care (Insert Text)  Description: FUNCTIONAL STATUS PRIOR TO ADMISSION: Patient was independent and active without use of DME.    HOME SUPPORT PRIOR TO ADMISSION: The patient lived with his wife but did not require assist.    Physical Therapy Goals  Initiated 10/26/2021  1. Patient will move from supine to sit and sit to supine , scoot up and down, and roll side to side in bed with independence within 7 day(s). 2.  Patient will transfer from bed to chair and chair to bed with modified independence using the least restrictive device within 7 day(s). 3.  Patient will perform sit to stand with modified independence within 7 day(s). 4.  Patient will ambulate with modified independence for 150 feet with the least restrictive device within 7 day(s). 5.  Patient will ascend/descend 2 stairs with 2 handrail(s) with modified independence within 7 day(s). Note:   PHYSICAL THERAPY TREATMENT  Patient: Celena Calderon (52 y.o. male)  Date: 10/30/2021  Diagnosis: [facility]end stage renal disease) [facility] [N18.6, Z99.2]  Cellulitis [L03.90] ESRD (end stage renal disease) on dialysis [facility])       Precautions:    Chart, physical therapy assessment, plan of care and goals were reviewed. ASSESSMENT  Patient continues with skilled PT services and is not progressing towards goals. Patient has regressed over the last 4 days as he was able to amb 100' 4 days ago and today it took 45 minutes to stand and amb 3' forward and backward and returned to chair. Patient is breathless at rest. His SPO2 94-96% and his weight was checked upon standing and he was measured as 251.8 pounds. Patient with BLE edema and edema throughout RUE. Patient with h/o severe gout but has had increased pain unable to have steroid medication due to antibiotic treatment needed for his cellulitis.  He received IV 60 mg steroid this afternoon for severe Right knee pain and B foot pain. Patient has been in bed x 2 days and did not want to get OOB today. Nurse convinced him this afternoon. Nurse requested PT see patient if possible to help with persuading him to get OOB due to potential plan for d/c today. PT received patient in chair and for activity. Patient is interested in very little activity. PT concerned shared with patient and wife as well as nurse, and CM is patient may need to discharge to rehab because he is likely to discharge to home and be sedentary and then have to immediately return to ED. Per nurse patient refuses regular dialysis as well as other txs that have been recommended for his chronic conditions. If patient does not consent to rehab placement then recommended to wife we either pursue rental w/c or she can borrow through her employer that she offered. CM states she will perfect serve message to Dr Tamy Patton and she will speak with patient in the am as to whether he agrees to pursuing placement or plans to discharge home on 10/31/21. PT will follow up in am     Current Level of Function Impacting Discharge (mobility/balance): Min- CG A x 2, breathless, fatigue and unable to amb >7' with RW    Other factors to consider for discharge: wife checking to see if she can borrow W/C through her employer         PLAN :  Patient continues to benefit from skilled intervention to address the above impairments. Continue treatment per established plan of care. to address goals.     Recommendation for discharge: (in order for the patient to meet his/her long term goals)  Therapy up to 5 days/week in SNF setting    This discharge recommendation:  Has been made in collaboration with the attending provider and/or case management    IF patient discharges home will need the following DME: rolling walker received and wheelchair (TBD tomorrow)       SUBJECTIVE:   Patient stated .    OBJECTIVE DATA SUMMARY:   Critical Behavior:  Neurologic State: Alert, Appropriate for age  Orientation Level: Oriented X4  Cognition: Appropriate decision making  Safety/Judgement: Awareness of environment  Functional Mobility Training:  Bed Mobility:  Rolling:  (nursing reports finally convinced patient to get up to chair)  Supine to Sit:  (Up in chair on arrival)              Transfers:  Sit to Stand: Contact guard assistance; Additional time;Assist x2  Stand to Sit: Contact guard assistance; Additional time;Assist x2  Stand Pivot Transfers: Minimum assistance     Bed to Chair: Contact guard assistance; Additional time;Assist x2                    Balance:  Sitting: Intact  Standing: Impaired  Standing - Static: Constant support;Good  Standing - Dynamic : Constant support; Fair  Ambulation/Gait Training:  Distance (ft): 3 Feet (ft)  Assistive Device: Gait belt;Walker, rolling  Ambulation - Level of Assistance: Contact guard assistance; Additional time;Assist x2        Gait Abnormalities: Antalgic              Speed/Elizabeth: Pace decreased (<100 feet/min)       Pain Ratin/10 BLEs    Activity Tolerance:   Poor, SpO2 stable on RA, requires frequent rest breaks, and observed SOB with activity    After treatment patient left in no apparent distress:   Sitting in chair, Call bell within reach, and Caregiver / family present    COMMUNICATION/COLLABORATION:   The patients plan of care was discussed with: Registered nurse, Physician, and Case management.      Jasper Moran PT, DPT   Time Calculation: 65 mins

## 2021-10-30 NOTE — PROGRESS NOTES
Problem: Falls - Risk of  Goal: *Absence of Falls  Description: Document Irena Lobe Fall Risk and appropriate interventions in the flowsheet.   Outcome: Progressing Towards Goal  Note: Fall Risk Interventions:            Medication Interventions: Evaluate medications/consider consulting pharmacy, Patient to call before getting OOB

## 2021-10-30 NOTE — PROGRESS NOTES
Nathanael Sinha [facility] 79  [address] [facility], [address] [place] [address]  [phone]      Medical Progress Note      NAME: Rukhsana Perry   :  1950  MRM:  [id number]    Date of service: 10/30/2021  8:27 AM       Assessment and Plan:   1. LLE Cellulitis/ venous stasis. Not improving with multiple ABx( per pt, it is getting worse). was on cefepime and doxy after failing clinda as an outpt. U/S without clots. Was on vanc and cefepime here. ABx changed to daptomycin, clinda and to continue cefepime per ID recommendation. Elevate leg. CT scan of the leg without contrast: soft tissue swelling due to cellulitis, venous stasis, or fluid overload. No drainable abscess. Appreciated ID and general surgery consult. Sample of omadacycline was given by ID. 2.  CKD5: Still making urine. Not volume overloaded. No indication for HD at this time. Today he says he agrees to HD. Has followed with Dr. Tk Prater, will establish with Dr. Echo Rankin next week. Renally dose meds, avoid nephrotoxins. Renal evaluation appreciated.      3. HTN: Cont home nifedipine, clonidine, carvedilol. BP not well controlled. Increased nifedipine dose. 4.  Afib: Cont carvedilol, apix     5. VIK: CPAP    6. Thrombocytopenia. This is chronic since 2018. Outpatient FU     7.  RT arm swelling/ pain. Hx of gout. Check venous doppler. Advised to resume prednisone. Subjective:     Chief Complaint[de-identified] Patient was seen and examined as a follow up for cellulitis. Chart was reviewed. c/o RT arm swelling      ROS:  (bold if positive, if negative)    Tolerating PT  Tolerating Diet        Objective:     Last 24hrs VS reviewed since prior progress note.  Most recent are:    Visit Vitals  /78 (BP 1 Location: Left upper arm, BP Patient Position: At rest)   Pulse 80   Temp 97.7 °F (36.5 °C)   Resp 18   Ht 5' 10.98\" (1.803 m)   Wt 113.3 kg (249 lb 12.5 oz)   SpO2 97%   BMI 34.85 kg/m²     SpO2 Readings from Last 6 Encounters:   10/30/21 97%   01/07/18 94%            Intake/Output Summary (Last 24 hours) at 10/30/2021 0904  Last data filed at 10/30/2021 0341  Gross per 24 hour   Intake 120 ml   Output 300 ml   Net -180 ml        Physical Exam:    Gen:  Well-developed, well-nourished, in no acute distress  HEENT:  Pink conjunctivae, PERRL, hearing intact to voice, moist mucous membranes  Neck:  Supple, without masses, thyroid non-tender  Resp:  No accessory muscle use, clear breath sounds without wheezes rales or rhonchi  Card:  No murmurs, normal S1, S2 without thrills, bruits or peripheral edema  Abd:  Soft, non-tender, non-distended, normoactive bowel sounds are present, no palpable organomegaly and no detectable hernias  Lymph:  No cervical or inguinal adenopathy  Musc:  RT leg hyperemia, tender.    Skin:  No rashes or ulcers, skin turgor is good  Neuro:  Cranial nerves are grossly intact, no focal motor weakness, follows commands appropriately  Psych:  Good insight, oriented to person, place and time, alert  __________________________________________________________________  Medications Reviewed: (see below)  Medications:     Current Facility-Administered Medications   Medication Dose Route Frequency    oxyCODONE-acetaminophen (PERCOCET) 5-325 mg per tablet 2 Tablet  2 Tablet Oral Q6H PRN    DAPTOmycin (CUBICIN) 700 mg in 0.9% sodium chloride 14 mL IV Syringe  700 mg IntraVENous Q48H    clindamycin (CLEOCIN) 900mg NS 50mL IVPB (premix)  900 mg IntraVENous Q8H    lactobacillus-acidophilus (LACTINEX) 1 Packet  1 Packet Oral BID    NIFEdipine ER (PROCARDIA XL) tablet 60 mg  60 mg Oral DAILY    cefepime (MAXIPIME) 2 g in sterile water (preservative free) 10 mL IV syringe  2 g IntraVENous Q24H    apixaban (ELIQUIS) tablet 5 mg  5 mg Oral BID    calcitRIOL (ROCALTROL) capsule 0.25 mcg  0.25 mcg Oral DAILY    carvediloL (COREG) tablet 12.5 mg  12.5 mg Oral BID WITH MEALS    cloNIDine HCL (CATAPRES) tablet 0.2 mg  0.2 mg Oral BID    fluticasone propionate (FLONASE) 50 mcg/actuation nasal spray 2 Spray  2 Spray Both Nostrils DAILY    sodium chloride (NS) flush 5-40 mL  5-40 mL IntraVENous Q8H    sodium chloride (NS) flush 5-40 mL  5-40 mL IntraVENous PRN    acetaminophen (TYLENOL) tablet 650 mg  650 mg Oral Q6H PRN    Or    acetaminophen (TYLENOL) suppository 650 mg  650 mg Rectal Q6H PRN    polyethylene glycol (MIRALAX) packet 17 g  17 g Oral DAILY PRN    ondansetron (ZOFRAN ODT) tablet 4 mg  4 mg Oral Q8H PRN    Or    ondansetron (ZOFRAN) injection 4 mg  4 mg IntraVENous Q6H PRN        Lab Data Reviewed: (see below)  Lab Review:     Recent Labs     10/29/21  0312 10/28/21  0428   WBC 6.9 8.2   HGB 10.1* 10.6*   HCT 30.3* 32.2*   * 110*     Recent Labs     10/30/21  0452 10/29/21  0312 10/28/21  0428   NA  --  138 138   K  --  5.0 5.1   CL  --  112* 113*   CO2  --  17* 18*   GLU  --  125* 120*   BUN  --  81* 79*   CREA 5.64* 5.29* 4.81*   CA  --  8.0* 8.2*     Lab Results   Component Value Date/Time    Glucose (POC) 110 10/29/2021 09:15 PM     No results for input(s): PH, PCO2, PO2, HCO3, FIO2 in the last 72 hours. No results for input(s): INR, INREXT, INREXT in the last 72 hours.   All Micro Results     Procedure Component Value Units Date/Time    CULTURE, BLOOD, PERIPHERAL [[id number]] Collected: 10/24/21 1628    Order Status: Completed Specimen: Blood Updated: 10/30/21 0608     Special Requests: NO SPECIAL REQUESTS        Culture result: NO GROWTH 6 DAYS       CULTURE, BLOOD, PERIPHERAL [[id number]] Collected: 10/24/21 1637    Order Status: Completed Specimen: Blood Updated: 10/30/21 0608     Special Requests: NO SPECIAL REQUESTS        Culture result: NO GROWTH 6 DAYS       CULTURE, URINE [[id number]] Collected: 10/24/21 1807    Order Status: Completed Specimen: Urine from Clean catch Updated: 10/25/21 1845     Special Requests: NO SPECIAL REQUESTS        Culture result: No growth (<1,000 CFU/ML)       RESPIRATORY VIRUS PANEL W/COVID-19, PCR [[id number]] Collected: 10/25/21 0325    Order Status: Completed Specimen: Nasopharyngeal Updated: 10/25/21 0247     Adenovirus Not detected        Coronavirus 229E Not detected        Coronavirus HKU1 Not detected        Coronavirus CVNL63 Not detected        Coronavirus OC43 Not detected        SARS-CoV-2, PCR Not detected        Metapneumovirus Not detected        Rhinovirus and Enterovirus Not detected        Influenza A Not detected        Influenza A, subtype H1 Not detected        Influenza A, subtype H3 Not detected        INFLUENZA A H1N1 PCR Not detected        Influenza B Not detected        Parainfluenza 1 Not detected        Parainfluenza 2 Not detected        Parainfluenza 3 Not detected        Parainfluenza virus 4 Not detected        RSV by PCR Not detected        B. parapertussis, PCR Not detected        Bordetella pertussis - PCR Not detected        Chlamydophila pneumoniae DNA, QL, PCR Not detected        Mycoplasma pneumoniae DNA, QL, PCR Not detected             I have reviewed notes of prior 24hr. Other pertinent lab: Total time spent with patient: 28 I personally reviewed chart, notes, data and current medications in the medical record. I have personally examined and treated the patient at bedside during this period.                  Care Plan discussed with: Patient, Nursing Staff and >50% of time spent in counseling and coordination of care    Discussed:  Care Plan    Prophylaxis:  eliquis    Disposition:  Home w/Family           ___________________________________________________    Attending Physician: Sangita De La Torre MD

## 2021-10-30 NOTE — PROGRESS NOTES
Bedside and Verbal shift change report given to Mi Burns (oncoming nurse) by Jigar Casas (offgoing nurse). Report included the following information SBAR, Kardex, ED Summary, Intake/Output, MAR, Accordion and Recent Results.

## 2021-10-31 VITALS
BODY MASS INDEX: 34.97 KG/M2 | HEART RATE: 74 BPM | OXYGEN SATURATION: 98 % | HEIGHT: 71 IN | TEMPERATURE: 97.8 F | DIASTOLIC BLOOD PRESSURE: 71 MMHG | RESPIRATION RATE: 18 BRPM | WEIGHT: 249.78 LBS | SYSTOLIC BLOOD PRESSURE: 129 MMHG

## 2021-10-31 PROCEDURE — 74011250637 HC RX REV CODE- 250/637: Performed by: INTERNAL MEDICINE

## 2021-10-31 PROCEDURE — 74011250636 HC RX REV CODE- 250/636: Performed by: INTERNAL MEDICINE

## 2021-10-31 PROCEDURE — 97530 THERAPEUTIC ACTIVITIES: CPT

## 2021-10-31 PROCEDURE — 74011000250 HC RX REV CODE- 250: Performed by: INTERNAL MEDICINE

## 2021-10-31 RX ORDER — PREDNISONE 20 MG/1
10 TABLET ORAL 2 TIMES DAILY
Qty: 60 TABLET | Refills: 0 | Status: SHIPPED
Start: 2021-10-31

## 2021-10-31 RX ADMIN — CEFEPIME HYDROCHLORIDE 2 G: 2 INJECTION, POWDER, FOR SOLUTION INTRAVENOUS at 09:06

## 2021-10-31 RX ADMIN — METHYLPREDNISOLONE SODIUM SUCCINATE 40 MG: 40 INJECTION, POWDER, FOR SOLUTION INTRAMUSCULAR; INTRAVENOUS at 00:30

## 2021-10-31 RX ADMIN — SODIUM BICARBONATE 1300 MG: 650 TABLET ORAL at 09:06

## 2021-10-31 RX ADMIN — CLONIDINE HYDROCHLORIDE 0.2 MG: 0.1 TABLET ORAL at 09:06

## 2021-10-31 RX ADMIN — NIFEDIPINE 60 MG: 30 TABLET, FILM COATED, EXTENDED RELEASE ORAL at 09:06

## 2021-10-31 RX ADMIN — CARVEDILOL 12.5 MG: 12.5 TABLET, FILM COATED ORAL at 09:06

## 2021-10-31 RX ADMIN — CALCITRIOL CAPSULES 0.25 MCG 0.25 MCG: 0.25 CAPSULE ORAL at 09:06

## 2021-10-31 RX ADMIN — Medication 10 ML: at 04:00

## 2021-10-31 RX ADMIN — METHYLPREDNISOLONE SODIUM SUCCINATE 40 MG: 40 INJECTION, POWDER, FOR SOLUTION INTRAMUSCULAR; INTRAVENOUS at 04:00

## 2021-10-31 RX ADMIN — CLINDAMYCIN PHOSPHATE 900 MG: 900 INJECTION, SOLUTION INTRAVENOUS at 04:00

## 2021-10-31 RX ADMIN — APIXABAN 5 MG: 5 TABLET, FILM COATED ORAL at 09:06

## 2021-10-31 NOTE — PROGRESS NOTES
Bedside, Verbal and Written shift change report given to [address] [place] [address] (oncoming nurse) by Binu Hutchins (offgoing nurse). Report included the following information SBAR, Kardex, ED Summary, Procedure Summary, Intake/Output, MAR, Recent Results and Med Rec Status.

## 2021-10-31 NOTE — PROGRESS NOTES
Discharge instructions reviewed with Patient and Family member verbalized understanding. Discharge medications reviewed with Patient and Family member and prescriptions given to patient. Patient and Family member made aware of follow up MD visits. An After Visit Summary was printed and given to the patient, hardcopy signed and placed in chart. Peripheral IV was removed. Patient transported home.

## 2021-10-31 NOTE — PROGRESS NOTES
Nathanael Sinha [facility]  [address] [facility], [place], [address] [place] [address]  [phone]      Medical Progress Note      NAME: Tara Bryant   :  1950  MRM:  [id number]    Date of service: 10/31/2021  8:27 AM       Assessment and Plan:   1. LLE Cellulitis/ venous stasis. Not improving with multiple ABx( per pt, it is getting worse). was on cefepime and doxy after failing clinda as an outpt. U/S without clots. Was on vanc and cefepime here. ABx changed to daptomycin, clinda and to continue cefepime per ID recommendation. Elevate leg. CT scan of the leg without contrast: soft tissue swelling due to cellulitis, venous stasis, or fluid overload. No drainable abscess. Appreciated ID and general surgery consult. Sample of omadacycline was given by ID. 2.  CKD5: Still making urine. Not volume overloaded. No indication for HD at this time. Today he says he agrees to HD. Has followed with Dr. Tameka Park, will establish with Dr. Leticia Garcia next week. Renally dose meds, avoid nephrotoxins. Renal evaluation appreciated.      3. HTN: Cont home nifedipine, clonidine, carvedilol. BP not well controlled. Increased nifedipine dose. 4.  Afib: Cont carvedilol, apix     5. VIK: CPAP    6. Thrombocytopenia. This is chronic since 2018. Outpatient FU     7.  RT arm swelling/ pain. Hx of gout. Negative for venous doppler. 8.  Gout flare. Has multiple joint pain. Started on steroid. Pt takes prednisone at home as needed. Subjective:     Chief Complaint[de-identified] Patient was seen and examined as a follow up for cellulitis. Chart was reviewed. Joint pain is much better      ROS:  (bold if positive, if negative)    Tolerating PT  Tolerating Diet        Objective:     Last 24hrs VS reviewed since prior progress note.  Most recent are:    Visit Vitals  /74 (BP 1 Location: Left arm, BP Patient Position: At rest)   Pulse 77   Temp 97.7 °F (36.5 °C)   Resp 18   Ht 5' 10.98\" (1.803 m)   Wt 113.3 kg (249 lb 12.5 oz)   SpO2 96%   BMI 34.85 kg/m²     SpO2 Readings from Last 6 Encounters:   10/31/21 96%   01/07/18 94%            Intake/Output Summary (Last 24 hours) at 10/31/2021 2500  Last data filed at 10/31/2021 2531  Gross per 24 hour   Intake    Output 400 ml   Net -400 ml        Physical Exam:    Gen:  Well-developed, well-nourished, in no acute distress  HEENT:  Pink conjunctivae, PERRL, hearing intact to voice, moist mucous membranes  Neck:  Supple, without masses, thyroid non-tender  Resp:  No accessory muscle use, clear breath sounds without wheezes rales or rhonchi  Card:  No murmurs, normal S1, S2 without thrills, bruits or peripheral edema  Abd:  Soft, non-tender, non-distended, normoactive bowel sounds are present, no palpable organomegaly and no detectable hernias  Lymph:  No cervical or inguinal adenopathy  Musc:  RT leg hyperemia, tender.    Skin:  No rashes or ulcers, skin turgor is good  Neuro:  Cranial nerves are grossly intact, no focal motor weakness, follows commands appropriately  Psych:  Good insight, oriented to person, place and time, alert  __________________________________________________________________  Medications Reviewed: (see below)  Medications:     Current Facility-Administered Medications   Medication Dose Route Frequency    methylPREDNISolone (PF) (SOLU-MEDROL) injection 30 mg  30 mg IntraVENous Q12H    sodium bicarbonate tablet 1,300 mg  1,300 mg Oral BID    oxyCODONE-acetaminophen (PERCOCET) 5-325 mg per tablet 2 Tablet  2 Tablet Oral Q6H PRN    DAPTOmycin (CUBICIN) 700 mg in 0.9% sodium chloride 14 mL IV Syringe  700 mg IntraVENous Q48H    clindamycin (CLEOCIN) 900mg NS 50mL IVPB (premix)  900 mg IntraVENous Q8H    lactobacillus-acidophilus (LACTINEX) 1 Packet  1 Packet Oral BID    NIFEdipine ER (PROCARDIA XL) tablet 60 mg  60 mg Oral DAILY    cefepime (MAXIPIME) 2 g in sterile water (preservative free) 10 mL IV syringe  2 g IntraVENous Q24H    apixaban (ELIQUIS) tablet 5 mg  5 mg Oral BID    calcitRIOL (ROCALTROL) capsule 0.25 mcg  0.25 mcg Oral DAILY    carvediloL (COREG) tablet 12.5 mg  12.5 mg Oral BID WITH MEALS    cloNIDine HCL (CATAPRES) tablet 0.2 mg  0.2 mg Oral BID    fluticasone propionate (FLONASE) 50 mcg/actuation nasal spray 2 Spray  2 Spray Both Nostrils DAILY    sodium chloride (NS) flush 5-40 mL  5-40 mL IntraVENous Q8H    sodium chloride (NS) flush 5-40 mL  5-40 mL IntraVENous PRN    acetaminophen (TYLENOL) tablet 650 mg  650 mg Oral Q6H PRN    Or    acetaminophen (TYLENOL) suppository 650 mg  650 mg Rectal Q6H PRN    polyethylene glycol (MIRALAX) packet 17 g  17 g Oral DAILY PRN    ondansetron (ZOFRAN ODT) tablet 4 mg  4 mg Oral Q8H PRN    Or    ondansetron (ZOFRAN) injection 4 mg  4 mg IntraVENous Q6H PRN        Lab Data Reviewed: (see below)  Lab Review:     Recent Labs     10/29/21  0312   WBC 6.9   HGB 10.1*   HCT 30.3*   *     Recent Labs     10/30/21  0452 10/29/21  0312   NA  --  138   K  --  5.0   CL  --  112*   CO2  --  17*   GLU  --  125*   BUN  --  81*   CREA 5.64* 5.29*   CA  --  8.0*     Lab Results   Component Value Date/Time    Glucose (POC) 110 10/29/2021 09:15 PM     No results for input(s): PH, PCO2, PO2, HCO3, FIO2 in the last 72 hours. No results for input(s): INR, INREXT, INREXT in the last 72 hours.   All Micro Results     Procedure Component Value Units Date/Time    CULTURE, BLOOD, PERIPHERAL [[id number]] Collected: 10/24/21 1628    Order Status: Completed Specimen: Blood Updated: 10/30/21 0608     Special Requests: NO SPECIAL REQUESTS        Culture result: NO GROWTH 6 DAYS       CULTURE, BLOOD, PERIPHERAL [[id number]] Collected: 10/24/21 1637    Order Status: Completed Specimen: Blood Updated: 10/30/21 0608     Special Requests: NO SPECIAL REQUESTS        Culture result: NO GROWTH 6 DAYS       CULTURE, URINE [[id number]] Collected: 10/24/21 1807    Order Status: Completed Specimen: Urine from Clean catch Updated: 10/25/21 1845     Special Requests: NO SPECIAL REQUESTS        Culture result: No growth (<1,000 CFU/ML)       RESPIRATORY VIRUS PANEL W/COVID-19, PCR [[id number]] Collected: 10/25/21 0325    Order Status: Completed Specimen: Nasopharyngeal Updated: 10/25/21 0847     Adenovirus Not detected        Coronavirus 229E Not detected        Coronavirus HKU1 Not detected        Coronavirus CVNL63 Not detected        Coronavirus OC43 Not detected        SARS-CoV-2, PCR Not detected        Metapneumovirus Not detected        Rhinovirus and Enterovirus Not detected        Influenza A Not detected        Influenza A, subtype H1 Not detected        Influenza A, subtype H3 Not detected        INFLUENZA A H1N1 PCR Not detected        Influenza B Not detected        Parainfluenza 1 Not detected        Parainfluenza 2 Not detected        Parainfluenza 3 Not detected        Parainfluenza virus 4 Not detected        RSV by PCR Not detected        B. parapertussis, PCR Not detected        Bordetella pertussis - PCR Not detected        Chlamydophila pneumoniae DNA, QL, PCR Not detected        Mycoplasma pneumoniae DNA, QL, PCR Not detected             I have reviewed notes of prior 24hr. Other pertinent lab: Total time spent with patient: 28 I personally reviewed chart, notes, data and current medications in the medical record. I have personally examined and treated the patient at bedside during this period.                  Care Plan discussed with: Patient, Nursing Staff and >50% of time spent in counseling and coordination of care    Discussed:  Care Plan    Prophylaxis:  eliquis    Disposition:  Home w/Family           ___________________________________________________    Attending Physician: Raimundo Gavin MD

## 2021-10-31 NOTE — PROGRESS NOTES
Problem: Mobility Impaired (Adult and Pediatric)  Goal: *Acute Goals and Plan of Care (Insert Text)  Description: FUNCTIONAL STATUS PRIOR TO ADMISSION: Patient was independent and active without use of DME.    HOME SUPPORT PRIOR TO ADMISSION: The patient lived with his wife but did not require assist.    Physical Therapy Goals  Initiated 10/26/2021  1. Patient will move from supine to sit and sit to supine , scoot up and down, and roll side to side in bed with independence within 7 day(s). 2.  Patient will transfer from bed to chair and chair to bed with modified independence using the least restrictive device within 7 day(s). 3.  Patient will perform sit to stand with modified independence within 7 day(s). 4.  Patient will ambulate with modified independence for 150 feet with the least restrictive device within 7 day(s). 5.  Patient will ascend/descend 2 stairs with 2 handrail(s) with modified independence within 7 day(s). Note: Physical Therapy - Greeted patient and wife. Discharge orders on chart. Patient appears much improved from yesterday, moving without hesitation sit<>stand and amb few steps in the room with RW. No delay noted with sit<>stand and patient not as breathless when speaking with PT. He and wife deny steps at the house. He states the steroids have kicked in and his pain is much improved and he feels ready for home. He is fully dressed and answering questions and conversation without delay or agitation toward PT. Patient appears better today and ready to D/C.    Oscar Yip, PT, DPT

## 2021-10-31 NOTE — DISCHARGE SUMMARY
Hospitalist Discharge Summary     Patient ID:    Oralia Louis  [id number]  34 y.o.  1950    Admit date of service: 10/24/2021    Discharge date of service: 10/31/2021    Admission Diagnoses: ESRD (end stage renal disease) on dialysis ([facility] [N18.6, Z99.2]  Cellulitis [L03.90]    Chronic Diagnoses:    Problem List as of 10/31/2021 Date Reviewed: 10/24/2021        Codes Class Noted - Resolved    Cellulitis ICD-10-CM: L03.90  ICD-9-CM: 682.9  10/25/2021 - Present        Pulmonary hypertension ([facility] ICD-10-CM: I27.20  ICD-9-CM: 416.8  Unknown - Present    Overview Signed 10/24/2021  3:36 PM by MD Ramon Ludwig NP at [facility]             VIK on CPAP ICD-10-CM: G47.33, Z99.89  ICD-9-CM: 327.23, V46.8  Unknown - Present        Hypertension ICD-10-CM: I10  ICD-9-CM: 401.9  Unknown - Present        Heart failure ([facility] ICD-10-CM: I50.9  ICD-9-CM: 428.9  Unknown - Present        * (Principal) ESRD (end stage renal disease) on dialysis [facility]) ICD-10-CM: N18.6, Z99.2  ICD-9-CM: 585.6, V45.11  Unknown - Present    Overview Signed 10/24/2021  3:36 PM by MD Dr Sonja Ludwig with [facility]             Chronic atrial fibrillation [facility]) ICD-10-CM: I61.52  ICD-9-CM: 427.31  Unknown - Present    Overview Signed 10/24/2021  3:36 PM by MD Kendal Ludwig [facility]             Cervical disc disorder ICD-10-CM: M50.90  ICD-9-CM: 722.91  Unknown - Present    Overview Signed 10/24/2021  3:36 PM by Irena Grayson MD     C5,C6 fractured disc and lumbar disc slipped             Atrial fibrillation ([facility] ICD-10-CM: I48.91  ICD-9-CM: 427.31  Unknown - Present        RESOLVED: Hyperkalemia ICD-10-CM: E87.5  ICD-9-CM: 276.7  10/24/2021 - 10/30/2021              Discharge Medications:   Current Discharge Medication List      CONTINUE these medications which have CHANGED    Details   predniSONE (DELTASONE) 20 mg tablet Take 20 mg by mouth two (2) times a day.  Taken as needed for inflammation may have up to 40mg a day  Qty: 60 Tablet, Refills: 0         CONTINUE these medications which have NOT CHANGED    Details   calcitRIOL (ROCALTROL) 0.25 mcg capsule Take 0.25 mcg by mouth daily. carvediloL (COREG) 12.5 mg tablet Take 12.5 mg by mouth two (2) times daily (with meals). cloNIDine HCL (CATAPRES) 0.2 mg tablet Take 0.2 mg by mouth two (2) times a day. apixaban (Eliquis) 5 mg tablet Take 5 mg by mouth two (2) times a day. NIFEdipine ER (ADALAT CC) 30 mg ER tablet Take 30 mg by mouth daily. fluticasone propionate (FLONASE) 50 mcg/actuation nasal spray 2 Sprays by Both Nostrils route daily. STOP taking these medications       furosemide (Lasix) 40 mg tablet Comments:   Reason for Stopping: Follow up Care:    1. Meghan Pierce MD in 1-2 weeks  2. Nephrology     Diet:  Cardiac Diet and Renal Diet    Disposition:  Home. Advanced Directive:    Discharge Exam:  See today's note. CONSULTATIONS: ID and Nephrology    Significant Diagnostic Studies:   Recent Labs     10/29/21  0312   WBC 6.9   HGB 10.1*   HCT 30.3*   *     Recent Labs     10/30/21  0452 10/29/21  0312   NA  --  138   K  --  5.0   CL  --  112*   CO2  --  17*   BUN  --  81*   CREA 5.64* 5.29*   GLU  --  125*   CA  --  8.0*     No results for input(s): ALT, AP, TBIL, TBILI, TP, ALB, GLOB, GGT, AML, LPSE in the last 72 hours. No lab exists for component: SGOT, GPT, AMYP, HLPSE  No results for input(s): INR, PTP, APTT, INREXT in the last 72 hours. No results for input(s): FE, TIBC, PSAT, FERR in the last 72 hours. No results for input(s): PH, PCO2, PO2 in the last 72 hours. No results for input(s): CPK, CKMB in the last 72 hours. No lab exists for component: TROPONINI  Lab Results   Component Value Date/Time    Glucose (POC) 110 10/29/2021 09:15 PM             HOSPITAL COURSE & TREATMENT RENDERED:   1. LLE Cellulitis/ venous stasis. Not improving with multiple ABx( per pt, it is getting worse).  was on cefepime and doxy after failing clinda as an outpt. U/S without clots. Was on vanc and cefepime here. ABx changed to daptomycin, clinda and to continue cefepime per ID recommendation. Elevate leg. CT scan of the leg without contrast: soft tissue swelling due to cellulitis, venous stasis, or fluid overload. No drainable abscess. Appreciated ID and general surgery consult. Sample of omadacycline was given by ID.          2. CKD5: Still making urine. Not volume overloaded. No indication for HD at this time. Today he says he agrees to HD. Has followed with Dr. Duran Stephens, will establish with Dr. Candice Perales next week. Renally dose meds, avoid nephrotoxins. Renal evaluation appreciated.      3. HTN: Cont home nifedipine, clonidine, carvedilol. BP not well controlled. Increased nifedipine dose. 4.  Afib: Cont carvedilol, apix     5. VIK: CPAP     6. Thrombocytopenia. This is chronic since 2018. Outpatient FU      7.  RT arm swelling/ pain. Hx of gout. Negative for venous doppler.      8.  Gout flare. Has multiple joint pain. Started on steroid. Pt takes prednisone at home as needed. Evaluated by PT and recommended SNF, but pt felt better and doesn't want to go to SNF. He has already established PT as outpatient. Discharged in improved condition.     Spent 35 minutes    Signed:  Kam Lakhani MD  10/31/2021  9:52 AM

## 2021-10-31 NOTE — PROGRESS NOTES
10/31/2021  11:56 AM    RUR:  13%  Risk Level: [x]Low []Moderate []High  Value-based purchasing: [] Yes [x] No  Bundle patient: [] Yes [x] No   Specify:     Transition of care plan:  1. Medically stable with discharge order  2. CM met with patient to discuss SNF - patient declines at this time and feels comfortable discharging and going for outpatient PT. He has been going to [facility].  3. Outpatient follow-up. 4. Pt's family to transport  5. No further CM needs identified    Care Management Interventions  PCP Verified by CM: Yes (last saw within the three months)  Mode of Transport at Discharge:  Other (see comment) (Spouse will transport at [facility])  Transition of Care Consult (CM Consult): Discharge Planning  Discharge Durable Medical Equipment: No  Physical Therapy Consult: Yes  Occupational Therapy Consult: Yes  Speech Therapy Consult: No  Support Systems: Spouse/Significant Other  Discharge Location  Discharge Placement: Home with outpatient services    Daja Oliver RN

## 2021-10-31 NOTE — DISCHARGE INSTRUCTIONS
ACUTE DIAGNOSES:  ESRD (end stage renal disease) on dialysis ([facility] [N18.6, Z99.2]  Cellulitis [L03.90]    CHRONIC MEDICAL DIAGNOSES:  Problem List as of 10/31/2021 Date Reviewed: 10/24/2021        Codes Class Noted - Resolved    Cellulitis ICD-10-CM: L03.90  ICD-9-CM: 682.9  10/25/2021 - Present        Pulmonary hypertension ([facility] ICD-10-CM: I27.20  ICD-9-CM: 416.8  Unknown - Present    Overview Signed 10/24/2021  3:36 PM by MD Ramno Trivedi NP at [facility]             VIK on CPAP ICD-10-CM: G47.33, Z99.89  ICD-9-CM: 327.23, V46.8  Unknown - Present        Hypertension ICD-10-CM: I10  ICD-9-CM: 401.9  Unknown - Present        Heart failure ([facility] ICD-10-CM: I50.9  ICD-9-CM: 428.9  Unknown - Present        * (Principal) ESRD (end stage renal disease) on dialysis [facility]) ICD-10-CM: N18.6, Z99.2  ICD-9-CM: 585.6, V45.11  Unknown - Present    Overview Signed 10/24/2021  3:36 PM by MD Dr Ye Trivedi with [facility]             Chronic atrial fibrillation [facility]) ICD-10-CM: V12.70  ICD-9-CM: 427.31  Unknown - Present    Overview Signed 10/24/2021  3:36 PM by MD Kendal Trivedi [facility]             Cervical disc disorder ICD-10-CM: M50.90  ICD-9-CM: 722.91  Unknown - Present    Overview Signed 10/24/2021  3:36 PM by Jasvir Valverde MD     C5,C6 fractured disc and lumbar disc slipped             Atrial fibrillation ([facility] ICD-10-CM: I48.91  ICD-9-CM: 427.31  Unknown - Present        RESOLVED: Hyperkalemia ICD-10-CM: E87.5  ICD-9-CM: 276.7  10/24/2021 - 10/30/2021              DISCHARGE MEDICATIONS:          · It is important that you take the medication exactly as they are prescribed. · Keep your medication in the bottles provided by the pharmacist and keep a list of the medication names, dosages, and times to be taken in your wallet. · Do not take other medications without consulting your doctor.        DIET:  Renal Diet    ACTIVITY: Activity as tolerated    ADDITIONAL INFORMATION: If you experience any of the following symptoms then please call your primary care physician or return to the emergency room if you cannot get hold of your doctor: Fever, chills, nausea, vomiting, diarrhea, change in mentation, falling, bleeding, shortness of breath. FOLLOW UP CARE:  Dr. Frank Willson MD  you are to call and set up an appointment to see them in 5 days. Follow-up with nephrology      Information obtained by :  I understand that if any problems occur once I am at home I am to contact my physician. I understand and acknowledge receipt of the instructions indicated above.                                                                                                                                            Physician's or R.N.'s Signature                                                                  Date/Time                                                                                                                                              Patient or Representative Signature                                                          Date/Time

## 2021-11-01 ENCOUNTER — TELEPHONE (OUTPATIENT)
Dept: FAMILY MEDICINE CLINIC | Age: 71
End: 2021-11-01

## 2021-11-01 NOTE — TELEPHONE ENCOUNTER
Megan called from [facility] to advise pt was approved for pt assistance and will ship out pt's medication tomorrow.   Roel

## 2021-12-03 ENCOUNTER — APPOINTMENT (OUTPATIENT)
Dept: CT IMAGING | Age: 71
DRG: 871 | End: 2021-12-03
Attending: STUDENT IN AN ORGANIZED HEALTH CARE EDUCATION/TRAINING PROGRAM
Payer: MEDICARE

## 2021-12-03 ENCOUNTER — HOSPITAL ENCOUNTER (INPATIENT)
Age: 71
LOS: 6 days | Discharge: HOME HEALTH | DRG: 871 | End: 2021-12-10
Attending: STUDENT IN AN ORGANIZED HEALTH CARE EDUCATION/TRAINING PROGRAM | Admitting: FAMILY MEDICINE
Payer: MEDICARE

## 2021-12-03 DIAGNOSIS — I48.19 PERSISTENT ATRIAL FIBRILLATION (HCC): ICD-10-CM

## 2021-12-03 DIAGNOSIS — A41.9 SEPSIS WITHOUT ACUTE ORGAN DYSFUNCTION, DUE TO UNSPECIFIED ORGANISM (HCC): Primary | ICD-10-CM

## 2021-12-03 DIAGNOSIS — K92.1 GASTROINTESTINAL HEMORRHAGE WITH MELENA: ICD-10-CM

## 2021-12-03 DIAGNOSIS — M50.90 CERVICAL DISC DISORDER: ICD-10-CM

## 2021-12-03 PROBLEM — R53.1 WEAKNESS GENERALIZED: Status: ACTIVE | Noted: 2021-12-03

## 2021-12-03 PROBLEM — I95.9 HYPOTENSION: Status: ACTIVE | Noted: 2021-12-03

## 2021-12-03 PROBLEM — L03.90 CELLULITIS: Status: RESOLVED | Noted: 2021-10-25 | Resolved: 2021-12-03

## 2021-12-03 PROBLEM — D63.8 ANEMIA, CHRONIC DISEASE: Status: ACTIVE | Noted: 2021-12-03

## 2021-12-03 LAB
ALBUMIN SERPL-MCNC: 2.3 G/DL (ref 3.5–5)
ALBUMIN/GLOB SERPL: 0.7 {RATIO} (ref 1.1–2.2)
ALP SERPL-CCNC: 67 U/L (ref 45–117)
ALT SERPL-CCNC: 24 U/L (ref 12–78)
ANION GAP SERPL CALC-SCNC: 14 MMOL/L (ref 5–15)
AST SERPL-CCNC: 17 U/L (ref 15–37)
BASOPHILS # BLD: 0 K/UL (ref 0–0.1)
BASOPHILS NFR BLD: 0 % (ref 0–1)
BILIRUB SERPL-MCNC: 0.5 MG/DL (ref 0.2–1)
BUN SERPL-MCNC: 130 MG/DL (ref 6–20)
BUN/CREAT SERPL: 24 (ref 12–20)
CALCIUM SERPL-MCNC: 8 MG/DL (ref 8.5–10.1)
CALCULATED R AXIS, ECG10: -3 DEGREES
CALCULATED T AXIS, ECG11: -11 DEGREES
CHLORIDE SERPL-SCNC: 106 MMOL/L (ref 97–108)
CO2 SERPL-SCNC: 19 MMOL/L (ref 21–32)
COMMENT, HOLDF: NORMAL
CREAT SERPL-MCNC: 5.4 MG/DL (ref 0.7–1.3)
DIAGNOSIS, 93000: NORMAL
DIFFERENTIAL METHOD BLD: ABNORMAL
EOSINOPHIL # BLD: 0 K/UL (ref 0–0.4)
EOSINOPHIL NFR BLD: 0 % (ref 0–7)
ERYTHROCYTE [DISTWIDTH] IN BLOOD BY AUTOMATED COUNT: 17.2 % (ref 11.5–14.5)
GLOBULIN SER CALC-MCNC: 3.2 G/DL (ref 2–4)
GLUCOSE SERPL-MCNC: 101 MG/DL (ref 65–100)
HCT VFR BLD AUTO: 29.7 % (ref 36.6–50.3)
HEMOCCULT STL QL: POSITIVE
HGB BLD-MCNC: 9.5 G/DL (ref 12.1–17)
IMM GRANULOCYTES # BLD AUTO: 0 K/UL
IMM GRANULOCYTES NFR BLD AUTO: 0 %
INR PPP: 1.3 (ref 0.9–1.1)
LACTATE SERPL-SCNC: 1.3 MMOL/L (ref 0.4–2)
LACTATE SERPL-SCNC: 2.1 MMOL/L (ref 0.4–2)
LYMPHOCYTES # BLD: 0.1 K/UL (ref 0.8–3.5)
LYMPHOCYTES NFR BLD: 1 % (ref 12–49)
MCH RBC QN AUTO: 28.9 PG (ref 26–34)
MCHC RBC AUTO-ENTMCNC: 32 G/DL (ref 30–36.5)
MCV RBC AUTO: 90.3 FL (ref 80–99)
MONOCYTES # BLD: 0.1 K/UL (ref 0–1)
MONOCYTES NFR BLD: 2 % (ref 5–13)
NEUTS BAND NFR BLD MANUAL: 10 % (ref 0–6)
NEUTS SEG # BLD: 7.1 K/UL (ref 1.8–8)
NEUTS SEG NFR BLD: 87 % (ref 32–75)
NRBC # BLD: 0.03 K/UL (ref 0–0.01)
NRBC BLD-RTO: 0.4 PER 100 WBC
PLATELET # BLD AUTO: 65 K/UL (ref 150–400)
POTASSIUM SERPL-SCNC: 5 MMOL/L (ref 3.5–5.1)
PROT SERPL-MCNC: 5.5 G/DL (ref 6.4–8.2)
PROTHROMBIN TIME: 12.6 SEC (ref 9–11.1)
Q-T INTERVAL, ECG07: 338 MS
QRS DURATION, ECG06: 90 MS
QTC CALCULATION (BEZET), ECG08: 392 MS
RBC # BLD AUTO: 3.29 M/UL (ref 4.1–5.7)
RBC MORPH BLD: ABNORMAL
RBC MORPH BLD: ABNORMAL
SAMPLES BEING HELD,HOLD: NORMAL
SODIUM SERPL-SCNC: 139 MMOL/L (ref 136–145)
TROPONIN-HIGH SENSITIVITY: 81 NG/L (ref 0–76)
VENTRICULAR RATE, ECG03: 81 BPM
WBC # BLD AUTO: 7.3 K/UL (ref 4.1–11.1)

## 2021-12-03 PROCEDURE — C9113 INJ PANTOPRAZOLE SODIUM, VIA: HCPCS | Performed by: STUDENT IN AN ORGANIZED HEALTH CARE EDUCATION/TRAINING PROGRAM

## 2021-12-03 PROCEDURE — G0378 HOSPITAL OBSERVATION PER HR: HCPCS

## 2021-12-03 PROCEDURE — 74176 CT ABD & PELVIS W/O CONTRAST: CPT

## 2021-12-03 PROCEDURE — 84484 ASSAY OF TROPONIN QUANT: CPT

## 2021-12-03 PROCEDURE — 87077 CULTURE AEROBIC IDENTIFY: CPT

## 2021-12-03 PROCEDURE — 36415 COLL VENOUS BLD VENIPUNCTURE: CPT

## 2021-12-03 PROCEDURE — 85025 COMPLETE CBC W/AUTO DIFF WBC: CPT

## 2021-12-03 PROCEDURE — 82272 OCCULT BLD FECES 1-3 TESTS: CPT

## 2021-12-03 PROCEDURE — 96360 HYDRATION IV INFUSION INIT: CPT

## 2021-12-03 PROCEDURE — 70450 CT HEAD/BRAIN W/O DYE: CPT

## 2021-12-03 PROCEDURE — 83605 ASSAY OF LACTIC ACID: CPT

## 2021-12-03 PROCEDURE — 99285 EMERGENCY DEPT VISIT HI MDM: CPT

## 2021-12-03 PROCEDURE — 0202U NFCT DS 22 TRGT SARS-COV-2: CPT

## 2021-12-03 PROCEDURE — 80053 COMPREHEN METABOLIC PANEL: CPT

## 2021-12-03 PROCEDURE — 87040 BLOOD CULTURE FOR BACTERIA: CPT

## 2021-12-03 PROCEDURE — 71250 CT THORAX DX C-: CPT

## 2021-12-03 PROCEDURE — 93005 ELECTROCARDIOGRAM TRACING: CPT

## 2021-12-03 PROCEDURE — 74011250636 HC RX REV CODE- 250/636: Performed by: INTERNAL MEDICINE

## 2021-12-03 PROCEDURE — 96374 THER/PROPH/DIAG INJ IV PUSH: CPT

## 2021-12-03 PROCEDURE — 74011250636 HC RX REV CODE- 250/636: Performed by: STUDENT IN AN ORGANIZED HEALTH CARE EDUCATION/TRAINING PROGRAM

## 2021-12-03 PROCEDURE — 85610 PROTHROMBIN TIME: CPT

## 2021-12-03 PROCEDURE — 87186 SC STD MICRODIL/AGAR DIL: CPT

## 2021-12-03 PROCEDURE — 96361 HYDRATE IV INFUSION ADD-ON: CPT

## 2021-12-03 PROCEDURE — 74011250637 HC RX REV CODE- 250/637: Performed by: INTERNAL MEDICINE

## 2021-12-03 RX ORDER — POLYETHYLENE GLYCOL 3350 17 G/17G
17 POWDER, FOR SOLUTION ORAL DAILY PRN
Status: DISCONTINUED | OUTPATIENT
Start: 2021-12-03 | End: 2021-12-10 | Stop reason: HOSPADM

## 2021-12-03 RX ORDER — CLONIDINE HYDROCHLORIDE 0.1 MG/1
0.1 TABLET ORAL 2 TIMES DAILY
Status: DISCONTINUED | OUTPATIENT
Start: 2021-12-03 | End: 2021-12-10 | Stop reason: HOSPADM

## 2021-12-03 RX ORDER — ONDANSETRON 4 MG/1
4 TABLET, ORALLY DISINTEGRATING ORAL
Status: DISCONTINUED | OUTPATIENT
Start: 2021-12-03 | End: 2021-12-10 | Stop reason: HOSPADM

## 2021-12-03 RX ORDER — ONDANSETRON 2 MG/ML
4 INJECTION INTRAMUSCULAR; INTRAVENOUS
Status: DISCONTINUED | OUTPATIENT
Start: 2021-12-03 | End: 2021-12-10 | Stop reason: HOSPADM

## 2021-12-03 RX ORDER — CARVEDILOL 3.12 MG/1
3.12 TABLET ORAL 2 TIMES DAILY WITH MEALS
Status: DISCONTINUED | OUTPATIENT
Start: 2021-12-04 | End: 2021-12-06

## 2021-12-03 RX ORDER — ACETAMINOPHEN 325 MG/1
650 TABLET ORAL
Status: DISCONTINUED | OUTPATIENT
Start: 2021-12-03 | End: 2021-12-10 | Stop reason: HOSPADM

## 2021-12-03 RX ORDER — SODIUM CHLORIDE 9 MG/ML
75 INJECTION, SOLUTION INTRAVENOUS CONTINUOUS
Status: DISCONTINUED | OUTPATIENT
Start: 2021-12-03 | End: 2021-12-05

## 2021-12-03 RX ORDER — FUROSEMIDE 80 MG/1
80 TABLET ORAL DAILY
COMMUNITY
End: 2021-12-10

## 2021-12-03 RX ORDER — ACETAMINOPHEN 650 MG/1
650 SUPPOSITORY RECTAL
Status: DISCONTINUED | OUTPATIENT
Start: 2021-12-03 | End: 2021-12-10 | Stop reason: HOSPADM

## 2021-12-03 RX ORDER — VANCOMYCIN 2 GRAM/500 ML IN 0.9 % SODIUM CHLORIDE INTRAVENOUS
2000 ONCE
Status: DISCONTINUED | OUTPATIENT
Start: 2021-12-03 | End: 2021-12-03

## 2021-12-03 RX ADMIN — PANTOPRAZOLE SODIUM 40 MG: 40 INJECTION, POWDER, FOR SOLUTION INTRAVENOUS at 19:04

## 2021-12-03 RX ADMIN — CLONIDINE HYDROCHLORIDE 0.1 MG: 0.1 TABLET ORAL at 22:20

## 2021-12-03 RX ADMIN — SODIUM CHLORIDE 75 ML/HR: 9 INJECTION, SOLUTION INTRAVENOUS at 22:20

## 2021-12-03 RX ADMIN — SODIUM CHLORIDE 1000 ML: 9 INJECTION, SOLUTION INTRAVENOUS at 16:20

## 2021-12-03 NOTE — ED TRIAGE NOTES
Pt arrives via EMS they report that pt fell in his bathroom falling back onto the tub hitting his upper back. NO LOC but pt prior to the fall was feeling weak and then after the fall pt felt dizzy. Upon arrival pt is A X O X4 c/o upper back and neck pain. Denies hitting his head. Pt continues to feel weak and states that since yesterday he has had some chills.

## 2021-12-03 NOTE — ED NOTES
Plan of care and all test/meds ordered explained to pt and wife. Good understanding and agreement with plan was verbalized. IVF infusing as ordered. Pt given pillow and blankets for comfort c/o butt pain pt positioned off buttocks on side with pillow behind him. Call bell in reach will monitor.

## 2021-12-03 NOTE — ED PROVIDER NOTES
Patient is a 31-year-old male presenting to the emergency department for hypotension. Patient was in his bathroom when he fell backwards falling onto the bathtub hitting his upper back. Patient denies LOC but states that prior to the fall he was feeling extremely weak and fatigue. Patient says that after the fall he started to have dizziness. Patient is currently complaining of upper back pain and neck pain. Patient denies striking his head. Patient also states that he was having some chills yesterday with fatigue. Patient denies any chest pain, shortness of breath, abdominal pain, nausea or vomiting. Patient states that he has noticed changes in the color of his bowel movements stating that they have been increasingly darker. Past Medical History:   Diagnosis Date    Atrial fibrillation ([address]     Cervical disc disorder     C5,C6 fractured disc and lumbar disc slipped    Chronic atrial fibrillation (HCC)     [facility]    ESRD (end stage renal disease) on dialysis ([facility]     [facility] De[facility] Pollard with [facility]    Heart failure ([facility]     Hypertension     VIK on CPAP     Pulmonary hypertension ([facility]     Ramon NP at [address] [place] [address]       Past Surgical History:   Procedure Laterality Date    HX HEENT      right sinus polyp removal    HX ORTHOPAEDIC  2000    left ACL replacement    HX OTHER SURGICAL      cardioversion for AFIB    HX UROLOGICAL  08/2015    renal gland removal left         History reviewed. No pertinent family history.     Social History     Socioeconomic History    Marital status:      Spouse name: Not on file    Number of children: Not on file    Years of education: Not on file    Highest education level: Not on file   Occupational History    Not on file   Tobacco Use    Smoking status: Never Smoker    Smokeless tobacco: Never Used   Substance and Sexual Activity    Alcohol use: No    Drug use: Never    Sexual activity: Not on file   Other Topics Concern    Not on file   Social History Narrative    Not on file     Social Determinants of Health     Financial Resource Strain:     Difficulty of Paying Living Expenses: Not on file   Food Insecurity:     Worried About Running Out of Food in the Last Year: Not on file    Ray of Food in the Last Year: Not on file   Transportation Needs:     Lack of Transportation (Medical): Not on file    Lack of Transportation (Non-Medical): Not on file   Physical Activity:     Days of Exercise per Week: Not on file    Minutes of Exercise per Session: Not on file   Stress:     Feeling of Stress : Not on file   Social Connections:     Frequency of Communication with Friends and Family: Not on file    Frequency of Social Gatherings with Friends and Family: Not on file    Attends Zoroastrianism Services: Not on file    Active Member of [address] [place] [address] [employer] or Organizations: Not on file    Attends Club or Organization Meetings: Not on file    Marital Status: Not on file   Intimate Partner Violence:     Fear of Current or Ex-Partner: Not on file    Emotionally Abused: Not on file    Physically Abused: Not on file    Sexually Abused: Not on file   Housing Stability:     Unable to Pay for Housing in the Last Year: Not on file    Number of [place] in the Last Year: Not on file    Unstable Housing in the Last Year: Not on file         ALLERGIES: Levofloxacin and Pcn [penicillins]    Review of Systems   Constitutional: Positive for chills and fatigue. Musculoskeletal: Positive for back pain and neck pain. All other systems reviewed and are negative. Vitals:    12/03/21 1530 12/03/21 1545 12/03/21 1605 12/03/21 1615   BP: (!) 87/69 (!) 86/51 (!) 87/63 97/66   Pulse: 88 84 88 88   Resp: 23 18 20 15   Temp:       SpO2: 99% 97% 97% 97%            Physical Exam  Vitals and nursing note reviewed. Constitutional:       Appearance: He is ill-appearing. HENT:      Head: Normocephalic and atraumatic. Mouth/Throat:      Mouth: Mucous membranes are dry.    Eyes: Extraocular Movements: Extraocular movements intact. Pupils: Pupils are equal, round, and reactive to light. Neck:     Cardiovascular:      Rate and Rhythm: Normal rate and regular rhythm. Pulses: Normal pulses. Heart sounds: Normal heart sounds. Pulmonary:      Effort: Pulmonary effort is normal.      Breath sounds: Normal breath sounds. Abdominal:      General: There is distension. Palpations: Abdomen is soft. Musculoskeletal:         General: Normal range of motion. Cervical back: Neck supple. Signs of trauma present. Spinous process tenderness and muscular tenderness present. Thoracic back: Signs of trauma, tenderness and bony tenderness present. Back:    Skin:     General: Skin is warm. Coloration: Skin is pale. Neurological:      General: No focal deficit present. Mental Status: He is alert and oriented to person, place, and time. Psychiatric:         Mood and Affect: Mood normal.         Behavior: Behavior normal.          MDM  Number of Diagnoses or Management Options  Gastrointestinal hemorrhage with melena  Sepsis without acute organ dysfunction, due to unspecified organism [facility])  Diagnosis management comments: A/P: Sepsis, GI bleed. 61-year-old male present emergency department concern for sepsis as patient has been having subjective chills hypotensive on arrival likely orthostatic causing the ground-level fall. Patient without any clear source. Sepsis order set initiated due to patient's end-stage renal disease will not give patient 30 cc/kg of IV fluids due to fluid overload. Will gradually give fluid boluses for maps to maintain at 65. Amount and/or Complexity of Data Reviewed  Clinical lab tests: ordered and reviewed  Tests in the radiology section of CPT®: ordered and reviewed           Procedures    Perfect Serve Consult for Admission  5:54 PM    ED Room Number: WER11/11  Patient Name and age:  Manoj Hameed 78 y.o. male  Working Diagnosis:   1. Sepsis without acute organ dysfunction, due to unspecified organism ([address]    2.  Gastrointestinal hemorrhage with melena        COVID-19 Suspicion:  no  Sepsis present:  yes  Reassessment needed: yes  Code Status:  Full Code  Readmission: yes  Isolation Requirements:  no  Recommended Level of Care:  step down  Department:[facility] - [phone]  Other:        Code Sepsis Reassessment & Plan    - Sepsis order set entered: YES  - Broad Spectrum Antibiotics given: Cefepime  - Repeat lactic acid ordered for time 2000  - Re-assessment performed at time 1815 and clinical condition improving.    - Actions taken: IV ABx.  - Hypotension or Lactic Acidosis present (SBP<90, MAP<65, Lactate >4): NO   - IVF: Limited IVF given (1000) because patient has ESRD with GFR = 11  - Persistent Septic Shock present (Hypotension despite IVF resuscitation): NO  Vasopressors: Not indicated due to septic shock not present  - Disposition: Admit to Step down

## 2021-12-04 ENCOUNTER — APPOINTMENT (OUTPATIENT)
Dept: VASCULAR SURGERY | Age: 71
DRG: 871 | End: 2021-12-04
Attending: FAMILY MEDICINE
Payer: MEDICARE

## 2021-12-04 ENCOUNTER — APPOINTMENT (OUTPATIENT)
Dept: NON INVASIVE DIAGNOSTICS | Age: 71
DRG: 871 | End: 2021-12-04
Attending: INTERNAL MEDICINE
Payer: MEDICARE

## 2021-12-04 PROBLEM — N30.00 ACUTE CYSTITIS WITHOUT HEMATURIA: Status: ACTIVE | Noted: 2021-12-04

## 2021-12-04 PROBLEM — D69.6 THROMBOCYTOPENIA (HCC): Status: ACTIVE | Noted: 2021-12-04

## 2021-12-04 PROBLEM — R78.81 BACTEREMIA: Status: ACTIVE | Noted: 2021-12-04

## 2021-12-04 PROBLEM — R78.81 BACTEREMIA DUE TO GRAM-NEGATIVE BACTERIA: Status: ACTIVE | Noted: 2021-12-04

## 2021-12-04 LAB
ALBUMIN SERPL-MCNC: 2 G/DL (ref 3.5–5)
ALBUMIN/GLOB SERPL: 0.8 {RATIO} (ref 1.1–2.2)
ALP SERPL-CCNC: 60 U/L (ref 45–117)
ALT SERPL-CCNC: 19 U/L (ref 12–78)
ANION GAP SERPL CALC-SCNC: 11 MMOL/L (ref 5–15)
APPEARANCE UR: ABNORMAL
AST SERPL-CCNC: 12 U/L (ref 15–37)
AV R PG: 72.46 MMHG
B PERT DNA SPEC QL NAA+PROBE: NOT DETECTED
BACTERIA URNS QL MICRO: ABNORMAL /HPF
BILIRUB SERPL-MCNC: 0.3 MG/DL (ref 0.2–1)
BILIRUB UR QL: NEGATIVE
BORDETELLA PARAPERTUSSIS PCR, BORPAR: NOT DETECTED
BUN SERPL-MCNC: 129 MG/DL (ref 6–20)
BUN/CREAT SERPL: 24 (ref 12–20)
C PNEUM DNA SPEC QL NAA+PROBE: NOT DETECTED
CALCIUM SERPL-MCNC: 7 MG/DL (ref 8.5–10.1)
CHLORIDE SERPL-SCNC: 112 MMOL/L (ref 97–108)
CO2 SERPL-SCNC: 17 MMOL/L (ref 21–32)
COLOR UR: ABNORMAL
CREAT SERPL-MCNC: 5.28 MG/DL (ref 0.7–1.3)
CRP SERPL-MCNC: 12.4 MG/DL (ref 0–0.6)
ECHO AO ASC DIAM: 4 CM
ECHO AR MAX VEL PISA: 425.63 CM/S
ECHO AV ANNULUS DIAM: 4.36 CM
ECHO AV AREA PEAK VELOCITY: 2.56 CM2
ECHO AV AREA/BSA PEAK VELOCITY: 1.1 CM2/M2
ECHO AV PEAK GRADIENT: 15.91 MMHG
ECHO AV PEAK VELOCITY: 199.41 CM/S
ECHO AV REGURGITANT PHT: 561.21 MS
ECHO IVC PROX: 1.83 CM
ECHO LA AREA 4C: 30.17 CM2
ECHO LA MAJOR AXIS: 4.13 CM
ECHO LA MINOR AXIS: 1.8 CM
ECHO LA VOL 2C: 149.63 ML (ref 18–58)
ECHO LA VOL 4C: 108.41 ML (ref 18–58)
ECHO LA VOL BP: 136.93 ML (ref 18–58)
ECHO LA VOL/BSA BIPLANE: 59.79 ML/M2 (ref 16–28)
ECHO LA VOLUME INDEX A2C: 65.34 ML/M2 (ref 16–28)
ECHO LA VOLUME INDEX A4C: 47.34 ML/M2 (ref 16–28)
ECHO LV E' LATERAL VELOCITY: 8.03 CM/S
ECHO LV E' SEPTAL VELOCITY: 5.25 CM/S
ECHO LV INTERNAL DIMENSION DIASTOLIC: 5.58 CM (ref 4.2–5.9)
ECHO LV INTERNAL DIMENSION SYSTOLIC: 4.01 CM
ECHO LV IVSD: 0.91 CM (ref 0.6–1)
ECHO LV MASS 2D: 211.3 G (ref 88–224)
ECHO LV MASS INDEX 2D: 92.3 G/M2 (ref 49–115)
ECHO LV POSTERIOR WALL DIASTOLIC: 1.04 CM (ref 0.6–1)
ECHO LVOT CARDIAC OUTPUT: 8.15 LITER/MINUTE
ECHO LVOT DIAM: 2.26 CM
ECHO LVOT PEAK GRADIENT: 6.57 MMHG
ECHO LVOT PEAK VELOCITY: 127.58 CM/S
ECHO LVOT SV: 80.9 ML
ECHO LVOT VTI: 20.21 CM
ECHO MV A VELOCITY: 0.49 CM/S
ECHO MV E DECELERATION TIME (DT): 202.89 MS
ECHO MV E VELOCITY: 99.8 CM/S
ECHO MV E/A RATIO: 203.67
ECHO MV E/E' LATERAL: 12.43
ECHO MV E/E' RATIO (AVERAGED): 15.72
ECHO MV E/E' SEPTAL: 19.01
ECHO PV MAX VELOCITY: 83.95 CM/S
ECHO PV PEAK INSTANTANEOUS GRADIENT SYSTOLIC: 2.82 MMHG
ECHO PV REGURGITANT MAX VELOCITY: 100.16 CM/S
ECHO RV INTERNAL DIMENSION: 4.76 CM
ECHO RV TAPSE: 2.95 CM (ref 1.5–2)
ECHO TV REGURGITANT MAX VELOCITY: 309.35 CM/S
ECHO TV REGURGITANT MAX VELOCITY: 533.29 CM/S
ECHO TV REGURGITANT PEAK GRADIENT: 38.35 MMHG
EPITH CASTS URNS QL MICRO: ABNORMAL /LPF
ERYTHROCYTE [DISTWIDTH] IN BLOOD BY AUTOMATED COUNT: 17.3 % (ref 11.5–14.5)
FLUAV H1 2009 PAND RNA SPEC QL NAA+PROBE: NOT DETECTED
FLUAV H1 RNA SPEC QL NAA+PROBE: NOT DETECTED
FLUAV H3 RNA SPEC QL NAA+PROBE: NOT DETECTED
FLUAV SUBTYP SPEC NAA+PROBE: NOT DETECTED
FLUBV RNA SPEC QL NAA+PROBE: NOT DETECTED
GLOBULIN SER CALC-MCNC: 2.6 G/DL (ref 2–4)
GLUCOSE SERPL-MCNC: 129 MG/DL (ref 65–100)
GLUCOSE UR STRIP.AUTO-MCNC: NEGATIVE MG/DL
HADV DNA SPEC QL NAA+PROBE: NOT DETECTED
HCOV 229E RNA SPEC QL NAA+PROBE: NOT DETECTED
HCOV HKU1 RNA SPEC QL NAA+PROBE: NOT DETECTED
HCOV NL63 RNA SPEC QL NAA+PROBE: NOT DETECTED
HCOV OC43 RNA SPEC QL NAA+PROBE: NOT DETECTED
HCT VFR BLD AUTO: 25.6 % (ref 36.6–50.3)
HGB BLD-MCNC: 8.4 G/DL (ref 12.1–17)
HGB UR QL STRIP: ABNORMAL
HMPV RNA SPEC QL NAA+PROBE: NOT DETECTED
HPIV1 RNA SPEC QL NAA+PROBE: NOT DETECTED
HPIV2 RNA SPEC QL NAA+PROBE: NOT DETECTED
HPIV3 RNA SPEC QL NAA+PROBE: NOT DETECTED
HPIV4 RNA SPEC QL NAA+PROBE: NOT DETECTED
KETONES UR QL STRIP.AUTO: NEGATIVE MG/DL
LA VOL DISK BP: 127.3 ML (ref 18–58)
LEUKOCYTE ESTERASE UR QL STRIP.AUTO: ABNORMAL
LVOT MG: 4.07 MMHG
M PNEUMO DNA SPEC QL NAA+PROBE: NOT DETECTED
MAGNESIUM SERPL-MCNC: 2.8 MG/DL (ref 1.6–2.4)
MCH RBC QN AUTO: 29.5 PG (ref 26–34)
MCHC RBC AUTO-ENTMCNC: 32.8 G/DL (ref 30–36.5)
MCV RBC AUTO: 89.8 FL (ref 80–99)
NITRITE UR QL STRIP.AUTO: NEGATIVE
NRBC # BLD: 0.03 K/UL (ref 0–0.01)
NRBC BLD-RTO: 0.3 PER 100 WBC
PH UR STRIP: 5.5 [PH] (ref 5–8)
PLATELET # BLD AUTO: 60 K/UL (ref 150–400)
POTASSIUM SERPL-SCNC: 4.5 MMOL/L (ref 3.5–5.1)
PROCALCITONIN SERPL-MCNC: 283.54 NG/ML
PROT SERPL-MCNC: 4.6 G/DL (ref 6.4–8.2)
PROT UR STRIP-MCNC: 300 MG/DL
RBC # BLD AUTO: 2.85 M/UL (ref 4.1–5.7)
RBC #/AREA URNS HPF: ABNORMAL /HPF (ref 0–5)
RSV RNA SPEC QL NAA+PROBE: NOT DETECTED
RV+EV RNA SPEC QL NAA+PROBE: NOT DETECTED
SARS-COV-2 PCR, COVPCR: NOT DETECTED
SODIUM SERPL-SCNC: 140 MMOL/L (ref 136–145)
SP GR UR REFRACTOMETRY: 1.01 (ref 1–1.03)
UROBILINOGEN UR QL STRIP.AUTO: 0.2 EU/DL (ref 0.2–1)
WBC # BLD AUTO: 10.9 K/UL (ref 4.1–11.1)
WBC URNS QL MICRO: >100 /HPF (ref 0–4)

## 2021-12-04 PROCEDURE — 74011250636 HC RX REV CODE- 250/636: Performed by: INTERNAL MEDICINE

## 2021-12-04 PROCEDURE — 93306 TTE W/DOPPLER COMPLETE: CPT | Performed by: SPECIALIST

## 2021-12-04 PROCEDURE — 84145 PROCALCITONIN (PCT): CPT

## 2021-12-04 PROCEDURE — 83735 ASSAY OF MAGNESIUM: CPT

## 2021-12-04 PROCEDURE — 87077 CULTURE AEROBIC IDENTIFY: CPT

## 2021-12-04 PROCEDURE — 97116 GAIT TRAINING THERAPY: CPT

## 2021-12-04 PROCEDURE — 80053 COMPREHEN METABOLIC PANEL: CPT

## 2021-12-04 PROCEDURE — 65270000029 HC RM PRIVATE

## 2021-12-04 PROCEDURE — C8929 TTE W OR WO FOL WCON,DOPPLER: HCPCS

## 2021-12-04 PROCEDURE — G0378 HOSPITAL OBSERVATION PER HR: HCPCS

## 2021-12-04 PROCEDURE — 85027 COMPLETE CBC AUTOMATED: CPT

## 2021-12-04 PROCEDURE — 81001 URINALYSIS AUTO W/SCOPE: CPT

## 2021-12-04 PROCEDURE — 97161 PT EVAL LOW COMPLEX 20 MIN: CPT

## 2021-12-04 PROCEDURE — 87086 URINE CULTURE/COLONY COUNT: CPT

## 2021-12-04 PROCEDURE — 96375 TX/PRO/DX INJ NEW DRUG ADDON: CPT

## 2021-12-04 PROCEDURE — 74011250636 HC RX REV CODE- 250/636: Performed by: FAMILY MEDICINE

## 2021-12-04 PROCEDURE — 86140 C-REACTIVE PROTEIN: CPT

## 2021-12-04 PROCEDURE — 74011250637 HC RX REV CODE- 250/637: Performed by: INTERNAL MEDICINE

## 2021-12-04 PROCEDURE — 36415 COLL VENOUS BLD VENIPUNCTURE: CPT

## 2021-12-04 PROCEDURE — 87186 SC STD MICRODIL/AGAR DIL: CPT

## 2021-12-04 PROCEDURE — 93971 EXTREMITY STUDY: CPT

## 2021-12-04 PROCEDURE — 74011000250 HC RX REV CODE- 250: Performed by: FAMILY MEDICINE

## 2021-12-04 PROCEDURE — 97530 THERAPEUTIC ACTIVITIES: CPT

## 2021-12-04 RX ORDER — PANTOPRAZOLE SODIUM 40 MG/1
40 TABLET, DELAYED RELEASE ORAL
Status: DISCONTINUED | OUTPATIENT
Start: 2021-12-04 | End: 2021-12-10 | Stop reason: HOSPADM

## 2021-12-04 RX ADMIN — PERFLUTREN 2 ML: 6.52 INJECTION, SUSPENSION INTRAVENOUS at 14:56

## 2021-12-04 RX ADMIN — ACETAMINOPHEN 650 MG: 325 TABLET ORAL at 10:47

## 2021-12-04 RX ADMIN — WATER 1 G: 1 INJECTION INTRAMUSCULAR; INTRAVENOUS; SUBCUTANEOUS at 10:46

## 2021-12-04 RX ADMIN — PANTOPRAZOLE SODIUM 40 MG: 40 TABLET, DELAYED RELEASE ORAL at 14:00

## 2021-12-04 RX ADMIN — CARVEDILOL 3.12 MG: 3.12 TABLET, FILM COATED ORAL at 10:46

## 2021-12-04 RX ADMIN — CLONIDINE HYDROCHLORIDE 0.1 MG: 0.1 TABLET ORAL at 10:47

## 2021-12-04 RX ADMIN — CARVEDILOL 3.12 MG: 3.12 TABLET, FILM COATED ORAL at 17:08

## 2021-12-04 RX ADMIN — CLONIDINE HYDROCHLORIDE 0.1 MG: 0.1 TABLET ORAL at 17:08

## 2021-12-04 NOTE — CONSULTS
Reji Soler. Gary Rios, [address] [place] [address]  [phone] office     Gastroenterology Consultation Note      Admit Date: 12/3/2021  Consult Date: 12/4/2021       Narrative Assessment and Plan   79 yo male with CKD admitted with fall consulted for anemia. Heme positive stools, questionable dark stools. He is unsure if he feels up for endo eval given his body pain from his falls. Rec PPI, avoid NSAIDs. Monitor CBC, will reassess tomorrow if he is up for endo eval EGD or EGD/colonoscopy. Will add iron studies, suspect element of ACD given CKD. Of note on eliquis which is being held. Subjective:     Chief Complaint:     History of Present Illness:   79 yo male with CKD admitted with fall consulted for anemia. Hg 8-9 with heme positive. Baseline constipation, questionable dark stools. Last colonoscopy was at age 50 and normal, has never had EGD. No GI sxs other than occ constipation. No NSAID use. No acid suppression. No FHx GI disease. Has a lot of back pain and neck pain now s/p fall. Is on eliquis, has been held while here. PCP:  Meghan Pierce MD    Past Medical History:   Diagnosis Date    Atrial fibrillation ([address]     Cervical disc disorder     C5,C6 fractured disc and lumbar disc slipped    Chronic atrial fibrillation ([address]     [facility]    ESRD (end stage renal disease) on dialysis [facility])     [facility]    Heart failure ([address]     Hypertension     VIK on CPAP     Pulmonary hypertension ([address]     Ramon NP at [facility]        Past Surgical History:   Procedure Laterality Date    HX HEENT      right sinus polyp removal    HX ORTHOPAEDIC  2000    left ACL replacement    HX OTHER SURGICAL      cardioversion for AFIB    HX UROLOGICAL  08/2015    renal gland removal left       Social History     Tobacco Use    Smoking status: Never Smoker    Smokeless tobacco: Never Used   Substance Use Topics    Alcohol use: No        History reviewed. No pertinent family history.      Allergies   Allergen Reactions    Levofloxacin Other (comments)     Insombnia, negative thoughts. [place] like I was going to die.  Pcn [Penicillins] Rash            Home Medications:  Prior to Admission Medications   Prescriptions Last Dose Informant Patient Reported? Taking? NIFEdipine ER (ADALAT CC) 30 mg ER tablet 12/3/2021 at Unknown time  Yes Yes   Sig: Take 30 mg by mouth daily. apixaban (Eliquis) 5 mg tablet 12/3/2021 at Unknown time  Yes Yes   Sig: Take 5 mg by mouth two (2) times a day. calcitRIOL (ROCALTROL) 0.25 mcg capsule 12/3/2021 at Unknown time  Yes Yes   Sig: Take 0.25 mcg by mouth daily. carvediloL (COREG) 12.5 mg tablet 12/3/2021 at Unknown time  Yes Yes   Sig: Take 12.5 mg by mouth two (2) times daily (with meals). cloNIDine HCL (CATAPRES) 0.2 mg tablet 12/3/2021 at Unknown time  Yes Yes   Sig: Take 0.2 mg by mouth two (2) times a day. fluticasone propionate (FLONASE) 50 mcg/actuation nasal spray 12/3/2021 at Unknown time  Yes Yes   Si Sprays by Both Nostrils route daily. furosemide (LASIX) 80 mg tablet 12/3/2021 at Unknown time  Yes Yes   Sig: Take 80 mg by mouth daily. predniSONE (DELTASONE) 20 mg tablet 12/3/2021 at Unknown time  No Yes   Sig: Take 20 mg by mouth two (2) times a day.  Taken as needed for inflammation may have up to 40mg a day      Facility-Administered Medications: None       Hospital Medications:  Current Facility-Administered Medications   Medication Dose Route Frequency    cefTRIAXone (ROCEPHIN) 1 g in sterile water (preservative free) 10 mL IV syringe  1 g IntraVENous Q24H    cloNIDine HCL (CATAPRES) tablet 0.1 mg  0.1 mg Oral BID    carvediloL (COREG) tablet 3.125 mg  3.125 mg Oral BID WITH MEALS    acetaminophen (TYLENOL) tablet 650 mg  650 mg Oral Q6H PRN    Or    acetaminophen (TYLENOL) suppository 650 mg  650 mg Rectal Q6H PRN    polyethylene glycol (MIRALAX) packet 17 g  17 g Oral DAILY PRN    ondansetron (ZOFRAN ODT) tablet 4 mg  4 mg Oral Q8H PRN    Or    ondansetron (ZOFRAN) injection 4 mg  4 mg IntraVENous Q6H PRN    0.9% sodium chloride infusion  75 mL/hr IntraVENous CONTINUOUS    [Held by provider] apixaban (ELIQUIS) tablet 5 mg  5 mg Oral BID    influenza vaccine 2021-22 (6 mos+)(PF) (FLUARIX/FLULAVAL/FLUZONE QUAD) injection 0.5 mL  1 Each IntraMUSCular PRIOR TO DISCHARGE       Review of Systems: Admission ROS by Krzysztof Castillo MD from 12/3/2021 were reviewed with the patient and changes (other than per HPI) include: negative except per HPI      Objective:     Physical Exam:  Visit Vitals  BP (!) 119/90 (BP 1 Location: Right upper arm)   Pulse 99   Temp 98.3 °F (36.8 °C)   Resp 21   Ht 5' 10.98\" (1.803 m)   SpO2 98%   BMI 34.85 kg/m²     SpO2 Readings from Last 6 Encounters:   12/04/21 98%   10/31/21 98%   01/07/18 94%            Intake/Output Summary (Last 24 hours) at 12/4/2021 1332  Last data filed at 12/4/2021 0830  Gross per 24 hour   Intake 1130 ml   Output 200 ml   Net 930 ml      General: no distress, comfortable  Skin:  No visible rash or palpable dermatologic mass lesions  HEENT: Pupils equal, sclera anicteric  Respiratory:  No abnormal audible breath sounds, normal respiratory effort, no throacic deformity  GI:   Abdomen nondistended, nontender, no mass, no free fluid, no rebound or guarding. Musculoskeletal:  No gross skeletal deformity.   Neurological:  Alert and oriented  Psychiatric:  Normal affect, memory intact,    Laboratory:    Recent Results (from the past 24 hour(s))   EKG, 12 LEAD, INITIAL    Collection Time: 12/03/21  3:44 PM   Result Value Ref Range    Ventricular Rate 81 BPM    QRS Duration 90 ms    Q-T Interval 338 ms    QTC Calculation (Bezet) 392 ms    Calculated R Axis -3 degrees    Calculated T Axis -11 degrees    Diagnosis       Atrial fibrillation  Abnormal ECG  No previous ECGs available  Confirmed by Mahad Caldera MD. ([id number]) on 12/3/2021 6:02:37 PM     CBC WITH AUTOMATED DIFF    Collection Time: 12/03/21  4:01 PM   Result Value Ref Range    WBC 7.3 4.1 - 11.1 K/uL    RBC 3.29 (L) 4.10 - 5.70 M/uL    HGB 9.5 (L) 12.1 - 17.0 g/dL    HCT 29.7 (L) 36.6 - 50.3 %    MCV 90.3 80.0 - 99.0 FL    MCH 28.9 26.0 - 34.0 PG    MCHC 32.0 30.0 - 36.5 g/dL    RDW 17.2 (H) 11.5 - 14.5 %    PLATELET 65 (L) 336 - 400 K/uL    NRBC 0.4 (H) 0.0  WBC    ABSOLUTE NRBC 0.03 (H) 0.00 - 0.01 K/uL    NEUTROPHILS 87 (H) 32 - 75 %    BAND NEUTROPHILS 10 (H) 0 - 6 %    LYMPHOCYTES 1 (L) 12 - 49 %    MONOCYTES 2 (L) 5 - 13 %    EOSINOPHILS 0 0 - 7 %    BASOPHILS 0 0 - 1 %    IMMATURE GRANULOCYTES 0 %    ABS. NEUTROPHILS 7.1 1.8 - 8.0 K/UL    ABS. LYMPHOCYTES 0.1 (L) 0.8 - 3.5 K/UL    ABS. MONOCYTES 0.1 0.0 - 1.0 K/UL    ABS. EOSINOPHILS 0.0 0.0 - 0.4 K/UL    ABS. BASOPHILS 0.0 0.0 - 0.1 K/UL    ABS. IMM. GRANS. 0.0 K/UL    DF MANUAL      RBC COMMENTS ANISOCYTOSIS  1+        RBC COMMENTS OVALOCYTES  PRESENT       METABOLIC PANEL, COMPREHENSIVE    Collection Time: 12/03/21  4:01 PM   Result Value Ref Range    Sodium 139 136 - 145 mmol/L    Potassium 5.0 3.5 - 5.1 mmol/L    Chloride 106 97 - 108 mmol/L    CO2 19 (L) 21 - 32 mmol/L    Anion gap 14 5 - 15 mmol/L    Glucose 101 (H) 65 - 100 mg/dL     (H) 6 - 20 MG/DL    Creatinine 5.40 (H) 0.70 - 1.30 MG/DL    BUN/Creatinine ratio 24 (H) 12 - 20      GFR est AA 13 (L) >60 ml/min/1.73m2    GFR est non-AA 11 (L) >60 ml/min/1.73m2    Calcium 8.0 (L) 8.5 - 10.1 MG/DL    Bilirubin, total 0.5 0.2 - 1.0 MG/DL    ALT (SGPT) 24 12 - 78 U/L    AST (SGOT) 17 15 - 37 U/L    Alk.  phosphatase 67 45 - 117 U/L    Protein, total 5.5 (L) 6.4 - 8.2 g/dL    Albumin 2.3 (L) 3.5 - 5.0 g/dL    Globulin 3.2 2.0 - 4.0 g/dL    A-G Ratio 0.7 (L) 1.1 - 2.2     TROPONIN-HIGH SENSITIVITY    Collection Time: 12/03/21  4:01 PM   Result Value Ref Range    Troponin-High Sensitivity 81 (HH) 0 - 76 ng/L   LACTIC ACID    Collection Time: 12/03/21  4:01 PM   Result Value Ref Range    Lactic acid 2.1 (HH) 0.4 - 2.0 MMOL/L   SAMPLES BEING HELD Collection Time: 12/03/21  4:01 PM   Result Value Ref Range    SAMPLES BEING HELD 1 SST 1 PINK 1 BC     COMMENT        Add-on orders for these samples will be processed based on acceptable specimen integrity and analyte stability, which may vary by analyte.    CULTURE, BLOOD, PAIRED    Collection Time: 12/03/21  4:01 PM    Specimen: Blood   Result Value Ref Range    Special Requests: NO SPECIAL REQUESTS      Culture result: (A)       GRAM NEGATIVE RODS GROWING IN ALL 4 BOTTLES DRAWN (SITES=LAC AND RFA)   PROTHROMBIN TIME + INR    Collection Time: 12/03/21  4:17 PM   Result Value Ref Range    INR 1.3 (H) 0.9 - 1.1      Prothrombin time 12.6 (H) 9.0 - 11.1 sec   OCCULT BLOOD, STOOL    Collection Time: 12/03/21  4:48 PM   Result Value Ref Range    Occult blood, stool Positive     LACTIC ACID    Collection Time: 12/03/21  8:06 PM   Result Value Ref Range    Lactic acid 1.3 0.4 - 2.0 MMOL/L   RESPIRATORY VIRUS PANEL W/COVID-19, PCR    Collection Time: 12/03/21 10:23 PM    Specimen: Nasopharyngeal   Result Value Ref Range    Adenovirus Not detected NOTD      Coronavirus 229E Not detected NOTD      Coronavirus HKU1 Not detected NOTD      Coronavirus CVNL63 Not detected NOTD      Coronavirus OC43 Not detected NOTD      SARS-CoV-2, PCR Not detected NOTD      Metapneumovirus Not detected NOTD      Rhinovirus and Enterovirus Not detected NOTD      Influenza A Not detected NOTD      Influenza A, subtype H1 Not detected NOTD      Influenza A, subtype H3 Not detected NOTD      INFLUENZA A H1N1 PCR Not detected NOTD      Influenza B Not detected NOTD      Parainfluenza 1 Not detected NOTD      Parainfluenza 2 Not detected NOTD      Parainfluenza 3 Not detected NOTD      Parainfluenza virus 4 Not detected NOTD      RSV by PCR Not detected NOTD      B. parapertussis, PCR Not detected NOTD      Bordetella pertussis - PCR Not detected NOTD      Chlamydophila pneumoniae DNA, QL, PCR Not detected NOTD      Mycoplasma pneumoniae DNA, QL, PCR Not detected NOTD     PROCALCITONIN    Collection Time: 12/04/21  5:32 AM   Result Value Ref Range    Procalcitonin 283.54 ng/mL   C REACTIVE PROTEIN, QT    Collection Time: 12/04/21  5:32 AM   Result Value Ref Range    C-Reactive protein 12.40 (H) 0.00 - 0.60 mg/dL   URINALYSIS W/MICROSCOPIC    Collection Time: 12/04/21  5:32 AM   Result Value Ref Range    Color YELLOW/STRAW      Appearance TURBID (A) CLEAR      Specific gravity 1.013 1.003 - 1.030      pH (UA) 5.5 5.0 - 8.0      Protein 300 (A) NEG mg/dL    Glucose Negative NEG mg/dL    Ketone Negative NEG mg/dL    Bilirubin Negative NEG      Blood LARGE (A) NEG      Urobilinogen 0.2 0.2 - 1.0 EU/dL    Nitrites Negative NEG      Leukocyte Esterase LARGE (A) NEG      WBC >100 (H) 0 - 4 /hpf    RBC 10-20 0 - 5 /hpf    Epithelial cells FEW FEW /lpf    Bacteria 2+ (A) NEG /hpf   METABOLIC PANEL, COMPREHENSIVE    Collection Time: 12/04/21  5:32 AM   Result Value Ref Range    Sodium 140 136 - 145 mmol/L    Potassium 4.5 3.5 - 5.1 mmol/L    Chloride 112 (H) 97 - 108 mmol/L    CO2 17 (L) 21 - 32 mmol/L    Anion gap 11 5 - 15 mmol/L    Glucose 129 (H) 65 - 100 mg/dL     (H) 6 - 20 MG/DL    Creatinine 5.28 (H) 0.70 - 1.30 MG/DL    BUN/Creatinine ratio 24 (H) 12 - 20      GFR est AA 13 (L) >60 ml/min/1.73m2    GFR est non-AA 11 (L) >60 ml/min/1.73m2    Calcium 7.0 (L) 8.5 - 10.1 MG/DL    Bilirubin, total 0.3 0.2 - 1.0 MG/DL    ALT (SGPT) 19 12 - 78 U/L    AST (SGOT) 12 (L) 15 - 37 U/L    Alk.  phosphatase 60 45 - 117 U/L    Protein, total 4.6 (L) 6.4 - 8.2 g/dL    Albumin 2.0 (L) 3.5 - 5.0 g/dL    Globulin 2.6 2.0 - 4.0 g/dL    A-G Ratio 0.8 (L) 1.1 - 2.2     MAGNESIUM    Collection Time: 12/04/21  5:32 AM   Result Value Ref Range    Magnesium 2.8 (H) 1.6 - 2.4 mg/dL   CBC W/O DIFF    Collection Time: 12/04/21  5:32 AM   Result Value Ref Range    WBC 10.9 4.1 - 11.1 K/uL    RBC 2.85 (L) 4.10 - 5.70 M/uL    HGB 8.4 (L) 12.1 - 17.0 g/dL    HCT 25.6 (L) 36.6 - 50.3 % MCV 89.8 80.0 - 99.0 FL    MCH 29.5 26.0 - 34.0 PG    MCHC 32.8 30.0 - 36.5 g/dL    RDW 17.3 (H) 11.5 - 14.5 %    PLATELET 60 (L) 170 - 400 K/uL    NRBC 0.3 (H) 0  WBC    ABSOLUTE NRBC 0.03 (H) 0.00 - 0.01 K/uL         Assessment/Plan:     Active Problems:    Pulmonary hypertension (HCC) ()      Overview: [facility] at [facility]      VIK on CPAP ()      Hypertension ()      Heart failure (HCC) ()      ESRD (end stage renal disease) on dialysis ([facility]) ()      Overview: Dr Isis De León with [facility]      Chronic atrial fibrillation ([facility] ()      Overview: [facility]      Cervical disc disorder ()      Overview: C5,C6 fractured disc and lumbar disc slipped      Atrial fibrillation ([facility]) ()      Hypotension (12/3/2021)      Weakness generalized (12/3/2021)      Anemia, chronic disease (12/3/2021)      Acute cystitis without hematuria (12/4/2021)      Bacteremia due to Gram-negative bacteria (12/4/2021)         See above narrative for full detail.

## 2021-12-04 NOTE — ED NOTES
TRANSFER - OUT REPORT:    Verbal report given to EMT with IRLANDA(name) on [facility] Megan Glasgow  being transferred to [address] [place] [address] Dr Alejandro(unit) for routine progression of care       Report consisted of patients Situation, Background, Assessment and   Recommendations(SBAR). Information from the following report(s) SBAR, MAR, telemetry,MAR, all test results was reviewed with the EMT with IRLANDA. Lines:   Peripheral IV 12/03/21 Right Antecubital (Active)   Site Assessment Clean, dry, & intact 12/03/21 1601   Phlebitis Assessment 0 12/03/21 1601   Infiltration Assessment 0 12/03/21 1601   Dressing Status Clean, dry, & intact 12/03/21 1601   Dressing Type Transparent 12/03/21 1601   Hub Color/Line Status Pink 12/03/21 1601       Peripheral IV 12/03/21 Right Forearm (Active)   Site Assessment Clean, dry, & intact 12/03/21 1623   Phlebitis Assessment 0 12/03/21 1623   Infiltration Assessment 0 12/03/21 1623   Dressing Status Clean, dry, & intact 12/03/21 1623   Dressing Type Transparent 12/03/21 1623   Hub Color/Line Status Pink 12/03/21 1623        Opportunity for questions and clarification was provided. Patient transported with: IRLANDA BLS monitoring reassessment at this time is improved BP. Pt is stable for transport as ordered.

## 2021-12-04 NOTE — PROGRESS NOTES
Bedside and Verbal shift change report given to Jessica Cardenas RN (oncoming nurse) by Ev Joy RN (offgoing nurse). Report included the following information SBAR, Kardex, Intake/Output, MAR, Accordion and Recent Results.

## 2021-12-04 NOTE — H&P
[facility]    Hospitalist Admission Note      NAME:  Brady Dowell   :   1950   MRN:  [id number]     PCP:  Marcella Hawley MD     Date/Time of service:  12/3/2021 8:12 PM          Subjective:     CHIEF COMPLAINT: fall     HISTORY OF PRESENT ILLNESS:     Mr. Haroldo Read is a 78 y.o.  male who presented to the Emergency Department complaining of fall. Occurred this afternoon as he was going to the bathroom. He as felt weak ever since discharge a few weeks ago. ER finds dehydration on top of CKD stage 5, with hypotension. We will admit him for observation. Past Medical History:   Diagnosis Date    Atrial fibrillation ([address]     Cervical disc disorder     C5,C6 fractured disc and lumbar disc slipped    Chronic atrial fibrillation (HCC)     [facility]    ESRD (end stage renal disease) on dialysis ([address]     Dr [facility]arnie Massey with [facility]    Heart failure ([address]     Hypertension     VIK on CPAP     Pulmonary hypertension ([address]     [facility]amon NP at [facility]        Past Surgical History:   Procedure Laterality Date    HX HEENT      right sinus polyp removal    HX ORTHOPAEDIC      left ACL replacement    HX OTHER SURGICAL      cardioversion for AFIB    HX UROLOGICAL  2015    renal gland removal left       Social History     Tobacco Use    Smoking status: Never Smoker    Smokeless tobacco: Never Used   Substance Use Topics    Alcohol use: No        History reviewed. No pertinent family history. Family hx cannot be fully assessed, since the patient cannot provide information    Allergies   Allergen Reactions    Levofloxacin Other (comments)     Insombnia, negative thoughts. [place] like I was going to die.  Pcn [Penicillins] Rash        Prior to Admission medications    Medication Sig Start Date End Date Taking? Authorizing Provider   furosemide (LASIX) 80 mg tablet Take 80 mg by mouth daily. Yes Other, MD Saurav   predniSONE (DELTASONE) 20 mg tablet Take 20 mg by mouth two (2) times a day. Taken as needed for inflammation may have up to 40mg a day 10/31/21  Yes Teresa Willson MD   calcitRIOL (ROCALTROL) 0.25 mcg capsule Take 0.25 mcg by mouth daily. Yes Saurav Mancuso MD   carvediloL (COREG) 12.5 mg tablet Take 12.5 mg by mouth two (2) times daily (with meals). Yes Jamee, MD Saurav   cloNIDine HCL (CATAPRES) 0.2 mg tablet Take 0.2 mg by mouth two (2) times a day. Yes Jamee, MD Saurav   apixaban (Eliquis) 5 mg tablet Take 5 mg by mouth two (2) times a day. Yes Jamee, MD Saurav   NIFEdipine ER (ADALAT CC) 30 mg ER tablet Take 30 mg by mouth daily. Yes Saurav Mancuso MD   fluticasone propionate (FLONASE) 50 mcg/actuation nasal spray 2 Sprays by Both Nostrils route daily.    Yes Jamee, MD Saurav       Review of Systems:  (bold if positive, if negative)    Gen:  chillsfatigueEyes:  ENT:  CVS:  dizzinessPulm:  GI:  :  MS:  Pain, weakness, swelling, arthritisSkin:  Psych:  Endo:  Hem:  Renal:  Neuro:  weakness      Objective:      VITALS:    Vital signs reviewed; most recent are:    Visit Vitals  /66   Pulse 88   Temp 99.1 °F (37.3 °C)   Resp 19   Ht 5' 10.98\" (1.803 m)   SpO2 97%   BMI 34.85 kg/m²     SpO2 Readings from Last 6 Encounters:   12/03/21 97%   10/31/21 98%   01/07/18 94%            Intake/Output Summary (Last 24 hours) at 12/3/2021 2012  Last data filed at 12/3/2021 1720  Gross per 24 hour   Intake 1000 ml   Output    Net 1000 ml        Exam:     Physical Exam:    Gen:  Obese, in no acute distress  HEENT:  Pink conjunctivae, PERRL, hearing intact to voice, moist mucous membranes  Neck:  Supple, without masses, thyroid non-tender  Resp:  No accessory muscle use, clear breath sounds without wheezes rales or rhonchi  Card:  No murmurs, normal S1, S2 without thrills, bruits or peripheral edema  Abd:  Soft, non-tender, non-distended, normoactive bowel sounds are present, no mass  Lymph:  No cervical or inguinal adenopathy  Musc:  No cyanosis or clubbing  Skin:  Chronic LE dermatitis, skin turgor is good  Neuro:  Cranial nerves are grossly intact, general motor weakness, follows commands   Psych:  Good insight, oriented to person, place and time, alert     Labs:    Recent Labs     12/03/21  1601   WBC 7.3   HGB 9.5*   HCT 29.7*   PLT 65*     Recent Labs     12/03/21  1601      K 5.0      CO2 19*   *   *   CREA 5.40*   CA 8.0*   ALB 2.3*   TBILI 0.5   ALT 24     Lab Results   Component Value Date/Time    Glucose (POC) 110 10/29/2021 09:15 PM     No results for input(s): PH, PCO2, PO2, HCO3, FIO2 in the last 72 hours. Recent Labs     12/03/21  1617   INR 1.3*     All Micro Results     Procedure Component Value Units Date/Time    CULTURE, URINE [[id number]]     Order Status: Sent Specimen: Urine from Clean catch     RESPIRATORY VIRUS PANEL W/COVID-19, PCR [[id number]]     Order Status: Sent Specimen: NASOPHARYNGEAL SWAB     CULTURE, BLOOD, PAIRED [[id number]]     Order Status: Sent Specimen: Blood           I have reviewed previous records       Assessment and Plan:      Hypotension / Hx Hypertension - POA, likely multifactorial due to IVVD, meds, pulmonary HTN, deconditioning. Hydrate. Reduce doses of coreg and clonidine. Hold lasix and nifedipine. Check orthostatics. Weakness generalized / Cervical disc disorder - Persistent since discharge. Re check PT OT needs. Likely would benefit from [facility] PT    ESRD (end stage renal disease) not on dialysis - Had been followed by Dr Liliya Mejia, but he retired. Consult nephrology. Patient wants to avoid HD. Pulmonary hypertension / Heart failure - Followed by Ramon PEREZ at [facility]. Check ECHO    VIK on CPAP - May use home machine if available. Chronic atrial fibrillation - Cut back rate control as above. Continue Eliquis    Anemia, chronic disease - Monitor. EPO per renal    Hx cellulitis / chills - Leg appears fine. Check RVP. Consult ID to see if they think any Abx are warranted. No suspicion of covid.        Telemetry reviewed: normal sinus rhythm    Risk of deterioration: high      Total time spent with patient: 48 Minutes I personally reviewed chart, notes, data and current medications in the medical record. I have personally examined and treated the patient at bedside during this period.                  Care Plan discussed with: Patient, Family, Nursing Staff and >50% of time spent in counseling and coordination of care    Discussed:  Care Plan       ___________________________________________________    Attending Physician: Catalino Smith MD

## 2021-12-04 NOTE — ROUTINE PROCESS
TRANSFER - OUT REPORT:    Verbal report given to GUSTAVO Schultz Rn(name) on [facility] Lesa Stover  being transferred to [place] [id number]) for routine progression of care       Report consisted of patients Situation, Background, Assessment and   Recommendations(SBAR). Information from the following report(s) SBAR,vitals,telemetry,all test results, MAR  was reviewed with the receiving nurse. Lines:   Peripheral IV 12/03/21 Right Antecubital (Active)   Site Assessment Clean, dry, & intact 12/03/21 1601   Phlebitis Assessment 0 12/03/21 1601   Infiltration Assessment 0 12/03/21 1601   Dressing Status Clean, dry, & intact 12/03/21 1601   Dressing Type Transparent 12/03/21 1601   Hub Color/Line Status Pink 12/03/21 1601       Peripheral IV 12/03/21 Right Forearm (Active)   Site Assessment Clean, dry, & intact 12/03/21 1623   Phlebitis Assessment 0 12/03/21 1623   Infiltration Assessment 0 12/03/21 1623   Dressing Status Clean, dry, & intact 12/03/21 1623   Dressing Type Transparent 12/03/21 1623   Hub Color/Line Status Pink 12/03/21 1623        Opportunity for questions and clarification was provided. Patient transported with: IRLANDA ALS monitoring reassessment at this time is unchanged pt is stable for transport at this time.

## 2021-12-04 NOTE — PROGRESS NOTES
Problem: Mobility Impaired (Adult and Pediatric)  Goal: *Acute Goals and Plan of Care (Insert Text)  Description: FUNCTIONAL STATUS PRIOR TO ADMISSION: Patient was modified independent using a single point cane for functional mobility. HOME SUPPORT PRIOR TO ADMISSION: The patient lived with wife but did not require assist.    Physical Therapy Goals  Initiated 12/4/2021  1. Patient will move from supine to sit and sit to supine  in bed with modified independence within 7 day(s). 2.  Patient will transfer from bed to chair and chair to bed with modified independence using the least restrictive device within 7 day(s). 3.  Patient will perform sit to stand with modified independence within 7 day(s). 4.  Patient will ambulate with modified independence for 150 feet with the least restrictive device within 7 day(s). Outcome: Progressing Towards Goal     PHYSICAL THERAPY EVALUATION  Patient: Rosa Saldana (49 y.o. male)  Date: 12/4/2021  Primary Diagnosis: Hypotension [I95.9]        Precautions: Fall       ASSESSMENT  Based on the objective data described below, the patient presents with decreased activity tolerance, generalized weakness, pain limiting function and presents below his functional baseline, admitted to the acute setting for hypotension and cellulitis and blood cultures found +for growing gram neg rods. Currently pt is requiring Min A for bed mobility and CGA for transfers and gait with RW. Overall gait is steady but slow and guarded d/t pain but pt denies s/s of orthostatic hypotension throughout session and vitals remained WNL in all positions. Pt left sitting up in chair with all needs in reach. Acute PT will continue to follow pt while he remains in the acute setting. Rec HHPT to follow up at discharge. .    Current Level of Function Impacting Discharge (mobility/balance): Min A bed mobility, CGA transfers and gait with RW    Functional Outcome Measure:   The patient scored 55/100 on the Barthel Index outcome measure which is indicative of 45% functional impairment. Other factors to consider for discharge: =     Patient will benefit from skilled therapy intervention to address the above noted impairments. PLAN :  Recommendations and Planned Interventions: bed mobility training, transfer training, gait training, therapeutic exercises, patient and family training/education, and therapeutic activities      Frequency/Duration: Patient will be followed by physical therapy:  5 times a week to address goals. Recommendation for discharge: (in order for the patient to meet his/her long term goals)  Physical therapy at least 2 days/week in the home     This discharge recommendation:  A follow-up discussion with the attending provider and/or case management is planned    IF patient discharges home will need the following DME: patient owns DME required for discharge         SUBJECTIVE:   Patient stated I'm just hurting a lot.     OBJECTIVE DATA SUMMARY:   HISTORY:    Past Medical History:   Diagnosis Date    Atrial fibrillation ([address]     Cervical disc disorder     C5,C6 fractured disc and lumbar disc slipped    Chronic atrial fibrillation (HCC)     [facility]    ESRD (end stage renal disease) on dialysis ([facility])     Dr Blayne Ricardo with [facility]    Heart failure ([address]     Hypertension     VIK on CPAP     Pulmonary hypertension ([address]     Ramon NP at [facility]     Past Surgical History:   Procedure Laterality Date    HX HEENT      right sinus polyp removal    HX ORTHOPAEDIC  2000    left ACL replacement    HX OTHER SURGICAL      cardioversion for AFIB    HX UROLOGICAL  08/2015    renal gland removal left       Personal factors and/or comorbidities impacting plan of care:     Home Situation  Home Environment: Apartment  # Steps to Enter: 0  One/Two Story Residence: One story  Living Alone: No  Support Systems: Spouse/Significant Other (spouse works during the day)  Patient Expects to be Discharged toVF Cor[de-identified]ration  Current DME Used/Available at Home: Walker, rolling, Cane, straight    EXAMINATION/PRESENTATION/DECISION MAKING:   Critical Behavior:              Hearing: Auditory  Auditory Impairment: None  Skin:  Defer to RN notes  Edema: Defer to RN notes  Range Of Motion:  AROM: Within functional limits           PROM: Within functional limits           Strength:    Strength: Generally decreased, functional                    Tone & Sensation:   Tone: Normal              Sensation: Intact               Coordination:  Coordination: Within functional limits  Vision:      Functional Mobility:  Bed Mobility:  Rolling: Minimum assistance  Supine to Sit: Minimum assistance     Scooting: Contact guard assistance  Transfers:  Sit to Stand: Contact guard assistance  Stand to Sit: Contact guard assistance                       Balance:   Sitting: Intact  Standing: Impaired  Standing - Static: Constant support; Good  Standing - Dynamic : Constant support; Good  Ambulation/Gait Training:  Distance (ft): 22 Feet (ft)  Assistive Device: Walker, rolling; Gait belt  Ambulation - Level of Assistance: Contact guard assistance; Additional time     Gait Description (WDL): Exceptions to WDL  Gait Abnormalities: Antalgic; Decreased step clearance; Shuffling gait        Base of Support: Widened     Speed/Elizabeth: Shuffled; Slow                  Functional Measure:  Barthel Index:    Bathin  Bladder: 10  Bowels: 10  Groomin  Dressin  Feeding: 10  Mobility: 0  Stairs: 5  Toilet Use: 0  Transfer (Bed to Chair and Back): 10  Total: 55/100       The Barthel ADL Index: Guidelines  1. The index should be used as a record of what a patient does, not as a record of what a patient could do. 2. The main aim is to establish degree of independence from any help, physical or verbal, however minor and for whatever reason. 3. The need for supervision renders the patient not independent.   4. A patient's performance should be established using the best available evidence. Asking the patient, friends/relatives and nurses are the usual sources, but direct observation and common sense are also important. However direct testing is not needed. 5. Usually the patient's performance over the preceding 24-48 hours is important, but occasionally longer periods will be relevant. 6. Middle categories imply that the patient supplies over 50 per cent of the effort. 7. Use of aids to be independent is allowed. Score Interpretation (from [address] [facility])    Independent   60-79 Minimally independent   40-59 Partially dependent   20-39 Very dependent   <20 Totally dependent     -Benito Darby., Barthel, D.W. (1965). Functional evaluation: the Barthel Index. [address] ([address] (1997). The Barthel activities of daily living index: self-reporting versus actual performance in the old (> or = 75 years). Journal of [address] [place] [address] 45(7), [facility], DAVEY, Joey Russell., Miah uZrita (1999). Measuring the change in disability after inpatient rehabilitation; comparison of the responsiveness of the Barthel Index and Functional [place] Measure. Journal of Neurology, Neurosurgery, and Psychiatry, 66(4), 669-574. Rosemarie Schwartz, N.J.A, TRACEE Alex, & Martha Olsen, MCortneyA. (2004) Assessment of post-stroke quality of life in cost-effectiveness studies: The usefulness of the Barthel Index and the EuroQoL-5D.  Quality of Life Research, 15, 157-32           Physical Therapy Evaluation Charge Determination   History Examination Presentation Decision-Making   MEDIUM  Complexity : 1-2 comorbidities / personal factors will impact the outcome/ POC  LOW Complexity : 1-2 Standardized tests and measures addressing body structure, function, activity limitation and / or participation in recreation  LOW Complexity : Stable, uncomplicated  LOW Complexity : FOTO score of       Based on the above components, the patient evaluation is determined to be of the following complexity level: LOW     Pain Ratin/10 back and neck pain    Activity Tolerance:   Fair and requires rest breaks    After treatment patient left in no apparent distress:   Sitting in chair, Call bell within reach, and Bed / chair alarm activated    COMMUNICATION/EDUCATION:   The patients plan of care was discussed with: Occupational therapist and Registered nurse. Fall prevention education was provided and the patient/caregiver indicated understanding., Patient/family have participated as able in goal setting and plan of care. , and Patient/family agree to work toward stated goals and plan of care.     Thank you for this referral.  Carter Castleman PT, DPT   Time Calculation: 40 mins

## 2021-12-04 NOTE — CONSULTS
[facility]   [address] [facility], [address] [place] [address], [facility]  Phone: [phone] NOTE     Patient: Yamilka Rolon MRN: [id number]  PCP: Lio Amaro MD   :     1950  Age:   78 y.o. Sex:  male      Referring physician: Kofi Perez MD  Reason for consultation: 78 y.o. male with Hypotension [Y74.5] complicated by DRAKE   Admission Date: 12/3/2021  2:46 PM  LOS: 0 days      ASSESSMENT and PLAN :   1 CKD 5 Not yet on HD  2 Fall   3 Hypotension   4 Pulm HTN/HF   5 VIK on CPAP   6 Afib   7  H/o cellulitis   Plan   1 No acute indication for HD   2 gentle hydration for hypotension  3 Check Echo  4 Down titrate Bp meds  If low BP  5 Nep will follow   6 If Bp better and cr stable, he can be dced tomorrow   Care Plan discussed with:  nurse      Thank you for consulting [facility] in the care of your patient. Subjective:   HPI: Yamilka Rolon is a 78 y.o.  male who has been admitted to the hospital for fall. Pt had fall going to bathroom. He was hypotensive on presentation  And started on IVF. Nep has been consulted for CKD 5 not yet on HD. Cr seems to be relativley stable.      Past Medical Hx:   Past Medical History:   Diagnosis Date    Atrial fibrillation ([address]     Cervical disc disorder     C5,C6 fractured disc and lumbar disc slipped    Chronic atrial fibrillation (HCC)     [facility]    ESRD (end stage renal disease) on dialysis ([address]     Dr [facility]bra Pollard with [facility]    Heart failure ([address]     Hypertension     VIK on CPAP     Pulmonary hypertension ([address]     Ramon NP at [facility]        Past Surgical Hx:     Past Surgical History:   Procedure Laterality Date    HX HEENT      right sinus polyp removal    HX ORTHOPAEDIC      left ACL replacement    HX OTHER SURGICAL      cardioversion for AFIB    HX UROLOGICAL  2015    renal gland removal left         Allergies   Allergen Reactions    Levofloxacin Other (comments)     Insombnia, negative thoughts. [place] like I was going to die.  Pcn [Penicillins] Rash       Social Hx:  reports that he has never smoked. He has never used smokeless tobacco. He reports that he does not drink alcohol and does not use drugs. History reviewed. No pertinent family history. Review of Systems:  A thorough twelve point review of system was performed today. Pertinent positives and negatives are mentioned in the HPI. The reminder of the ROS is negative and noncontributory. Objective:    Vitals:    Vitals:    12/03/21 2145 12/03/21 2147 12/03/21 2149 12/03/21 2341   BP: 116/75 114/64 120/73 (!) 160/80   Pulse: 85 84 84 91   Resp: 18   22   Temp: 99 °F (37.2 °C)   98.1 °F (36.7 °C)   SpO2: 97%   99%   Height:         I&O's:  12/03 0701 - 12/04 0700  In: 1000 [I.V.:1000]  Out: -   Visit Vitals  BP (!) 160/80 (BP 1 Location: Left lower arm, BP Patient Position: Sitting)   Pulse 91   Temp 98.1 °F (36.7 °C)   Resp 22   Ht 5' 10.98\" (1.803 m)   SpO2 99%   BMI 34.85 kg/m²       Physical Exam:  General:  No apparent Distress  HEENT: PERRL,  No Pallor , No Icterus  Neck: Supple,no mass palpable  Lungs : CTA  CVS: RRR, S1 S2 normal, No murmur   Abdomen: Soft, NT, BS +  Extremities:  No Edema  Skin: No rash or lesions.   MS: No joint swelling, erythema, warmth  Neurologic: non focal, AAO x 3  Psych: normal affect    Laboratory Results:    Recent Labs     12/04/21  0532 12/03/21  1617 12/03/21  1601     --  139   K 4.5  --  5.0   *  --  106   CO2 17*  --  19*   *  --  101*   *  --  130*   CREA 5.28*  --  5.40*   CA 7.0*  --  8.0*   MG 2.8*  --   --    ALB 2.0*  --  2.3*   ALT 19  --  24   INR  --  1.3*  --      Recent Labs     12/04/21  0532 12/03/21  1601   WBC 10.9 7.3   HGB 8.4* 9.5*   HCT 25.6* 29.7*   PLT 60* 65*     No results found for: SDES  Lab Results   Component Value Date/Time    Culture result:  12/03/2021 04:01 PM     TWO OF FOUR BOTTLES HAVE BEEN FLAGGED POSITIVE BY INSTRUMENT. BOTTLES HAVE BEEN SENT TO [facility]ORATORY TO ASSESS FOR POSSIBLE GROWTH. Culture result: REMAINING BOTTLE(S) HAS/HAVE NO GROWTH SO FAR 12/03/2021 04:01 PM    Culture result: No growth (<1,000 CFU/ML) 10/24/2021 06:07 PM     Recent Results (from the past 24 hour(s))   EKG, 12 LEAD, INITIAL    Collection Time: 12/03/21  3:44 PM   Result Value Ref Range    Ventricular Rate 81 BPM    QRS Duration 90 ms    Q-T Interval 338 ms    QTC Calculation (Bezet) 392 ms    Calculated R Axis -3 degrees    Calculated T Axis -11 degrees    Diagnosis       Atrial fibrillation  Abnormal ECG  No previous ECGs available  Confirmed by Mahad Stein MD. ([id number]) on 12/3/2021 6:02:37 PM     CBC WITH AUTOMATED DIFF    Collection Time: 12/03/21  4:01 PM   Result Value Ref Range    WBC 7.3 4.1 - 11.1 K/uL    RBC 3.29 (L) 4.10 - 5.70 M/uL    HGB 9.5 (L) 12.1 - 17.0 g/dL    HCT 29.7 (L) 36.6 - 50.3 %    MCV 90.3 80.0 - 99.0 FL    MCH 28.9 26.0 - 34.0 PG    MCHC 32.0 30.0 - 36.5 g/dL    RDW 17.2 (H) 11.5 - 14.5 %    PLATELET 65 (L) 749 - 400 K/uL    NRBC 0.4 (H) 0.0  WBC    ABSOLUTE NRBC 0.03 (H) 0.00 - 0.01 K/uL    NEUTROPHILS 87 (H) 32 - 75 %    BAND NEUTROPHILS 10 (H) 0 - 6 %    LYMPHOCYTES 1 (L) 12 - 49 %    MONOCYTES 2 (L) 5 - 13 %    EOSINOPHILS 0 0 - 7 %    BASOPHILS 0 0 - 1 %    IMMATURE GRANULOCYTES 0 %    ABS. NEUTROPHILS 7.1 1.8 - 8.0 K/UL    ABS. LYMPHOCYTES 0.1 (L) 0.8 - 3.5 K/UL    ABS. MONOCYTES 0.1 0.0 - 1.0 K/UL    ABS. EOSINOPHILS 0.0 0.0 - 0.4 K/UL    ABS. BASOPHILS 0.0 0.0 - 0.1 K/UL    ABS. IMM.  GRANS. 0.0 K/UL    DF MANUAL      RBC COMMENTS ANISOCYTOSIS  1+        RBC COMMENTS OVALOCYTES  PRESENT       METABOLIC PANEL, COMPREHENSIVE    Collection Time: 12/03/21  4:01 PM   Result Value Ref Range    Sodium 139 136 - 145 mmol/L    Potassium 5.0 3.5 - 5.1 mmol/L    Chloride 106 97 - 108 mmol/L    CO2 19 (L) 21 - 32 mmol/L    Anion gap 14 5 - 15 mmol/L    Glucose 101 (H) 65 - 100 mg/dL     (H) 6 - 20 MG/DL    Creatinine 5.40 (H) 0.70 - 1.30 MG/DL    BUN/Creatinine ratio 24 (H) 12 - 20      GFR est AA 13 (L) >60 ml/min/1.73m2    GFR est non-AA 11 (L) >60 ml/min/1.73m2    Calcium 8.0 (L) 8.5 - 10.1 MG/DL    Bilirubin, total 0.5 0.2 - 1.0 MG/DL    ALT (SGPT) 24 12 - 78 U/L    AST (SGOT) 17 15 - 37 U/L    Alk. phosphatase 67 45 - 117 U/L    Protein, total 5.5 (L) 6.4 - 8.2 g/dL    Albumin 2.3 (L) 3.5 - 5.0 g/dL    Globulin 3.2 2.0 - 4.0 g/dL    A-G Ratio 0.7 (L) 1.1 - 2.2     TROPONIN-HIGH SENSITIVITY    Collection Time: 12/03/21  4:01 PM   Result Value Ref Range    Troponin-High Sensitivity 81 (HH) 0 - 76 ng/L   LACTIC ACID    Collection Time: 12/03/21  4:01 PM   Result Value Ref Range    Lactic acid 2.1 (HH) 0.4 - 2.0 MMOL/L   SAMPLES BEING HELD    Collection Time: 12/03/21  4:01 PM   Result Value Ref Range    SAMPLES BEING HELD 1 SST 1 PINK 1 BC     COMMENT        Add-on orders for these samples will be processed based on acceptable specimen integrity and analyte stability, which may vary by analyte. CULTURE, BLOOD, PAIRED    Collection Time: 12/03/21  4:01 PM    Specimen: Blood   Result Value Ref Range    Special Requests: NO SPECIAL REQUESTS      Culture result:        TWO OF FOUR BOTTLES HAVE BEEN FLAGGED POSITIVE BY INSTRUMENT. BOTTLES HAVE BEEN SENT TO [facility]ORATORY TO ASSESS FOR POSSIBLE GROWTH.     Culture result: REMAINING BOTTLE(S) HAS/HAVE NO GROWTH SO FAR     PROTHROMBIN TIME + INR    Collection Time: 12/03/21  4:17 PM   Result Value Ref Range    INR 1.3 (H) 0.9 - 1.1      Prothrombin time 12.6 (H) 9.0 - 11.1 sec   OCCULT BLOOD, STOOL    Collection Time: 12/03/21  4:48 PM   Result Value Ref Range    Occult blood, stool Positive     LACTIC ACID    Collection Time: 12/03/21  8:06 PM   Result Value Ref Range    Lactic acid 1.3 0.4 - 2.0 MMOL/L   PROCALCITONIN    Collection Time: 12/04/21  5:32 AM   Result Value Ref Range    Procalcitonin 283.54 ng/mL   C REACTIVE PROTEIN, QT    Collection Time: 12/04/21  5:32 AM   Result Value Ref Range    C-Reactive protein 12.40 (H) 0.00 - 0.60 mg/dL   URINALYSIS W/MICROSCOPIC    Collection Time: 12/04/21  5:32 AM   Result Value Ref Range    Color YELLOW/STRAW      Appearance TURBID (A) CLEAR      Specific gravity 1.013 1.003 - 1.030      pH (UA) 5.5 5.0 - 8.0      Protein 300 (A) NEG mg/dL    Glucose Negative NEG mg/dL    Ketone Negative NEG mg/dL    Bilirubin Negative NEG      Blood LARGE (A) NEG      Urobilinogen 0.2 0.2 - 1.0 EU/dL    Nitrites Negative NEG      Leukocyte Esterase LARGE (A) NEG      WBC >100 (H) 0 - 4 /hpf    RBC 10-20 0 - 5 /hpf    Epithelial cells FEW FEW /lpf    Bacteria 2+ (A) NEG /hpf   METABOLIC PANEL, COMPREHENSIVE    Collection Time: 12/04/21  5:32 AM   Result Value Ref Range    Sodium 140 136 - 145 mmol/L    Potassium 4.5 3.5 - 5.1 mmol/L    Chloride 112 (H) 97 - 108 mmol/L    CO2 17 (L) 21 - 32 mmol/L    Anion gap 11 5 - 15 mmol/L    Glucose 129 (H) 65 - 100 mg/dL     (H) 6 - 20 MG/DL    Creatinine 5.28 (H) 0.70 - 1.30 MG/DL    BUN/Creatinine ratio 24 (H) 12 - 20      GFR est AA 13 (L) >60 ml/min/1.73m2    GFR est non-AA 11 (L) >60 ml/min/1.73m2    Calcium 7.0 (L) 8.5 - 10.1 MG/DL    Bilirubin, total 0.3 0.2 - 1.0 MG/DL    ALT (SGPT) 19 12 - 78 U/L    AST (SGOT) 12 (L) 15 - 37 U/L    Alk.  phosphatase 60 45 - 117 U/L    Protein, total 4.6 (L) 6.4 - 8.2 g/dL    Albumin 2.0 (L) 3.5 - 5.0 g/dL    Globulin 2.6 2.0 - 4.0 g/dL    A-G Ratio 0.8 (L) 1.1 - 2.2     MAGNESIUM    Collection Time: 12/04/21  5:32 AM   Result Value Ref Range    Magnesium 2.8 (H) 1.6 - 2.4 mg/dL   CBC W/O DIFF    Collection Time: 12/04/21  5:32 AM   Result Value Ref Range    WBC 10.9 4.1 - 11.1 K/uL    RBC 2.85 (L) 4.10 - 5.70 M/uL    HGB 8.4 (L) 12.1 - 17.0 g/dL    HCT 25.6 (L) 36.6 - 50.3 %    MCV 89.8 80.0 - 99.0 FL    MCH 29.5 26.0 - 34.0 PG    MCHC 32.8 30.0 - 36.5 g/dL    RDW 17.3 (H) 11.5 - 14.5 %    PLATELET 60 (L) 106 - 400 K/uL    NRBC 0.3 (H) 0 PER 100 WBC    ABSOLUTE NRBC 0.03 (H) 0.00 - 0.01 K/uL         Urine dipstick:   Lab Results   Component Value Date/Time    Color YELLOW/STRAW 12/04/2021 05:32 AM    Appearance TURBID (A) 12/04/2021 05:32 AM    Specific gravity 1.013 12/04/2021 05:32 AM    pH (UA) 5.5 12/04/2021 05:32 AM    Protein 300 (A) 12/04/2021 05:32 AM    Glucose Negative 12/04/2021 05:32 AM    Ketone Negative 12/04/2021 05:32 AM    Bilirubin Negative 12/04/2021 05:32 AM    Urobilinogen 0.2 12/04/2021 05:32 AM    Nitrites Negative 12/04/2021 05:32 AM    Leukocyte Esterase LARGE (A) 12/04/2021 05:32 AM    Epithelial cells FEW 12/04/2021 05:32 AM    Bacteria 2+ (A) 12/04/2021 05:32 AM    WBC >100 (H) 12/04/2021 05:32 AM    RBC 10-20 12/04/2021 05:32 AM       I have reviewed the following: All pertinent labs, microbiology data, radiology imaging for my assessment     Medications list Personally Reviewed   [x]      Yes     []               No       Medications:  Prior to Admission medications    Medication Sig Start Date End Date Taking? Authorizing Provider   furosemide (LASIX) 80 mg tablet Take 80 mg by mouth daily. Yes Jamee, MD Saurav   predniSONE (DELTASONE) 20 mg tablet Take 20 mg by mouth two (2) times a day. Taken as needed for inflammation may have up to 40mg a day 10/31/21  Yes Queta Roman MD   calcitRIOL (ROCALTROL) 0.25 mcg capsule Take 0.25 mcg by mouth daily. Yes Jamee, MD Saurav   carvediloL (COREG) 12.5 mg tablet Take 12.5 mg by mouth two (2) times daily (with meals). Yes Jamee, MD Saurav   cloNIDine HCL (CATAPRES) 0.2 mg tablet Take 0.2 mg by mouth two (2) times a day. Yes Saurav Mancuso MD   apixaban (Eliquis) 5 mg tablet Take 5 mg by mouth two (2) times a day. Yes Saurav Mancuso MD   NIFEdipine ER (ADALAT CC) 30 mg ER tablet Take 30 mg by mouth daily. Yes Other, MD Saurav   fluticasone propionate (FLONASE) 50 mcg/actuation nasal spray 2 Sprays by Both Nostrils route daily.    Yes Other, MD Saurav        Thank you for allowing us to participate in the care of this patient. We will follow patient. Please dont hesitate to call with any questions    Nevin Jc MD  [facility] [address]  Phone - [phone]   Fax - [phone]  [email or web address]

## 2021-12-05 ENCOUNTER — APPOINTMENT (OUTPATIENT)
Dept: GENERAL RADIOLOGY | Age: 71
DRG: 871 | End: 2021-12-05
Attending: STUDENT IN AN ORGANIZED HEALTH CARE EDUCATION/TRAINING PROGRAM
Payer: MEDICARE

## 2021-12-05 LAB
ANION GAP SERPL CALC-SCNC: 11 MMOL/L (ref 5–15)
BASOPHILS # BLD: 0 K/UL (ref 0–0.1)
BASOPHILS NFR BLD: 0 % (ref 0–1)
BUN SERPL-MCNC: 103 MG/DL (ref 6–20)
BUN/CREAT SERPL: 21 (ref 12–20)
CALCIUM SERPL-MCNC: 7.8 MG/DL (ref 8.5–10.1)
CHLORIDE SERPL-SCNC: 115 MMOL/L (ref 97–108)
CO2 SERPL-SCNC: 15 MMOL/L (ref 21–32)
CREAT SERPL-MCNC: 4.88 MG/DL (ref 0.7–1.3)
DIFFERENTIAL METHOD BLD: ABNORMAL
EOSINOPHIL # BLD: 0 K/UL (ref 0–0.4)
EOSINOPHIL NFR BLD: 0 % (ref 0–7)
ERYTHROCYTE [DISTWIDTH] IN BLOOD BY AUTOMATED COUNT: 17.4 % (ref 11.5–14.5)
GLUCOSE SERPL-MCNC: 120 MG/DL (ref 65–100)
HCT VFR BLD AUTO: 29.5 % (ref 36.6–50.3)
HGB BLD-MCNC: 9.6 G/DL (ref 12.1–17)
IMM GRANULOCYTES # BLD AUTO: 0 K/UL
IMM GRANULOCYTES NFR BLD AUTO: 0 %
LYMPHOCYTES # BLD: 0.2 K/UL (ref 0.8–3.5)
LYMPHOCYTES NFR BLD: 2 % (ref 12–49)
MCH RBC QN AUTO: 29.2 PG (ref 26–34)
MCHC RBC AUTO-ENTMCNC: 32.5 G/DL (ref 30–36.5)
MCV RBC AUTO: 89.7 FL (ref 80–99)
MONOCYTES # BLD: 0.3 K/UL (ref 0–1)
MONOCYTES NFR BLD: 3 % (ref 5–13)
MYELOCYTES NFR BLD MANUAL: 1 %
NEUTS BAND NFR BLD MANUAL: 19 % (ref 0–6)
NEUTS SEG # BLD: 8.4 K/UL (ref 1.8–8)
NEUTS SEG NFR BLD: 75 % (ref 32–75)
NRBC # BLD: 0.02 K/UL (ref 0–0.01)
NRBC BLD-RTO: 0.2 PER 100 WBC
PLATELET # BLD AUTO: 62 K/UL (ref 150–400)
POTASSIUM SERPL-SCNC: 4.9 MMOL/L (ref 3.5–5.1)
PROCALCITONIN SERPL-MCNC: 246.54 NG/ML
RBC # BLD AUTO: 3.29 M/UL (ref 4.1–5.7)
RBC MORPH BLD: ABNORMAL
SODIUM SERPL-SCNC: 141 MMOL/L (ref 136–145)
WBC # BLD AUTO: 8.9 K/UL (ref 4.1–11.1)

## 2021-12-05 PROCEDURE — 84145 PROCALCITONIN (PCT): CPT

## 2021-12-05 PROCEDURE — 65270000029 HC RM PRIVATE

## 2021-12-05 PROCEDURE — 74011250636 HC RX REV CODE- 250/636: Performed by: FAMILY MEDICINE

## 2021-12-05 PROCEDURE — 74011000250 HC RX REV CODE- 250: Performed by: STUDENT IN AN ORGANIZED HEALTH CARE EDUCATION/TRAINING PROGRAM

## 2021-12-05 PROCEDURE — 99222 1ST HOSP IP/OBS MODERATE 55: CPT | Performed by: INTERNAL MEDICINE

## 2021-12-05 PROCEDURE — 74011250636 HC RX REV CODE- 250/636: Performed by: STUDENT IN AN ORGANIZED HEALTH CARE EDUCATION/TRAINING PROGRAM

## 2021-12-05 PROCEDURE — 73610 X-RAY EXAM OF ANKLE: CPT

## 2021-12-05 PROCEDURE — 97165 OT EVAL LOW COMPLEX 30 MIN: CPT

## 2021-12-05 PROCEDURE — 85025 COMPLETE CBC W/AUTO DIFF WBC: CPT

## 2021-12-05 PROCEDURE — 87040 BLOOD CULTURE FOR BACTERIA: CPT

## 2021-12-05 PROCEDURE — 74011250637 HC RX REV CODE- 250/637: Performed by: INTERNAL MEDICINE

## 2021-12-05 PROCEDURE — 36415 COLL VENOUS BLD VENIPUNCTURE: CPT

## 2021-12-05 PROCEDURE — 74011000250 HC RX REV CODE- 250: Performed by: FAMILY MEDICINE

## 2021-12-05 PROCEDURE — 74011000250 HC RX REV CODE- 250: Performed by: INTERNAL MEDICINE

## 2021-12-05 PROCEDURE — 97535 SELF CARE MNGMENT TRAINING: CPT

## 2021-12-05 PROCEDURE — 80048 BASIC METABOLIC PNL TOTAL CA: CPT

## 2021-12-05 RX ORDER — OXYCODONE AND ACETAMINOPHEN 5; 325 MG/1; MG/1
1 TABLET ORAL
Status: DISCONTINUED | OUTPATIENT
Start: 2021-12-05 | End: 2021-12-10 | Stop reason: HOSPADM

## 2021-12-05 RX ORDER — MORPHINE SULFATE 2 MG/ML
2 INJECTION, SOLUTION INTRAMUSCULAR; INTRAVENOUS
Status: DISCONTINUED | OUTPATIENT
Start: 2021-12-05 | End: 2021-12-10 | Stop reason: HOSPADM

## 2021-12-05 RX ADMIN — CARVEDILOL 3.12 MG: 3.12 TABLET, FILM COATED ORAL at 17:48

## 2021-12-05 RX ADMIN — SODIUM BICARBONATE: 84 INJECTION, SOLUTION INTRAVENOUS at 15:06

## 2021-12-05 RX ADMIN — WATER 2 G: 1 INJECTION INTRAMUSCULAR; INTRAVENOUS; SUBCUTANEOUS at 10:35

## 2021-12-05 RX ADMIN — CLONIDINE HYDROCHLORIDE 0.1 MG: 0.1 TABLET ORAL at 17:48

## 2021-12-05 RX ADMIN — CARVEDILOL 3.12 MG: 3.12 TABLET, FILM COATED ORAL at 09:05

## 2021-12-05 RX ADMIN — PANTOPRAZOLE SODIUM 40 MG: 40 TABLET, DELAYED RELEASE ORAL at 09:05

## 2021-12-05 RX ADMIN — MORPHINE SULFATE 2 MG: 2 INJECTION, SOLUTION INTRAMUSCULAR; INTRAVENOUS at 14:45

## 2021-12-05 RX ADMIN — CLONIDINE HYDROCHLORIDE 0.1 MG: 0.1 TABLET ORAL at 09:05

## 2021-12-05 RX ADMIN — WATER 1 G: 1 INJECTION INTRAMUSCULAR; INTRAVENOUS; SUBCUTANEOUS at 09:06

## 2021-12-05 NOTE — ROUTINE PROCESS
Bedside shift change report given to Claudine Dumont RN (oncoming nurse) by Markell Shaw RN (offgoing nurse). Report included the following information SBAR, Kardex, Intake/Output, MAR, Accordion and Recent Results.

## 2021-12-05 NOTE — PROGRESS NOTES
1420: Patient c/o L ankle pain; perfect-served Dr. Shawna Crigler requesting pain medication; per MD orders to follow.

## 2021-12-05 NOTE — PROGRESS NOTES
Problem: Self Care Deficits Care Plan (Adult)  Goal: *Acute Goals and Plan of Care (Insert Text)  Description: FUNCTIONAL STATUS PRIOR TO ADMISSION: Patient was modified independent using a single point cane for functional mobility. HOME SUPPORT PRIOR TO ADMISSION: The patient lived with wife but did not require assist.    Occupational Therapy Goals  Initiated 12/5/2021  1. Patient will perform lower body dressing with minimal assistance/contact guard assist within 7 day(s). 2.  Patient will perform bathing with minimal assistance/contact guard assist within 7 day(s). 3.  Patient will perform toilet transfers with supervision/set-up within 7 day(s). 4.  Patient will perform all aspects of toileting with supervision/set-up within 7 day(s). 5.  Patient will participate in upper extremity therapeutic exercise/activities with supervision/set-up for 10 minutes within 7 day(s). 6.  Patient will utilize energy conservation techniques during functional activities with verbal cues within 7 day(s). Outcome: Progressing Towards Goal   OCCUPATIONAL THERAPY EVALUATION  Patient: O[place] (51 y.o. male)  Date: 12/5/2021  Primary Diagnosis: Hypotension [I95.9]  Bacteremia [R78.81]        Precautions: fall       ASSESSMENT  Based on the objective data described below, the patient presents with decreased functional mobility, increased back pain, decreased balance in standing, and overall feeling of weakness. Patient reports return home after hospital admission 2 months ago and with 2 falls at home. Patient now below baseline. He requires min assist for rolling, and mod assist for supine to sit. Once seated, he is able to maintain balance and scoot to EOB without assistance. Patient reports weakness in LEs and room set up for transfer to chair with stand pivot technique. Patient able to stand with assist x 1 and use of RW. He manages a few steps to chair for transfer.   Patient able to manage meal from seated position in chair. Patient desires to return home, but would benefit from continued therapy to maximize function prior to return home. Noted patient with better performance with PT yesterday but unable to complete same tasks this date. Recommend rehab placement. Current Level of Function Impacting Discharge (ADLs/self-care): up to mod assist for ADLs, and only able to tolerate very short distance transfers at this time    Functional Outcome Measure: The patient scored 50/100 on the Barthel INdex  outcome measure. Other factors to consider for discharge: lives with spouse     Patient will benefit from skilled therapy intervention to address the above noted impairments. PLAN :  Recommendations and Planned Interventions: self care training, functional mobility training, therapeutic exercise, balance training, therapeutic activities, endurance activities, patient education, home safety training, and family training/education    Frequency/Duration: Patient will be followed by occupational therapy 5 times a week to address goals. Recommendation for discharge: (in order for the patient to meet his/her long term goals)  Therapy up to 5 days/week in rehab setting    This discharge recommendation:  Has been made in collaboration with the attending provider and/or case management    IF patient discharges home will need the following DME: TBD       SUBJECTIVE:   Patient stated I feel like I need to get stronger.     OBJECTIVE DATA SUMMARY:   HISTORY:   Past Medical History:   Diagnosis Date    Atrial fibrillation ([facility]     Cervical disc disorder     C5,C6 fractured disc and lumbar disc slipped    Chronic atrial fibrillation ([facility])     [facility]    ESRD (end stage renal disease) on dialysis ([facility])     Dr Heaven Ace with [facility]    Heart failure ([facility]     Hypertension     VIK on CPAP     Pulmonary hypertension ([facility]     Ramon NP at [address] [place] [address]     Past Surgical History:   Procedure Laterality Date    HX HEENT      right sinus polyp removal    HX ORTHOPAEDIC  2000    left ACL replacement    HX OTHER SURGICAL      cardioversion for AFIB    HX UROLOGICAL  08/2015    renal gland removal left       Expanded or extensive additional review of patient history:     Home Situation  Home Environment: Apartment  # Steps to Enter: 0  One/Two Story Residence: One story (3rd floor apartment with elevator)  Living Alone: No  Support Systems: Spouse/Significant Other  Patient Expects to be Discharged to[de-identified] Rehabilitation facility  Current DME Used/Available at Home: CPAP, Walker, rolling, Cane, straight  Tub or Shower Type: Tub/Shower combination    Hand dominance: Right    EXAMINATION OF PERFORMANCE DEFICITS:  Cognitive/Behavioral Status:  Neurologic State: Alert  Orientation Level: Oriented X4  Cognition: Follows commands  Perception: Appears intact  Perseveration: No perseveration noted  Safety/Judgement: Awareness of environment    Skin: intact as seen    Edema: none noted     Hearing: Auditory  Auditory Impairment: None    Vision/Perceptual:    Tracking: Able to track stimulus in all quadrants w/o difficulty                                Range of Motion:  AROM: Within functional limits                         Strength:  Strength: Generally decreased, functional                Coordination:  Coordination: Within functional limits  Fine Motor Skills-Upper: Left Intact; Right Intact    Gross Motor Skills-Upper: Left Intact; Right Intact    Tone & Sensation:  Tone: Normal  Sensation: Intact                      Balance:  Sitting: Intact  Standing: Impaired  Standing - Static: Constant support; Fair  Standing - Dynamic : Constant support;  Fair    Functional Mobility and Transfers for ADLs:  Bed Mobility:  Rolling: Minimum assistance  Supine to Sit: Moderate assistance  Scooting: Contact guard assistance    Transfers:  Sit to Stand: Minimum assistance (bed height elevated )  Stand to Sit: Minimum assistance  Bed to Chair: Minimum assistance; Assist x1 (RW, chair set up next to bed )  Toilet Transfer : Minimum assistance  Assistive Device : Walker, rolling    ADL Assessment:  Feeding: Independent    Oral Facial Hygiene/Grooming: Setup    Bathing: Moderate assistance    Upper Body Dressing: Minimum assistance    Lower Body Dressing: Moderate assistance    Toileting: Moderate assistance                ADL Intervention and task modifications:                                     Cognitive Retraining  Safety/Judgement: Awareness of environment    Therapeutic Exercise:     Functional Measure:    Barthel Index:  Bathin  Bladder: 10  Bowels: 10  Groomin  Dressin  Feeding: 10  Mobility: 0  Stairs: 0  Toilet Use: 0  Transfer (Bed to Chair and Back): 10  Total: 50/100      The Barthel ADL Index: Guidelines  1. The index should be used as a record of what a patient does, not as a record of what a patient could do. 2. The main aim is to establish degree of independence from any help, physical or verbal, however minor and for whatever reason. 3. The need for supervision renders the patient not independent. 4. A patient's performance should be established using the best available evidence. Asking the patient, friends/relatives and nurses are the usual sources, but direct observation and common sense are also important. However direct testing is not needed. 5. Usually the patient's performance over the preceding 24-48 hours is important, but occasionally longer periods will be relevant. 6. Middle categories imply that the patient supplies over 50 per cent of the effort. 7. Use of aids to be independent is allowed. Score Interpretation (from [address] [place] [address])    Independent   60-79 Minimally independent   40-59 Partially dependent   20-39 Very dependent   <20 Totally dependent     -Benito Darby., Barthel, D.W. (1965). Functional evaluation: the Barthel Index. [facility] ([address] (1997).  The Barthel activities of daily living index: self-reporting versus actual performance in the old (> or = 75 years). Journal of [address] [place] [address] 45(1), [facility], JMAURISIOF, Jigna Choudhary, Darlene De Los Santos. (1999). Measuring the change in disability after inpatient rehabilitation; comparison of the responsiveness of the Barthel Index and Functional [place] Measure. Journal of Neurology, Neurosurgery, and Psychiatry, 66(4), 580-541. MAXIMINO Cherry, TRACEE Alex, & Nan Vee M.A. (2004) Assessment of post-stroke quality of life in cost-effectiveness studies: The usefulness of the Barthel Index and the EuroQoL-5D. Quality of Life Research, 15, 305-41         Occupational Therapy Evaluation Charge Determination   History Examination Decision-Making   LOW Complexity : Brief history review  LOW Complexity : 1-3 performance deficits relating to physical, cognitive , or psychosocial skils that result in activity limitations and / or participation restrictions  LOW Complexity : No comorbidities that affect functional and no verbal or physical assistance needed to complete eval tasks       Based on the above components, the patient evaluation is determined to be of the following complexity level: LOW   Pain Rating:      Activity Tolerance:   Good    After treatment patient left in no apparent distress:    Sitting in chair, Call bell within reach, Bed / chair alarm activated, and Caregiver / family present    COMMUNICATION/EDUCATION:   The patients plan of care was discussed with: Registered nurse and Case management. Home safety education was provided and the patient/caregiver indicated understanding., Patient/family have participated as able in goal setting and plan of care. , and Patient/family agree to work toward stated goals and plan of care. This patients plan of care is appropriate for delegation to [facility] [place].     Thank you for this referral.  Oralia Penn, OTR/L  Time Calculation: 31 mins

## 2021-12-05 NOTE — PROGRESS NOTES
Bedside and Verbal shift change report given to Regina Natarajan RN (oncoming nurse) by Luis Miguel Lowry RN (offgoing nurse). Report included the following information SBAR, Kardex, Intake/Output, MAR, Accordion, Recent Results, Med Rec Status and Cardiac Rhythm Afib.

## 2021-12-05 NOTE — PROGRESS NOTES
W. Phill Lesches, MD  [phone] office       Gastroenterology Progress Note    December 5, 2021  Admit Date: 12/3/2021         Narrative Assessment and Plan   · 78year old male with CKD admitted with a fall. GI was consulted for anemia, heme positive stools. The patient is asymptomatic from a GI standpoint today and denies any bleeding. His Hgb is stable. He declines an EGD and Colonoscopy for now in favor of completing the rest of his work-up. Would monitor his clinical course. Subjective:   · The patient denies any abdominal pain or GI bleeding. He reports pain in his legs. Current Medications:     Current Facility-Administered Medications   Medication Dose Route Frequency    cefepime (MAXIPIME) 2 g in sterile water (preservative free) 10 mL IV syringe  2 g IntraVENous Q24H    pantoprazole (PROTONIX) tablet 40 mg  40 mg Oral ACB    cloNIDine HCL (CATAPRES) tablet 0.1 mg  0.1 mg Oral BID    carvediloL (COREG) tablet 3.125 mg  3.125 mg Oral BID WITH MEALS    acetaminophen (TYLENOL) tablet 650 mg  650 mg Oral Q6H PRN    Or    acetaminophen (TYLENOL) suppository 650 mg  650 mg Rectal Q6H PRN    polyethylene glycol (MIRALAX) packet 17 g  17 g Oral DAILY PRN    ondansetron (ZOFRAN ODT) tablet 4 mg  4 mg Oral Q8H PRN    Or    ondansetron (ZOFRAN) injection 4 mg  4 mg IntraVENous Q6H PRN    0.9% sodium chloride infusion  75 mL/hr IntraVENous CONTINUOUS    [Held by provider] apixaban (ELIQUIS) tablet 5 mg  5 mg Oral BID    influenza vaccine 2021-22 (6 mos+)(PF) (FLUARIX/FLULAVAL/FLUZONE QUAD) injection 0.5 mL  1 Each IntraMUSCular PRIOR TO DISCHARGE       Objective:     VITALS:   Last 24hrs VS reviewed since prior progress note.  Most recent are:  Visit Vitals  /88 (BP 1 Location: Left upper arm, BP Patient Position: At rest)   Pulse (!) 102   Temp 97.7 °F (36.5 °C)   Resp 20   Ht 5' 10\" (1.778 m)   Wt 113.3 kg (249 lb 12.5 oz)   SpO2 95%   BMI 35.84 kg/m²     Temp (24hrs), Av.2 °F (36.8 °C), Min:97 °F (36.1 °C), Max:99.1 °F (37.3 °C)      Intake/Output Summary (Last 24 hours) at 2021 1329  Last data filed at 2021 0915  Gross per 24 hour   Intake 2722.5 ml   Output 2270 ml   Net 452.5 ml       EXAM:  General: Comfortable, no distress    HEENT: Atraumatic skull, pupils equal  Lungs:  Clear bilaterally. Speaking in complete sentences  Heart:  Regular. Well perfused  Abdomen: Nondistended, nontender, soft. No guarding or rebound  Musc:  No skeletal defects or deformities  Neurologic:  Cranial nerves grossly intact, moves all 4 extremities  Psych:   Good insight. Not anxious nor agitated    Lab Data Reviewed:   Recent Labs     21  1227 21  0532 21  1601   WBC 8.9 10.9 7.3   HGB 9.6* 8.4* 9.5*   HCT 29.5* 25.6* 29.7*   PLT 62* 60* 65*     Recent Labs     21  0532 21  1617 21  1601     --  139   K 4.5  --  5.0   *  --  106   CO2 17*  --  19*   *  --  101*   *  --  130*   CREA 5.28*  --  5.40*   CA 7.0*  --  8.0*   MG 2.8*  --   --    ALB 2.0*  --  2.3*   TBILI 0.3  --  0.5   ALT 19  --  24   INR  --  1.3*  --      Lab Results   Component Value Date/Time    Glucose (POC) 110 10/29/2021 09:15 PM     No results for input(s): PH, PCO2, PO2, HCO3, FIO2 in the last 72 hours.   Recent Labs     21  1617   INR 1.3*           Assessment:   (See above)  Active Problems:    Pulmonary hypertension (HCC) ()      Overview: [facility]      VIK on CPAP ()      Hypertension ()      Heart failure (HCC) ()      ESRD (end stage renal disease) on dialysis [facility]) ()      Overview: Dr Kathe Hernandez with [facility]      Chronic atrial fibrillation ([address] ()      Overview: [facility]      Cervical disc disorder ()      Overview: C5,C6 fractured disc and lumbar disc slipped      Atrial fibrillation (HCC) ()      Hypotension (12/3/2021)      Weakness generalized (12/3/2021)      Anemia, chronic disease (12/3/2021)      Acute cystitis without hematuria (12/4/2021)      Bacteremia due to Gram-negative bacteria (12/4/2021)      Thrombocytopenia ([address] (12/4/2021)      Bacteremia (12/4/2021)        Plan:   (See above)      Signed By: Elton Astudillo MD     12/5/2021  1:29 PM

## 2021-12-05 NOTE — CONSULTS
Jv Pemberton is a 78 y.o. male admitted with fall, weakness, CKD, hypotension noted to have pericardial effusion on CT; echo shows small pericardial effusion without tamponade. Tele shows AF with mild tachycardia.        ACTIVE PROBLEM LIST     Patient Active Problem List    Diagnosis Date Noted    Acute cystitis without hematuria 12/04/2021    Bacteremia due to Gram-negative bacteria 12/04/2021    Thrombocytopenia (HCC) 12/04/2021    Bacteremia 12/04/2021    Hypotension 12/03/2021    Weakness generalized 12/03/2021    Anemia, chronic disease 12/03/2021    Pulmonary hypertension (HCC)     VIK on CPAP     Hypertension     Heart failure (HCC)     ESRD (end stage renal disease) on dialysis (HCC)     Chronic atrial fibrillation (HCC)     Cervical disc disorder     Atrial fibrillation (HCC)           MEDICATIONS     Current Facility-Administered Medications   Medication Dose Route Frequency    cefepime (MAXIPIME) 2 g in sterile water (preservative free) 10 mL IV syringe  2 g IntraVENous Q24H    pantoprazole (PROTONIX) tablet 40 mg  40 mg Oral ACB    cloNIDine HCL (CATAPRES) tablet 0.1 mg  0.1 mg Oral BID    carvediloL (COREG) tablet 3.125 mg  3.125 mg Oral BID WITH MEALS    acetaminophen (TYLENOL) tablet 650 mg  650 mg Oral Q6H PRN    Or    acetaminophen (TYLENOL) suppository 650 mg  650 mg Rectal Q6H PRN    polyethylene glycol (MIRALAX) packet 17 g  17 g Oral DAILY PRN    ondansetron (ZOFRAN ODT) tablet 4 mg  4 mg Oral Q8H PRN    Or    ondansetron (ZOFRAN) injection 4 mg  4 mg IntraVENous Q6H PRN    0.9% sodium chloride infusion  75 mL/hr IntraVENous CONTINUOUS    [Held by provider] apixaban (ELIQUIS) tablet 5 mg  5 mg Oral BID    influenza vaccine 2021-22 (6 mos+)(PF) (FLUARIX/FLULAVAL/FLUZONE QUAD) injection 0.5 mL  1 Each IntraMUSCular PRIOR TO DISCHARGE          PAST MEDICAL HISTORY     Past Medical History:   Diagnosis Date    [facility]     Cervical disc disorder     C5,C6 fractured disc and lumbar disc slipped    Chronic atrial fibrillation (HCC)     [facility]    ESRD (end stage renal disease) on [facility] ([facility])     [facility] Dw[facility] Sumner [facility]    Heart failure ([facility]     Hypertension     VIK on CPAP     Pulmonary hypertension ([facility]     [facility]           PAST SURGICAL HISTORY     Past Surgical History:   Procedure Laterality Date    HX HEENT      right sinus polyp removal    HX ORTHOPAEDIC  2000    left ACL replacement    HX OTHER SURGICAL      cardioversion for AFIB    HX UROLOGICAL  08/2015    renal gland removal left          ALLERGIES     Allergies   Allergen Reactions    Levofloxacin Other (comments)     Insombnia, negative thoughts. [place] like I was going to die.  Pcn [Penicillins] Rash          FAMILY HISTORY     History reviewed. No pertinent family history.  negative for cardiac disease     SOCIAL HISTORY     Social History     Socioeconomic History    Marital status:    Tobacco Use    Smoking status: Never Smoker    Smokeless tobacco: Never Used   Substance and Sexual Activity    Alcohol use: No    Drug use: Never       MEDICATIONS     Current Facility-Administered Medications   Medication Dose Route Frequency    cefepime (MAXIPIME) 2 g in sterile water (preservative free) 10 mL IV syringe  2 g IntraVENous Q24H    pantoprazole (PROTONIX) tablet 40 mg  40 mg Oral ACB    cloNIDine HCL (CATAPRES) tablet 0.1 mg  0.1 mg Oral BID    carvediloL (COREG) tablet 3.125 mg  3.125 mg Oral BID WITH MEALS    acetaminophen (TYLENOL) tablet 650 mg  650 mg Oral Q6H PRN    Or    acetaminophen (TYLENOL) suppository 650 mg  650 mg Rectal Q6H PRN    polyethylene glycol (MIRALAX) packet 17 g  17 g Oral DAILY PRN    ondansetron (ZOFRAN ODT) tablet 4 mg  4 mg Oral Q8H PRN    Or    ondansetron (ZOFRAN) injection 4 mg  4 mg IntraVENous Q6H PRN    0.9% sodium chloride infusion  75 mL/hr IntraVENous CONTINUOUS    [Held by provider] apixaban (ELIQUIS) tablet 5 mg  5 mg Oral BID    influenza vaccine 2021-22 (6 mos+)(PF) (FLUARIX/FLULAVAL/FLUZONE QUAD) injection 0.5 mL  1 Each IntraMUSCular PRIOR TO DISCHARGE       I have reviewed the nurses notes, vitals, problem list, allergy list, medical history, family, social history and medications. REVIEW OF SYMPTOMS      General: Pt denies excessive weight gain or loss. Pt is able to conduct ADL's  HEENT: Denies blurred vision, headaches, hearing loss, epistaxis and difficulty swallowing. Respiratory: Denies cough, congestion, shortness of breath, MENA, wheezing or stridor. Cardiovascular: Denies precordial pain, palpitations, edema or PND  Gastrointestinal: Denies poor appetite, indigestion, abdominal pain or blood in stool  Genitourinary: Denies hematuria, dysuria, increased urinary frequency  Musculoskeletal: Denies joint pain or swelling from muscles or joints  Neurologic: Denies tremor, paresthesias, headache, or sensory motor disturbance  Psychiatric: Denies confusion, insomnia, depression  Integumentray: Denies rash, itching or ulcers. Hematologic: Denies easy bruising, bleeding       PHYSICAL EXAMINATION      Vitals:    12/05/21 0444 12/05/21 0445 12/05/21 0726 12/05/21 0731   BP: (!) 145/96 (!) 149/80  (!) 154/92   Pulse: 87  (!) 103 (!) 103   Resp: 20   20   Temp: 99.1 °F (37.3 °C)   97.9 °F (36.6 °C)   SpO2: 98%   98%   Weight:       Height:         General: Well developed, in no acute distress. HEENT: No jaundice, oral mucosa moist, no oral ulcers  Neck: Supple, no stiffness, no lymphadenopathy, supple  Heart:  irreg irreg, no murmur, gallop or rub, no jugular venous distention  Respiratory: Clear bilaterally x 4, no wheezing or rales  Abdomen:   Soft, non-tender, bowel sounds are active.   Extremities:  No edema, normal cap refill, no cyanosis.   Musculoskeletal: No clubbing, no deformities  Neuro: A&Ox3, speech clear, gait stable, cooperative, no focal neurologic deficits  Skin: Skin color is normal. No rashes or lesions. Non diaphoretic, moist.  Vascular: 2+ pulses symmetric in all extremities       DIAGNOSTIC DATA      TELEMETRY: AF        LABORATORY DATA      Lab Results   Component Value Date/Time    WBC 10.9 12/04/2021 05:32 AM    HGB 8.4 (L) 12/04/2021 05:32 AM    HCT 25.6 (L) 12/04/2021 05:32 AM    PLATELET 60 (L) 27/45/6862 05:32 AM    MCV 89.8 12/04/2021 05:32 AM      Lab Results   Component Value Date/Time    Sodium 140 12/04/2021 05:32 AM    Potassium 4.5 12/04/2021 05:32 AM    Chloride 112 (H) 12/04/2021 05:32 AM    CO2 17 (L) 12/04/2021 05:32 AM    Anion gap 11 12/04/2021 05:32 AM    Glucose 129 (H) 12/04/2021 05:32 AM     (H) 12/04/2021 05:32 AM    Creatinine 5.28 (H) 12/04/2021 05:32 AM    BUN/Creatinine ratio 24 (H) 12/04/2021 05:32 AM    GFR est AA 13 (L) 12/04/2021 05:32 AM    GFR est non-AA 11 (L) 12/04/2021 05:32 AM    Calcium 7.0 (L) 12/04/2021 05:32 AM    Bilirubin, total 0.3 12/04/2021 05:32 AM    Alk. phosphatase 60 12/04/2021 05:32 AM    Protein, total 4.6 (L) 12/04/2021 05:32 AM    Albumin 2.0 (L) 12/04/2021 05:32 AM    Globulin 2.6 12/04/2021 05:32 AM    A-G Ratio 0.8 (L) 12/04/2021 05:32 AM    ALT (SGPT) 19 12/04/2021 05:32 AM           ASSESSMENT      1. Syncope  2. Anemia  3. CKD  4. Pericardial effusion  5. AF       PLAN     Echo shows small effusion without tamponade. Continue monitoring clinically/telemetry.          Carolin John MD  Cardiac Electrophysiology / Cardiology    [facility]  [address], [facility] [place]  [phone] / [phone] Fax   [phone] / [phone] Fax

## 2021-12-05 NOTE — PROGRESS NOTES
Progress Note  Date:2021       Room:[facility] [place][note 3]  Patient Malcolm Garza     YOB: 1950     Age:70 y.o. Subjective    Subjective:     Patient seen today. Still has chills and Knee pain. No fever. Blood and urine culture + GNR's    Review of Systems   Constitutional: Negative for fever. Eyes: Negative. Cardiovascular: Positive for leg swelling. Gastrointestinal: Negative for abdominal pain. Genitourinary: Negative. Musculoskeletal: Positive for arthralgias. Skin: Positive for color change (left leg). Neurological: Negative. Hematological: Negative. Psychiatric/Behavioral: Negative. Objective         Vitals Last 24 Hours:  TEMPERATURE:  Temp  Av °F (36.7 °C)  Min: 97 °F (36.1 °C)  Max: 99.1 °F (37.3 °C)  RESPIRATIONS RANGE: Resp  Av  Min: 20  Max: 20  PULSE OXIMETRY RANGE: SpO2  Av.5 %  Min: 94 %  Max: 98 %  PULSE RANGE: Pulse  Av.6  Min: 87  Max: 106  BLOOD PRESSURE RANGE: Systolic (90HNT), PVB:948 , Min:125 , MEN:900   ; Diastolic (62UFB), BIR:24, Min:80, Max:96    I/O (24Hr): Intake/Output Summary (Last 24 hours) at 2021 1705  Last data filed at 2021 1506  Gross per 24 hour   Intake 1732.5 ml   Output 2070 ml   Net -337.5 ml     Objective:  General Appearance:  Uncomfortable (chills). Vital signs: (most recent): Blood pressure (!) 154/92, pulse (!) 103, temperature 97.9 °F (36.6 °C), resp. rate 20, height 5' 10\" (1.778 m), weight 113.3 kg (249 lb 12.5 oz), SpO2 98 %. No fever. Lungs:  Normal effort and normal respiratory rate. Heart: Normal rate. Regular rhythm. No murmur. Abdomen: Abdomen is soft. Neurological: Patient is alert and oriented to person, place and time. Skin:  There is ecchymosis (left leg).       Labs/Imaging/Diagnostics    Labs:  CBC:  Recent Labs     21  1227 21  0532 21  1601   WBC 8.9 10.9 7.3   RBC 3.29* 2.85* 3.29*   HGB 9.6* 8.4* 9.5* HCT 29.5* 25.6* 29.7*   MCV 89.7 89.8 90.3   RDW 17.4* 17.3* 17.2*   PLT 62* 60* 65*     CHEMISTRIES:  Recent Labs     12/05/21  1227 12/04/21  0532 12/03/21  1601    140 139   K 4.9 4.5 5.0   * 112* 106   CO2 15* 17* 19*   * 129* 130*   CA 7.8* 7.0* 8.0*   MG  --  2.8*  --    PT/INR:  Recent Labs     12/03/21  1617   INR 1.3*     APTT:No results for input(s): APTT in the last 72 hours. LIVER PROFILE:  Recent Labs     12/04/21  0532 12/03/21  1601   AST 12* 17   ALT 19 24     Lab Results   Component Value Date/Time    ALT (SGPT) 19 12/04/2021 05:32 AM    AST (SGOT) 12 (L) 12/04/2021 05:32 AM    Alk. phosphatase 60 12/04/2021 05:32 AM    Bilirubin, total 0.3 12/04/2021 05:32 AM       Imaging Last 24 Hours:  XR ANKLE LT MIN 3 V    Result Date: 12/5/2021  EXAM: XR ANKLE LT MIN 3 V INDICATION: Left lower leg and ankle pain. Recent cellulitis. Now with bacteremia. . COMPARISON: None. FINDINGS: Three views of the left ankle demonstrate no fracture or disruption of the ankle mortise. There is no other acute osseous or articular abnormality. Diffuse vascular calcifications are noted. There is a plantar spur. No acute abnormality.     Assessment//Plan   Active Problems:    Pulmonary hypertension (HCC) ()      Overview: Ramon NP at [facility]      VIK on CPAP ()      Hypertension ()      Heart failure (HCC) ()      ESRD (end stage renal disease) on dialysis [facility]) ()      Overview: Dr Arun Watson with [facility]      Chronic atrial fibrillation ([facility] [address] ()      Overview: [facility]      Cervical disc disorder ()      Overview: C5,C6 fractured disc and lumbar disc slipped      Atrial fibrillation (HCC) ()      Hypotension (12/3/2021)      Weakness generalized (12/3/2021)      Anemia, chronic disease (12/3/2021)      Acute cystitis without hematuria (12/4/2021)      Bacteremia due to Gram-negative bacteria (12/4/2021)      Thrombocytopenia ([facility] [address] (12/4/2021)      Bacteremia (12/4/2021)          Assessment & Plan     GNR Bacteremia   - blood culture on 12/3 ,4/4 blood cx bottles + GNR, Final results pending  - s/p IV Rocephin   - Now on IV Cefepime  -Repeat blood culture on 12/5 pending  - Procal and CRP elevated   - ID consulted . Appreciate input     Syncope / Pericardial effusion  - CT chest shows pericardial effusion  - ECHO reviewed: EF 45%, small pericardial effusion , No tamponade   - Cardiology consulted     Possible UTI   - Continue IV rocephin  - Urine culture , + GNR. Final results pending     Thrombocytopenia   - Eliquis on hold   - need further workup  - repeat cbc in am  - if not improving will consult hematology    Heme positive   - reports recent dark colored stools   - Hgb stable  - On Eliquis for afib, now on hold   - Protonix   - GI consulted . Recommending EGD/Colonoscopy he however declined for now in favour of completing the rest of his work up.     Anemia   - Likely 2/2 Chronic kidney dx  - Occult stool heme positive   - Hold Eliquis  - anemia work up pending  - GI consulted    Hx Afib  - On Eliquis , on hold for now due to thrombocytopenia       ESRD   - patient not on dialysis   - Defer to Nephrology  - Nephrology consulted     Left leg pain and swelling   - Duplex LE US no DVT  - XR left knee - NAD   - Pain control as needed  - Consider CT left knee if not improving     Covid ruled out: Negative , patient is vaccinated x 2 + Booster    Disposition : Possible discharge in 2-3 days       Electronically signed by Tyrone Dang MD on 12/5/2021 at 11:42 AM

## 2021-12-05 NOTE — PROGRESS NOTES
12/5/2021  11:11 AM    Reason for Admission: Fall; hypotension  Assessment completed in person with patient and his wife, Celestino Delarosa    PMH: CKD Stage 5; ESRD not on dialysis; HTN; Afib    Since patient's last admission in late October, he states he has had 2 falls and that he believes he would benefit from rehab placement after this admission. He lives at home with his wife and currently owns a RW, cane, and CPAP. He has no history of HH or rehab but has attended OP PT at [facility]. Pt and wife stated they are interested in IP rehab at [address] [place] [address]. CM met with PT/OT to discuss - OT to eval today and give recommendations. Per PT note yesterday, patient may be too high functioning to qualify. Preferred Rx: [facility]       RUR: 20%  Risk Level: []Low []Moderate [x]High  Value-based purchasing: [] Yes [x] No  Bundle patient: [] Yes [x] No   Specify:     Advance Directive: Full code      Cognition: [x]Intact []Some spheres some of the time []Some spheres all of the time []All spheres all of the time. Concerns:      PCP:  Beau Person   Name of Practice:   Current patient: [x]Yes []No   Approximate date of last visit: 2-3 weeks ago   Access to virtual PCP visits: [x]Yes []No   Concerns:      Financial/Difficulty affording medications: [x]No concerns reported []Yes, describe:         Resources/supports identified by patient/family:  Strong family support from spouse   Concerns:    Transport:  No concerns   Concerns:      Barriers/Additional Concerns: No concerns       Transition of care plan:  1. Nephro, GI, cardiology, and ID consulted; GI recommending EGD vs colonoscopy; ID rec xray L ankle - completed, negative; nephro found no acute need for HD  2. Dispo TBD - pt would like IP rehab vs SNF; OT to eval today, PT rec HH; patient given SNF list  3. Outpatient follow-up  4. Wife to transport  5. CM to continue to follow    Care Management Interventions  PCP Verified by CM:  Yes Doreen Lowe)  Palliative Care Criteria Met (RRAT>21 & CHF Dx)?: No  Mode of Transport at Discharge:  Other (see comment) (spouse)  Transition of Care Consult (CM Consult): Discharge Planning  MyChart Signup: No  Discharge Durable Medical Equipment: No  Physical Therapy Consult: Yes  Occupational Therapy Consult: Yes  Speech Therapy Consult: No  Support Systems: Spouse/Significant Other  Confirm Follow Up Transport: Family  The Plan for Transition of Care is Related to the Following Treatment Goals : fall; hypotension  Discharge Location  Discharge Placement: Unable to determine at this time    Alfonso Anaya RN

## 2021-12-05 NOTE — CONSULTS
Infectious Disease Consult Note    Reason for Consult: Chills  Date of Consultation: December 5, 2021  Date of Admission: 12/3/2021  Referring Physician: Dr. Darcy Olivas      HPI:  Pascale Scott is a 78y.o. year old male with history  significant for pulmonary hypertension, stage V chronic kidney disease, and atrial fibrillation who is being seen for chills. Patient states that he has a history of chronic lymphedema and discoloration of the lower extremities due to chronic venous stasis disease. Recent hx of LLE cellulitis in Oct-Nov 2021; was evaluate by Dr. Chin Lang from ID. Past Medical History:  Past Medical History:   Diagnosis Date    Atrial fibrillation ([address]     Cervical disc disorder     C5,C6 fractured disc and lumbar disc slipped    Chronic atrial fibrillation ([facility])     [facility]    ESRD (end stage renal disease) on dialysis ([facility])     Dr Amrita Hudson with [facility]    Heart failure ([address]     Hypertension     VIK on CPAP     Pulmonary hypertension ([facility] [address]     Palmira Moy NP at [facility]         Surgical History:  Past Surgical History:   Procedure Laterality Date    HX HEENT      right sinus polyp removal    HX ORTHOPAEDIC  2000    left ACL replacement    HX OTHER SURGICAL      cardioversion for AFIB    HX UROLOGICAL  08/2015    renal gland removal left         Family History:   History reviewed. No pertinent family history. Social History:     Lives with his wife. Allergies: Allergies   Allergen Reactions    Levofloxacin Other (comments)     Insombnia, negative thoughts. Lizemores like I was going to die. Pcn [Penicillins] Rash         Review of Systems:     Negative except as in HPI    Medications:  No current facility-administered medications on file prior to encounter. Current Outpatient Medications on File Prior to Encounter   Medication Sig Dispense Refill    furosemide (LASIX) 80 mg tablet Take 80 mg by mouth daily. predniSONE (DELTASONE) 20 mg tablet Take 20 mg by mouth two (2) times a day. Taken as needed for inflammation may have up to 40mg a day 60 Tablet 0    calcitRIOL (ROCALTROL) 0.25 mcg capsule Take 0.25 mcg by mouth daily. carvediloL (COREG) 12.5 mg tablet Take 12.5 mg by mouth two (2) times daily (with meals). cloNIDine HCL (CATAPRES) 0.2 mg tablet Take 0.2 mg by mouth two (2) times a day. apixaban (Eliquis) 5 mg tablet Take 5 mg by mouth two (2) times a day. NIFEdipine ER (ADALAT CC) 30 mg ER tablet Take 30 mg by mouth daily. fluticasone propionate (FLONASE) 50 mcg/actuation nasal spray 2 Sprays by Both Nostrils route daily. Physical Exam:    Vitals:   Patient Vitals for the past 24 hrs:   Temp Pulse Resp BP SpO2   12/05/21 0731 97.9 °F (36.6 °C) (!) 103 20 (!) 154/92 98 %   12/05/21 0726 -- (!) 103 -- -- --   12/05/21 0445 -- -- -- (!) 149/80 --   12/05/21 0444 99.1 °F (37.3 °C) 87 20 (!) 145/96 98 %   12/05/21 0119 -- 99 -- -- --   12/04/21 2318 98.6 °F (37 °C) 98 20 130/87 97 %   12/04/21 2123 97 °F (36.1 °C) 95 20 125/83 97 %   12/04/21 1628 99.1 °F (37.3 °C) 96 21 125/80 96 %   12/04/21 1403 -- -- -- 113/86 --   12/04/21 1130 -- -- -- 113/86 --   12/04/21 1115 -- -- -- 122/88 --   12/04/21 1030 98.3 °F (36.8 °C) 99 21 (!) 119/90 98 %     GEN: NAD but appears uncomfortable  HEENT: Normocephalic, atraumatic, PERRL, no scleral icterus,  CV: HDS  Lungs: Clear to auscultation bilaterally  Abdomen: soft, non distended, non tender  Genitourinary:  no kruger  Extremities: Left ankle and lower leg pain on palpation.    Neuro: Alert, oriented to time, place and situation, moves all extremities to commands, verbal   Skin: no rash  Psych: good affect, good eye contact, non tearful   Lines: ayush catheter      Labs:   Recent Results (from the past 24 hour(s))   ECHO ADULT COMPLETE    Collection Time: 12/04/21  2:55 PM   Result Value Ref Range    IVSd 0.91 0.6 - 1.0 cm    LVIDd 5.58 4.2 - 5.9 cm    LVIDs 4.01 cm    LVOT d 2.26 cm    LVPWd 1.04 (A) 0.6 - 1.0 cm LVOT Cardiac Output 8.15 liter/minute    LVOT Peak Gradient 6.57 mmHg    Left Ventricular Outflow Tract Mean Gradient 4.07 mmHg    LVOT SV 80.9 mL    LVOT Peak Velocity 127.58 cm/s    LVOT VTI 20.21 cm    RVIDd 4.76 cm    Left Atrium Major Axis 4.13 cm    LA Volume 136.93 18 - 58 mL    LA Area 4C 30.17 cm2    LA Vol 2C 149.63 (A) 18 - 58 mL    LA Vol 4C 108.41 (A) 18 - 58 mL    LA Volume DISK .30 18 - 58 mL    AV Annulus 4.36 cm    Aortic Valve Area by Continuity of Peak Velocity 2.56 cm2    AV R PG 72.46 mmHg    Aortic Regurgitant Pressure Half-time 561.21 ms    AR Max Rene 425.63 cm/s    AoV PG 15.91 mmHg    Aortic Valve Systolic Peak Velocity 216.58 cm/s    MV A Rene 0.49 cm/s    Mitral Valve E Wave Deceleration Time 202.89 ms    MV E Rene 99.80 cm/s    E/E' ratio (averaged) 15.72     E/E' lateral 12.43     E/E' septal 19.01     LV E' Lateral Velocity 8.03 cm/s    LV E' Septal Velocity 5.25 cm/s    TR Max Velocity 533.29 cm/s    Pulmonic Regurgitant End Max Velocity 100.16 cm/s    Pulmonic Valve Systolic Peak Instantaneous Gradient 2.82 mmHg    Pulmonic Valve Max Velocity 83.95 cm/s    Tapse 2.95 (A) 1.5 - 2.0 cm    Triscuspid Valve Regurgitation Peak Gradient 38.35 mmHg    TR Max Velocity 309.35 cm/s    AO ASC D 4.00 cm    IVC proximal 1.83 cm    MV E/A 203.67     LV Mass .3 88 - 224 g    LV Mass AL Index 92.3 49 - 115 g/m2    Left Atrium Minor Axis 1.80 cm    LA Vol Index 59.79 16 - 28 ml/m2    LA Vol Index 65.34 16 - 28 ml/m2    LA Vol Index 47.34 16 - 28 ml/m2    BIBI/BSA Pk Rene 1.1 cm2/m2       Microbiology Data: In HPI    Pathology Results:        Assessment:   Having chills on exam.   Left ankle and lower leg pain on palpation. There is concern for ongoing infection and it could be deep. Hx of chronic prednisone on a daily basis for ~3 yrs for \"inflammatory\" arthritis, patient reports \"gout\". 79 yo M with:     - Sepsis due to Klebsiella pneumoniae bacteremia.  Unclear source exactly; probably urine: U/A had pyuria 10/20 cells, and urine cultures with >100K GNR.   - Left ankle and lower leg recurrent cellulitis this admission.   - LLE cellulitis in Oct 2021. Seen by ID at that time - per patient has resolved. - Hx of chronic prednisone on a daily basis for ~3 yrs for \"inflammatory\" arthritis, patient reports \"gout\", by Dr. Marnie Massey at [facility]. - Allergies listed to PCN and levofloxacin. Discussed in depth with patient. - Incidental pericardial effusion, cardiomegaly, pericardial effusion,  cholelithiasis, prostatomegaly noted on CT.   - Hx of pulmonary hypertension, stage V chronic kidney disease, and atrial fibrillation     Patient has home catheter, placed 12/9/2021. Recommendations:  - Stop ceftriaxone and excalate to cefepime renal dosing  - Blood cultures from 12/5/21 need to be sent  - Start with plain xrays for left ankle. Might need CT. Thank for the opportunity to participate in the care of this patient. Please contact with questions or concerns.            Shakila Dove MD  Infectious Diseases

## 2021-12-06 ENCOUNTER — APPOINTMENT (OUTPATIENT)
Dept: MRI IMAGING | Age: 71
DRG: 871 | End: 2021-12-06
Attending: FAMILY MEDICINE
Payer: MEDICARE

## 2021-12-06 LAB
ANION GAP SERPL CALC-SCNC: 8 MMOL/L (ref 5–15)
BASOPHILS # BLD: 0 K/UL (ref 0–0.1)
BASOPHILS NFR BLD: 0 % (ref 0–1)
BUN SERPL-MCNC: 97 MG/DL (ref 6–20)
BUN/CREAT SERPL: 20 (ref 12–20)
CALCIUM SERPL-MCNC: 7.6 MG/DL (ref 8.5–10.1)
CHLORIDE SERPL-SCNC: 114 MMOL/L (ref 97–108)
CO2 SERPL-SCNC: 20 MMOL/L (ref 21–32)
CREAT SERPL-MCNC: 4.92 MG/DL (ref 0.7–1.3)
DIFFERENTIAL METHOD BLD: ABNORMAL
EOSINOPHIL # BLD: 0.1 K/UL (ref 0–0.4)
EOSINOPHIL NFR BLD: 1 % (ref 0–7)
ERYTHROCYTE [DISTWIDTH] IN BLOOD BY AUTOMATED COUNT: 17.1 % (ref 11.5–14.5)
GLUCOSE SERPL-MCNC: 95 MG/DL (ref 65–100)
HCT VFR BLD AUTO: 26.8 % (ref 36.6–50.3)
HGB BLD-MCNC: 8.5 G/DL (ref 12.1–17)
IMM GRANULOCYTES # BLD AUTO: 0 K/UL
IMM GRANULOCYTES NFR BLD AUTO: 0 %
LYMPHOCYTES # BLD: 0.2 K/UL (ref 0.8–3.5)
LYMPHOCYTES NFR BLD: 3 % (ref 12–49)
MCH RBC QN AUTO: 28.5 PG (ref 26–34)
MCHC RBC AUTO-ENTMCNC: 31.7 G/DL (ref 30–36.5)
MCV RBC AUTO: 89.9 FL (ref 80–99)
METAMYELOCYTES NFR BLD MANUAL: 1 %
MONOCYTES # BLD: 0.4 K/UL (ref 0–1)
MONOCYTES NFR BLD: 5 % (ref 5–13)
MYELOCYTES NFR BLD MANUAL: 2 %
NEUTS BAND NFR BLD MANUAL: 7 % (ref 0–6)
NEUTS SEG # BLD: 6.8 K/UL (ref 1.8–8)
NEUTS SEG NFR BLD: 81 % (ref 32–75)
NRBC # BLD: 0 K/UL (ref 0–0.01)
NRBC BLD-RTO: 0 PER 100 WBC
PLATELET # BLD AUTO: 55 K/UL (ref 150–400)
POTASSIUM SERPL-SCNC: 4.9 MMOL/L (ref 3.5–5.1)
RBC # BLD AUTO: 2.98 M/UL (ref 4.1–5.7)
RBC MORPH BLD: ABNORMAL
SODIUM SERPL-SCNC: 142 MMOL/L (ref 136–145)
WBC # BLD AUTO: 7.7 K/UL (ref 4.1–11.1)

## 2021-12-06 PROCEDURE — 99233 SBSQ HOSP IP/OBS HIGH 50: CPT | Performed by: STUDENT IN AN ORGANIZED HEALTH CARE EDUCATION/TRAINING PROGRAM

## 2021-12-06 PROCEDURE — 97116 GAIT TRAINING THERAPY: CPT

## 2021-12-06 PROCEDURE — 74011250637 HC RX REV CODE- 250/637: Performed by: INTERNAL MEDICINE

## 2021-12-06 PROCEDURE — 73721 MRI JNT OF LWR EXTRE W/O DYE: CPT

## 2021-12-06 PROCEDURE — 97530 THERAPEUTIC ACTIVITIES: CPT

## 2021-12-06 PROCEDURE — 74011250637 HC RX REV CODE- 250/637: Performed by: FAMILY MEDICINE

## 2021-12-06 PROCEDURE — 65270000029 HC RM PRIVATE

## 2021-12-06 PROCEDURE — 74011000250 HC RX REV CODE- 250: Performed by: STUDENT IN AN ORGANIZED HEALTH CARE EDUCATION/TRAINING PROGRAM

## 2021-12-06 PROCEDURE — 2709999900 HC NON-CHARGEABLE SUPPLY

## 2021-12-06 PROCEDURE — 80048 BASIC METABOLIC PNL TOTAL CA: CPT

## 2021-12-06 PROCEDURE — 99232 SBSQ HOSP IP/OBS MODERATE 35: CPT | Performed by: INTERNAL MEDICINE

## 2021-12-06 PROCEDURE — 85025 COMPLETE CBC W/AUTO DIFF WBC: CPT

## 2021-12-06 PROCEDURE — 74011000250 HC RX REV CODE- 250: Performed by: INTERNAL MEDICINE

## 2021-12-06 PROCEDURE — 36415 COLL VENOUS BLD VENIPUNCTURE: CPT

## 2021-12-06 PROCEDURE — 74011250636 HC RX REV CODE- 250/636: Performed by: STUDENT IN AN ORGANIZED HEALTH CARE EDUCATION/TRAINING PROGRAM

## 2021-12-06 RX ORDER — HYDRALAZINE HYDROCHLORIDE 20 MG/ML
10 INJECTION INTRAMUSCULAR; INTRAVENOUS
Status: DISCONTINUED | OUTPATIENT
Start: 2021-12-06 | End: 2021-12-10 | Stop reason: HOSPADM

## 2021-12-06 RX ORDER — CARVEDILOL 12.5 MG/1
12.5 TABLET ORAL 2 TIMES DAILY WITH MEALS
Status: DISCONTINUED | OUTPATIENT
Start: 2021-12-06 | End: 2021-12-10 | Stop reason: HOSPADM

## 2021-12-06 RX ORDER — LIDOCAINE HYDROCHLORIDE 10 MG/ML
5 INJECTION, SOLUTION EPIDURAL; INFILTRATION; INTRACAUDAL; PERINEURAL ONCE
Status: DISPENSED | OUTPATIENT
Start: 2021-12-06 | End: 2021-12-07

## 2021-12-06 RX ADMIN — CARVEDILOL 3.12 MG: 3.12 TABLET, FILM COATED ORAL at 08:40

## 2021-12-06 RX ADMIN — OXYCODONE AND ACETAMINOPHEN 1 TABLET: 5; 325 TABLET ORAL at 08:40

## 2021-12-06 RX ADMIN — CLONIDINE HYDROCHLORIDE 0.1 MG: 0.1 TABLET ORAL at 17:31

## 2021-12-06 RX ADMIN — OXYCODONE AND ACETAMINOPHEN 1 TABLET: 5; 325 TABLET ORAL at 20:12

## 2021-12-06 RX ADMIN — PANTOPRAZOLE SODIUM 40 MG: 40 TABLET, DELAYED RELEASE ORAL at 07:03

## 2021-12-06 RX ADMIN — OXYCODONE AND ACETAMINOPHEN 1 TABLET: 5; 325 TABLET ORAL at 00:23

## 2021-12-06 RX ADMIN — WATER 2 G: 1 INJECTION INTRAMUSCULAR; INTRAVENOUS; SUBCUTANEOUS at 10:05

## 2021-12-06 RX ADMIN — SODIUM BICARBONATE: 84 INJECTION, SOLUTION INTRAVENOUS at 10:04

## 2021-12-06 RX ADMIN — CARVEDILOL 12.5 MG: 12.5 TABLET, FILM COATED ORAL at 17:31

## 2021-12-06 RX ADMIN — CLONIDINE HYDROCHLORIDE 0.1 MG: 0.1 TABLET ORAL at 08:40

## 2021-12-06 NOTE — PROGRESS NOTES
10:14am:  Patient maintains his desire for rehab at discharge, stating wife works outside of the home, and he as no assistance during the day. He is motivated to participate in therapy and his preference remains [facility]. Patient understands he may not meet criteria for acute rehab and deferred to wife for SNF choices. CM contacted wife, Muna Hull, by phone to obtain SNF choices. She selected [facility], [facility] and [address] [address]. Referrals have been placed.       Quynh De La Rosa LCSW

## 2021-12-06 NOTE — PROGRESS NOTES
Progress Note  Date:2021       Room:[facility]  Patient Name:Kendall Londono     YOB: 1950     Age:70 y.o. Subjective    Subjective     Patient seen today. He feels much better. Chills have resolved. His Left knee and ankle pain 7/10. For MRI Left knee and ankle today. Blood cx+ Klebsiella Pneumonia     Review of Systems   Constitutional: Negative for fever. Eyes: Negative. Cardiovascular: Positive for leg swelling. Gastrointestinal: Negative for abdominal pain. Genitourinary: Negative. Musculoskeletal: Positive for arthralgias. Skin: Positive for color change (left leg). Neurological: Negative. Hematological: Negative. Psychiatric/Behavioral: Negative. Objective         Vitals Last 24 Hours:  TEMPERATURE:  Temp  Av.3 °F (36.8 °C)  Min: 97.9 °F (36.6 °C)  Max: 98.6 °F (37 °C)  RESPIRATIONS RANGE: Resp  Av.3  Min: 18  Max: 20  PULSE OXIMETRY RANGE: SpO2  Av.6 %  Min: 94 %  Max: 96 %  PULSE RANGE: Pulse  Av.9  Min: 86  Max: 107  BLOOD PRESSURE RANGE: Systolic (97IVY), NCZ:049 , Min:142 , LIP:624   ; Diastolic (72OUN), OFV:163, Min:89, Max:123    I/O (24Hr): Intake/Output Summary (Last 24 hours) at 2021 1816  Last data filed at 2021 0600  Gross per 24 hour   Intake 1695 ml   Output 800 ml   Net 895 ml     Objective:  General Appearance:  Uncomfortable (chills). Vital signs: (most recent): Blood pressure (!) 188/123, pulse 96, temperature 98.2 °F (36.8 °C), resp. rate 20, height 5' 10\" (1.778 m), weight 113.3 kg (249 lb 12.5 oz), SpO2 96 %. No fever. Lungs:  Normal effort and normal respiratory rate. Heart: Normal rate. Regular rhythm. No murmur. Abdomen: Abdomen is soft. Neurological: Patient is alert and oriented to person, place and time. Skin:  There is ecchymosis (left leg).       Labs/Imaging/Diagnostics    Labs:  CBC:  Recent Labs     21  0536 21  1227 21  0532 WBC 7.7 8.9 10.9   RBC 2.98* 3.29* 2.85*   HGB 8.5* 9.6* 8.4*   HCT 26.8* 29.5* 25.6*   MCV 89.9 89.7 89.8   RDW 17.1* 17.4* 17.3*   PLT 55* 62* 60*     CHEMISTRIES:  Recent Labs     12/06/21  0536 12/05/21  1227 12/04/21  0532    141 140   K 4.9 4.9 4.5   * 115* 112*   CO2 20* 15* 17*   BUN 97* 103* 129*   CA 7.6* 7.8* 7.0*   MG  --   --  2.8*   PT/INR:  No results for input(s): INR, INREXT, INREXT in the last 72 hours. No lab exists for component: PROTIME  APTT:No results for input(s): APTT in the last 72 hours. LIVER PROFILE:  Recent Labs     12/04/21  0532   AST 12*   ALT 19     Lab Results   Component Value Date/Time    ALT (SGPT) 19 12/04/2021 05:32 AM    AST (SGOT) 12 (L) 12/04/2021 05:32 AM    Alk. phosphatase 60 12/04/2021 05:32 AM    Bilirubin, total 0.3 12/04/2021 05:32 AM       Imaging Last 24 Hours:  MRI KNEE LT WO CONT    Result Date: 12/6/2021  EXAM: MRI KNEE LT WO CONT INDICATION: Bacteremia, knee pain COMPARISON: Left lower extremity venous duplex 10/24/2021 TECHNIQUE: Axial T2 fat-saturated and proton density fat-saturated; coronal T1 and proton density fat-saturated; and sagittal T2 fat-saturated, proton density fat-saturated, and gradient echo MRI of the left knee . CONTRAST: None. FINDINGS: Bone marrow: Tricompartmental marginal osteophytes. No acute fracture, dislocation, or marrow replacing process. Joint fluid: Large joint effusion with synovitis. 9 mm intra-articular body in posteriorly (3-30). Collateral ligaments and posterior, lateral corner: Partial tear at the femoral attachment of LCL. Thickening and intermediate signal at the femoral attachment of MCL may reflect chronic sprain versus degeneration. Posterior lateral corner is grossly intact Medial meniscus: Complex tear involving the body and posterior horn in length extending to the posterior root. Lateral meniscus: Intact. ACL and PCL: Status post ACL reconstruction with grossly intact fibers. PCL is intact. Tendons: Popliteus insertion mild tendinosis. Muscles: Diffuse atrophy and fatty infiltration, most pronounced in the medial head of the gastrocnemius muscle. Patchy nonspecific edema signal, most pronounced in the anterior proximal calf musculature Patellofemoral alignment: No patellar subluxation/tilt. Trochlear groove is not hypoplastic. TT-TG distance: 17 mm Articular cartilage: Focal high-grade fissuring with subchondral cystic change in the weightbearing medial femoral condyle. Signal heterogeneity and moderate grade fissuring throughout the weightbearing medial surfaces. Focal chondral delamination in the weightbearing lateral femoral condyle. Full-thickness chondral loss with prominent subchondral cystic change/marrow edema in the lateral facet of the patella. High-grade fissuring with patchy subchondral cystic change/marrow edema in the lateral femoral trochlea. Signal heterogeneity and moderate grade fissuring involving the median ridge of the patella. Focal subchondral cystic change in the medial facet of the patella. Soft tissue mass: None. 1.  Large joint effusion with synovitis, cannot exclude infection based on imaging alone. No MRI evidence of acute osteomyelitis. 2.  Complex tear involving the body and posterior horn of the medial meniscus. 3.  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 4.  Partial tear at the femoral attachment of LCL. Thickening and intermediate signal at the femoral attachment of MCL may reflect chronic sprain versus degeneration. 5.  ACL reconstruction grossly intact fibers. MRI ANKLE LT WO CONT    Result Date: 12/6/2021  EXAM: MRI ANKLE LT WO CONT INDICATION: Bacteremia with left ankle pain COMPARISON: Prior day TECHNIQUE: Axial T2 and proton density fat-saturated; coronal T2 fat-saturated; sagittal T1, T2 fat-saturated, and spoiled gradient-echo MRI of the left ankle . CONTRAST: None.  FINDINGS: Bone Marrow: within normal limits. No fracture, dislocation, or marrow replacing process. Joint fluid: None. Tendons: intact. Muscles: Diffuse muscular edema without drainable fluid collection Lateral ligaments: intact. Deltoid and spring ligaments: intact. Sinus tarsi: within normal limits. Plantar fascia: Chronically thickened Articular cartilage: Within normal limits. No osteochondral lesion. No evidence of coalition. Soft tissue mass: Diffuse edema in the subcutaneous fat and fat anterior to the Achilles without drainable fluid collection     1. Diffuse muscular and subcutaneous edema compatible with a nonspecific cellulitis and myositis. No evidence of osteomyelitis or drainable fluid collection    Assessment//Plan   Active Problems:    Pulmonary hypertension ([facility]) ()      Overview: Ramon [facility] at [facility]      VIK on CPAP ()      Hypertension ()      Heart failure ([facility]) ()      ESRD (end stage renal disease) on dialysis ([facility]) ()      Overview: Dr Amalia Vasquez with [facility]      Chronic atrial fibrillation ([facility] [address] ()      Overview: Kendal [facility]      Cervical disc disorder ()      Overview: C5,C6 fractured disc and lumbar disc slipped      Atrial fibrillation ([facility]) ()      Hypotension (12/3/2021)      Weakness generalized (12/3/2021)      Anemia, chronic disease (12/3/2021)      Acute cystitis without hematuria (12/4/2021)      Bacteremia due to Gram-negative bacteria (12/4/2021)      Thrombocytopenia ([facility] [address] (12/4/2021)      Bacteremia (12/4/2021)          Assessment & Plan     Klebsiella Pneumonia Bacteremia   - blood culture on 12/3 ,4/4 blood cx bottles + Klebsiella Pneumonia.  - s/p IV Rocephin   - Now on IV Cefepime  -Repeat blood culture on 12/5 pending  - Procal and CRP elevated   - ID consulted . Appreciate input     Syncope / Pericardial effusion  - CT chest shows pericardial effusion  - ECHO reviewed: EF 45%, small pericardial effusion , No tamponade   - Cardiology consulted      UTI   - Continue IV Cefepime  - Urine culture , + GNR.  Final results pending     Thrombocytopenia   - Eliquis on hold   - need further workup  - repeat cbc in am  - if not improving will consult hematology    GI Bleed  - Heme + stool   - Hgb stable for now  - On Eliquis for afib, now on hold   - Protonix  - Patient holding off on EGD/Colonoscopy per his request.   - GI consulted . Appreciate input. Anemia   - Likely 2/2 Chronic kidney dx  - Occult stool heme positive   - Hold Eliquis  - anemia work up pending  - GI consulted    Hx Afib  - On Eliquis , on hold for now due to thrombocytopenia   - consider switching to coumadin will discuss with GI for clearance       ESRD   - patient not on dialysis   - Defer to Nephrology  - Nephrology consulted     Left Knee pain with effusion and medial meniscal tear   - Duplex LE US no DVT  - XR left knee - NAD   - MRI Left Knee reviewed.      IMPRESSION  1.  Large joint effusion with synovitis, cannot exclude infection based on  imaging alone. No MRI evidence of acute osteomyelitis. 2.  Complex tear involving the body and posterior horn of the medial meniscus. 3.  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 4.  Partial tear at the femoral attachment of LCL. Thickening and intermediate  signal at the femoral attachment of MCL may reflect chronic sprain versus  degeneration. 5.  ACL reconstruction grossly intact fibers  - MRI left ankle reviewed   - Pain control as needed  - Consult Orthopedic surgery for evaluation for artherocentesis , LCL and meniscal repairs.      Covid ruled out: Negative , patient is vaccinated x 2 + Booster    Disposition : TBD       Electronically signed by Derick Ruffin MD on 12/6/2021 at 11:42 AM

## 2021-12-06 NOTE — PROGRESS NOTES
Bedside and Verbal shift change report given to Wilma Watson RN (oncoming nurse) by Agnes Marion RN (offgoing nurse). Report included the following information SBAR, Kardex, Intake/Output, MAR, Accordion, Recent Results and Med Rec Status.

## 2021-12-06 NOTE — PROGRESS NOTES
Cardiology Daily Progress Note      Brady Dowell is a 78 y.o. male with pmh of Afib, HTN, ESRD, admitted with fall. He normally follows with [facility]. Has been rate controlled. Was noted to have small pericardial effusion without temponade.        Visit Vitals  BP (!) 146/98 (BP 1 Location: Right upper arm, BP Patient Position: Sitting) Comment (BP Patient Position): post ambulation   Pulse 94   Temp 98.6 °F (37 °C)   Resp 19   Ht 5' 10\" (1.778 m)   Wt 249 lb 12.5 oz (113.3 kg)   SpO2 94%   BMI 35.84 kg/m²       Intake/Output Summary (Last 24 hours) at 12/6/2021 1458  Last data filed at 12/6/2021 0600  Gross per 24 hour   Intake 2270 ml   Output 800 ml   Net 1470 ml     General appearance - alert, well appearing, and in no distress  Mental status - affect appropriate to mood  Eyes - sclera anicteric, moist mucous membranes  Neck - supple, no significant adenopathy  Chest - clear to auscultation, no wheezes, rales or rhonchi  Heart - normal rate, regular rhythm, normal S1, S2, no murmurs, rubs, clicks or gallops  Abdomen - soft, nontender, nondistended, no masses or organomegaly  Extremities - peripheral pulses normal, no pedal edema    Current Facility-Administered Medications   Medication Dose Route Frequency    hydrALAZINE (APRESOLINE) 20 mg/mL injection 10 mg  10 mg IntraVENous Q6H PRN    carvediloL (COREG) tablet 12.5 mg  12.5 mg Oral BID WITH MEALS    cefepime (MAXIPIME) 2 g in sterile water (preservative free) 10 mL IV syringe  2 g IntraVENous Q24H    morphine injection 2 mg  2 mg IntraVENous Q4H PRN    oxyCODONE-acetaminophen (PERCOCET) 5-325 mg per tablet 1 Tablet  1 Tablet Oral Q6H PRN    sodium bicarbonate (8.4%) 75 mEq in 0.45% sodium chloride 1,000 mL infusion   IntraVENous CONTINUOUS    pantoprazole (PROTONIX) tablet 40 mg  40 mg Oral ACB    cloNIDine HCL (CATAPRES) tablet 0.1 mg  0.1 mg Oral BID    acetaminophen (TYLENOL) tablet 650 mg  650 mg Oral Q6H PRN    Or    acetaminophen (TYLENOL) suppository 650 mg  650 mg Rectal Q6H PRN    polyethylene glycol (MIRALAX) packet 17 g  17 g Oral DAILY PRN    ondansetron (ZOFRAN ODT) tablet 4 mg  4 mg Oral Q8H PRN    Or    ondansetron (ZOFRAN) injection 4 mg  4 mg IntraVENous Q6H PRN    [Held by provider] apixaban (ELIQUIS) tablet 5 mg  5 mg Oral BID    influenza vaccine 2021-22 (6 mos+)(PF) (FLUARIX/FLULAVAL/FLUZONE QUAD) injection 0.5 mL  1 Each IntraMUSCular PRIOR TO DISCHARGE        No specialty comments available. Assessment:    - Afib  - Small pericardial effusion  - Renal failure      Plan:     - Rate controlled. Resume home coreg dose of 12.5mg BID   - Was taking full dose Eliquis prior to admission with severe renal failure. Recommend to switch to coumadin or get clearance from nephrology regarding dosing of Eliquis with GFR of 11.   - Pericardial effusion is small and likely chronic from underlying uremia.   -  No further addition from cardiac standpoint. Will sign off. He will see [facility] cardiology on discharge.            ___________________________________________________    Ruchi Brown MD, [facility]

## 2021-12-06 NOTE — PROGRESS NOTES
Progress Note  Date:2021       Room:[facility]  Patient Name:Kendall Horton     YOB: 1950     Age:70 y.o. Subjective    Subjective:  Symptoms:  Stable. Diet:  Adequate intake. No nausea or vomiting. Activity level: Impaired due to weakness. Pain:  He reports no pain. Review of Systems   Constitutional: Positive for fatigue. Negative for appetite change and fever. Cardiovascular: Negative for leg swelling. Gastrointestinal: Negative for abdominal pain, blood in stool, nausea and vomiting. Objective         Vitals Last 24 Hours:  TEMPERATURE:  Temp  Av.3 °F (36.8 °C)  Min: 97.9 °F (36.6 °C)  Max: 98.6 °F (37 °C)  RESPIRATIONS RANGE: Resp  Av.8  Min: 19  Max: 20  PULSE OXIMETRY RANGE: SpO2  Av.4 %  Min: 94 %  Max: 96 %  PULSE RANGE: Pulse  Av.6  Min: 86  Max: 107  BLOOD PRESSURE RANGE: Systolic (78PHR), APV:152 , Min:127 , LJW:336   ; Diastolic (31TXL), KHR:24, Min:88, Max:123    I/O (24Hr): Intake/Output Summary (Last 24 hours) at 2021 1339  Last data filed at 2021 0600  Gross per 24 hour   Intake 2270 ml   Output 800 ml   Net 1470 ml     Objective:  General Appearance:  Comfortable and not in pain (Appears chronically ill). Vital signs: (most recent): Blood pressure (!) 146/98, pulse 94, temperature 98.6 °F (37 °C), resp. rate 19, height 5' 10\" (1.778 m), weight 113.3 kg (249 lb 12.5 oz), SpO2 94 %. No fever. Output: Producing stool. HEENT: Normal HEENT exam.    Lungs:  Normal effort and normal respiratory rate. Breath sounds clear to auscultation. Heart: Normal rate. Regular rhythm. S1 normal and S2 normal.  No murmur. Abdomen: Abdomen is soft and non-distended. Bowel sounds are normal.   There is no abdominal tenderness. Extremities: Normal range of motion. There is venous stasis and dependent edema. (LLE slightly more edematous than RLE. LLE painful to touch.  Blue/purple discoloration to bilateral LE. )  Pulses: Distal pulses are intact. Neurological: Patient is alert and oriented to person, place and time. Pupils:  Pupils are equal, round, and reactive to light. Skin:  Warm and dry. Labs/Imaging/Diagnostics    Labs:  CBC:  Recent Labs     12/06/21  0536 12/05/21  1227 12/04/21  0532   WBC 7.7 8.9 10.9   RBC 2.98* 3.29* 2.85*   HGB 8.5* 9.6* 8.4*   HCT 26.8* 29.5* 25.6*   MCV 89.9 89.7 89.8   RDW 17.1* 17.4* 17.3*   PLT 55* 62* 60*     CHEMISTRIES:  Recent Labs     12/06/21  0536 12/05/21  1227 12/04/21  0532    141 140   K 4.9 4.9 4.5   * 115* 112*   CO2 20* 15* 17*   BUN 97* 103* 129*   CA 7.6* 7.8* 7.0*   MG  --   --  2.8*   PT/INR:  Recent Labs     12/03/21  1617   INR 1.3*     APTT:No results for input(s): APTT in the last 72 hours. LIVER PROFILE:  Recent Labs     12/04/21  0532 12/03/21  1601   AST 12* 17   ALT 19 24     Lab Results   Component Value Date/Time    ALT (SGPT) 19 12/04/2021 05:32 AM    AST (SGOT) 12 (L) 12/04/2021 05:32 AM    Alk. phosphatase 60 12/04/2021 05:32 AM    Bilirubin, total 0.3 12/04/2021 05:32 AM       Imaging Last 24 Hours:  No results found.   Assessment//Plan   Active Problems:    Pulmonary hypertension ([facility]) ()      Overview: Ramon NP at [facility]      VIK on CPAP ()      Hypertension ()      Heart failure (HCC) ()      ESRD (end stage renal disease) on dialysis [facility]) ()      Overview: Dr Kush Grace with [facility]      Chronic atrial fibrillation ([facility] [address] ()      Overview: Kendal [facility]      Cervical disc disorder ()      Overview: C5,C6 fractured disc and lumbar disc slipped      Atrial fibrillation (H[facility]) ()      Hypotension (12/3/2021)      Weakness generalized (12/3/2021)      Anemia, chronic disease (12/3/2021)      Acute cystitis without hematuria (12/4/2021)      Bacteremia due to Gram-negative bacteria (12/4/2021)      Thrombocytopenia ([facility] [address] (12/4/2021)      Bacteremia (12/4/2021)      Assessment:  (80 y/o male admitted after syncope and fall at home. Found to be anemic with heme+ stool. Multiple comorbid conditions including ESRD, afib on Eliquis (now being held), thrombocytopenia, and GNR bacteremia. He has declined endoscopic work up due to these other conditions and is feeling overwhelmed. CT abd/pelvis without contrast showed mild thrombocytopenia. Last EGD and colonoscopy 18 years ago and were normal per patient. 12/6/2021: Hgb down slightly, 8.5, platelets stable 62. No GI complaints at all. Tolerating diet. Does not want an endoscopic evaluation due to concerns about the prep and being able to make it to the bathroom. He seems more agreeable to EGD. He wants to work on 1 thing at a time rather than undergo multiple tests on various problems. ). Plan:   (- Follow H&H, transfuse prn. Will hold off endoscopic procedures per his request but will continue to follow. - Continue PPI).        Electronically signed by Genny Barker NP on 12/6/2021 at 1:39 PM    I have interviewed and examined patient with addendum to note above and formulation care plan to reflect my evaluation    Gianluca Matthews M.D.

## 2021-12-06 NOTE — PROGRESS NOTES
NEPHROLOGY PROGRESS NOTE         NAME:Kendall Quintana                   OOZ:[id number]   :1950    Chief complaints: f/u CKD stage V    Subjective: no complaints    24 hr interval history: UOP not documented, non oliguric scr 4.9, CO2 20        Objective:  Vitals:    21 1552 21 2016 21 2300 21 0835   BP: 127/88 (!) 142/89  (!) 188/123   Pulse: 94 95 95 96   Resp: 20 20  20   Temp: 97.9 °F (36.6 °C) 98.6 °F (37 °C)  98.2 °F (36.8 °C)   SpO2: 94% 94%  96%   Weight:       Height:           Intake/Output Summary (Last 24 hours) at 2021 1045  Last data filed at 2021 0600  Gross per 24 hour   Intake 2270 ml   Output 800 ml   Net 1470 ml       PHYSICAL EXAM:  GENERAL : Lying down in bed with no acute distress  HEENT: AT NC PEERLA   NECK: Supple no JVP  CVS: S1 S2 RRR, no murmur or gallops heard  RS: CTABL,  ABDOMEN: soft NT ND positive BS  EXTREMITY: No edema clubbing or cyanosis, pedal pulse +  NEUROLOGY: AAA X3, no focal deficit or asterixis      Labs:  CBC:    Lab Results   Component Value Date/Time    WBC 7.7 2021 05:36 AM    HGB 8.5 (L) 2021 05:36 AM    HCT 26.8 (L) 2021 05:36 AM    PLT 55 (L) 2021 05:36 AM     BMP:   Lab Results   Component Value Date/Time     2021 05:36 AM    K 4.9 2021 05:36 AM     (H) 2021 05:36 AM    CO2 20 (L) 2021 05:36 AM    AGAP 8 2021 05:36 AM    GLU 95 2021 05:36 AM    BUN 97 (H) 2021 05:36 AM    CREA 4.92 (H) 2021 05:36 AM    GFRAA 14 (L) 2021 05:36 AM    GFRNA 12 (L) 2021 05:36 AM        Lab Results   Component Value Date/Time    Color YELLOW/STRAW 2021 05:32 AM    Appearance TURBID (A) 2021 05:32 AM    Specific gravity 1.013 2021 05:32 AM    pH (UA) 5.5 2021 05:32 AM    Protein 300 (A) 2021 05:32 AM    Glucose Negative 2021 05:32 AM    Ketone Negative 2021 05:32 AM    Bilirubin Negative 2021 05:32 AM Urobilinogen 0.2 12/04/2021 05:32 AM    Nitrites Negative 12/04/2021 05:32 AM    Leukocyte Esterase LARGE (A) 12/04/2021 05:32 AM    Epithelial cells FEW 12/04/2021 05:32 AM    Bacteria 2+ (A) 12/04/2021 05:32 AM    WBC >100 (H) 12/04/2021 05:32 AM    RBC 10-20 12/04/2021 05:32 AM       Imaging study:    Assessment &Plan    CKD stage V  HTN  Anemia of CKD    -Scr 4.9 same for 2 days  -no documented UOP  -K and C02 level stable  -no urgent indication for RRT at this time  -RFP daily  -continue on bicarb gtt    Care Plan discussed with:   Medical Team    Mary Brooks MD  12/6/2021    [facility]:  [email or web address]. [employer]  Jenell Nyhan office:  [address] [place] [address], [address] [place] [address],[address]  [place], [address] [facility]  Phone: [phone]  Fax :     [phone]     [place] office:  [address] [facility], [address]  Phone - [phone]  Fax - [phone]

## 2021-12-06 NOTE — PROGRESS NOTES
12/6/2021  Case Management Progress Note    12:25 PM  Patient is 78year old male admitted 12/4 with hypotension   Patient's RUR is 19% yellow/moderate risk for readmission  Covid test: negative 12/3   Chart reviewed--patient discussed at IDR rounds   Per rounds patient is not yet ready for discharge, multiple specialties involved including ID, GI, nephrology. Patient requests to go to rehab after hospitalization, appreciate help of Barbara DE SATNIAGO in obtaining choices. Referrals have been sent to [address] [place] [address] (patient preference), [facility], and [facility] (denied). Will continue to follow and update. Transition of Care Plan   1. Continue medical management/treatment  2. IPR at [address] [place] [address] vs. SNF--referrals sent  3. Family will transport as appropriate  4.  CM will continue to follow    MICKY Otero

## 2021-12-06 NOTE — PROGRESS NOTES
Problem: Self Care Deficits Care Plan (Adult)  Goal: *Acute Goals and Plan of Care (Insert Text)  Description: FUNCTIONAL STATUS PRIOR TO ADMISSION: Patient was modified independent using a single point cane for functional mobility. HOME SUPPORT PRIOR TO ADMISSION: The patient lived with wife but did not require assist.    Occupational Therapy Goals  Initiated 12/5/2021  1. Patient will perform lower body dressing with minimal assistance/contact guard assist within 7 day(s). 2.  Patient will perform bathing with minimal assistance/contact guard assist within 7 day(s). 3.  Patient will perform toilet transfers with supervision/set-up within 7 day(s). 4.  Patient will perform all aspects of toileting with supervision/set-up within 7 day(s). 5.  Patient will participate in upper extremity therapeutic exercise/activities with supervision/set-up for 10 minutes within 7 day(s). 6.  Patient will utilize energy conservation techniques during functional activities with verbal cues within 7 day(s). Outcome: Progressing Towards Goal   OCCUPATIONAL THERAPY TREATMENT  Patient: Pascale Scott (71 y.o. male)  Date: 12/6/2021  Diagnosis: Hypotension [I95.9]  Bacteremia [R78.81]   <principal problem not specified>       Precautions:  fall  Chart, occupational therapy assessment, plan of care, and goals were reviewed. ASSESSMENT  Patient continues with skilled OT services and is progressing towards goals. Mr. Travis Vance was received outside the room on the stretcher returning from [place] [address]. Patient agreeable to transfer to the chair and remain OOB for seated activity. Patient required up to mod A to stand and min A for ambulation from stretcher (door threshold) to recliner on the far side of the room using rolling walker. Patient limited by poor endurance and SOB.   SpO2 remained >90% on room air and HR ranging from low 90s to 115 bpm.  Education provided regarding energy conservation, pacing, and pursed lip breathing techniques; He needs max verbal cues for carryover with functional tasks. Patient setup for seated grooming tasks and managed well. He needs increased time and additional rest breaks for all upright activity. Patient would benefit from continued skilled OT to progress towards goals and improve overall independence. Current Level of Function Impacting Discharge (ADLs): Patient required mod A for bed mobility and min A for OOB transfers. Patient required setup for grooming from supported sitting position (chair). PLAN :  Patient continues to benefit from skilled intervention to address the above impairments. Continue treatment per established plan of care to address goals. Recommend with staff:   Recommend patient be OOB to chair as frequently as tolerated; Goal of 3x/day for all meals for 60 minutes at a time. For toileting needs, recommend [facility] or bathroom with staff assist.    Encourage patient involvement in personal care as able. Encourage exercises frequently throughout the day. Recommend next OT session: Continue towards set goals. Recommendation for discharge: (in order for the patient to meet his/her long term goals)  Therapy 3 hours per day 5-7 days per week    This discharge recommendation:  Has been made in collaboration with the attending provider and/or case management    IF patient discharges home will need the following DME: none       SUBJECTIVE:   Patient stated I am willing to do what it takes to get better.     OBJECTIVE DATA SUMMARY:   Cognitive/Behavioral Status:  Neurologic State: Alert  Orientation Level: Oriented X4  Cognition: Appropriate decision making; Appropriate for age attention/concentration;  Appropriate safety awareness  Perception: Appears intact  Perseveration: No perseveration noted  Safety/Judgement: Awareness of environment    Functional Mobility and Transfers for ADLs:  Bed Mobility:  Supine to Sit:  (pt was received up and remained up)  Scooting: Minimum assistance; Assist x1; Additional time    Transfers:  Sit to Stand: Moderate assistance; Assist x1; Additional time  Bed to Chair: Minimum assistance; Assist x1; Additional time    Balance:  Sitting: Intact  Standing: Impaired  Standing - Static: Good  Standing - Dynamic : Fair    ADL Intervention:  Grooming  Grooming Assistance: Set-up  Position Performed: Seated in chair  Washing Face: Set-up  Washing Hands: Set-up  Cues: Verbal cues provided    Cognitive Retraining  Safety/Judgement: Awareness of environment    Provided verbal cuing for education for breathing techniques including pursed lip breathing techniques (PLB) and circulatory breathing. Additionally, patient was educated on energy conservation techniques, including how they specifically apply to functional activities; This includes pacing, rest breaks, and planning. Patient with good verbal understanding however needing additional cuing through out tasks to use techniques. Additional time needed during tasks. Activity Tolerance:   Fair, requires frequent rest breaks, and observed SOB with activity    After treatment patient left in no apparent distress:   Sitting in chair, Call bell within reach, and Bed / chair alarm activated    COMMUNICATION/COLLABORATION:   The patients plan of care was discussed with: Physical therapist, Registered nurse, Case management, and patient .      [place] Saver, OTR/L  Time Calculation: 25 mins

## 2021-12-06 NOTE — PROGRESS NOTES
Infectious Disease Progress Note         Interval:  NAEO    Subjective:   Patient seen this morning. Lying flat on bed. Looks fine. Reports feeling a but better than yesterday; no further chills as of today. Reports pain in left ankle and lower leg after it was examined by me yesterday; requiring pain medications. Appetite improved. No other complaints. Objective:    Vitals:   Reviewed in chart. Physical Exam:  Gen: No apparent distress  HEENT:  Normocephalic, atraumatic, no scleral icterus, no thrush,  CV: HDS  Lungs: Clear to auscultation bilaterally, no wheezing  Abdomen: soft, non tender, non distended  Genitourinary:  no kruger catheter   Skin: no rash   Psych: good affect, good eye contact, non tearful  Neuro: alert, oriented to time,  place, and situation, moves all extremities to commands, verbal  Musculoskeletal:  Left ankle and lower leg are edematous and very tender on superficial palpation. Lines: PIVs        Labs:  Recent Results (from the past 24 hour(s))   CBC WITH AUTOMATED DIFF    Collection Time: 12/05/21 12:27 PM   Result Value Ref Range    WBC 8.9 4.1 - 11.1 K/uL    RBC 3.29 (L) 4.10 - 5.70 M/uL    HGB 9.6 (L) 12.1 - 17.0 g/dL    HCT 29.5 (L) 36.6 - 50.3 %    MCV 89.7 80.0 - 99.0 FL    MCH 29.2 26.0 - 34.0 PG    MCHC 32.5 30.0 - 36.5 g/dL    RDW 17.4 (H) 11.5 - 14.5 %    PLATELET 62 (L) 593 - 400 K/uL    NRBC 0.2 (H) 0  WBC    ABSOLUTE NRBC 0.02 (H) 0.00 - 0.01 K/uL    NEUTROPHILS 75 32 - 75 %    BAND NEUTROPHILS 19 (H) 0 - 6 %    LYMPHOCYTES 2 (L) 12 - 49 %    MONOCYTES 3 (L) 5 - 13 %    EOSINOPHILS 0 0 - 7 %    BASOPHILS 0 0 - 1 %    MYELOCYTES 1 (H) 0 %    IMMATURE GRANULOCYTES 0 %    ABS. NEUTROPHILS 8.4 (H) 1.8 - 8.0 K/UL    ABS. LYMPHOCYTES 0.2 (L) 0.8 - 3.5 K/UL    ABS. MONOCYTES 0.3 0.0 - 1.0 K/UL    ABS. EOSINOPHILS 0.0 0.0 - 0.4 K/UL    ABS. BASOPHILS 0.0 0.0 - 0.1 K/UL    ABS. IMM.  GRANS. 0.0 K/UL    DF MANUAL      RBC COMMENTS ANISOCYTOSIS  1+ METABOLIC PANEL, BASIC    Collection Time: 12/05/21 12:27 PM   Result Value Ref Range    Sodium 141 136 - 145 mmol/L    Potassium 4.9 3.5 - 5.1 mmol/L    Chloride 115 (H) 97 - 108 mmol/L    CO2 15 (LL) 21 - 32 mmol/L    Anion gap 11 5 - 15 mmol/L    Glucose 120 (H) 65 - 100 mg/dL     (H) 6 - 20 MG/DL    Creatinine 4.88 (H) 0.70 - 1.30 MG/DL    BUN/Creatinine ratio 21 (H) 12 - 20      GFR est AA 14 (L) >60 ml/min/1.73m2    GFR est non-AA 12 (L) >60 ml/min/1.73m2    Calcium 7.8 (L) 8.5 - 10.1 MG/DL   PROCALCITONIN    Collection Time: 12/05/21 12:27 PM   Result Value Ref Range    Procalcitonin 246.54 ng/mL   CULTURE, BLOOD    Collection Time: 12/05/21 12:27 PM    Specimen: Blood   Result Value Ref Range    Special Requests: NO SPECIAL REQUESTS      Culture result: NO GROWTH AFTER 19 HOURS     CULTURE, BLOOD    Collection Time: 12/05/21 12:27 PM    Specimen: Blood   Result Value Ref Range    Special Requests: NO SPECIAL REQUESTS      Culture result: NO GROWTH AFTER 18 HOURS     CBC WITH AUTOMATED DIFF    Collection Time: 12/06/21  5:36 AM   Result Value Ref Range    WBC 7.7 4.1 - 11.1 K/uL    RBC 2.98 (L) 4.10 - 5.70 M/uL    HGB 8.5 (L) 12.1 - 17.0 g/dL    HCT 26.8 (L) 36.6 - 50.3 %    MCV 89.9 80.0 - 99.0 FL    MCH 28.5 26.0 - 34.0 PG    MCHC 31.7 30.0 - 36.5 g/dL    RDW 17.1 (H) 11.5 - 14.5 %    PLATELET 55 (L) 828 - 400 K/uL    NRBC 0.0 0  WBC    ABSOLUTE NRBC 0.00 0.00 - 0.01 K/uL    NEUTROPHILS 81 (H) 32 - 75 %    BAND NEUTROPHILS 7 (H) 0 - 6 %    LYMPHOCYTES 3 (L) 12 - 49 %    MONOCYTES 5 5 - 13 %    EOSINOPHILS 1 0 - 7 %    BASOPHILS 0 0 - 1 %    METAMYELOCYTES 1 (H) 0 %    MYELOCYTES 2 (H) 0 %    IMMATURE GRANULOCYTES 0 %    ABS. NEUTROPHILS 6.8 1.8 - 8.0 K/UL    ABS. LYMPHOCYTES 0.2 (L) 0.8 - 3.5 K/UL    ABS. MONOCYTES 0.4 0.0 - 1.0 K/UL    ABS. EOSINOPHILS 0.1 0.0 - 0.4 K/UL    ABS. BASOPHILS 0.0 0.0 - 0.1 K/UL    ABS. IMM.  GRANS. 0.0 K/UL    DF MANUAL      RBC COMMENTS ANISOCYTOSIS  1+ METABOLIC PANEL, BASIC    Collection Time: 12/06/21  5:36 AM   Result Value Ref Range    Sodium 142 136 - 145 mmol/L    Potassium 4.9 3.5 - 5.1 mmol/L    Chloride 114 (H) 97 - 108 mmol/L    CO2 20 (L) 21 - 32 mmol/L    Anion gap 8 5 - 15 mmol/L    Glucose 95 65 - 100 mg/dL    BUN 97 (H) 6 - 20 MG/DL    Creatinine 4.92 (H) 0.70 - 1.30 MG/DL    BUN/Creatinine ratio 20 12 - 20      GFR est AA 14 (L) >60 ml/min/1.73m2    GFR est non-AA 12 (L) >60 ml/min/1.73m2    Calcium 7.6 (L) 8.5 - 10.1 MG/DL             Assessment:  77 yo M with:    - Sepsis due to Klebsiella pneumoniae bacteremia. Unclear source exactly; probably urine: U/A had pyuria 10/20 cells, and urine cultures with >100K GNR.   - Left ankle and lower leg recurrent cellulitis this admission.   - LLE cellulitis in Oct 2021. Seen by ID at that time - per patient has resolved. - Hx of chronic prednisone on a daily basis for ~3 yrs for \"inflammatory\" arthritis, patient reports \"gout\", by Dr. Kush Grace at [address] [place] [address]. - Allergies listed to PCN and levofloxacin. Discussed in depth with patient. - Incidental pericardial effusion, cardiomegaly, pericardial effusion,  cholelithiasis, prostatomegaly noted on CT.   - Hx of pulmonary hypertension, stage V chronic kidney disease, and atrial fibrillation      Recommendations:  - Continue cefepime  - F/u repeat blood cultures from 12/5/21  - F/u urine cultures. - Obtain MRI of left ankle and lower leg to see if any deep-seated infection.   - Dopplers negative for DVT. - TTE was poor study. If persistent bacteremia is seen, AWAIS will be required. Will follow. Thank you for the opportunity to participate in the care of this patient. Please contact with questions or concerns.       Yeison Casper MD  Infectious Diseases

## 2021-12-06 NOTE — PROGRESS NOTES
Problem: Mobility Impaired (Adult and Pediatric)  Goal: *Acute Goals and Plan of Care (Insert Text)  Description: FUNCTIONAL STATUS PRIOR TO ADMISSION: Patient was modified independent using a single point cane for functional mobility. HOME SUPPORT PRIOR TO ADMISSION: The patient lived with wife but did not require assist.    Physical Therapy Goals  Initiated 12/4/2021  1. Patient will move from supine to sit and sit to supine  in bed with modified independence within 7 day(s). 2.  Patient will transfer from bed to chair and chair to bed with modified independence using the least restrictive device within 7 day(s). 3.  Patient will perform sit to stand with modified independence within 7 day(s). 4.  Patient will ambulate with modified independence for 150 feet with the least restrictive device within 7 day(s). Outcome: Progressing Towards Goal   PHYSICAL THERAPY TREATMENT  Patient: Yamilka Rolon (42 y.o. male)  Date: 12/6/2021  Diagnosis: Hypotension [I95.9]  Bacteremia [R78.81]   <principal problem not specified>       Precautions:    Chart, physical therapy assessment, plan of care and goals were reviewed. ASSESSMENT  Patient continues with skilled PT services and is progressing towards goals. Patient this morning reporting improved pain control with morning medications. He tolerates transferring to EOB with modAx1 and increased instructions for sequencing. Patient initially stands with modAx1 with RW and ambulates with step to gait 15 ft in room with minAx1/CGA. Patient noted to have significant MENA with short distance ambulation, however with SpO2 check vitals stable on RA. Upon second standing trial, patient only requiring CGAx1 demonstrating improvement. Patient subjectively speaking to his LE weakness, saying he doesn't know how his legs are going to do until he actually tries to stand up.      Vitals:    12/06/21 1100 12/06/21 1150 12/06/21 1157   BP: (!) 153/98 (!) 147/94 (!) 146/98 BP 1 Location: Left upper arm Left upper arm Right upper arm   BP Patient Position: Semi fowlers Sitting Sitting  Comment: post ambulation   Pulse:  (!) 107 94   Temp:      Resp:      Height:      Weight:      SpO2:  94% 94%       Current Level of Function Impacting Discharge (mobility/balance): modAx1 sit <> Stand    Other factors to consider for discharge: Pt with planned LLE MRI today         PLAN :  Patient continues to benefit from skilled intervention to address the above impairments. Continue treatment per established plan of care. to address goals. Recommendation for discharge: (in order for the patient to meet his/her long term goals)  Therapy 3 hours per day 5-7 days per week    This discharge recommendation:  Has been made in collaboration with the attending provider and/or case management    IF patient discharges home will need the following DME: patient owns DME required for discharge       SUBJECTIVE:   Patient stated I don't know how my legs are going to do until I try to stand up.  re: patient describing subjective LE weakness    OBJECTIVE DATA SUMMARY:   Patient received supine in bed and was agreeable to participate in PT session. Patient was cleared by nursing to participate in PT session. Critical Behavior:  Neurologic State: Alert  Orientation Level: Oriented X4  Cognition: Follows commands  Safety/Judgement: Awareness of environment  Functional Mobility Training:  Bed Mobility:     Supine to Sit: Moderate assistance; Assist x1     Scooting: Contact guard assistance; Assist x1        Transfers:  Sit to Stand: Moderate assistance; Assist x1  Stand to Sit: Minimum assistance; Assist x1        Bed to Chair: Contact guard assistance; Assist x1                    Balance:  Sitting: Intact  Standing: Impaired;  With support  Standing - Static: Good  Standing - Dynamic : Fair  Ambulation/Gait Training:  Distance (ft): 15 Feet (ft)  Assistive Device: Walker, rolling; Gait belt  Ambulation - Level of Assistance: Contact guard assistance; Minimal assistance; Assist x1        Gait Abnormalities: Antalgic; Decreased step clearance; Step to gait              Speed/Elizabeth: Shuffled; Slow                   Pain Rating:  Patient with minimal complaints of pain during therapy; he was premedicated prior to session    Activity Tolerance:   Fair, requires rest breaks, and observed SOB with activity    After treatment patient left in no apparent distress:   Sitting in chair, Heels elevated for pressure relief, Call bell within reach, and Bed / chair alarm activated    COMMUNICATION/COLLABORATION:   The patients plan of care was discussed with: Occupational therapist, Registered nurse, and Certified nursing assistant/patient care technician.      Dimitri Boast, PT   Time Calculation: 23 mins

## 2021-12-07 ENCOUNTER — APPOINTMENT (OUTPATIENT)
Dept: GENERAL RADIOLOGY | Age: 71
DRG: 871 | End: 2021-12-07
Attending: PHYSICIAN ASSISTANT
Payer: MEDICARE

## 2021-12-07 LAB
ANION GAP SERPL CALC-SCNC: 8 MMOL/L (ref 5–15)
BASOPHILS # BLD: 0 K/UL (ref 0–0.1)
BASOPHILS NFR BLD: 0 % (ref 0–1)
BUN SERPL-MCNC: 94 MG/DL (ref 6–20)
BUN/CREAT SERPL: 20 (ref 12–20)
CALCIUM SERPL-MCNC: 7.9 MG/DL (ref 8.5–10.1)
CHLORIDE SERPL-SCNC: 112 MMOL/L (ref 97–108)
CO2 SERPL-SCNC: 19 MMOL/L (ref 21–32)
CREAT SERPL-MCNC: 4.8 MG/DL (ref 0.7–1.3)
DIFFERENTIAL METHOD BLD: ABNORMAL
EOSINOPHIL # BLD: 0.1 K/UL (ref 0–0.4)
EOSINOPHIL NFR BLD: 1 % (ref 0–7)
ERYTHROCYTE [DISTWIDTH] IN BLOOD BY AUTOMATED COUNT: 17.1 % (ref 11.5–14.5)
GLUCOSE SERPL-MCNC: 114 MG/DL (ref 65–100)
HCT VFR BLD AUTO: 26.6 % (ref 36.6–50.3)
HGB BLD-MCNC: 8.7 G/DL (ref 12.1–17)
IMM GRANULOCYTES # BLD AUTO: 0 K/UL
IMM GRANULOCYTES NFR BLD AUTO: 0 %
LYMPHOCYTES # BLD: 0.2 K/UL (ref 0.8–3.5)
LYMPHOCYTES NFR BLD: 2 % (ref 12–49)
MCH RBC QN AUTO: 29.2 PG (ref 26–34)
MCHC RBC AUTO-ENTMCNC: 32.7 G/DL (ref 30–36.5)
MCV RBC AUTO: 89.3 FL (ref 80–99)
METAMYELOCYTES NFR BLD MANUAL: 1 %
MONOCYTES # BLD: 0.8 K/UL (ref 0–1)
MONOCYTES NFR BLD: 10 % (ref 5–13)
MYELOCYTES NFR BLD MANUAL: 1 %
NEUTS SEG # BLD: 6.7 K/UL (ref 1.8–8)
NEUTS SEG NFR BLD: 85 % (ref 32–75)
NRBC # BLD: 0.02 K/UL (ref 0–0.01)
NRBC BLD-RTO: 0.3 PER 100 WBC
PLATELET # BLD AUTO: 63 K/UL (ref 150–400)
PLATELET COMMENTS,PCOM: ABNORMAL
POTASSIUM SERPL-SCNC: 4.6 MMOL/L (ref 3.5–5.1)
RBC # BLD AUTO: 2.98 M/UL (ref 4.1–5.7)
RBC MORPH BLD: ABNORMAL
SODIUM SERPL-SCNC: 139 MMOL/L (ref 136–145)
WBC # BLD AUTO: 7.9 K/UL (ref 4.1–11.1)
WBC MORPH BLD: ABNORMAL

## 2021-12-07 PROCEDURE — 74011250636 HC RX REV CODE- 250/636: Performed by: STUDENT IN AN ORGANIZED HEALTH CARE EDUCATION/TRAINING PROGRAM

## 2021-12-07 PROCEDURE — 74011250637 HC RX REV CODE- 250/637: Performed by: INTERNAL MEDICINE

## 2021-12-07 PROCEDURE — 72040 X-RAY EXAM NECK SPINE 2-3 VW: CPT

## 2021-12-07 PROCEDURE — 74011250636 HC RX REV CODE- 250/636: Performed by: FAMILY MEDICINE

## 2021-12-07 PROCEDURE — 97116 GAIT TRAINING THERAPY: CPT

## 2021-12-07 PROCEDURE — 74011250637 HC RX REV CODE- 250/637: Performed by: FAMILY MEDICINE

## 2021-12-07 PROCEDURE — 65270000029 HC RM PRIVATE

## 2021-12-07 PROCEDURE — 74011000250 HC RX REV CODE- 250: Performed by: STUDENT IN AN ORGANIZED HEALTH CARE EDUCATION/TRAINING PROGRAM

## 2021-12-07 PROCEDURE — 97530 THERAPEUTIC ACTIVITIES: CPT

## 2021-12-07 PROCEDURE — 80048 BASIC METABOLIC PNL TOTAL CA: CPT

## 2021-12-07 PROCEDURE — 99233 SBSQ HOSP IP/OBS HIGH 50: CPT | Performed by: STUDENT IN AN ORGANIZED HEALTH CARE EDUCATION/TRAINING PROGRAM

## 2021-12-07 PROCEDURE — 36415 COLL VENOUS BLD VENIPUNCTURE: CPT

## 2021-12-07 PROCEDURE — 85025 COMPLETE CBC W/AUTO DIFF WBC: CPT

## 2021-12-07 RX ORDER — DOCUSATE SODIUM 100 MG/1
100 CAPSULE, LIQUID FILLED ORAL 2 TIMES DAILY
Status: DISCONTINUED | OUTPATIENT
Start: 2021-12-07 | End: 2021-12-10 | Stop reason: HOSPADM

## 2021-12-07 RX ORDER — HYDRALAZINE HYDROCHLORIDE 20 MG/ML
10 INJECTION INTRAMUSCULAR; INTRAVENOUS
Status: DISCONTINUED | OUTPATIENT
Start: 2021-12-07 | End: 2021-12-07

## 2021-12-07 RX ORDER — HEPARIN SODIUM 5000 [USP'U]/ML
5000 INJECTION, SOLUTION INTRAVENOUS; SUBCUTANEOUS EVERY 12 HOURS
Status: DISCONTINUED | OUTPATIENT
Start: 2021-12-07 | End: 2021-12-10 | Stop reason: HOSPADM

## 2021-12-07 RX ORDER — SODIUM BICARBONATE 650 MG/1
650 TABLET ORAL 3 TIMES DAILY
Status: DISCONTINUED | OUTPATIENT
Start: 2021-12-07 | End: 2021-12-10 | Stop reason: HOSPADM

## 2021-12-07 RX ADMIN — CARVEDILOL 12.5 MG: 12.5 TABLET, FILM COATED ORAL at 17:22

## 2021-12-07 RX ADMIN — SODIUM BICARBONATE 650 MG: 650 TABLET ORAL at 17:23

## 2021-12-07 RX ADMIN — DOCUSATE SODIUM 100 MG: 100 CAPSULE ORAL at 13:34

## 2021-12-07 RX ADMIN — OXYCODONE AND ACETAMINOPHEN 1 TABLET: 5; 325 TABLET ORAL at 13:34

## 2021-12-07 RX ADMIN — CARVEDILOL 12.5 MG: 12.5 TABLET, FILM COATED ORAL at 08:26

## 2021-12-07 RX ADMIN — CEFTRIAXONE 2 G: 2 INJECTION, POWDER, FOR SOLUTION INTRAMUSCULAR; INTRAVENOUS at 13:35

## 2021-12-07 RX ADMIN — CLONIDINE HYDROCHLORIDE 0.1 MG: 0.1 TABLET ORAL at 08:26

## 2021-12-07 RX ADMIN — SODIUM BICARBONATE 650 MG: 650 TABLET ORAL at 10:21

## 2021-12-07 RX ADMIN — HYDRALAZINE HYDROCHLORIDE 10 MG: 20 INJECTION INTRAMUSCULAR; INTRAVENOUS at 08:26

## 2021-12-07 RX ADMIN — CLONIDINE HYDROCHLORIDE 0.1 MG: 0.1 TABLET ORAL at 17:22

## 2021-12-07 RX ADMIN — SODIUM BICARBONATE 650 MG: 650 TABLET ORAL at 21:49

## 2021-12-07 RX ADMIN — PANTOPRAZOLE SODIUM 40 MG: 40 TABLET, DELAYED RELEASE ORAL at 06:24

## 2021-12-07 RX ADMIN — OXYCODONE AND ACETAMINOPHEN 1 TABLET: 5; 325 TABLET ORAL at 06:26

## 2021-12-07 RX ADMIN — WATER 2 G: 1 INJECTION INTRAMUSCULAR; INTRAVENOUS; SUBCUTANEOUS at 10:21

## 2021-12-07 RX ADMIN — HEPARIN SODIUM 5000 UNITS: 5000 INJECTION INTRAVENOUS; SUBCUTANEOUS at 21:49

## 2021-12-07 RX ADMIN — OXYCODONE AND ACETAMINOPHEN 1 TABLET: 5; 325 TABLET ORAL at 21:48

## 2021-12-07 RX ADMIN — DOCUSATE SODIUM 100 MG: 100 CAPSULE ORAL at 17:22

## 2021-12-07 RX ADMIN — HEPARIN SODIUM 5000 UNITS: 5000 INJECTION INTRAVENOUS; SUBCUTANEOUS at 10:21

## 2021-12-07 NOTE — PROGRESS NOTES
Progress Note  Date:2021       Room:[facility]  Patient Name:Kendall Horton     YOB: 1950     Age:70 y.o. Subjective    Subjective     Patient seen today. No fever. Pain in Left knee tolerable. Blood culture  no growth. Review of Systems   Constitutional: Negative for fever. Eyes: Negative. Cardiovascular: Positive for leg swelling. Gastrointestinal: Negative for abdominal pain. Genitourinary: Negative. Musculoskeletal: Positive for arthralgias. Skin: Positive for color change (left leg). Neurological: Negative. Hematological: Negative. Psychiatric/Behavioral: Negative. Objective         Vitals Last 24 Hours:  TEMPERATURE:  Temp  Av.8 °F (36.6 °C)  Min: 96.9 °F (36.1 °C)  Max: 98.7 °F (37.1 °C)  RESPIRATIONS RANGE: Resp  Av.1  Min: 18  Max: 22  PULSE OXIMETRY RANGE: SpO2  Av.7 %  Min: 95 %  Max: 98 %  PULSE RANGE: Pulse  Av  Min: 83  Max: 95  BLOOD PRESSURE RANGE: Systolic (60KXD), XZZ:664 , Min:110 , IFR:874   ; Diastolic (09WWZ), NCP:26, Min:69, Max:101    I/O (24Hr): Intake/Output Summary (Last 24 hours) at 2021 1650  Last data filed at 2021 0617  Gross per 24 hour   Intake 1607 ml   Output 300 ml   Net 1307 ml     Objective:  General Appearance:  Uncomfortable (chills). Vital signs: (most recent): Blood pressure 128/89, pulse 93, temperature 97.4 °F (36.3 °C), resp. rate 19, height 5' 10\" (1.778 m), weight 113.3 kg (249 lb 12.5 oz), SpO2 98 %. No fever. Lungs:  Normal effort and normal respiratory rate. Heart: Normal rate. Regular rhythm. No murmur. Abdomen: Abdomen is soft. Neurological: Patient is alert and oriented to person, place and time. Skin:  There is ecchymosis (left leg).       Labs/Imaging/Diagnostics    Labs:  CBC:  Recent Labs     21  0619 21  0536 21  1227   WBC 7.9 7.7 8.9   RBC 2.98* 2.98* 3.29*   HGB 8.7* 8.5* 9.6*   HCT 26.6* 26.8* 29.5*   MCV 89.3 89.9 89.7   RDW 17.1* 17.1* 17.4*   PLT 63* 55* 62*     CHEMISTRIES:  Recent Labs     12/07/21  0619 12/06/21  0536 12/05/21  1227    142 141   K 4.6 4.9 4.9   * 114* 115*   CO2 19* 20* 15*   BUN 94* 97* 103*   CA 7.9* 7.6* 7.8*   PT/INR:  No results for input(s): INR, INREXT, INREXT in the last 72 hours. No lab exists for component: PROTIME  APTT:No results for input(s): APTT in the last 72 hours. LIVER PROFILE:  No results for input(s): AST, ALT in the last 72 hours. No lab exists for component: Levora Mark Anthony, ALKPHOS  Lab Results   Component Value Date/Time    ALT (SGPT) 19 12/04/2021 05:32 AM    AST (SGOT) 12 (L) 12/04/2021 05:32 AM    Alk. phosphatase 60 12/04/2021 05:32 AM    Bilirubin, total 0.3 12/04/2021 05:32 AM       Imaging Last 24 Hours:  XR SPINE CERV PA LAT ODONT 3 V MAX    Result Date: 12/7/2021  EXAM: XR SPINE CERV PA LAT ODONT 3 V MAX INDICATION: neck pain s/p fall COMPARISON: None. FINDINGS: AP, lateral, and open mouth odontoid views of the cervical spine were obtained. The alignment is normal. The vertebral body heights and disc spaces are well-preserved. There is no fracture or subluxation. The prevertebral soft tissues are normal. The odontoid process is intact and the C1-C2 relationship is normal. DISH is most pronounced at C2-3 and C4-5. The C7 and C7-T1 levels are evaluated for alignment purposes only. Bilateral carotid calcification is seen. No definite fracture on this screening study. .    Assessment//Plan   Active Problems:    Pulmonary hypertension (HCC) ()      Overview: Ramon NP at [facility]      VIK on CPAP ()      Hypertension ()      Heart failure (HCC) ()      ESRD (end stage renal disease) on dialysis [facility]) ()      Overview: Dr Amalia Vasquez with [facility]      Chronic atrial fibrillation ([address] ()      Overview: Kendal SARKAR      Cervical disc disorder ()      Overview: C5,C6 fractured disc and lumbar disc slipped      Atrial fibrillation (HCC) () Hypotension (12/3/2021)      Weakness generalized (12/3/2021)      Anemia, chronic disease (12/3/2021)      Acute cystitis without hematuria (12/4/2021)      Bacteremia due to Gram-negative bacteria (12/4/2021)      Thrombocytopenia ([address] (12/4/2021)      Bacteremia (12/4/2021)          Assessment & Plan     Klebsiella Pneumonia Bacteremia   - blood culture on 12/3 ,4/4 blood cx bottles + Klebsiella Pneumonia. - Repeat blood cx 12/5 , No growth  - s/p IV Rocephin   - On IV Cefepime  - Procal and CRP elevated   - ID consulted . Recommending 2 weeks of IV antibiotics, IV Rocephin 2 gm daily via PICC on discharge. Ok to proceed to PICC ON 12/8    Syncope / Pericardial effusion  - CT chest shows pericardial effusion  - ECHO reviewed: EF 45%, small pericardial effusion , No tamponade   - Cardiology consulted . Signed off. Appreciate input     UTI   - Continue IV Cefepime  - Urine culture , + GNR. Final results pending     Thrombocytopenia   - Eliquis on hold   - On Heparin for now for DVT prophylaxis   - if not improving will consult hematology    GI Bleed  - Heme + stool   - Hgb stable   - On Eliquis for afib, now on hold   - Protonix every day   - Patient holding off on EGD/Colonoscopy per his request.   - GI consulted . Appreciate input. Anemia   - acute on chronic   - likely GI related and CKD   - stable  - Occult stool heme positive   - Hold Eliquis until cleared by GI       Hx Afib  - On Eliquis , on hold for now due to thrombocytopenia   - consider switching to coumadin will discuss with GI for clearance       ESRD   - patient not on dialysis   - Defer to Nephrology  - Nephrology consulted     Left Knee pain with effusion and medial meniscal tear   - Duplex LE US no DVT  - XR left knee - NAD   - MRI Left Knee reviewed.      IMPRESSION  1.  Large joint effusion with synovitis, cannot exclude infection based on  imaging alone. No MRI evidence of acute osteomyelitis.   2.  Complex tear involving the body and posterior horn of the medial meniscus. 3.  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 4.  Partial tear at the femoral attachment of LCL. Thickening and intermediate  signal at the femoral attachment of MCL may reflect chronic sprain versus  degeneration. 5.  ACL reconstruction grossly intact fibers  - MRI left ankle reviewed   - Pain control as needed  - Consult Orthopedic surgery consulted. Appreciate inputs. Covid ruled out: Negative , patient is vaccinated x 2 + Booster    Disposition : Possible discharge in 1-2 days .  PT recommendations pending       Electronically signed by Matt Reyes MD on 12/7/2021 at 11:42 AM

## 2021-12-07 NOTE — PROGRESS NOTES
Orthopedic NP Progress Note      December 7, 2021 10:07 AM     Pascale Scott    Attending Physician: Treatment Team: Attending Provider: Sera Young MD; Consulting Provider: Arleth Andrade MD; Consulting Provider: Ernesto Sparks MD; Consulting Provider: Anand Thompson MD; Consulting Provider: Stephanie Richard MD; Care Manager: Carle Kayser; Consulting Provider: FARZANA Muñoz; Consulting Provider: Melody Hodge MD; Consulting Provider: Gil Reyes MD     Vital Signs:    Patient Vitals for the past 8 hrs:   BP Temp Pulse Resp SpO2   12/07/21 0804 (!) 172/98 98.4 °F (36.9 °C) 86 20 97 %   12/07/21 0803 (!) 164/101       12/07/21 0421 (!) 159/97 97.7 °F (36.5 °C) 88 22 95 %     BMI (calculated): 35.8 (12/04/21 1403)    Intake/Output:  No intake/output data recorded. 12/05 1901 - 12/07 0700  In: 2959 [P. O.:650; I.V.:2652]  Out: 1100 [Urine:1100]    Pain Control:   Pain Assessment  Pain Scale 1: Numeric (0 - 10)  Pain Intensity 1: 4  Pain Onset 1: acute  Pain Location 1: Back, Leg, Neck  Pain Orientation 1: Left (Leg left.)  Pain Description 1: Aching  Pain Intervention(s) 1: Declines    LAB:    Recent Labs     12/07/21 0619   HCT 26.6*   HGB 8.7*     Lab Results   Component Value Date/Time    Sodium 139 12/07/2021 06:19 AM    Potassium 4.6 12/07/2021 06:19 AM    Chloride 112 (H) 12/07/2021 06:19 AM    CO2 19 (L) 12/07/2021 06:19 AM    Glucose 114 (H) 12/07/2021 06:19 AM    BUN 94 (H) 12/07/2021 06:19 AM    Creatinine 4.80 (H) 12/07/2021 06:19 AM    Calcium 7.9 (L) 12/07/2021 06:19 AM       Subjective:  Pascale Scott is a 78 y.o. male resting in chair, no change in chronic neck pain, no knee/ ankle pain . Tolerating diet. Objective: General: alert, cooperative, no distress. Neuro/Vascular: CNS Intact. Sensation stable.  Brisk cap refill, 2+ pulses UE/LE  Musculoskeletal:  +ROM UE/LE, NVI  + edema to bilat LE without erythema or warmth, Chronic skin discoloration c/w venous stasis. Skin:warm and dry     Clarke - n  Drain - n       PT/OT:   Gait:  Gait  Base of Support: Widened  Speed/Elizabeth: Shuffled, Slow  Gait Abnormalities: Antalgic, Decreased step clearance, Step to gait  Ambulation - Level of Assistance: Contact guard assistance, Minimal assistance, Assist x1  Distance (ft): 15 Feet (ft)  Assistive Device: Walker, rolling, Gait belt                 Assessment:    s/p     Active Problems:    Pulmonary hypertension (HCC) ()      Overview: [facility] at [facility]      VIK on CPAP ()      Hypertension ()      Heart failure ([facility]) ()      ESRD (end stage renal disease) on dialysis ([facility]) ()      Overview: Dr Tameka Park with [facility]      Chronic atrial fibrillation ([facility]) ()      Overview: [facility]      Cervical disc disorder ()      Overview: C5,C6 fractured disc and lumbar disc slipped      Atrial fibrillation ([facility]) ()      Hypotension (12/3/2021)      Weakness generalized (12/3/2021)      Anemia, chronic disease (12/3/2021)      Acute cystitis without hematuria (12/4/2021)      Bacteremia due to Gram-negative bacteria (12/4/2021)      Thrombocytopenia ([address] (12/4/2021)      Bacteremia (12/4/2021)         Plan:   Very low suspicion of septic knee given lack of tenderness/erythema combined with excellent AROM. No plans of joint aspiration at this time. I have very low suspicion of septic ankle given no collection/effusion on ankle MRI   Resume compression stockings when able. C spine XR reviewed, no acute fracture noted, DISH noted most serverly at C2-3 & 4-5, advise pain management and follow up with spine surgeon & pain managment    .  I'll defer to ID if he needs MRI of spine based on pain and recent bacteremia. It seems the cervical pain is worse than usual and thoracic pain is new since his fall. We will sign off please reconsultfor any future issues     Dr. Kevin Castorena aware and agree with plan.      Signed By: Don Rebollar NP    Orthopedic Nurse Practitioner

## 2021-12-07 NOTE — PROGRESS NOTES
12/7/2021  Case Management Progress Note    12:27 PM  Patient is 78year old male admitted 12/4 with hypotension   Patient's RUR is 19% yellow/moderate risk for readmission  Covid test: negative 12/3   Chart reviewed--patient discussed at IDR rounds   Spoke with Daysi Taylor at [address] [place] [address] this morning, patient is accepted there for when he is medically stable for discharge. Updated Daysi Taylor that it will likely be another day or two until that time. Will continue to follow and assist with discharge planning as clinical course continues. Transition of Care Plan   1. Continue medical management/treatment  2. [facility] accepted patient, discharge there when ready   3. Transportation to be determined closer to discharge  4.  CM will continue to follow    MICKY Todd

## 2021-12-07 NOTE — PROGRESS NOTES
Infectious Disease Progress Note         Interval:  NAEO    Subjective:   Patient seen this morning. Sitting in chair. Denies fevers or chills in the past 2 days. Reports he is feeling better each day. Hepatitis coming back slowly. Left lower leg pain is stable today. Feels intensity is reducing. Neck and overall back pain is improving as well. Patient is able to weight-bear on his left leg standing up. He denies any left knee pain at all. Objective:    Vitals:   Reviewed in chart. Physical Exam:  Gen: No apparent distress  HEENT:  Normocephalic, atraumatic, no scleral icterus, no thrush,  CV: HDS  Lungs: Clear to auscultation bilaterally, no wheezing  Abdomen: soft, non tender, non distended  Genitourinary:  no kruger catheter   Skin: no rash   Psych: good affect, good eye contact, non tearful  Neuro: alert, oriented to time,  place, and situation, moves all extremities to commands, verbal  Musculoskeletal:  Left ankle and lower leg are edematous and very tender on superficial palpation. Lines: PIVs        Labs:  Recent Results (from the past 24 hour(s))   CBC WITH AUTOMATED DIFF    Collection Time: 12/07/21  6:19 AM   Result Value Ref Range    WBC 7.9 4.1 - 11.1 K/uL    RBC 2.98 (L) 4.10 - 5.70 M/uL    HGB 8.7 (L) 12.1 - 17.0 g/dL    HCT 26.6 (L) 36.6 - 50.3 %    MCV 89.3 80.0 - 99.0 FL    MCH 29.2 26.0 - 34.0 PG    MCHC 32.7 30.0 - 36.5 g/dL    RDW 17.1 (H) 11.5 - 14.5 %    PLATELET 63 (L) 457 - 400 K/uL    NRBC 0.3 (H) 0  WBC    ABSOLUTE NRBC 0.02 (H) 0.00 - 0.01 K/uL    NEUTROPHILS 85 (H) 32 - 75 %    LYMPHOCYTES 2 (L) 12 - 49 %    MONOCYTES 10 5 - 13 %    EOSINOPHILS 1 0 - 7 %    BASOPHILS 0 0 - 1 %    METAMYELOCYTES 1 (H) 0 %    MYELOCYTES 1 (H) 0 %    IMMATURE GRANULOCYTES 0 %    ABS. NEUTROPHILS 6.7 1.8 - 8.0 K/UL    ABS. LYMPHOCYTES 0.2 (L) 0.8 - 3.5 K/UL    ABS. MONOCYTES 0.8 0.0 - 1.0 K/UL    ABS. EOSINOPHILS 0.1 0.0 - 0.4 K/UL    ABS.  BASOPHILS 0.0 0.0 - 0.1 K/UL ABS. IMM. GRANS. 0.0 K/UL    DF MANUAL      PLATELET COMMENTS Large Platelets      RBC COMMENTS ANISOCYTOSIS  1+        WBC COMMENTS TOXIC GRANULATION     METABOLIC PANEL, BASIC    Collection Time: 12/07/21  6:19 AM   Result Value Ref Range    Sodium 139 136 - 145 mmol/L    Potassium 4.6 3.5 - 5.1 mmol/L    Chloride 112 (H) 97 - 108 mmol/L    CO2 19 (L) 21 - 32 mmol/L    Anion gap 8 5 - 15 mmol/L    Glucose 114 (H) 65 - 100 mg/dL    BUN 94 (H) 6 - 20 MG/DL    Creatinine 4.80 (H) 0.70 - 1.30 MG/DL    BUN/Creatinine ratio 20 12 - 20      GFR est AA 15 (L) >60 ml/min/1.73m2    GFR est non-AA 12 (L) >60 ml/min/1.73m2    Calcium 7.9 (L) 8.5 - 10.1 MG/DL             Assessment:  79 yo M with:    - Sepsis due to Klebsiella pneumoniae bacteremia. Unclear source exactly; probably urine: U/A had pyuria 10/20 cells, and urine cultures with >100K GNR.   - Left ankle and lower leg recurrent cellulitis this admission.   - LLE cellulitis in Oct 2021. Seen by ID at that time - per patient has resolved. - Hx of chronic prednisone on a daily basis for ~3 yrs for \"inflammatory\" arthritis, patient reports \"gout\", by Dr. Miah Armstrong at [facility]. - Allergies listed to PCN and levofloxacin. Discussed in depth with patient. - Incidental pericardial effusion, cardiomegaly, pericardial effusion,  cholelithiasis, prostatomegaly noted on CT.   - Hx of pulmonary hypertension, stage V chronic kidney disease, and atrial fibrillation      Recommendations:  -MRI reports reviewed. Left knee is suspicious of infectious involvement this time. The effusion could be other inflammatory in nature. Can get evaluated by his primary care doctor as an outpatient. -MRI of the left ankle shows: Diffuse muscular and subcutaneous edema compatible with a nonspecific cellulitis and myositis.  No evidence of osteomyelitis or drainable fluid collection.  - Stop cefepime and switch to ceftriaxone 2gm daily.  - F/u repeat blood cultures from 12/5/21  -Given recurrence of the left leg cellulitis, now with myositis, we will plan to repeat 2 weeks of IV antibiotics at least.  Unclear what the source of the Klebsiella bacteremia was; could be from the urine (urine cultures pending at this time). - OK to proceed with PICC on 12/8/21.   - Final recs pending clinical course   - No suspicion for involvement of infectious process of the spine this time. Will follow. Thank you for the opportunity to participate in the care of this patient. Please contact with questions or concerns.       Nicholas Middleton MD  Infectious Diseases

## 2021-12-07 NOTE — PROGRESS NOTES
Bedside and Verbal shift change report given to Willian Pickard RN (oncoming nurse) by Ilia Bolaños RN (offgoing nurse). Report included the following information SBAR, Kardex, Intake/Output, MAR and Recent Results.

## 2021-12-07 NOTE — CONSULTS
ORTHO CONSULT NOTE    Date of Consultation:  December 6, 2021  Referring Physician:  Luis Miguel Mcguire  CC: LLE pain    HPI:  Charly Lema is a 78 y.o. male PMH AFib (chronic Eliquis), CKD, gout, left knee ACL repair ~2000, and chronic cervical DDD C5-6 who was admitted 12/3/21 s/p GLF d/t near syncopal event. He tells me he was standing about to urinate when he became lightheaded and fell to his right side, striking his neck and back on the bathtub. He denies LOC. He c/o lateral left lower leg pain. He reports the current appearance of left shin is his baseline regarding discoloration and edema. These symptoms improve with compression hose. He denies knee pain and knee redness. He denies fever but reports chills since 12/1/21 that resolved today. MRI was obtained of left knee revealing effusion plus DJD and meniscal tear as well as MRI left ankle revealing no effusion. Ortho is consulted for left knee pathology. Of note, he reports neck stiffness with worse than usual cervical pain without radiation. He has chronic tingling RUE. He denies hand clumsiness and arm weakness. He has chronic low back pain radiating to right buttocks. He denies leg pain, numbness, and weakness. He denies change in bowel/bladder function and saddle numbness. A spine surgeon recommended ACDF 12-15 years ago but the patient preferred conservative treatment. He was admitted [facility] Oct 2021 for LLE cellulitis which he states resolved. He uses a walker since that admission but feels his deconditioning is improving.       Past Medical History:   Diagnosis Date    Atrial fibrillation ([address]     Cervical disc disorder     C5,C6 fractured disc and lumbar disc slipped    Chronic atrial fibrillation (HCC)     [facility]    ESRD (end stage renal disease) on dialysis [facility])     [facility] with [facility]    Heart failure ([address]     Hypertension     VIK on CPAP     [facility]     Ramon NP at [facility]      Past Surgical History: Procedure Laterality Date    HX HEENT      right sinus polyp removal    HX ORTHOPAEDIC      left ACL replacement    HX OTHER SURGICAL      cardioversion for AFIB    HX UROLOGICAL  2015    renal gland removal left      History reviewed. No pertinent family history. Social History     Tobacco Use    Smoking status: Never Smoker    Smokeless tobacco: Never Used   Substance Use Topics    Alcohol use: No     Allergies   Allergen Reactions    Levofloxacin Other (comments)     Insombnia, negative thoughts. [place] like I was going to die.  Pcn [Penicillins] Rash        Review of Systems:  Per HPI. Objective:     Patient Vitals for the past 8 hrs:   BP Temp Pulse Resp SpO2   21 137/84 98 °F (36.7 °C) 88 18 96 %   21 2124 110/69 98.7 °F (37.1 °C) 85 18 98 %   21 1601 (!) 145/95 97.9 °F (36.6 °C)  18 95 %   21 1501   98       Temp (24hrs), Av.3 °F (36.8 °C), Min:97.9 °F (36.6 °C), Max:98.7 °F (37.1 °C)      EXAM:   NAD. Answers questions appropriately. Doesn't move his neck spontaneously. Will attempt AROM on command but very limited. Mild TTP central cervical spine. Mild TTP central upper thoracic spine around T1-2. Moves BUE spontaneously with good strength  and resisted wrist, elbow, and shoulder motion. BUE SILT, palp radial pulse, and neg Hoffmans. More TTP central upper lumbar spine around L1-2. No pain pelvic compression. Left knee no open wound. Healed surgical scar. AROM 0-110+. Pitting edema lower legs L > R, extends into left foot. Chronic skin discoloration c/w venous stasis. Ankle NTTP anterior joint, medial mall, and post tib tendon. TTP distal fibula, nathan ~7-8 cm above tibiotalar joint line. Hindfoot, midfoot, and forefoot NTTP. SILT. Palp DP. BLE generates good strength for resisted hip, knee, ankle, and toe motion. SILT. No clonus. Imaging Review:   Left ankle xray :   Three views of the left ankle demonstrate no fracture or disruption of the ankle mortise. There is no other acute osseous or articular abnormality. Diffuse vascular calcifications are noted. There is a plantar spur. Left ankle MRI 12/6:  Diffuse muscular and subcutaneous edema compatible with a nonspecific cellulitis and myositis. No evidence of osteomyelitis or drainable fluid collection. Left knee MRI 12/6:  1.  Large joint effusion with synovitis, cannot exclude infection based on imaging alone. No MRI evidence of acute osteomyelitis. 2.  Complex tear involving the body and posterior horn of the medial meniscus. 3.  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 4.  Partial tear at the femoral attachment of LCL. Thickening and intermediate signal at the femoral attachment of MCL may reflect chronic sprain versus degeneration. 5.  ACL reconstruction grossly intact fibers. CT chest/abd/pelvis 12/3:  No evidence for acute chest, abdominal, or pelvic trauma. Incidental pericardial effusion, cardiomegaly, pericardial effusion, cholelithiasis, prostatomegaly. Indeterminate right subcentimeter renal hypodensity  Mild splenomegaly    Labs:   WBC 7.7, Hgb 8.5, PLT 55. CRP 12.4. Blood cultures 12/3 Klebsiella 4/4 bottles. Urine 12/4 GNR.     Impression:     Patient Active Problem List    Diagnosis Date Noted    Acute cystitis without hematuria 12/04/2021    Bacteremia due to Gram-negative bacteria 12/04/2021    [facility]rombocyt[facility] 12/04/2021    Bacteremia 12/04/2021    Hypotension 12/03/2021    Weakness generalized 12/03/2021    Anemia, chronic disease 12/03/2021    Pulmonary hypertension (HCC)     VIK on CPAP     Hypertension     Heart failure (HCC)     ESRD (end stage renal disease) on dialysis (HCC)     Chronic atrial fibrillation (HCC)     Cervical disc disorder     Atrial fibrillation (HCC)      Active Problems:    Pulmonary hypertension (HCC) ()      Overview: [facility]on PEREZ at [facility]      VIK on CPAP ()      Hypertension ()      Heart failure (HCC) ()      ESRD (end stage renal disease) on dialysis [facility]) ()      Overview: Dr He Miranda with [facility]      Ch[facility] ()      Overview: [facility]      Cervical disc disorder ()      Overview: C5,C6 fractured disc and lumbar disc slipped      Atrial fibrillation (HCC) ()      Hypotension (12/3/2021)      Weakness generalized (12/3/2021)      Anemia, chronic disease (12/3/2021)      Acute cystitis without hematuria (12/4/2021)      Bacteremia due to Gram-negative bacteria (12/4/2021)      Thrombocytopenia ([facility] (12/4/2021)      Bacteremia (12/4/2021)      Low suspicion of septic left knee  Low suspicion of septic left ankle  Acute on chronic cervical pain without radicular symptoms  Chronic low back pain. Bacteremia with UTI. Venous stasis dermatitis    Plan:   I reassured the patient I have very low suspicion of septic knee given lack of tenderness/erythema combined with excellent AROM. He needs office FU for his knee. I reassured the patient there is no fracture on the ankle xray (which include area of bony point tenderness). I have very low suspicion of septic ankle given no collection/effusion on ankle MRI. Resume compression stockings when able. His cervical spine has not been imaged during this admission so I'll order plain xray C-spine. Low suspicion of fracture. Consider muscle relaxant. The Thoracic and Lumbar spine have no fracture on CT chest/abd/pelvis. No plans of joint aspiration at this time. I'll defer to ID if he needs MRI of spine based on pain and recent bacteremia. It seems the cervical pain is worse than usual and thoracic pain is new since his fall. Dr. Torrance Landau is aware.       FARZANA Morrow  Orthopedic Trauma Service  [facility]

## 2021-12-07 NOTE — PROGRESS NOTES
Patient is not in the room. He went for procedure. Scr 4.8. BP elevated. DC bicarb drip and started on oral bicarb supplement. BMP in am. I will see him tomorrow.

## 2021-12-07 NOTE — PROGRESS NOTES
Problem: Mobility Impaired (Adult and Pediatric)  Goal: *Acute Goals and Plan of Care (Insert Text)  Description: FUNCTIONAL STATUS PRIOR TO ADMISSION: Patient was modified independent using a single point cane for functional mobility. HOME SUPPORT PRIOR TO ADMISSION: The patient lived with wife but did not require assist.    Physical Therapy Goals  Initiated 12/4/2021  1. Patient will move from supine to sit and sit to supine  in bed with modified independence within 7 day(s). 2.  Patient will transfer from bed to chair and chair to bed with modified independence using the least restrictive device within 7 day(s). 3.  Patient will perform sit to stand with modified independence within 7 day(s). 4.  Patient will ambulate with modified independence for 150 feet with the least restrictive device within 7 day(s). Outcome: Progressing Towards Goal   PHYSICAL THERAPY TREATMENT  Patient: Camila Rock (60 y.o. male)  Date: 12/7/2021  Diagnosis: Hypotension [I95.9]  Bacteremia [R78.81]   <principal problem not specified>       Precautions:    Chart, physical therapy assessment, plan of care and goals were reviewed. ASSESSMENT  Patient continues with skilled PT services and is progressing towards goals. Communicated with nurse cleared for therapy. Patient already up on the chair when received. Sit to stand min assist, ambulate with rolling walker min assist in the room and out on the 5th floor hallway. Decreased pace no loss of balance need some standing rest and able to proceed with ambulation on the hallway. OOB to chair as tolerated performed some active range of motion exercise on both LE while up on the chair. Activated chair alarm and notified nurse who agreed to monitor patient.      Current Level of Function Impacting Discharge (mobility/balance): min assist with transfers and ambulation using a rolling walker    Other factors to consider for discharge: fall         PLAN :  Patient continues to benefit from skilled intervention to address the above impairments. Continue treatment per established plan of care. to address goals. Recommendation for discharge: (in order for the patient to meet his/her long term goals)  Therapy 3 hours per day 5-7 days per week    This discharge recommendation:  Has been made in collaboration with the attending provider and/or case management    IF patient discharges home will need the following DME: patient owns DME required for discharge       SUBJECTIVE:   Patient stated Fabiola Albarran.     OBJECTIVE DATA SUMMARY:   Critical Behavior:  Neurologic State: Alert  Orientation Level: Oriented X4  Cognition: Follows commands  Safety/Judgement: Awareness of environment  Functional Mobility Training:  Bed Mobility:  Rolling:  (already up on the chair)                 Transfers:  Sit to Stand: Minimum assistance (from elevated surface in chair)  Stand to Sit: Minimum assistance  Stand Pivot Transfers: Minimum assistance     Bed to Chair: Minimum assistance                    Balance:  Sitting: Intact  Standing: Impaired; With support  Standing - Static: Fair  Standing - Dynamic : Fair  Ambulation/Gait Training:  Distance (ft): 80 Feet (ft)  Assistive Device: Walker, rolling; Gait belt  Ambulation - Level of Assistance: Minimal assistance     Gait Description (WDL): Exceptions to WDL  Gait Abnormalities: Antalgic; Path deviations; Step to gait        Base of Support: Widened     Speed/Elizabeth: Pace decreased (<100 feet/min)  Step Length: Right shortened; Left shortened                             Therapeutic Exercises:   Educate and instructed patient to continue active range of motion exercise on both legs while up on chair or on bed multiple times. Recommend patient to be up on the chair at least 3 times a day every meal times as tolerated.        Pain Ratin/10    Activity Tolerance:   Good    After treatment patient left in no apparent distress:   Sitting in chair, Heels elevated for pressure relief, Call bell within reach, and Bed / chair alarm activated    COMMUNICATION/COLLABORATION:   The patients plan of care was discussed with: Occupational therapy assistant and Registered nurse. Ana M Reese PT,WCC.    Time Calculation: 24 mins

## 2021-12-07 NOTE — PROGRESS NOTES
Progress Note  Date:2021       Room:[facility]  Patient Name:Kendall Cook     YOB: 1950     Age:70 y.o. Eugenio Parkinson. Kobi Moses MD  [phone] office  [phone] voicemail     Continues to decline inpatient endoscopic evaluation. Hemoglobin stable without evidence of active GI hemorrhage. Will arrange outpatient gi clinic appointment to discuss endoscopic testing when he is willing. We'll see again as needed, for now signing off. Shena Vigil MD        Subjective    Subjective:  Symptoms:  Stable. Diet:  Adequate intake. No nausea or vomiting. Activity level: Impaired due to weakness. Pain:  He reports no pain. Review of Systems   Constitutional: Positive for fatigue. Negative for appetite change and fever. Cardiovascular: Negative for leg swelling. Gastrointestinal: Negative for abdominal pain, blood in stool, nausea and vomiting. Objective         Vitals Last 24 Hours:  TEMPERATURE:  Temp  Av.2 °F (36.8 °C)  Min: 97.7 °F (36.5 °C)  Max: 98.7 °F (37.1 °C)  RESPIRATIONS RANGE: Resp  Av  Min: 18  Max: 22  PULSE OXIMETRY RANGE: SpO2  Av.6 %  Min: 94 %  Max: 98 %  PULSE RANGE: Pulse  Av.6  Min: 83  Max: 107  BLOOD PRESSURE RANGE: Systolic (83WOB), ZXD:819 , Min:110 , QNA:408   ; Diastolic (75LSA), YJ, Min:69, Max:101    I/O (24Hr): Intake/Output Summary (Last 24 hours) at 2021 0855  Last data filed at 2021 2133  Gross per 24 hour   Intake 150 ml   Output 300 ml   Net -150 ml     Objective:  General Appearance:  Comfortable and not in pain (Appears chronically ill). Vital signs: (most recent): Blood pressure (!) 172/98, pulse 86, temperature 98.4 °F (36.9 °C), resp. rate 20, height 5' 10\" (1.778 m), weight 113.3 kg (249 lb 12.5 oz), SpO2 97 %. No fever. Output: Producing stool. HEENT: Normal HEENT exam.    Lungs:  Normal effort and normal respiratory rate.   Breath sounds clear to auscultation. Heart: Normal rate. Irregular rhythm. S1 normal and S2 normal.  No murmur. Abdomen: Abdomen is soft and non-distended. Bowel sounds are normal.   There is no abdominal tenderness. Extremities: Normal range of motion. There is venous stasis and dependent edema. (LLE slightly more edematous than RLE. LLE painful to touch. Blue/purple discoloration to bilateral LE. )  Pulses: Distal pulses are intact. Neurological: Patient is alert and oriented to person, place and time. Pupils:  Pupils are equal, round, and reactive to light. Skin:  Warm and dry. Labs/Imaging/Diagnostics    Labs:  CBC:  Recent Labs     12/07/21  0619 12/06/21  0536 12/05/21  1227   WBC 7.9 7.7 8.9   RBC 2.98* 2.98* 3.29*   HGB 8.7* 8.5* 9.6*   HCT 26.6* 26.8* 29.5*   MCV 89.3 89.9 89.7   RDW 17.1* 17.1* 17.4*   PLT 63* 55* 62*     CHEMISTRIES:  Recent Labs     12/07/21  0619 12/06/21  0536 12/05/21  1227    142 141   K 4.6 4.9 4.9   * 114* 115*   CO2 19* 20* 15*   BUN 94* 97* 103*   CA 7.9* 7.6* 7.8*   PT/INR:  No results for input(s): INR, INREXT, INREXT in the last 72 hours. No lab exists for component: PROTIME  APTT:No results for input(s): APTT in the last 72 hours. LIVER PROFILE:  No results for input(s): AST, ALT in the last 72 hours. No lab exists for component: Tobias Paula, ALKPHOS  Lab Results   Component Value Date/Time    ALT (SGPT) 19 12/04/2021 05:32 AM    AST (SGOT) 12 (L) 12/04/2021 05:32 AM    Alk.  phosphatase 60 12/04/2021 05:32 AM    Bilirubin, total 0.3 12/04/2021 05:32 AM       Imaging Last 24 Hours:  MRI KNEE LT WO CONT    Result Date: 12/6/2021  EXAM: MRI KNEE LT WO CONT INDICATION: Bacteremia, knee pain COMPARISON: Left lower extremity venous duplex 10/24/2021 TECHNIQUE: Axial T2 fat-saturated and proton density fat-saturated; coronal T1 and proton density fat-saturated; and sagittal T2 fat-saturated, proton density fat-saturated, and gradient echo MRI of the left knee . CONTRAST: None. FINDINGS: Bone marrow: Tricompartmental marginal osteophytes. No acute fracture, dislocation, or marrow replacing process. Joint fluid: Large joint effusion with synovitis. 9 mm intra-articular body in posteriorly (3-30). Collateral ligaments and posterior, lateral corner: Partial tear at the femoral attachment of LCL. Thickening and intermediate signal at the femoral attachment of MCL may reflect chronic sprain versus degeneration. Posterior lateral corner is grossly intact Medial meniscus: Complex tear involving the body and posterior horn in length extending to the posterior root. Lateral meniscus: Intact. ACL and PCL: Status post ACL reconstruction with grossly intact fibers. PCL is intact. Tendons: Popliteus insertion mild tendinosis. Muscles: Diffuse atrophy and fatty infiltration, most pronounced in the medial head of the gastrocnemius muscle. Patchy nonspecific edema signal, most pronounced in the anterior proximal calf musculature Patellofemoral alignment: No patellar subluxation/tilt. Trochlear groove is not hypoplastic. TT-TG distance: 17 mm Articular cartilage: Focal high-grade fissuring with subchondral cystic change in the weightbearing medial femoral condyle. Signal heterogeneity and moderate grade fissuring throughout the weightbearing medial surfaces. Focal chondral delamination in the weightbearing lateral femoral condyle. Full-thickness chondral loss with prominent subchondral cystic change/marrow edema in the lateral facet of the patella. High-grade fissuring with patchy subchondral cystic change/marrow edema in the lateral femoral trochlea. Signal heterogeneity and moderate grade fissuring involving the median ridge of the patella. Focal subchondral cystic change in the medial facet of the patella. Soft tissue mass: None. 1.  Large joint effusion with synovitis, cannot exclude infection based on imaging alone. No MRI evidence of acute osteomyelitis.  2. Complex tear involving the body and posterior horn of the medial meniscus. 3.  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 4.  Partial tear at the femoral attachment of LCL. Thickening and intermediate signal at the femoral attachment of MCL may reflect chronic sprain versus degeneration. 5.  ACL reconstruction grossly intact fibers. MRI ANKLE LT WO CONT    Result Date: 12/6/2021  EXAM: MRI ANKLE LT WO CONT INDICATION: Bacteremia with left ankle pain COMPARISON: Prior day TECHNIQUE: Axial T2 and proton density fat-saturated; coronal T2 fat-saturated; sagittal T1, T2 fat-saturated, and spoiled gradient-echo MRI of the left ankle . CONTRAST: None. FINDINGS: Bone Marrow: within normal limits. No fracture, dislocation, or marrow replacing process. Joint fluid: None. Tendons: intact. Muscles: Diffuse muscular edema without drainable fluid collection Lateral ligaments: intact. Deltoid and spring ligaments: intact. Sinus tarsi: within normal limits. Plantar fascia: Chronically thickened Articular cartilage: Within normal limits. No osteochondral lesion. No evidence of coalition. Soft tissue mass: Diffuse edema in the subcutaneous fat and fat anterior to the Achilles without drainable fluid collection     1. Diffuse muscular and subcutaneous edema compatible with a nonspecific cellulitis and myositis.  No evidence of osteomyelitis or drainable fluid collection    Assessment//Plan   Active Problems:    Pulmonary hypertension (HCC) ()      Overview: Ramon NP at [facility]      VIK on CPAP ()      Hypertension ()      Heart failure (HCC) ()      ESRD (end stage renal disease) on dialysis [facility]) ()      Overview: Dr Heaven Ace with [facility]      Chronic atrial fibrillation ([facility] [address] ()      Overview: Kendal [facility]      Cervical disc disorder ()      Overview: C5,C6 fractured disc and lumbar disc slipped      Atrial fibrillation (HCC) ()      Hypotension (12/3/2021)      Weakness generalized (12/3/2021)      Anemia, chronic disease (12/3/2021)      Acute cystitis without hematuria (12/4/2021)      Bacteremia due to Gram-negative bacteria (12/4/2021)      Thrombocytopenia ([facility] [address] (12/4/2021)      Bacteremia (12/4/2021)      Assessment:  (78 y/o male admitted after syncope and fall at home. Found to be anemic with heme+ stool. Multiple comorbid conditions including ESRD, afib on Eliquis (now being held), thrombocytopenia, and GNR bacteremia. He has declined endoscopic work up due to these other conditions and is feeling overwhelmed. CT abd/pelvis without contrast showed mild thrombocytopenia. Last EGD and colonoscopy 18 years ago and were normal per patient. 12/6/2021: Hgb down slightly, 8.5, platelets stable 62. No GI complaints at all. Tolerating diet. Does not want an endoscopic evaluation due to concerns about the prep and being able to make it to the bathroom. He seems more agreeable to EGD. He wants to work on 1 thing at a time rather than undergo multiple tests on various problems. 12/7/2021: Hgb and platelets stable. Cardiology has signed off. Again asked would he be willing to have endoscopic exams but he again declines. No GI complaints. Tolerating diet. ). Plan:   (- Follow H&H, transfuse prn. Cardiology has signed off but will hold off endoscopic procedures per his request but would not recommend resuming anticoagulation. He seems agreeable to OP procedures. Will ask the office to contact him to arrange OP follow up to schedule EGD and colonoscopy. - Continue PPI    GI will sign off. Please call back if needed. ).        Electronically signed by Daniel Benitez NP on 12/7/2021 at 1:39 PM

## 2021-12-07 NOTE — PROGRESS NOTES
Problem: Self Care Deficits Care Plan (Adult)  Goal: *Acute Goals and Plan of Care (Insert Text)  Description: FUNCTIONAL STATUS PRIOR TO ADMISSION: Patient was modified independent using a single point cane for functional mobility. HOME SUPPORT PRIOR TO ADMISSION: The patient lived with wife but did not require assist.    Occupational Therapy Goals  Initiated 12/5/2021  1. Patient will perform lower body dressing with minimal assistance/contact guard assist within 7 day(s). 2.  Patient will perform bathing with minimal assistance/contact guard assist within 7 day(s). 3.  Patient will perform toilet transfers with supervision/set-up within 7 day(s). 4.  Patient will perform all aspects of toileting with supervision/set-up within 7 day(s). 5.  Patient will participate in upper extremity therapeutic exercise/activities with supervision/set-up for 10 minutes within 7 day(s). 6.  Patient will utilize energy conservation techniques during functional activities with verbal cues within 7 day(s). Outcome: Progressing Towards Goal   OCCUPATIONAL THERAPY TREATMENT  Patient: Frankey Freer (87 y.o. male)  Date: 12/7/2021  Diagnosis: Hypotension [I95.9]  Bacteremia [R78.81]   <principal problem not specified>       Precautions:    Chart, occupational therapy assessment, plan of care, and goals were reviewed. ASSESSMENT  Patient continues with skilled OT services and is progressing towards goals. Pt seated in chair, needed min assist to stand from elevated surface. Pt fatigues easily with activity and educated as to energy conservation techniques, pacing himself. Pt performed 2 UE exercises in seated position. Current Level of Function Impacting Discharge (ADLs): Min assist ADL related transfers, mod assist LB bathe/dress    Other factors to consider for discharge:          PLAN :  Patient continues to benefit from skilled intervention to address the above impairments.   Continue treatment per established plan of care to address goals. Recommend with staff: out of bed for ADL's, there ex, there act, meals    Recommend next OT session: cont towards goals    Recommendation for discharge: (in order for the patient to meet his/her long term goals)  Therapy 3 hours per day 5-7 days per week    This discharge recommendation:  Has not yet been discussed the attending provider and/or case management    IF patient discharges home will need the following DME:        SUBJECTIVE:   Patient stated Like rowing? Kenneth [employer]i    OBJECTIVE DATA SUMMARY:   Cognitive/Behavioral Status:  Neurologic State: Alert  Orientation Level: Oriented X4  Cognition: Follows commands             Functional Mobility and Transfers for ADLs:  Bed Mobility:   Pt already up in chair    Transfers:  Sit to Stand: Minimum assistance (from elevated surface in chair)          Balance:  Sitting: Intact  Standing: Impaired    ADL Intervention:     Mod assist LB bathe/dress   Min assist UB bathe/dress         Therapeutic Exercises:   Pt instructed to perform UE exercises 3 x a day as able and performed chest presses and shoulder ab/add      Activity Tolerance:   Fair    After treatment patient left in no apparent distress:   Sitting in chair and Call bell within reach    COMMUNICATION/COLLABORATION:   The patients plan of care was discussed with: Physical therapist, Occupational therapist, and Registered nurse.      GAYLE Woo/L  Time Calculation: 20 mins

## 2021-12-08 ENCOUNTER — ANESTHESIA EVENT (OUTPATIENT)
Dept: ENDOSCOPY | Age: 71
DRG: 871 | End: 2021-12-08
Payer: MEDICARE

## 2021-12-08 PROBLEM — B96.29 UTI DUE TO EXTENDED-SPECTRUM BETA LACTAMASE (ESBL) PRODUCING ESCHERICHIA COLI: Status: ACTIVE | Noted: 2021-12-08

## 2021-12-08 PROBLEM — M25.562 LEFT KNEE PAIN: Status: ACTIVE | Noted: 2021-12-08

## 2021-12-08 PROBLEM — Z16.12 UTI DUE TO EXTENDED-SPECTRUM BETA LACTAMASE (ESBL) PRODUCING ESCHERICHIA COLI: Status: ACTIVE | Noted: 2021-12-08

## 2021-12-08 PROBLEM — B96.1 BACTEREMIA DUE TO KLEBSIELLA PNEUMONIAE: Status: ACTIVE | Noted: 2021-12-04

## 2021-12-08 PROBLEM — N39.0 UTI DUE TO EXTENDED-SPECTRUM BETA LACTAMASE (ESBL) PRODUCING ESCHERICHIA COLI: Status: ACTIVE | Noted: 2021-12-08

## 2021-12-08 LAB
ANION GAP SERPL CALC-SCNC: 8 MMOL/L (ref 5–15)
BACTERIA SPEC CULT: ABNORMAL
BUN SERPL-MCNC: 88 MG/DL (ref 6–20)
BUN/CREAT SERPL: 19 (ref 12–20)
CALCIUM SERPL-MCNC: 8.2 MG/DL (ref 8.5–10.1)
CC UR VC: ABNORMAL
CHLORIDE SERPL-SCNC: 111 MMOL/L (ref 97–108)
CO2 SERPL-SCNC: 20 MMOL/L (ref 21–32)
CREAT SERPL-MCNC: 4.72 MG/DL (ref 0.7–1.3)
ERYTHROCYTE [DISTWIDTH] IN BLOOD BY AUTOMATED COUNT: 16.9 % (ref 11.5–14.5)
GLUCOSE SERPL-MCNC: 98 MG/DL (ref 65–100)
HCT VFR BLD AUTO: 28.4 % (ref 36.6–50.3)
HGB BLD-MCNC: 9.3 G/DL (ref 12.1–17)
MAGNESIUM SERPL-MCNC: 2.8 MG/DL (ref 1.6–2.4)
MCH RBC QN AUTO: 29.7 PG (ref 26–34)
MCHC RBC AUTO-ENTMCNC: 32.7 G/DL (ref 30–36.5)
MCV RBC AUTO: 90.7 FL (ref 80–99)
NRBC # BLD: 0 K/UL (ref 0–0.01)
NRBC BLD-RTO: 0 PER 100 WBC
PHOSPHATE SERPL-MCNC: 4.8 MG/DL (ref 2.6–4.7)
PLATELET # BLD AUTO: 85 K/UL (ref 150–400)
PMV BLD AUTO: ABNORMAL FL (ref 8.9–12.9)
POTASSIUM SERPL-SCNC: 4.9 MMOL/L (ref 3.5–5.1)
RBC # BLD AUTO: 3.13 M/UL (ref 4.1–5.7)
SERVICE CMNT-IMP: ABNORMAL
SERVICE CMNT-IMP: ABNORMAL
SODIUM SERPL-SCNC: 139 MMOL/L (ref 136–145)
WBC # BLD AUTO: 6.9 K/UL (ref 4.1–11.1)

## 2021-12-08 PROCEDURE — 74011250636 HC RX REV CODE- 250/636: Performed by: STUDENT IN AN ORGANIZED HEALTH CARE EDUCATION/TRAINING PROGRAM

## 2021-12-08 PROCEDURE — 74011250636 HC RX REV CODE- 250/636: Performed by: FAMILY MEDICINE

## 2021-12-08 PROCEDURE — 74011250637 HC RX REV CODE- 250/637: Performed by: INTERNAL MEDICINE

## 2021-12-08 PROCEDURE — 74011636637 HC RX REV CODE- 636/637: Performed by: INTERNAL MEDICINE

## 2021-12-08 PROCEDURE — 65270000029 HC RM PRIVATE

## 2021-12-08 PROCEDURE — 74011000250 HC RX REV CODE- 250: Performed by: STUDENT IN AN ORGANIZED HEALTH CARE EDUCATION/TRAINING PROGRAM

## 2021-12-08 PROCEDURE — 74011250637 HC RX REV CODE- 250/637: Performed by: FAMILY MEDICINE

## 2021-12-08 PROCEDURE — 80048 BASIC METABOLIC PNL TOTAL CA: CPT

## 2021-12-08 PROCEDURE — 83735 ASSAY OF MAGNESIUM: CPT

## 2021-12-08 PROCEDURE — 99233 SBSQ HOSP IP/OBS HIGH 50: CPT | Performed by: STUDENT IN AN ORGANIZED HEALTH CARE EDUCATION/TRAINING PROGRAM

## 2021-12-08 PROCEDURE — 97110 THERAPEUTIC EXERCISES: CPT

## 2021-12-08 PROCEDURE — 97530 THERAPEUTIC ACTIVITIES: CPT

## 2021-12-08 PROCEDURE — 85027 COMPLETE CBC AUTOMATED: CPT

## 2021-12-08 PROCEDURE — 84100 ASSAY OF PHOSPHORUS: CPT

## 2021-12-08 PROCEDURE — 36415 COLL VENOUS BLD VENIPUNCTURE: CPT

## 2021-12-08 RX ORDER — PREDNISONE 20 MG/1
20 TABLET ORAL
Status: DISCONTINUED | OUTPATIENT
Start: 2021-12-08 | End: 2021-12-08

## 2021-12-08 RX ORDER — PREDNISONE 20 MG/1
20 TABLET ORAL
Status: DISCONTINUED | OUTPATIENT
Start: 2021-12-08 | End: 2021-12-10 | Stop reason: HOSPADM

## 2021-12-08 RX ORDER — FLUTICASONE PROPIONATE 50 MCG
2 SPRAY, SUSPENSION (ML) NASAL DAILY
Status: DISCONTINUED | OUTPATIENT
Start: 2021-12-09 | End: 2021-12-10 | Stop reason: HOSPADM

## 2021-12-08 RX ORDER — POLYETHYLENE GLYCOL 3350, SODIUM SULFATE, SODIUM CHLORIDE, POTASSIUM CHLORIDE, SODIUM ASCORBATE, AND ASCORBIC ACID 7.5-2.691G
1 KIT ORAL EVERY 12 HOURS
Status: COMPLETED | OUTPATIENT
Start: 2021-12-09 | End: 2021-12-09

## 2021-12-08 RX ADMIN — CARVEDILOL 12.5 MG: 12.5 TABLET, FILM COATED ORAL at 08:14

## 2021-12-08 RX ADMIN — PANTOPRAZOLE SODIUM 40 MG: 40 TABLET, DELAYED RELEASE ORAL at 08:14

## 2021-12-08 RX ADMIN — DOCUSATE SODIUM 100 MG: 100 CAPSULE ORAL at 08:14

## 2021-12-08 RX ADMIN — MORPHINE SULFATE 2 MG: 2 INJECTION, SOLUTION INTRAMUSCULAR; INTRAVENOUS at 08:22

## 2021-12-08 RX ADMIN — HYDRALAZINE HYDROCHLORIDE 10 MG: 20 INJECTION INTRAMUSCULAR; INTRAVENOUS at 08:15

## 2021-12-08 RX ADMIN — SODIUM BICARBONATE 650 MG: 650 TABLET ORAL at 08:14

## 2021-12-08 RX ADMIN — MEROPENEM 500 MG: 500 INJECTION, POWDER, FOR SOLUTION INTRAVENOUS at 16:24

## 2021-12-08 RX ADMIN — OXYCODONE AND ACETAMINOPHEN 1 TABLET: 5; 325 TABLET ORAL at 10:29

## 2021-12-08 RX ADMIN — PREDNISONE 20 MG: 20 TABLET ORAL at 18:02

## 2021-12-08 RX ADMIN — CLONIDINE HYDROCHLORIDE 0.1 MG: 0.1 TABLET ORAL at 18:02

## 2021-12-08 RX ADMIN — CARVEDILOL 12.5 MG: 12.5 TABLET, FILM COATED ORAL at 16:24

## 2021-12-08 RX ADMIN — HEPARIN SODIUM 5000 UNITS: 5000 INJECTION INTRAVENOUS; SUBCUTANEOUS at 10:28

## 2021-12-08 RX ADMIN — PREDNISONE 20 MG: 20 TABLET ORAL at 10:27

## 2021-12-08 RX ADMIN — SODIUM BICARBONATE 650 MG: 650 TABLET ORAL at 16:24

## 2021-12-08 RX ADMIN — HEPARIN SODIUM 5000 UNITS: 5000 INJECTION INTRAVENOUS; SUBCUTANEOUS at 22:46

## 2021-12-08 RX ADMIN — DOCUSATE SODIUM 100 MG: 100 CAPSULE ORAL at 18:02

## 2021-12-08 RX ADMIN — POLYETHYLENE GLYCOL 3350 17 G: 17 POWDER, FOR SOLUTION ORAL at 08:31

## 2021-12-08 RX ADMIN — SODIUM BICARBONATE 650 MG: 650 TABLET ORAL at 22:46

## 2021-12-08 RX ADMIN — CLONIDINE HYDROCHLORIDE 0.1 MG: 0.1 TABLET ORAL at 08:17

## 2021-12-08 NOTE — PROGRESS NOTES
Infectious Disease Progress Note         Interval:  NAEO    Subjective:   Patient seen this morning. Sitting in bed. Reports that he is having flareups of his inflammatory arthritis in his elbows, knees, ankles which she does get from time to time. Objective:    Vitals:   Reviewed in chart. Physical Exam:  Gen: No apparent distress  HEENT:  Normocephalic, atraumatic, no scleral icterus, no thrush,  CV: HDS  Lungs: Clear to auscultation bilaterally, no wheezing  Abdomen: soft, non tender, non distended  Genitourinary:  no kruger catheter   Skin: no rash   Psych: good affect, good eye contact, non tearful  Neuro: alert, oriented to time,  place, and situation, moves all extremities to commands, verbal  Musculoskeletal:  Left ankle and lower leg are edematous and very tender on superficial palpation.    Lines: PIVs        Labs:  Recent Results (from the past 24 hour(s))   CBC W/O DIFF    Collection Time: 12/08/21  5:37 AM   Result Value Ref Range    WBC 6.9 4.1 - 11.1 K/uL    RBC 3.13 (L) 4.10 - 5.70 M/uL    HGB 9.3 (L) 12.1 - 17.0 g/dL    HCT 28.4 (L) 36.6 - 50.3 %    MCV 90.7 80.0 - 99.0 FL    MCH 29.7 26.0 - 34.0 PG    MCHC 32.7 30.0 - 36.5 g/dL    RDW 16.9 (H) 11.5 - 14.5 %    PLATELET 85 (L) 545 - 400 K/uL    MPV ABNORMAL 8.9 - 12.9 FL    NRBC 0.0 0  WBC    ABSOLUTE NRBC 0.00 0.00 - 3.35 K/uL   METABOLIC PANEL, BASIC    Collection Time: 12/08/21  5:37 AM   Result Value Ref Range    Sodium 139 136 - 145 mmol/L    Potassium 4.9 3.5 - 5.1 mmol/L    Chloride 111 (H) 97 - 108 mmol/L    CO2 20 (L) 21 - 32 mmol/L    Anion gap 8 5 - 15 mmol/L    Glucose 98 65 - 100 mg/dL    BUN 88 (H) 6 - 20 MG/DL    Creatinine 4.72 (H) 0.70 - 1.30 MG/DL    BUN/Creatinine ratio 19 12 - 20      GFR est AA 15 (L) >60 ml/min/1.73m2    GFR est non-AA 12 (L) >60 ml/min/1.73m2    Calcium 8.2 (L) 8.5 - 10.1 MG/DL   MAGNESIUM    Collection Time: 12/08/21  5:37 AM   Result Value Ref Range    Magnesium 2.8 (H) 1.6 - 2.4 mg/dL PHOSPHORUS    Collection Time: 12/08/21  5:37 AM   Result Value Ref Range    Phosphorus 4.8 (H) 2.6 - 4.7 MG/DL             Assessment:  79 yo M with:    - Sepsis due to Klebsiella pneumoniae bacteremia. Unclear source exactly; probably urine: U/A had pyuria 10/20 cells, and urine cultures with >100K GNR.   - Left ankle and lower leg recurrent cellulitis this admission.   - LLE cellulitis in Oct 2021. Seen by ID at that time - per patient has resolved. - Hx of chronic prednisone on a daily basis for ~3 yrs for \"inflammatory\" arthritis, patient reports \"gout\", by Dr. Saadia Hager at [facility]. - Allergies listed to PCN and levofloxacin. Discussed in depth with patient. - Incidental pericardial effusion, cardiomegaly, pericardial effusion,  cholelithiasis, prostatomegaly noted on CT.   - Hx of pulmonary hypertension, stage V chronic kidney disease, and atrial fibrillation      Recommendations:  -ESBL E. coli and non-ESBL Klebsiella pneumonia and urine cultures. Patient had reported some dysuria during his hospital stay here.   -Stop ceftriaxone, switch to meropenem. On discharge can be on ertapenem 1 g daily  -Please have the left knee joint fluid aspirated and sent for cell count, chemistry, cultures, crystals. There is very little suspicion for infectious involvement of the left knee, however due to patient's history of inflammatory arthritis in multiple joints, will recommend this be evaluated. -Patient must have a rheumatology evaluation on an outpatient basis given his significant history of polyarthritis for about 3 to 4 years for which he has been on prednisone as well. -We will plan for at least 2 weeks of IV antibiotics. -Okay to proceed with PICC line at this time. All of the above explained to the patient and his wife at bedside today. Will follow. Thank you for the opportunity to participate in the care of this patient. Please contact with questions or concerns.       Ashley Zafar MD  Infectious Diseases

## 2021-12-08 NOTE — PROGRESS NOTES
NEPHROLOGY PROGRESS NOTE         NAME:Kendall Magaña                   GJD:[id number]   :1950    Chief complaints: f/u CKD stage V    Subjective: c/o gout flare up in  Both elbow and ankle    24 hr interval history: UOP not documented, non oliguric scr 4.7 CO2 20        Objective:  Vitals:    21 0102 21 0545 21 0553 21 0738   BP: (!) 148/84 (!) 174/103  (!) 155/110   Pulse: 89 84  71   Resp: 18 19  18   Temp: 97.7 °F (36.5 °C) 97.9 °F (36.6 °C)  98 °F (36.7 °C)   SpO2: 96% 99%  100%   Weight:   114.3 kg (252 lb)    Height:           Intake/Output Summary (Last 24 hours) at 2021 6608  Last data filed at 2021 0105  Gross per 24 hour   Intake    Output 250 ml   Net -250 ml       PHYSICAL EXAM:  GENERAL : Lying down in bed with no acute distress  HEENT: AT NC PEERLA   NECK: Supple no JVP  CVS: S1 S2 RRR, no murmur or gallops heard  RS: CTABL,  ABDOMEN: soft NT ND positive BS  EXTREMITY: No edema clubbing or cyanosis, pedal pulse +  NEUROLOGY: AAA X3, no focal deficit or asterixis      Labs:  CBC:    Lab Results   Component Value Date/Time    WBC 6.9 2021 05:37 AM    HGB 9.3 (L) 2021 05:37 AM    HCT 28.4 (L) 2021 05:37 AM    PLT 85 (L) 2021 05:37 AM     BMP:   Lab Results   Component Value Date/Time     2021 05:37 AM    K 4.9 2021 05:37 AM     (H) 2021 05:37 AM    CO2 20 (L) 2021 05:37 AM    AGAP 8 2021 05:37 AM    GLU 98 2021 05:37 AM    BUN 88 (H) 2021 05:37 AM    CREA 4.72 (H) 2021 05:37 AM    GFRAA 15 (L) 2021 05:37 AM    GFRNA 12 (L) 2021 05:37 AM        Lab Results   Component Value Date/Time    Color YELLOW/STRAW 2021 05:32 AM    Appearance TURBID (A) 2021 05:32 AM    Specific gravity 1.013 2021 05:32 AM    pH (UA) 5.5 2021 05:32 AM    Protein 300 (A) 2021 05:32 AM    Glucose Negative 2021 05:32 AM    Ketone Negative 2021 05:32 AM Bilirubin Negative 12/04/2021 05:32 AM    Urobilinogen 0.2 12/04/2021 05:32 AM    Nitrites Negative 12/04/2021 05:32 AM    Leukocyte Esterase LARGE (A) 12/04/2021 05:32 AM    Epithelial cells FEW 12/04/2021 05:32 AM    Bacteria 2+ (A) 12/04/2021 05:32 AM    WBC >100 (H) 12/04/2021 05:32 AM    RBC 10-20 12/04/2021 05:32 AM       Imaging study:    Assessment &Plan    CKD stage V  HTN  Anemia of CKD  Gout flare up  Klebsiella PNA bacteremia    -Scr 4.7 below baseline  -K and C02 level stable  -no indication for RRT at this time  -start prednisone 20 mg TID for 5 days  -on IV abx per ID  -RFP daily  -Plan is discussed with patient and wife @ bedside    Care Plan discussed with:   Medical Team    Mary Arreaga MD  12/8/2021    [address] [place] [address] [facility]:  [email or web address]  Mandy Carlson office:  [address], [address] [place] [address] [address]  [address]  Phone: [phone]  Fax :     [phone]     [address] [place] [address] office:  [address]  Phone - [phone]  Fax - [phone]

## 2021-12-08 NOTE — PROGRESS NOTES
Problem: Mobility Impaired (Adult and Pediatric)  Goal: *Acute Goals and Plan of Care (Insert Text)  Description: FUNCTIONAL STATUS PRIOR TO ADMISSION: Patient was modified independent using a single point cane for functional mobility. HOME SUPPORT PRIOR TO ADMISSION: The patient lived with wife but did not require assist.    Physical Therapy Goals  Initiated 12/4/2021  1. Patient will move from supine to sit and sit to supine  in bed with modified independence within 7 day(s). 2.  Patient will transfer from bed to chair and chair to bed with modified independence using the least restrictive device within 7 day(s). 3.  Patient will perform sit to stand with modified independence within 7 day(s). 4.  Patient will ambulate with modified independence for 150 feet with the least restrictive device within 7 day(s). Outcome: Progressing Towards Goal   PHYSICAL THERAPY TREATMENT  Patient: Camila Rock (55 y.o. male)  Date: 12/8/2021  Diagnosis: Hypotension [I95.9]  Bacteremia [R78.81]   <principal problem not specified>       Precautions:   fall  Chart, physical therapy assessment, plan of care and goals were reviewed. ASSESSMENT  Patient continues with skilled PT services and is progressing towards goals. Communicated with nurse cleared for therapy. Patient sitting on the edge of bed when received spouse at bedside agreed with all goals set for the patient. Patient reporting gout flare up and having pain 10/10 on both feet. Sit to stand min assist, tolerated standing for at least minute a minute and need to sit  down too painful to step forward. Offered to be OOB to chair however patient declined and just want to sit on the edge of bed. Assisted sitting on the edge of bed performed some active range of motion exercise on  both LE as tolerated. Communicated with nurse who agreed to monitor patient.     Current Level of Function Impacting Discharge (mobility/balance): min assist with transfers and ambulation using a rolling walker. Other factors to consider for discharge: fall         PLAN :  Patient continues to benefit from skilled intervention to address the above impairments. Continue treatment per established plan of care. to address goals. Recommendation for discharge: (in order for the patient to meet his/her long term goals)  Therapy 3 hours per day 5-7 days per week    This discharge recommendation:  Has been made in collaboration with the attending provider and/or case management    IF patient discharges home will need the following DME: patient owns DME required for discharge       SUBJECTIVE:   Patient stated Elpidio Stephens.     OBJECTIVE DATA SUMMARY:   Critical Behavior:  Neurologic State: Alert  Orientation Level: Oriented X4  Cognition: Follows commands  Safety/Judgement: Awareness of environment  Functional Mobility Training:  Bed Mobility:  Rolling:  (sitting on the edge of bed)                 Transfers:  Sit to Stand: Minimum assistance  Stand to Sit: Contact guard assistance  Stand Pivot Transfers: Minimum assistance                          Balance:  Sitting: Intact  Standing: Impaired; Pull to stand; With support  Standing - Static: Fair  Standing - Dynamic : Fair  Ambulation/Gait Training:                                   Therapeutic Exercises:   Educate and instructed patient to continue active range of motion exercise on both legs while up on chair or on bed multiple times. Recommend patient to be up on the chair at least 3 times a day every meal times as tolerated. Pain Rating:  10/10    Activity Tolerance:   Good    After treatment patient left in no apparent distress:   Sitting on the side of bed, Heels elevated for pressure relief, Call bell within reach, and Caregiver / family present    COMMUNICATION/COLLABORATION:   The patients plan of care was discussed with: Occupational therapist and Registered nurse. Rufina Smith PT,WCC.    Time Calculation: 24 mins

## 2021-12-08 NOTE — PROGRESS NOTES
At 22:57 on 12/8/2021 Patient had 10 beats of non sustained ventriculare tachycardia. Patient was assessed and denied chest pain or any other symptoms.    Dr. Rafi Rasmussen notified and ordered labs for patient in am.

## 2021-12-08 NOTE — PROGRESS NOTES
Bedside shift change report given to Zi Gordon (oncoming nurse) by Justin Flores (offgoing nurse). Report included the following information SBAR, Kardex, Intake/Output, MAR and Recent Results.

## 2021-12-08 NOTE — PROGRESS NOTES
12/8/2021  Case Management Progress Note    1:44 PM  Patient is 78year old male admitted 12/4 with hypotension   Patient's RUR is 20% red/high risk for readmission  Covid test: negative 12/3   Chart reviewed--patient discussed at IDR rounds   Per rounds patient may be ready for discharge tomorrow or the next day. Patient's covid test is good for [facility] until tomorrow, if discharging after that he will need another. Bryon Liner at [facility]. Will continue to follow and assist with discharge planning. Transition of Care Plan   1. Continue medical management  2. Discharge to [address] [place] [address] when ready   3. Transportation: likely family  4. CM will continue to follow and assist with discharge planning.     Shruthi Omalley MSW

## 2021-12-08 NOTE — PROGRESS NOTES
Verbal shift change report given to Anastasiya Stern (oncoming nurse) by Kyle Erazo RN (offgoing nurse). Report included the following information Kardex, OR Summary, Recent Results and Med Rec Status.

## 2021-12-08 NOTE — PROGRESS NOTES
Occupational Therapy Note;Pt sitting edge of bed, spouse present. Stated just used restroom but now fatigued and having gout flare-up. Declining further activity at this time. Will cont to follow.

## 2021-12-08 NOTE — PROGRESS NOTES
Paulette Rodriguez. Mike Cody MD  [phone] office  [phone] voicemail     Gastroenterology Progress Note    December 8, 2021  Admit Date: 12/3/2021         Narrative Assessment and Plan   · Anemia  · UTI  · Bacteremia    He asks that we proceed with GI evaluation of his anemia, will prep tomorrow for EGD/colonsocopy on Friday. Subjective:   · CC: why do I have an infection? Location:  Denies abdominal pain  Duration: no vomiting  Timing: answered his questions to the best of my ability  Radiation:    Associated Symptoms:   Aggravating/Alleviating factors:     ROS:  The previous review of systems on initial consultation / H&P is noted and reviewed. Specific changes noted above in HPI.     Current Medications:     Current Facility-Administered Medications   Medication Dose Route Frequency    predniSONE (DELTASONE) tablet 20 mg  20 mg Oral TID WITH MEALS    [START ON 12/9/2021] fluticasone propionate (FLONASE) 50 mcg/actuation nasal spray 2 Spray  2 Spray Both Nostrils DAILY    meropenem (MERREM) 500 mg in sterile water (preservative free) 10 mL IV syringe  0.5 g IntraVENous Q12H    sodium bicarbonate tablet 650 mg  650 mg Oral TID    heparin (porcine) injection 5,000 Units  5,000 Units SubCUTAneous Q12H    docusate sodium (COLACE) capsule 100 mg  100 mg Oral BID    hydrALAZINE (APRESOLINE) 20 mg/mL injection 10 mg  10 mg IntraVENous Q6H PRN    carvediloL (COREG) tablet 12.5 mg  12.5 mg Oral BID WITH MEALS    morphine injection 2 mg  2 mg IntraVENous Q4H PRN    oxyCODONE-acetaminophen (PERCOCET) 5-325 mg per tablet 1 Tablet  1 Tablet Oral Q6H PRN    pantoprazole (PROTONIX) tablet 40 mg  40 mg Oral ACB    cloNIDine HCL (CATAPRES) tablet 0.1 mg  0.1 mg Oral BID    acetaminophen (TYLENOL) tablet 650 mg  650 mg Oral Q6H PRN    Or    acetaminophen (TYLENOL) suppository 650 mg  650 mg Rectal Q6H PRN    polyethylene glycol (MIRALAX) packet 17 g  17 g Oral DAILY PRN    ondansetron (ZOFRAN ODT) tablet 4 mg  4 mg Oral Q8H PRN    Or    ondansetron (ZOFRAN) injection 4 mg  4 mg IntraVENous Q6H PRN    [Held by provider] apixaban (ELIQUIS) tablet 5 mg  5 mg Oral BID    influenza vaccine  (6 mos+)(PF) (FLUARIX/FLULAVAL/FLUZONE QUAD) injection 0.5 mL  1 Each IntraMUSCular PRIOR TO DISCHARGE       Objective:     VITALS:   Last 24hrs VS reviewed since prior progress note. Most recent are:  Visit Vitals  /68 (BP 1 Location: Left upper arm, BP Patient Position: At rest;Sitting)   Pulse 86   Temp 97.9 °F (36.6 °C)   Resp 18   Ht 5' 10\" (1.778 m)   Wt 114.3 kg (252 lb)   SpO2 98%   BMI 36.16 kg/m²     Temp (24hrs), Av.8 °F (36.6 °C), Min:97.6 °F (36.4 °C), Max:98 °F (36.7 °C)      Intake/Output Summary (Last 24 hours) at 2021 1536  Last data filed at 2021 0818  Gross per 24 hour   Intake 120 ml   Output 250 ml   Net -130 ml       EXAM:  General: Comfortable, no distress    HEENT:  Atraumatic skull, pupils equal      Lab Data Reviewed:   Recent Labs     21  0537 21  0619 21  0536   WBC 6.9 7.9 7.7   HGB 9.3* 8.7* 8.5*   HCT 28.4* 26.6* 26.8*   PLT 85* 63* 55*     Recent Labs     21  0537 21  0619 21  0536    139 142   K 4.9 4.6 4.9   * 112* 114*   CO2 20* 19* 20*   GLU 98 114* 95   BUN 88* 94* 97*   CREA 4.72* 4.80* 4.92*   CA 8.2* 7.9* 7.6*   MG 2.8*  --   --    PHOS 4.8*  --   --      Lab Results   Component Value Date/Time    Glucose (POC) 110 10/29/2021 09:15 PM     No results for input(s): PH, PCO2, PO2, HCO3, FIO2 in the last 72 hours. No results for input(s): INR, INREXT in the last 72 hours.         Assessment:   (See above)  Active Problems:    Pulmonary hypertension (HCC) ()      Overview: [facility]on [facility] at [facility]      VIK on CPAP ()      Hypertension ()      Heart failure (HCC) ()      ESRD (end stage renal disease) on dialysis [facility]) ()      Overview: Dr Abby Beard with [facility]      Chronic atrial fibrillation ([facility] ()      Overview: Kendal [facility]      Cervical disc disorder ()      Overview: C5,C6 fractured disc and lumbar disc slipped      Atrial fibrillation (HCC) ()      Hypotension (12/3/2021)      Weakness generalized (12/3/2021)      Anemia, chronic disease (12/3/2021)      Acute cystitis without hematuria (12/4/2021)      Bacteremia due to Gram-negative bacteria (12/4/2021)      Thrombocytopenia ([address] (12/4/2021)      Bacteremia (12/4/2021)        Plan:   (See above)      Signed By: Arnaldo Ernandez MD     12/8/2021  3:36 PM

## 2021-12-08 NOTE — PROGRESS NOTES
Nathanael Sinha [facility]  [address] [facility], [place], [address] [place] [address]  [phone]      Medical Progress Note      NAME:         Brady Dowell   :        1950  MRM:        [id number]    Date of service: 2021      Subjective: Patient has been seen and examined as a follow up for multiple medical issues. Chart, labs, diagnostics reviewed. He feels weak. Does not talk much. Now agreeable to have scopes to evaluate his anemia. No fever or chills    Objective:    Vital Signs:    Visit Vitals  BP (!) 149/85   Pulse 89   Temp 98.1 °F (36.7 °C)   Resp 18   Ht 5' 10\" (1.778 m)   Wt 114.3 kg (252 lb)   SpO2 98%   BMI 36.16 kg/m²          Intake/Output Summary (Last 24 hours) at 2021 1749  Last data filed at 2021 0818  Gross per 24 hour   Intake 120 ml   Output 250 ml   Net -130 ml        Physical Examination:    General:   Weak and ill looking, although not in any acute distress   Eyes:   pale conjunctivae, PERRLA with no discharge. ENT:   no ottorrhea or rhinorrhea with dry mucous membranes  Neck: no masses, thyroid non-tender and trachea central.  Pulm:  no accessory muscle use, decreased but clear breath sounds without crackles or wheezes  Card:  no JVD or murmurs, has atrial fib, without thrills, bruits or peripheral edema  Abd:  Soft, non-tender, non-distended, normoactive bowel sounds with no palpable organomegaly  Musc:  No cyanosis, clubbing, atrophy or deformities. Skin:  No rashes, bruising or ulcers. Neuro: Awake and alert.  Generally a non focal exam. Follows commands appropriately  Psych:  Has a fair insight, flat affect     Current Facility-Administered Medications   Medication Dose Route Frequency    predniSONE (DELTASONE) tablet 20 mg  20 mg Oral TID WITH MEALS    [START ON 2021] fluticasone propionate (FLONASE) 50 mcg/actuation nasal spray 2 Spray  2 Spray Both Nostrils DAILY    meropenem (MERREM) 500 mg in sterile water (preservative free) 10 mL IV syringe  0.5 g IntraVENous Q12H    [START ON 12/9/2021] peg 3350-Electrolytes-Vit C (MOVIPREP) oral solution 1 L  1 L Oral Q12H    sodium bicarbonate tablet 650 mg  650 mg Oral TID    heparin (porcine) injection 5,000 Units  5,000 Units SubCUTAneous Q12H    docusate sodium (COLACE) capsule 100 mg  100 mg Oral BID    hydrALAZINE (APRESOLINE) 20 mg/mL injection 10 mg  10 mg IntraVENous Q6H PRN    carvediloL (COREG) tablet 12.5 mg  12.5 mg Oral BID WITH MEALS    morphine injection 2 mg  2 mg IntraVENous Q4H PRN    oxyCODONE-acetaminophen (PERCOCET) 5-325 mg per tablet 1 Tablet  1 Tablet Oral Q6H PRN    pantoprazole (PROTONIX) tablet 40 mg  40 mg Oral ACB    cloNIDine HCL (CATAPRES) tablet 0.1 mg  0.1 mg Oral BID    acetaminophen (TYLENOL) tablet 650 mg  650 mg Oral Q6H PRN    Or    acetaminophen (TYLENOL) suppository 650 mg  650 mg Rectal Q6H PRN    polyethylene glycol (MIRALAX) packet 17 g  17 g Oral DAILY PRN    ondansetron (ZOFRAN ODT) tablet 4 mg  4 mg Oral Q8H PRN    Or    ondansetron (ZOFRAN) injection 4 mg  4 mg IntraVENous Q6H PRN    [Held by provider] apixaban (ELIQUIS) tablet 5 mg  5 mg Oral BID    influenza vaccine 2021-22 (6 mos+)(PF) (FLUARIX/FLULAVAL/FLUZONE QUAD) injection 0.5 mL  1 Each IntraMUSCular PRIOR TO DISCHARGE        Laboratory data and review:    Recent Labs     12/08/21  0537 12/07/21  0619 12/06/21  0536   WBC 6.9 7.9 7.7   HGB 9.3* 8.7* 8.5*   HCT 28.4* 26.6* 26.8*   PLT 85* 63* 55*     Recent Labs     12/08/21  0537 12/07/21  0619 12/06/21  0536    139 142   K 4.9 4.6 4.9   * 112* 114*   CO2 20* 19* 20*   GLU 98 114* 95   BUN 88* 94* 97*   CREA 4.72* 4.80* 4.92*   CA 8.2* 7.9* 7.6*   MG 2.8*  --   --    PHOS 4.8*  --   --      No components found for: [place]    Diagnostics: Imaging studies have been reviewed    Telemetry reviewed by me:   ZAIDA    Assessment and Plan:    Bacteremia due to Klebsiella pneumoniae (12/4/2021) POA: likely due to the UTi. Echo with EF 40-45%. No obvious vegetations. Follow up blood cultures neg. Continue IV Meropenem with plan to change to IV Ertapenem at discharge. ID following. Get a Joshua catheter 12/9    UTI due to extended-spectrum beta lactamase (ESBL) producing Escherichia coli (12/8/2021) + Non ESBL Klebsiella pneumoniae POA: CT abdomen and pelvis unremarkable. Continue IV Meropenem. ID following    Anemia, chronic disease ns blood loss POA: had a +FOBT and was initially not willing to have further testing but he has now changed his mind. Re-evaluated by Gi who plan for an EGD + colonoscopy 12/10    Left knee pain (12/8/2021) POA: MRi noted a large effusion with synovitis. No osteo. Has a complex tear on medial meniscus. Seen by ortho. ID suggests aspirating the joint. Getting prednisone. Continue pain control for now. Will ask ortho to re-eval    CKD stage 5 POA: seen and followed by renal. No indication for RRT at this time. Monitor renal function. Continue Bicarb    Chronic atrial fibrillation (HCC) POA: rate controlled. Continue Coreg. Eliquis on hold for planned scoping    Pulmonary hypertension ([address] / VIK on CPAP POA: outpatient follow up     Hypertension POA: continue Coreg, Clonidine    Weakness generalized (12/3/2021) / Cervical disc disorder POA: ambulate as tolerated    Thrombocytopenia ([address] (12/4/2021) POA: seems chronic.  Monitor     Total time spent for the patient's care: [facility] discussed with: Patient, Care Manager, Nursing Staff and Consultant/Specialist    Discussed:  Care Plan and D/C Planning    Prophylaxis:  H2B/PPI    Anticipated Disposition:   PT, OT, RN           ___________________________________________________    Attending Physician:   Rachel Lakhani MD

## 2021-12-09 ENCOUNTER — APPOINTMENT (OUTPATIENT)
Dept: INTERVENTIONAL RADIOLOGY/VASCULAR | Age: 71
DRG: 871 | End: 2021-12-09
Attending: INTERNAL MEDICINE
Payer: MEDICARE

## 2021-12-09 LAB
ANION GAP SERPL CALC-SCNC: 7 MMOL/L (ref 5–15)
BUN SERPL-MCNC: 96 MG/DL (ref 6–20)
BUN/CREAT SERPL: 19 (ref 12–20)
CALCIUM SERPL-MCNC: 8.3 MG/DL (ref 8.5–10.1)
CHLORIDE SERPL-SCNC: 111 MMOL/L (ref 97–108)
CO2 SERPL-SCNC: 21 MMOL/L (ref 21–32)
CREAT SERPL-MCNC: 5.08 MG/DL (ref 0.7–1.3)
GLUCOSE SERPL-MCNC: 131 MG/DL (ref 65–100)
POTASSIUM SERPL-SCNC: 5.2 MMOL/L (ref 3.5–5.1)
SARS-COV-2, COV2: NORMAL
SODIUM SERPL-SCNC: 139 MMOL/L (ref 136–145)
URATE SERPL-MCNC: 10.9 MG/DL (ref 3.5–7.2)

## 2021-12-09 PROCEDURE — 2709999900 HC NON-CHARGEABLE SUPPLY

## 2021-12-09 PROCEDURE — 77030011229 HC DIL VESL COON COOK -A

## 2021-12-09 PROCEDURE — 74011250637 HC RX REV CODE- 250/637: Performed by: INTERNAL MEDICINE

## 2021-12-09 PROCEDURE — 36415 COLL VENOUS BLD VENIPUNCTURE: CPT

## 2021-12-09 PROCEDURE — C1769 GUIDE WIRE: HCPCS

## 2021-12-09 PROCEDURE — 0JH63XZ INSERTION OF TUNNELED VASCULAR ACCESS DEVICE INTO CHEST SUBCUTANEOUS TISSUE AND FASCIA, PERCUTANEOUS APPROACH: ICD-10-PCS | Performed by: STUDENT IN AN ORGANIZED HEALTH CARE EDUCATION/TRAINING PROGRAM

## 2021-12-09 PROCEDURE — C1751 CATH, INF, PER/CENT/MIDLINE: HCPCS

## 2021-12-09 PROCEDURE — 65270000029 HC RM PRIVATE

## 2021-12-09 PROCEDURE — 84550 ASSAY OF BLOOD/URIC ACID: CPT

## 2021-12-09 PROCEDURE — 74011250636 HC RX REV CODE- 250/636: Performed by: STUDENT IN AN ORGANIZED HEALTH CARE EDUCATION/TRAINING PROGRAM

## 2021-12-09 PROCEDURE — 74011250636 HC RX REV CODE- 250/636: Performed by: INTERNAL MEDICINE

## 2021-12-09 PROCEDURE — 99233 SBSQ HOSP IP/OBS HIGH 50: CPT | Performed by: STUDENT IN AN ORGANIZED HEALTH CARE EDUCATION/TRAINING PROGRAM

## 2021-12-09 PROCEDURE — B548ZZA ULTRASONOGRAPHY OF SUPERIOR VENA CAVA, GUIDANCE: ICD-10-PCS | Performed by: STUDENT IN AN ORGANIZED HEALTH CARE EDUCATION/TRAINING PROGRAM

## 2021-12-09 PROCEDURE — 74011636637 HC RX REV CODE- 636/637: Performed by: INTERNAL MEDICINE

## 2021-12-09 PROCEDURE — 97530 THERAPEUTIC ACTIVITIES: CPT

## 2021-12-09 PROCEDURE — 74011000250 HC RX REV CODE- 250: Performed by: STUDENT IN AN ORGANIZED HEALTH CARE EDUCATION/TRAINING PROGRAM

## 2021-12-09 PROCEDURE — 36558 INSERT TUNNELED CV CATH: CPT

## 2021-12-09 PROCEDURE — 94760 N-INVAS EAR/PLS OXIMETRY 1: CPT

## 2021-12-09 PROCEDURE — B5181ZA FLUOROSCOPY OF SUPERIOR VENA CAVA USING LOW OSMOLAR CONTRAST, GUIDANCE: ICD-10-PCS | Performed by: STUDENT IN AN ORGANIZED HEALTH CARE EDUCATION/TRAINING PROGRAM

## 2021-12-09 PROCEDURE — 80048 BASIC METABOLIC PNL TOTAL CA: CPT

## 2021-12-09 PROCEDURE — 97110 THERAPEUTIC EXERCISES: CPT

## 2021-12-09 PROCEDURE — 97535 SELF CARE MNGMENT TRAINING: CPT

## 2021-12-09 PROCEDURE — C1894 INTRO/SHEATH, NON-LASER: HCPCS

## 2021-12-09 PROCEDURE — 97116 GAIT TRAINING THERAPY: CPT

## 2021-12-09 PROCEDURE — 74011000250 HC RX REV CODE- 250: Performed by: SPECIALIST

## 2021-12-09 PROCEDURE — U0005 INFEC AGEN DETEC AMPLI PROBE: HCPCS

## 2021-12-09 PROCEDURE — 77030002996 HC SUT SLK J&J -A

## 2021-12-09 PROCEDURE — 77030010507 HC ADH SKN DERMBND J&J -B

## 2021-12-09 PROCEDURE — 74011250637 HC RX REV CODE- 250/637: Performed by: FAMILY MEDICINE

## 2021-12-09 PROCEDURE — 02H633Z INSERTION OF INFUSION DEVICE INTO RIGHT ATRIUM, PERCUTANEOUS APPROACH: ICD-10-PCS | Performed by: STUDENT IN AN ORGANIZED HEALTH CARE EDUCATION/TRAINING PROGRAM

## 2021-12-09 RX ORDER — LIDOCAINE HYDROCHLORIDE 10 MG/ML
10-30 INJECTION INFILTRATION; PERINEURAL
Status: DISCONTINUED | OUTPATIENT
Start: 2021-12-09 | End: 2021-12-10 | Stop reason: HOSPADM

## 2021-12-09 RX ORDER — FENTANYL CITRATE 50 UG/ML
25-200 INJECTION, SOLUTION INTRAMUSCULAR; INTRAVENOUS
Status: DISCONTINUED | OUTPATIENT
Start: 2021-12-09 | End: 2021-12-10 | Stop reason: HOSPADM

## 2021-12-09 RX ADMIN — DOCUSATE SODIUM 100 MG: 100 CAPSULE ORAL at 10:33

## 2021-12-09 RX ADMIN — PEG-3350, SODIUM SULFATE, SODIUM CHLORIDE, POTASSIUM CHLORIDE, SODIUM ASCORBATE AND ASCORBIC ACID 1 L: KIT at 10:30

## 2021-12-09 RX ADMIN — PREDNISONE 20 MG: 20 TABLET ORAL at 10:06

## 2021-12-09 RX ADMIN — MEROPENEM 500 MG: 500 INJECTION, POWDER, FOR SOLUTION INTRAVENOUS at 03:20

## 2021-12-09 RX ADMIN — CLONIDINE HYDROCHLORIDE 0.1 MG: 0.1 TABLET ORAL at 10:05

## 2021-12-09 RX ADMIN — SODIUM BICARBONATE 650 MG: 650 TABLET ORAL at 10:06

## 2021-12-09 RX ADMIN — MEROPENEM 500 MG: 500 INJECTION, POWDER, FOR SOLUTION INTRAVENOUS at 15:00

## 2021-12-09 RX ADMIN — ONDANSETRON 4 MG: 2 INJECTION INTRAMUSCULAR; INTRAVENOUS at 21:02

## 2021-12-09 RX ADMIN — ACETAMINOPHEN 650 MG: 325 TABLET ORAL at 21:03

## 2021-12-09 RX ADMIN — PANTOPRAZOLE SODIUM 40 MG: 40 TABLET, DELAYED RELEASE ORAL at 06:18

## 2021-12-09 RX ADMIN — LIDOCAINE HYDROCHLORIDE 10 ML: 10 INJECTION, SOLUTION INFILTRATION; PERINEURAL at 09:25

## 2021-12-09 RX ADMIN — PEG-3350, SODIUM SULFATE, SODIUM CHLORIDE, POTASSIUM CHLORIDE, SODIUM ASCORBATE AND ASCORBIC ACID 1 L: KIT at 21:04

## 2021-12-09 RX ADMIN — PREDNISONE 20 MG: 20 TABLET ORAL at 17:39

## 2021-12-09 RX ADMIN — CARVEDILOL 12.5 MG: 12.5 TABLET, FILM COATED ORAL at 10:04

## 2021-12-09 RX ADMIN — CARVEDILOL 12.5 MG: 12.5 TABLET, FILM COATED ORAL at 17:39

## 2021-12-09 RX ADMIN — SODIUM BICARBONATE 650 MG: 650 TABLET ORAL at 21:04

## 2021-12-09 RX ADMIN — CLONIDINE HYDROCHLORIDE 0.1 MG: 0.1 TABLET ORAL at 17:39

## 2021-12-09 RX ADMIN — SODIUM BICARBONATE 650 MG: 650 TABLET ORAL at 17:39

## 2021-12-09 RX ADMIN — PREDNISONE 20 MG: 20 TABLET ORAL at 12:44

## 2021-12-09 RX ADMIN — FLUTICASONE PROPIONATE 2 SPRAY: 50 SPRAY, METERED NASAL at 10:40

## 2021-12-09 RX ADMIN — FENTANYL CITRATE 50 MCG: 50 INJECTION INTRAMUSCULAR; INTRAVENOUS at 09:15

## 2021-12-09 NOTE — H&P
Radiology History and Physical    Patient: Pascale Scott 78 y.o. male       Chief Complaint: Fall, Fatigue, and Dizziness      History of Present Illness: 78year old male with bacteremia, presents for tunneled central venous catheter placement. History:    Past Medical History:   Diagnosis Date    Atrial fibrillation ([facility]     Cervical disc disorder     C5,C6 fractured disc and lumbar disc slipped    Chronic atrial fibrillation (HCC)     Kendal [facility]    ESRD (end stage renal disease) on dialysis [facility])     [facility]rita [facility]    Heart failure ([address]     Hypertension     VIK on CPAP     Pulmonary hypertension ([facility]     Ramon NP at [facility]     History reviewed. No pertinent family history. Social History     Socioeconomic History    Marital status:      Spouse name: Not on file    Number of children: Not on file    Years of education: Not on file    Highest education level: Not on file   Occupational History    Not on file   Tobacco Use    Smoking status: Never Smoker    Smokeless tobacco: Never Used   Substance and Sexual Activity    Alcohol use: No    Drug use: Never    Sexual activity: Not on file   Other Topics Concern    Not on file   Social History Narrative    Not on file     Social Determinants of Health     Financial Resource Strain:     Difficulty of Paying Living Expenses: Not on file   Food Insecurity:     Worried About Running Out of Food in the Last Year: Not on file    Ray of Food in the Last Year: Not on file   Transportation Needs:     Lack of Transportation (Medical): Not on file    Lack of Transportation (Non-Medical):  Not on file   Physical Activity:     Days of Exercise per Week: Not on file    Minutes of Exercise per Session: Not on file   Stress:     Feeling of Stress : Not on file   Social Connections:     Frequency of Communication with Friends and Family: Not on file    Frequency of Social Gatherings with Friends and Family: Not on file    Attends Orthodoxy Services: Not on file    Active Member of Clubs or Organizations: Not on file    Attends Club or Organization Meetings: Not on file    Marital Status: Not on file   Intimate Partner Violence:     Fear of Current or Ex-Partner: Not on file    Emotionally Abused: Not on file    Physically Abused: Not on file    Sexually Abused: Not on file   Housing Stability:     Unable to Pay for Housing in the Last Year: Not on file    Number of [place] in the Last Year: Not on file    Unstable Housing in the Last Year: Not on file       Allergies: Allergies   Allergen Reactions    Levofloxacin Other (comments)     Insombnia, negative thoughts. [place] like I was going to die.     Pcn [Penicillins] Rash       Current Medications:  Current Facility-Administered Medications   Medication Dose Route Frequency    lidocaine (XYLOCAINE) 10 mg/mL (1 %) injection 10-30 mL  10-30 mL SubCUTAneous Multiple    fentaNYL citrate (PF) injection  mcg   mcg IntraVENous Multiple    predniSONE (DELTASONE) tablet 20 mg  20 mg Oral TID WITH MEALS    fluticasone propionate (FLONASE) 50 mcg/actuation nasal spray 2 Spray  2 Spray Both Nostrils DAILY    meropenem (MERREM) 500 mg in sterile water (preservative free) 10 mL IV syringe  0.5 g IntraVENous Q12H    peg 3350-Electrolytes-Vit C (MOVIPREP) oral solution 1 L  1 L Oral Q12H    sodium bicarbonate tablet 650 mg  650 mg Oral TID    heparin (porcine) injection 5,000 Units  5,000 Units SubCUTAneous Q12H    docusate sodium (COLACE) capsule 100 mg  100 mg Oral BID    hydrALAZINE (APRESOLINE) 20 mg/mL injection 10 mg  10 mg IntraVENous Q6H PRN    carvediloL (COREG) tablet 12.5 mg  12.5 mg Oral BID WITH MEALS    morphine injection 2 mg  2 mg IntraVENous Q4H PRN    oxyCODONE-acetaminophen (PERCOCET) 5-325 mg per tablet 1 Tablet  1 Tablet Oral Q6H PRN    pantoprazole (PROTONIX) tablet 40 mg  40 mg Oral ACB    cloNIDine HCL (CATAPRES) tablet 0.1 mg  0.1 mg Oral BID    acetaminophen (TYLENOL) tablet 650 mg  650 mg Oral Q6H PRN    Or    acetaminophen (TYLENOL) suppository 650 mg  650 mg Rectal Q6H PRN    polyethylene glycol (MIRALAX) packet 17 g  17 g Oral DAILY PRN    ondansetron (ZOFRAN ODT) tablet 4 mg  4 mg Oral Q8H PRN    Or    ondansetron (ZOFRAN) injection 4 mg  4 mg IntraVENous Q6H PRN    [Held by provider] apixaban (ELIQUIS) tablet 5 mg  5 mg Oral BID    influenza vaccine 2021-22 (6 mos+)(PF) (FLUARIX/FLULAVAL/FLUZONE QUAD) injection 0.5 mL  1 Each IntraMUSCular PRIOR TO DISCHARGE        Physical Exam:  Blood pressure (!) 156/89, pulse 77, temperature 98.1 °F (36.7 °C), resp. rate 18, height 5' 10\" (1.778 m), weight 114.3 kg (252 lb), SpO2 98 %.   GENERAL: alert, cooperative, no distress, appears stated age,   LUNG: Nonlabored respiration on room air  HEART: regular rate and rhythm    Alerts:    Hospital Problems  Date Reviewed: 12/3/2021          Codes Class Noted POA    UTI due to extended-spectrum beta lactamase (ESBL) producing Escherichia coli ICD-10-CM: N39.0, B96.29, Z16.12  ICD-9-CM: 599.0, 041.49, V09.1  12/8/2021 Yes        Left knee pain ICD-10-CM: M25.562  ICD-9-CM: 719.46  12/8/2021 Yes        * (Principal) Bacteremia due to Klebsiella pneumoniae ICD-10-CM: R78.81, B96.1  ICD-9-CM: 790.7, 041.3  12/4/2021 Yes        Thrombocytopenia ([facility] ICD-10-CM: D69.6  ICD-9-CM: 287.5  12/4/2021 Yes        Weakness generalized ICD-10-CM: R53.1  ICD-9-CM: 780.79  12/3/2021 Yes        Anemia, chronic disease ICD-10-CM: D63.8  ICD-9-CM: 285.29  12/3/2021 Yes        Pulmonary hypertension ([facility] ICD-10-CM: I27.20  ICD-9-CM: 416.8  Unknown Yes    Overview Signed 10/24/2021  3:36 PM by MD Ramon Davila NP at [facility]             VIK on CPAP ICD-10-CM: G47.33, Z99.89  ICD-9-CM: 327.23, V46.8  Unknown Yes        Hypertension ICD-10-CM: I10  ICD-9-CM: 401.9  Unknown Yes        Heart failure ([facility] ICD-10-CM: I50.9  ICD-9-CM: 428.9  Unknown Yes        ESRD (end stage renal disease) on dialysis [facility]) ICD-10-CM: N18.6, Z99.2  ICD-9-CM: 585.6, V45.11  Unknown Yes    Overview Signed 10/24/2021  3:36 PM by MD Dr José Luis Díaz with [facility]             Chronic atrial fibrillation [facility]) ICD-10-CM: O99.02  ICD-9-CM: 427.31  Unknown Yes    Overview Signed 10/24/2021  3:36 PM by MD Kendal Díaz [facility]             Cervical disc disorder ICD-10-CM: M50.90  ICD-9-CM: 722.91  Unknown Yes    Overview Signed 10/24/2021  3:36 PM by Ginny Olsen MD     C5,C6 fractured disc and lumbar disc slipped                   Laboratory:      Recent Labs     12/08/21  0537   HGB 9.3*   HCT 28.4*   WBC 6.9   PLT 85*   BUN 88*   CREA 4.72*   K 4.9         Plan of Care/Planned Procedure:  Risks, benefits, and alternatives reviewed with patient and he agrees to proceed with the procedure. Tunneled central venous catheter placement    Deemed appropriate for moderate sedation with versed and fentanyl.     Niall Levy MD  Interventional Radiology  [facility]  9:18 AM, 12/9/2021

## 2021-12-09 NOTE — ANESTHESIA PREPROCEDURE EVALUATION
Relevant Problems   RESPIRATORY SYSTEM   (+) VIK on CPAP      CARDIOVASCULAR   (+) Chronic atrial fibrillation (HCC)   (+) Hypertension      RENAL FAILURE   (+) ESRD (end stage renal disease) on dialysis (HCC)      HEMATOLOGY   (+) Anemia, chronic disease       Anesthetic History   No history of anesthetic complications            Review of Systems / Medical History      Pulmonary        Sleep apnea: CPAP           Neuro/Psych   Within defined limits           Cardiovascular    Hypertension      CHF  Dysrhythmias : atrial fibrillation        Comments: Echo 12/21:  · Image quality for this study was poor. · Contrast used: DEFINITY. · LV: Estimated LVEF is 40 - 45% however endocardium is poorly seen and this is just an estimate. Left ventricle not well visualized. Mildly dilated left ventricle. Wall motion: unable to assess due to limited image quality. · AO: Mild sinuses of Valsalva dilatation. · AV: Aortic valve leaflet calcification present. Mild aortic valve regurgitation is present. · Pericardium: Small pericardial effusion. · MV: Mitral valve non-specific thickening. Moderate mitral annular calcification. · TV: Mild tricuspid valve regurgitation is present. Pulmonary hypertension found to be mild to moderate. · LA: Dilated left atrium. · RA: Mildly dilated right atrium. GI/Hepatic/Renal         Renal disease (stage 5):  CRI       Endo/Other        Anemia (hg=9.3)     Other Findings   Comments: Cervical disc disorder, history of C5 C6 herniated disc         Physical Exam    Airway  Mallampati: II  TM Distance: > 6 cm  Neck ROM: normal range of motion   Mouth opening: Normal     Cardiovascular    Rhythm: regular  Rate: normal         Dental    Dentition: Lower dentition intact and Upper dentition intact     Pulmonary  Breath sounds clear to auscultation               Abdominal         Other Findings            Anesthetic Plan    ASA: 4  Anesthesia type: MAC          Induction: Intravenous  Anesthetic plan and risks discussed with: Patient

## 2021-12-09 NOTE — PROGRESS NOTES
NEPHROLOGY PROGRESS NOTE         NAME:Kendall Willingham                   NXJ:[id number]   :1950    Chief complaints: f/u CKD stage V    Subjective: gout pain is better    24 hr interval history: UOP not documented, non oliguric scr 4.7 CO2 20 from         Objective:  Vitals:    21 0823 21 0824 21 0825 21 1001   BP: (!) 172/93 (!) 160/100 (!) 156/89 (!) 176/110   Pulse: 77   89   Resp: 18   18   Temp:    98.3 °F (36.8 °C)   SpO2: 98%   99%   Weight:       Height:           Intake/Output Summary (Last 24 hours) at 2021 1033  Last data filed at 2021 0644  Gross per 24 hour   Intake 1080 ml   Output 0 ml   Net 1080 ml       PHYSICAL EXAM:  GENERAL : Lying down in bed with no acute distress  HEENT: AT NC PEERLA   NECK: Supple no JVP  CVS: S1 S2 RRR, no murmur or gallops heard  RS: CTABL,  ABDOMEN: soft NT ND positive BS  EXTREMITY: No edema clubbing or cyanosis, pedal pulse +  NEUROLOGY: AAA X3, no focal deficit or asterixis      Labs:  CBC:    No results found for: WBC, HGB, HCT, PLT, HGBEXT, HCTEXT, PLTEXT, HGBEXT, HCTEXT, PLTEXT  BMP:   No results found for: NA, K, CL, CO2, AGAP, GLU, BUN, CREA, GFRAA, GFRNA     Lab Results   Component Value Date/Time    Color YELLOW/STRAW 2021 05:32 AM    Appearance TURBID (A) 2021 05:32 AM    Specific gravity 1.013 2021 05:32 AM    pH (UA) 5.5 2021 05:32 AM    Protein 300 (A) 2021 05:32 AM    Glucose Negative 2021 05:32 AM    Ketone Negative 2021 05:32 AM    Bilirubin Negative 2021 05:32 AM    Urobilinogen 0.2 2021 05:32 AM    Nitrites Negative 2021 05:32 AM    Leukocyte Esterase LARGE (A) 2021 05:32 AM    Epithelial cells FEW 2021 05:32 AM    Bacteria 2+ (A) 2021 05:32 AM    WBC >100 (H) 2021 05:32 AM    RBC 10-20 2021 05:32 AM       Imaging study:    Assessment &Plan    CKD stage V  HTN  Anemia of CKD  Gout flare up  Klebsiella PNA bacteremia    -Scr 4.7 below baseline  -K and C02 level stable  -no indication for RRT at this time  -continue prednisone 20 mg TID for 5 days  -on IV abx per ID  -RFP daily  -Plan is discussed with patient and wife @ [facility] discussed with:   Medical Team    Mary Nuñez MD  12/9/2021    [facility]:  [email or web address] [email or web address]  Meghana Reddy office:  [address], [address] [place] [address],[address]  [place], [address] [place] [address]  Phone: [phone]  Fax :     [phone]     [place] office:  [address] [facility], [address] [place] [address]  Phone - [phone]  Fax - [phone]

## 2021-12-09 NOTE — PROGRESS NOTES
12/9/2021  Case Management Progress Note    11:48 AM  Patient is 78year old male admitted 12/4 with hypotension   Patient's RUR is 21% red/high risk for readmission  Covid test: negative 12/3, patient will need another PCR, nursing aware  Chart reviewed--patient discussed at IDR rounds   Per rounds patient is to have EGD/scope tomorrow. Patient will need another PCR covid since he is not discharging today. Likely will be a weekend discharge, which should be okay with SAH. Will call and update [facility] today. Transition of Care Plan   1. Continue medical management/treatment  2. Procedure tomorrow  3. [facility] when ready   4. Transportation to be determined, likely family   5.  CM will continue to follow    MICKY Byers

## 2021-12-09 NOTE — CONSULTS
Orthopaedic Consult Note    December 9, 2021 11:33 AM     Patient: Brandon Pereira MRN: [id number]  SSN: [id number]    YOB: 1950  Age: 78 y.o. Sex: male      Admit date:  12/3/2021  Date of Surgery:  12/10/2021   Procedures:  Procedure(s):  ESOPHAGOGASTRODUODENOSCOPY (EGD)  COLONOSCOPY  Admitting Physician:  Buster Guerra MD   Surgeon:  Raúl Scott) and Role:     * Vero Fitch MD - Primary    Consulting Physician(s): Treatment Team: Attending Provider: Gil Conti MD; Consulting Provider: Renny Azul MD; Consulting Provider: Yina Wheat MD; Care Manager: Pato Washington; Consulting Provider: Vero Fitch MD; Consulting Provider: Renee Waters NP; Utilization Review: Ryann Patel RN    SUBJECTIVE:     Brandon Pereira is a 78 y.o. male we are very familiar with as we have been following since admission for polyarthralgias. We have been reconsulted due to increased left knee pain. Patient with a documented history of inflammatory arthopathy- M[facility] treated at [facility]. Patient with known history of right knee osteoarthritis and left knee osteoarthritis. He has a history of right knee arthrocentesis in the past for his osteoarthritis. He reports improved left knee pain, left ankle pain, left elbow pain, and right ankle pain after prednisone was started. He denies increasing neck pain today. Denies weakness. Denies bowel or bladder dysfunction. OBJECTIVE:       Physical Exam:  General: Alert, cooperative, no distress. Respiratory: Respirations unlabored  Neurological:  Neurovascular exam within normal limits. Motor: + DF/PF. Musculoskeletal: Calves soft, supple, non-tender upon palpation. BUE SILT, palp radial pulse, and neg Hoffmans. Left knee no open wound. Healed surgical scar proximal tibia from ACL reconstruction tunnel. AROM 0-115. No pain with AROM of the left knee. No pain with PROM of left knee. Obvious synovitis of left knee with mild joint effusion this morning. Pitting edema lower legs L > R, extends into left foot. Chronic skin discoloration c/w venous stasis. Ankle NTTP anterior joint, medial mall, and post tib tendon. TTP distal fibula, nathan ~7-8 cm above tibiotalar joint line. Hindfoot, midfoot, and forefoot NTTP. SILT. Palp DP. BLE generates good strength for resisted hip, knee, ankle, and toe motion. SILT. No clonus.     Vital Signs:      Patient Vitals for the past 8 hrs:   BP Temp Pulse Resp SpO2   21 1001 (!) 176/110 98.3 °F (36.8 °C) 89 18 99 %   21 0825 (!) 156/89       21 0824 (!) 160/100       21 0823 (!) 172/93  77 18 98 %   21 0705   79     21 0619 (!) 167/104       21 0548 (!) 179/88 98.1 °F (36.7 °C) 78 16 97 %                                          Temp (24hrs), Av °F (36.7 °C), Min:97.8 °F (36.6 °C), Max:98.3 °F (36.8 °C)      Labs:        Recent Labs     21  0537   HCT 28.4*   HGB 9.3*     Lab Results   Component Value Date/Time    Sodium 139 2021 05:37 AM    Potassium 4.9 2021 05:37 AM    Chloride 111 (H) 2021 05:37 AM    CO2 20 (L) 2021 05:37 AM    Glucose 98 2021 05:37 AM    BUN 88 (H) 2021 05:37 AM    Creatinine 4.72 (H) 2021 05:37 AM    Calcium 8.2 (L) 2021 05:37 AM       PT/OT:        Gait  Base of Support: Widened  Speed/Elizabeth: Pace decreased (<100 feet/min)  Step Length: Right shortened, Left shortened  Gait Abnormalities: Antalgic, Path deviations, Step to gait  Ambulation - Level of Assistance: Minimal assistance  Distance (ft): 80 Feet (ft)  Assistive Device: Walker, rolling, Gait belt       Patient mobility  Bed Mobility Training  Rolling:  (sitting on the edge of bed)  Supine to Sit:  (pt was received up and remained up)  Scooting: Minimum assistance, Assist x1, Additional time  Transfer Training  Sit to Stand: Minimum assistance  Stand to Sit: Contact guard assistance  Bed to Chair: Minimum assistance      Gait Training  Assistive Device: Walker, rolling, Gait belt  Ambulation - Level of Assistance: Minimal assistance  Distance (ft): 80 Feet (ft)            ASSESSMENT / PLAN:   Principal Problem:    Bacteremia due to Klebsiella pneumoniae (12/4/2021)    Active Problems:    Pulmonary hypertension ([facility] [address] ()      Overview: Ramon NP at [facility]      VIK on CPAP ()      Hypertension ()      Heart failure (HCC) ()      ESRD (end stage renal disease) on dialysis ([facility] [address] ()      Overview: Dr Charisse Wagner with [facility]      Chronic atrial fibrillation ([facility] [address] ()      Overview: [facility]      Cervical disc disorder ()      Overview: C5,C6 fractured disc and lumbar disc slipped      Weakness generalized (12/3/2021)      Anemia, chronic disease (12/3/2021)      Thrombocytopenia ([facility] [address] (12/4/2021)      UTI due to extended-spectrum beta lactamase (ESBL) producing Escherichia coli (12/8/2021)      Left knee pain (12/8/2021)         A: 1. Left and right knee osteoarthritis  2. 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 Long discussion with patient and family concerning his MSK examination and his diagnosis of uric acid arthropathy. Dr. Caio Rojas present during discussion. Explained the risks and benefits of an arthrocentesis in this setting and I recommended against this at this time. He has no PE findings of a septic joint and has no pain with ROM. I would not recommend arthrocentesis unless he becomes symptomatic and concern for septic arthritis. His polyarthralgia and PE is consistent with inflammatory arthropathy and treatment for this has been appropriately initiated. I will leave the contact for rheumatology in his chart as well as with orthopedic surgeon for future injections for his OA. Orthopedics will sign off please reconsult if we are needed. 2. DISPO: Per hospitalist  3.  Serum uric acid is pending, but needed for long term management.      Signed By:  Jennifer Iverson, [phone] [id number]

## 2021-12-09 NOTE — PROGRESS NOTES
Bedside shift change report given to Víctor (oncoming nurse) by Sree Miller (offgoing nurse). Report included the following information SBAR, Kardex, Intake/Output, MAR and Recent Results.

## 2021-12-09 NOTE — PROGRESS NOTES
Spoke with ESTEVAN odom for a [facility] [employer] at around [address] [facility], May have PO meds with sips of water except any blood thinners.

## 2021-12-09 NOTE — PROGRESS NOTES
Problem: Mobility Impaired (Adult and Pediatric)  Goal: *Acute Goals and Plan of Care (Insert Text)  Description: FUNCTIONAL STATUS PRIOR TO ADMISSION: Patient was modified independent using a single point cane for functional mobility. HOME SUPPORT PRIOR TO ADMISSION: The patient lived with wife but did not require assist.    Physical Therapy Goals  Initiated 12/4/2021  1. Patient will move from supine to sit and sit to supine  in bed with modified independence within 7 day(s). 2.  Patient will transfer from bed to chair and chair to bed with modified independence using the least restrictive device within 7 day(s). 3.  Patient will perform sit to stand with modified independence within 7 day(s). 4.  Patient will ambulate with modified independence for 150 feet with the least restrictive device within 7 day(s). Outcome: Progressing Towards Goal   PHYSICAL THERAPY TREATMENT  Patient: Philipp Avila (89 y.o. male)  Date: 12/9/2021  Diagnosis: Hypotension [I95.9]  Bacteremia [R78.81]   Bacteremia due to Klebsiella pneumoniae  Procedure(s) (LRB):  ESOPHAGOGASTRODUODENOSCOPY (EGD) (N/A)  COLONOSCOPY (N/A)    Precautions:  fall  Chart, physical therapy assessment, plan of care and goals were reviewed. ASSESSMENT  Patient continues with skilled PT services and is progressing towards goals. Communicated with nurse cleared for therapy. Patient supine on bed when received spouse at bedside agreed with all goals set for the patient. No complains of gout pain feeling much better today. Supine to sit SBA, sit to stand SBA, ambulate with rolling walker out on the 5th floor hallway SBA no loss of balance steady sitting and standing. OOB to chair as tolerated performed some active range of motion exercise on both LE all planes. Educate and instructed patient to continue active range of motion exercise on both legs while up on chair or on bed multiple times.  Recommend patient to be up on the chair at least 3 times a day every meal times as tolerated. Activated chair alarm and notified nurse who agreed to monitor patient. Current Level of Function Impacting Discharge (mobility/balance): SBA with transfers and ambulation using a rolling walker     Other factors to consider for discharge: fall         PLAN :  Patient continues to benefit from skilled intervention to address the above impairments. Continue treatment per established plan of care. to address goals. Recommendation for discharge: (in order for the patient to meet his/her long term goals)  Therapy 3 hours per day 5-7 days per week    This discharge recommendation:  Has been made in collaboration with the attending provider and/or case management    IF patient discharges home will need the following DME: patient owns DME required for discharge       SUBJECTIVE:   Patient stated feeling better today no more gout pain.     OBJECTIVE DATA SUMMARY:   Critical Behavior:  Neurologic State: Alert  Orientation Level: Oriented X4  Cognition: Follows commands  Safety/Judgement: Awareness of environment  Functional Mobility Training:  Bed Mobility:  Rolling: Stand-by assistance  Supine to Sit: Stand-by assistance  Sit to Supine: Stand-by assistance  Scooting: Stand-by assistance        Transfers:  Sit to Stand: Stand-by assistance  Stand to Sit: Stand-by assistance  Stand Pivot Transfers: Stand-by assistance     Bed to Chair: Stand-by assistance                    Balance:  Sitting: Intact  Standing: Impaired;  With support  Standing - Static: Good  Standing - Dynamic : Fair  Ambulation/Gait Training:  Distance (ft): 100 Feet (ft)  Assistive Device: Walker, rolling; Gait belt  Ambulation - Level of Assistance: Stand-by assistance     Gait Description (WDL): Exceptions to WDL  Gait Abnormalities: Path deviations              Speed/Elizabeth: Pace decreased (<100 feet/min)                          Therapeutic Exercises:   Educate and instructed patient to continue active range of motion exercise on both legs while up on chair or on bed multiple times. Recommend patient to be up on the chair at least 3 times a day every meal times as tolerated. Pain Ratin/10    Activity Tolerance:   Good    After treatment patient left in no apparent distress:   Sitting in chair, Heels elevated for pressure relief, Call bell within reach, Bed / chair alarm activated, and Caregiver / family present    COMMUNICATION/COLLABORATION:   The patients plan of care was discussed with: Occupational therapy assistant and Registered nurse. Hiram Frankel, PT,WCC.    Time Calculation: 24 mins

## 2021-12-09 NOTE — PROGRESS NOTES
Nathanael Sinha [facility] [address]  [address] [facility], [place], [id number] [place] [address]  [phone]      Medical Progress Note      NAME:         Thad Herron   :        1950  MRM:        [id number]    Date of service: 2021      Subjective: Patient has been seen and examined as a follow up for multiple medical issues. Chart, labs, diagnostics reviewed. He feels weak and much more awake and alert. No fever or chills. No abdominal discomfort. Objective:    Vital Signs:    Visit Vitals  BP (!) 176/110 (BP 1 Location: Left upper arm, BP Patient Position: Lying)   Pulse 89   Temp 98.3 °F (36.8 °C)   Resp 18   Ht 5' 10\" (1.778 m)   Wt 114.3 kg (252 lb)   SpO2 99%   BMI 36.16 kg/m²          Intake/Output Summary (Last 24 hours) at 2021 1105  Last data filed at 2021 0644  Gross per 24 hour   Intake 1080 ml   Output 0 ml   Net 1080 ml        Physical Examination:    General:   Weak and ill looking, although not in any acute distress   Eyes:   pale conjunctivae, PERRLA with no discharge. ENT:   no ottorrhea or rhinorrhea with dry mucous membranes  Neck: no masses, thyroid non-tender and trachea central.  Pulm: decreased but clear breath sounds without crackles or wheezes  Card:  no JVD or murmurs, has atrial fib, without thrills, bruits or peripheral edema  Abd:  Soft, non-tender, non-distended, normoactive bowel sounds   Musc:  No cyanosis, clubbing, atrophy or deformities. No knee swelling  Skin:  No rashes, bruising or ulcers. Neuro: Awake and alert.  Generally a non focal exam. Follows commands appropriately  Psych:  Has a fair insight, flat affect     Current Facility-Administered Medications   Medication Dose Route Frequency    lidocaine (XYLOCAINE) 10 mg/mL (1 %) injection 10-30 mL  10-30 mL SubCUTAneous Multiple    fentaNYL citrate (PF) injection  mcg   mcg IntraVENous Multiple    predniSONE (DELTASONE) tablet 20 mg  20 mg Oral TID WITH MEALS    fluticasone propionate (FLONASE) 50 mcg/actuation nasal spray 2 Spray  2 Spray Both Nostrils DAILY    meropenem (MERREM) 500 mg in sterile water (preservative free) 10 mL IV syringe  0.5 g IntraVENous Q12H    peg 3350-Electrolytes-Vit C (MOVIPREP) oral solution 1 L  1 L Oral Q12H    sodium bicarbonate tablet 650 mg  650 mg Oral TID    heparin (porcine) injection 5,000 Units  5,000 Units SubCUTAneous Q12H    docusate sodium (COLACE) capsule 100 mg  100 mg Oral BID    hydrALAZINE (APRESOLINE) 20 mg/mL injection 10 mg  10 mg IntraVENous Q6H PRN    carvediloL (COREG) tablet 12.5 mg  12.5 mg Oral BID WITH MEALS    morphine injection 2 mg  2 mg IntraVENous Q4H PRN    oxyCODONE-acetaminophen (PERCOCET) 5-325 mg per tablet 1 Tablet  1 Tablet Oral Q6H PRN    pantoprazole (PROTONIX) tablet 40 mg  40 mg Oral ACB    cloNIDine HCL (CATAPRES) tablet 0.1 mg  0.1 mg Oral BID    acetaminophen (TYLENOL) tablet 650 mg  650 mg Oral Q6H PRN    Or    acetaminophen (TYLENOL) suppository 650 mg  650 mg Rectal Q6H PRN    polyethylene glycol (MIRALAX) packet 17 g  17 g Oral DAILY PRN    ondansetron (ZOFRAN ODT) tablet 4 mg  4 mg Oral Q8H PRN    Or    ondansetron (ZOFRAN) injection 4 mg  4 mg IntraVENous Q6H PRN    [Held by provider] apixaban (ELIQUIS) tablet 5 mg  5 mg Oral BID    influenza vaccine 2021-22 (6 mos+)(PF) (FLUARIX/FLULAVAL/FLUZONE QUAD) injection 0.5 mL  1 Each IntraMUSCular PRIOR TO DISCHARGE        Laboratory data and review:    Recent Labs     12/08/21  0537 12/07/21  0619   WBC 6.9 7.9   HGB 9.3* 8.7*   HCT 28.4* 26.6*   PLT 85* 63*     Recent Labs     12/08/21  0537 12/07/21  0619    139   K 4.9 4.6   * 112*   CO2 20* 19*   GLU 98 114*   BUN 88* 94*   CREA 4.72* 4.80*   CA 8.2* 7.9*   MG 2.8*  --    PHOS 4.8*  --      No components found for: [place]    Diagnostics: Imaging studies have been reviewed    Telemetry reviewed by me:   ZAIDA    Assessment and Plan:    Bacteremia due to Klebsiella pneumoniae (12/4/2021) POA: likely due to the UTi. Echo with EF 40-45%. No obvious vegetations. Follow up blood cultures neg. Joshua catheter placed today. Continue IV Meropenem with plan to change to IV Ertapenem at discharge. ID following. CM for discharge planning. UTI due to extended-spectrum beta lactamase (ESBL) producing Escherichia coli (12/8/2021) + Non ESBL Klebsiella pneumoniae POA: CT abdomen and pelvis unremarkable. Continue IV Meropenem as noted above. ID following    Anemia, chronic disease ns blood loss POA: had a +FOBT and was initially not willing to have further testing but he has did change his mind. Re-evaluated by Gi who plan for an EGD + colonoscopy 12/10. Monitor Hgb    Left knee pain (12/8/2021) POA: MRi noted a large effusion with synovitis. No osteo. Has a complex tear on medial meniscus. Re-evaluated by ortho today. Patient has minimal swelling or symptoms today and actually feels better. No aspiration for now. Continue Prednisone. Continue pain control for now. Outpatient rheumatology and ortho follow up    CKD stage 5 POA: seen and followed by renal. No indication for RRT at this time. Monitor renal function. Continue Bicarb    Chronic atrial fibrillation (HCC) POA: rate controlled. Continue Coreg. Eliquis on hold for planned Gi testing and resume thereafter. Pulmonary hypertension ([address] / VIK on CPAP POA: outpatient follow up     Hypertension POA: continue Coreg, Clonidine    Weakness generalized (12/3/2021) / Cervical disc disorder POA: ambulate as tolerated    Thrombocytopenia ([address] (12/4/2021) POA: seems chronic.  Monitor     Total time spent for the patient's care: [facility] discussed with: Patient, Care Manager, Nursing Staff and Consultant/Specialist    Discussed:  Care Plan and D/C Planning    Prophylaxis:  H2B/PPI    Anticipated Disposition:   PT, OT, RN           ___________________________________________________    Attending Physician:   Michael Thompson MD

## 2021-12-09 NOTE — PROGRESS NOTES
Problem: Self Care Deficits Care Plan (Adult)  Goal: *Acute Goals and Plan of Care (Insert Text)  Description: FUNCTIONAL STATUS PRIOR TO ADMISSION: Patient was modified independent using a single point cane for functional mobility. HOME SUPPORT PRIOR TO ADMISSION: The patient lived with wife but did not require assist.    Occupational Therapy Goals  Initiated 12/5/2021  1. Patient will perform lower body dressing with minimal assistance/contact guard assist within 7 day(s). 2.  Patient will perform bathing with minimal assistance/contact guard assist within 7 day(s). 3.  Patient will perform toilet transfers with supervision/set-up within 7 day(s). 4.  Patient will perform all aspects of toileting with supervision/set-up within 7 day(s). 5.  Patient will participate in upper extremity therapeutic exercise/activities with supervision/set-up for 10 minutes within 7 day(s). 6.  Patient will utilize energy conservation techniques during functional activities with verbal cues within 7 day(s). Outcome: Progressing Towards Goal   OCCUPATIONAL THERAPY TREATMENT  Patient: John Johnson (89 y.o. male)  Date: 12/9/2021  Diagnosis: Hypotension [I95.9]  Bacteremia [R78.81]   Bacteremia due to Klebsiella pneumoniae  Procedure(s) (LRB):  ESOPHAGOGASTRODUODENOSCOPY (EGD) (N/A)  COLONOSCOPY (N/A)    Precautions:    Chart, occupational therapy assessment, plan of care, and goals were reviewed. ASSESSMENT  Patient continues with skilled OT services and is progressing towards goals. Pt with improved pain from gout today and wanting to participate with OT. Pt performs sit to stand with contact guard, ambulated to restroom, transfers to commode with contact guard. Pt unable to void and returned to chair to engage with 3 UE exercises. Wife present for tx.     Current Level of Function Impacting Discharge (ADLs): contact guard toileting    Other factors to consider for discharge:          PLAN :  Patient continues to benefit from skilled intervention to address the above impairments. Continue treatment per established plan of care to address goals. Recommend with staff:  Recommend patient be OOB to chair as frequently as tolerated; Goal of 3x/day for all meals for 60 minutes at a time. For toileting needs, recommend [facility] or bathroom with staff assist.                                             Encourage patient involvement in personal care as able. Encourage exercises frequently throughout the day.     Recommend next OT session: LB ADL's      Recommendation for discharge: (in order for the patient to meet his/her long term goals)  Therapy 3 hours per day 5-7 days per week    This discharge recommendation:  Has not yet been discussed the attending provider and/or case management    IF patient discharges home will need the following DME:        SUBJECTIVE:   Patient stated I am feeling better today    OBJECTIVE DATA SUMMARY:   Cognitive/Behavioral Status:  Neurologic State: Alert  Orientation Level: Oriented X4  Cognition: Follows commands             Functional Mobility and Transfers for ADLs:  Bed Mobility:   Stand by assist    Transfers:     Functional Transfers  Toilet Transfer : Contact guard assistance  Adaptive Equipment: Grab bars       Balance:Intact sitting balance       ADL Intervention:     Min assist UB dress  Moderate assist bathing and LB dress       Therapeutic Exercises:     EXERCISE   Sets   Reps   Active Active Assist   Passive   Comments   Finger flex/ext 1 10 [x]           []           []              Chest presses 1 10 [x]           []           []              Shoulder horizontal ab/add 1 10 [x]           []           []                 []           []           []                 []           []           []                 []           []           []                 []           []           [] []           []           []                 []           []           []                 []           []           []                 []           []           []                   Activity Tolerance:   Fair    After treatment patient left in no apparent distress:   Sitting in chair    COMMUNICATION/COLLABORATION:   The patients plan of care was discussed with: Physical therapist, Occupational therapist, and Registered nurse.      LORETO Woo  Time Calculation: 25 mins

## 2021-12-09 NOTE — PROCEDURES
Interventional Radiology  Procedure Note        12/9/2021 9:47 AM    Patient: Charly Lema     Informed consent obtained    Diagnosis: Bacteremia    Procedure(s): Tunneled central venous catheter    Specimens removed:  none    Complications: None    Primary Physician: Ellen Quigley MD    Recommendations: N/A    Discharge Disposition: return to floor    Full dictated report to follow    Ellen Quigley MD  Interventional Radiology  [facility] Radiology, [facility].  9:47 AM, 12/9/2021

## 2021-12-09 NOTE — PROGRESS NOTES
Comprehensive Nutrition Assessment    Type and Reason for Visit: Initial    Nutrition Recommendations/Plan:   1. Advance diet to Regular, Low Phos, Low Potassium as tolerated  2. Provide Nepro once daily once diet advances (420 kcal, 38 g Carbs, 19 g Protein)     Nutrition Assessment:    Pt is a 78year old male admitted with Hypotension [I95.9]  Bacteremia [R78.81]. He  has a past medical history of Atrial fibrillation ([facility] [address] Cervical disc disorder, Chronic atrial fibrillation ([facility] [address] ESRD (end stage renal disease) on dialysis ([facility] [address] Heart failure ([facility] [address] Hypertension, VIK on CPAP, and Pulmonary hypertension ([facility] [address] PO intake averaging 75% or more of all meals. Patient unavailable at time of RD visit. Weight appears stable x 2 mo. No noted chewing/swallowing problems. No noted N/V. Currently prepping for colonoscopy tomorrow. Wt Readings from Last 10 Encounters:   12/08/21 114.3 kg (252 lb)   10/24/21 113.3 kg (249 lb 12.5 oz)   01/07/18 120.2 kg (265 lb)       Malnutrition Assessment:  Malnutrition Status:  Mild malnutrition    Context:  Chronic illness     Findings of the 6 clinical characteristics of malnutrition:   Energy Intake:  No significant decrease in energy intake  Weight Loss:  No significant weight loss     Body Fat Loss:  No significant body fat loss,     Muscle Mass Loss:  No significant muscle mass loss,    Fluid Accumulation:  7 - Severe, Extremities   Strength:  Not performed     Estimated Daily Nutrient Needs:  Energy (kcal): 8369-4377 (25-35, ESRD); Weight Used for Energy Requirements: Current  Protein (g): 114-136 (1.0-1.2); Weight Used for Protein Requirements: Current  Fluid (ml/day): 1500 or per MD; Method Used for Fluid Requirements: Standard renal    Nutrition Related Findings:       ABD: Hypoactive bowel sounds 12/8  Last BM: 12/9 - bowel prep  Edema: +1 Pitting LLE; +2 non-pitting RUE; +2 pitting RLE noted 12/9    Nutr.  Labs:  Lab Results   Component Value Date/Time    GFR est AA 14 (L) 12/09/2021 10:12 AM    GFR est non-AA 11 (L) 12/09/2021 10:12 AM    Creatinine 5.08 (H) 12/09/2021 10:12 AM    BUN 96 (H) 12/09/2021 10:12 AM    Sodium 139 12/09/2021 10:12 AM    Potassium 5.2 (H) 12/09/2021 10:12 AM    Chloride 111 (H) 12/09/2021 10:12 AM    CO2 21 12/09/2021 10:12 AM     Lab Results   Component Value Date/Time    Glucose 131 (H) 12/09/2021 10:12 AM    Glucose (POC) 110 10/29/2021 09:15 PM     No results found for: HBA1C, FWH3JZCC, ORI5EAAN, AZV2KYZI    Nutr. Meds:  Coreg  Catapres  Colace  Merrem  Protonix  Miralax PRN  NaHCO3    Wounds:    None       Current Nutrition Therapies:  ADULT DIET Clear Liquid  DIET NPO    Anthropometric Measures:  · Height:  5' 10\" (177.8 cm)  · Current Body Wt:  114.3 kg (252 lb)   · Admission Body Wt:       · Usual Body Wt:        · Ideal Body Wt:  166 lbs:  151.8 %   · Body mass index is 36.16 kg/m². · BMI Category:  Obese class 2 (BMI 35.0-39. 9)       Nutrition Diagnosis:   · Mild malnutrition related to renal dysfunction as evidenced by localized or generalized fluid accumulation, dialysis    Nutrition Interventions:   Food and/or Nutrient Delivery: Modify current diet, Start oral nutrition supplement  Nutrition Education and Counseling: No recommendations at this time  Coordination of Nutrition Care: Continue to monitor while inpatient, Interdisciplinary rounds    Goals:  PO intake >/= 75% of all meals and supplements within 5 - 7 days       Nutrition Monitoring and Evaluation:   Behavioral-Environmental Outcomes: None identified  Food/Nutrient Intake Outcomes: Food and nutrient intake, Supplement intake  Physical Signs/Symptoms Outcomes: Biochemical data, Weight    Discharge Planning:    Continue oral nutrition supplement     Electronically signed by Maisha Yao RD on 81/2/3390   Contact: [phone] or via [place]

## 2021-12-09 NOTE — PROGRESS NOTES
Serum uric acid 10.9. Goal of serum uric acid <6.2. Patient needs followup with rheumatology for long term gout management. Acute management with oral steroids now in light of bacteremia. Tight glycemic control. Orthopedics will sign off. Please reconsult as needed.     ELI CrockerC  Orthopaedic Surgery PA  [address] [facility]

## 2021-12-09 NOTE — PROGRESS NOTES
TRANSFER - OUT REPORT:    Verbal report given to be given to floor RN on Bharath Hammond being transferred to 6(unit) for routine progression of care       Report consisted of patient's Situation, Background, Assessment and   Recommendations(SBAR). Information from the following report(s) Procedure Summary was reviewed with the receiving nurse. Opportunity for questions and clarification was provided.

## 2021-12-09 NOTE — PROGRESS NOTES
TRANSFER - IN REPORT:    Verbal report received from Floor RN(name) on [facility] Haroldo Cull  being received from 516(unit) for ordered procedure      Report consisted of patients Situation, Background, Assessment and   Recommendations(SBAR). Information from the following report(s) SBAR was reviewed with the receiving nurse. Opportunity for questions and clarification was provided. Assessment completed upon patients arrival to unit and care assumed.

## 2021-12-09 NOTE — PROGRESS NOTES
Bedside and Verbal shift change report given to JOSIAH Gibbons (oncoming nurse) by [facility] (offgoing nurse). Report included the following information SBAR, Kardex, Intake/Output, MAR, Recent Results and Med Rec Status.

## 2021-12-09 NOTE — PROGRESS NOTES
Merlin Simmonds. Ziggy Harley MD  [phone] office  [phone] [facility]     Gastroenterology Progress Note    December 9, 2021  Admit Date: 12/3/2021         Narrative Assessment and Plan   · Anemia, normocytic normochromic  · Guaiac positive stool  · Thrombocytopenia - not felt to be related to GI tract as liver imaging normal, bilirubin and liver enzymes normal, no evidence of cirrhosis. Up to 85k today from 55k earlier. Discussed EGD/colonoscopy. Introduced myself to his wife. He is proceeding with colonoscopy prep. Procedures scheduled 12/10/21. Subjective:   · CC: knee pain    Location:  proceding with prep  Duration: no vomiting  Timing: no overt GI blood loss  Radiation:    Associated Symptoms:   Aggravating/Alleviating factors:     ROS:  The previous review of systems on initial consultation / H&P is noted and reviewed. Specific changes noted above in HPI.     Current Medications:     Current Facility-Administered Medications   Medication Dose Route Frequency    lidocaine (XYLOCAINE) 10 mg/mL (1 %) injection 10-30 mL  10-30 mL SubCUTAneous Multiple    fentaNYL citrate (PF) injection  mcg   mcg IntraVENous Multiple    predniSONE (DELTASONE) tablet 20 mg  20 mg Oral TID WITH MEALS    fluticasone propionate (FLONASE) 50 mcg/actuation nasal spray 2 Spray  2 Spray Both Nostrils DAILY    meropenem (MERREM) 500 mg in sterile water (preservative free) 10 mL IV syringe  0.5 g IntraVENous Q12H    peg 3350-Electrolytes-Vit C (MOVIPREP) oral solution 1 L  1 L Oral Q12H    sodium bicarbonate tablet 650 mg  650 mg Oral TID    heparin (porcine) injection 5,000 Units  5,000 Units SubCUTAneous Q12H    docusate sodium (COLACE) capsule 100 mg  100 mg Oral BID    hydrALAZINE (APRESOLINE) 20 mg/mL injection 10 mg  10 mg IntraVENous Q6H PRN    carvediloL (COREG) tablet 12.5 mg  12.5 mg Oral BID WITH MEALS    morphine injection 2 mg  2 mg IntraVENous Q4H PRN    oxyCODONE-acetaminophen (PERCOCET) 5-325 mg per tablet 1 Tablet  1 Tablet Oral Q6H PRN    pantoprazole (PROTONIX) tablet 40 mg  40 mg Oral ACB    cloNIDine HCL (CATAPRES) tablet 0.1 mg  0.1 mg Oral BID    acetaminophen (TYLENOL) tablet 650 mg  650 mg Oral Q6H PRN    Or    acetaminophen (TYLENOL) suppository 650 mg  650 mg Rectal Q6H PRN    polyethylene glycol (MIRALAX) packet 17 g  17 g Oral DAILY PRN    ondansetron (ZOFRAN ODT) tablet 4 mg  4 mg Oral Q8H PRN    Or    ondansetron (ZOFRAN) injection 4 mg  4 mg IntraVENous Q6H PRN    [Held by provider] apixaban (ELIQUIS) tablet 5 mg  5 mg Oral BID    influenza vaccine  (6 mos+)(PF) (FLUARIX/FLULAVAL/FLUZONE QUAD) injection 0.5 mL  1 Each IntraMUSCular PRIOR TO DISCHARGE       Objective:     VITALS:   Last 24hrs VS reviewed since prior progress note. Most recent are:  Visit Vitals  BP (!) 176/110 (BP 1 Location: Left upper arm, BP Patient Position: Lying)   Pulse 89   Temp 98.3 °F (36.8 °C)   Resp 18   Ht 5' 10\" (1.778 m)   Wt 114.3 kg (252 lb)   SpO2 99%   BMI 36.16 kg/m²     Temp (24hrs), Av °F (36.7 °C), Min:97.8 °F (36.6 °C), Max:98.3 °F (36.8 °C)      Intake/Output Summary (Last 24 hours) at 2021 1208  Last data filed at 2021 0644  Gross per 24 hour   Intake 1080 ml   Output 0 ml   Net 1080 ml       EXAM:  General:  Comfortable, no distress    HEENT:  Atraumatic skull, pupils equal      Lab Data Reviewed:   Recent Labs     21  0537 21  0619   WBC 6.9 7.9   HGB 9.3* 8.7*   HCT 28.4* 26.6*   PLT 85* 63*     Recent Labs     21  1012 21  0537 21  0619    139 139   K 5.2* 4.9 4.6   * 111* 112*   CO2 21 20* 19*   * 98 114*   BUN 96* 88* 94*   CREA 5.08* 4.72* 4.80*   CA 8.3* 8.2* 7.9*   MG  --  2.8*  --    PHOS  --  4.8*  --      Lab Results   Component Value Date/Time    Glucose (POC) 110 10/29/2021 09:15 PM     No results for input(s): PH, PCO2, PO2, HCO3, FIO2 in the last 72 hours.   No results for input(s): INR, INREXT in the last 72 hours.         Assessment:   (See above)  Principal Problem:    Bacteremia due to Klebsiella pneumoniae (12/4/2021)    Active Problems:    Pulmonary hypertension (HCC) ()      Overview: Ramon NP at [facility]      VIK on CPAP ()      Hypertension ()      Heart failure (HCC) ()      ESRD (end stage renal disease) on dialysis [facility]) ()      Overview: Dr Dwain Sumner with [facility]      Chronic atrial fibrillation ([facility] [address] ()      Overview: Kendal [facility]      Cervical disc disorder ()      Overview: C5,C6 fractured disc and lumbar disc slipped      Weakness generalized (12/3/2021)      Anemia, chronic disease (12/3/2021)      Thrombocytopenia ([facility] [address] (12/4/2021)      UTI due to extended-spectrum beta lactamase (ESBL) producing Escherichia coli (12/8/2021)      Left knee pain (12/8/2021)        Plan:   (See above)      Signed By: Dusty Stewart MD     12/9/2021  12:08 PM

## 2021-12-09 NOTE — PROGRESS NOTES
Infectious Disease Progress Note         Interval:  NAEO    Subjective:   Patient seen this morning. Sitting in bed. Patient feels well today. He is denying any acute issues. He reports that the prednisone has helped the arthritis flareups a lot as it always does. Objective:    Vitals:   Reviewed in chart. Physical Exam:  Gen: No apparent distress  HEENT:  Normocephalic, atraumatic, no scleral icterus, no thrush,  CV: HDS  Lungs: Clear to auscultation bilaterally, no wheezing  Abdomen: soft, non tender, non distended  Genitourinary:  no kruger catheter   Skin: no rash   Psych: good affect, good eye contact, non tearful  Neuro: alert, oriented to time,  place, and situation, moves all extremities to commands, verbal  Musculoskeletal:  Left ankle and lower leg are edematous and very tender on superficial palpation. Lines: PIVs        Labs:  Recent Results (from the past 24 hour(s))   URIC ACID    Collection Time: 12/09/21 10:12 AM   Result Value Ref Range    Uric acid 10.9 (H) 3.5 - 7.2 MG/DL   METABOLIC PANEL, BASIC    Collection Time: 12/09/21 10:12 AM   Result Value Ref Range    Sodium 139 136 - 145 mmol/L    Potassium 5.2 (H) 3.5 - 5.1 mmol/L    Chloride 111 (H) 97 - 108 mmol/L    CO2 21 21 - 32 mmol/L    Anion gap 7 5 - 15 mmol/L    Glucose 131 (H) 65 - 100 mg/dL    BUN 96 (H) 6 - 20 MG/DL    Creatinine 5.08 (H) 0.70 - 1.30 MG/DL    BUN/Creatinine ratio 19 12 - 20      GFR est AA 14 (L) >60 ml/min/1.73m2    GFR est non-AA 11 (L) >60 ml/min/1.73m2    Calcium 8.3 (L) 8.5 - 10.1 MG/DL             Assessment:  79 yo M with:    - Sepsis due to Klebsiella pneumoniae bacteremia. Unclear source exactly; probably urine: U/A had pyuria 10/20 cells, and urine cultures with >100K GNR.   - Left ankle and lower leg recurrent cellulitis this admission.   - LLE cellulitis in Oct 2021. Seen by ID at that time - per patient has resolved.   - Hx of chronic prednisone on a daily basis for ~3 yrs for \"inflammatory\" arthritis, patient reports \"gout\", by Dr. Abby Juares at [facility]. - Allergies listed to PCN and levofloxacin. Discussed in depth with patient. - Incidental pericardial effusion, cardiomegaly, pericardial effusion,  cholelithiasis, prostatomegaly noted on CT.   - Hx of pulmonary hypertension, stage V chronic kidney disease, and atrial fibrillation    Patient has home catheter, placed 12/9/2021. Recommendations:  -ESBL E. coli and non-ESBL Klebsiella pneumonia and urine cultures. E.coli is likely a colonizer in the current setting. Patient had reported some dysuria during his hospital stay here. - Ertapenem 1 g daily  -Left knee joint effusion is to be evaluated by orthopedics as an outpatient. Clinically there is no suspicion for infection involvement at this time.  -Patient must have a rheumatology evaluation on an outpatient basis given his significant history of polyarthritis for about 3 to 4 years for which he has been on prednisone as well. -We will plan for at least 2 weeks of IV antibiotics. -Duration 2 weeks: 12/8/2021 to 12/22/2021   -Follow-up with ID Dr. Geri Pham in 2 weeks. ? Please have labs done on weekly basis for CBC with diff, CMP  ? Fax lab to [phone] [facility] Critical Lab Values to [phone].    ? Please ensure that patient has PICC. Joshua care arranged per protocol   ? May send to IR for line evaluation or replacement -[phone]  ? Smoking cessation encouraged if patient is current smoker to aid in infection and wound healing                All of the above explained to the patient and his wife at bedside today. ID will sign off now, please recall as needed. Thank you for the opportunity to participate in the care of this patient. Please contact with questions or concerns.       Mihaela Miguel MD  Infectious Diseases

## 2021-12-10 ENCOUNTER — ANESTHESIA (OUTPATIENT)
Dept: ENDOSCOPY | Age: 71
DRG: 871 | End: 2021-12-10
Payer: MEDICARE

## 2021-12-10 VITALS
DIASTOLIC BLOOD PRESSURE: 99 MMHG | BODY MASS INDEX: 35.44 KG/M2 | HEART RATE: 87 BPM | HEIGHT: 70 IN | RESPIRATION RATE: 18 BRPM | OXYGEN SATURATION: 99 % | SYSTOLIC BLOOD PRESSURE: 168 MMHG | TEMPERATURE: 97.4 F | WEIGHT: 247.58 LBS

## 2021-12-10 LAB
SARS-COV-2, XPLCVT: NOT DETECTED
SOURCE, COVRS: NORMAL

## 2021-12-10 PROCEDURE — 88305 TISSUE EXAM BY PATHOLOGIST: CPT

## 2021-12-10 PROCEDURE — 0DBK8ZZ EXCISION OF ASCENDING COLON, VIA NATURAL OR ARTIFICIAL OPENING ENDOSCOPIC: ICD-10-PCS | Performed by: SPECIALIST

## 2021-12-10 PROCEDURE — 76040000019: Performed by: SPECIALIST

## 2021-12-10 PROCEDURE — 0DB98ZX EXCISION OF DUODENUM, VIA NATURAL OR ARTIFICIAL OPENING ENDOSCOPIC, DIAGNOSTIC: ICD-10-PCS | Performed by: SPECIALIST

## 2021-12-10 PROCEDURE — 74011250637 HC RX REV CODE- 250/637: Performed by: INTERNAL MEDICINE

## 2021-12-10 PROCEDURE — 74011000250 HC RX REV CODE- 250: Performed by: STUDENT IN AN ORGANIZED HEALTH CARE EDUCATION/TRAINING PROGRAM

## 2021-12-10 PROCEDURE — 97116 GAIT TRAINING THERAPY: CPT

## 2021-12-10 PROCEDURE — 77030021593 HC FCPS BIOP ENDOSC BSC -A: Performed by: SPECIALIST

## 2021-12-10 PROCEDURE — 74011250636 HC RX REV CODE- 250/636: Performed by: STUDENT IN AN ORGANIZED HEALTH CARE EDUCATION/TRAINING PROGRAM

## 2021-12-10 PROCEDURE — 74011636637 HC RX REV CODE- 636/637: Performed by: INTERNAL MEDICINE

## 2021-12-10 PROCEDURE — 74011000250 HC RX REV CODE- 250: Performed by: NURSE ANESTHETIST, CERTIFIED REGISTERED

## 2021-12-10 PROCEDURE — 97530 THERAPEUTIC ACTIVITIES: CPT

## 2021-12-10 PROCEDURE — 77030010936 HC CLP LIG BSC -C: Performed by: SPECIALIST

## 2021-12-10 PROCEDURE — 90686 IIV4 VACC NO PRSV 0.5 ML IM: CPT | Performed by: INTERNAL MEDICINE

## 2021-12-10 PROCEDURE — 74011250636 HC RX REV CODE- 250/636: Performed by: INTERNAL MEDICINE

## 2021-12-10 PROCEDURE — 0W3P8ZZ CONTROL BLEEDING IN GASTROINTESTINAL TRACT, VIA NATURAL OR ARTIFICIAL OPENING ENDOSCOPIC: ICD-10-PCS | Performed by: SPECIALIST

## 2021-12-10 PROCEDURE — 74011250636 HC RX REV CODE- 250/636: Performed by: NURSE ANESTHETIST, CERTIFIED REGISTERED

## 2021-12-10 PROCEDURE — 74011250637 HC RX REV CODE- 250/637: Performed by: SPECIALIST

## 2021-12-10 PROCEDURE — 77030013992 HC SNR POLYP ENDOSC BSC -B: Performed by: SPECIALIST

## 2021-12-10 PROCEDURE — 74011000258 HC RX REV CODE- 258: Performed by: INTERNAL MEDICINE

## 2021-12-10 PROCEDURE — 90471 IMMUNIZATION ADMIN: CPT

## 2021-12-10 PROCEDURE — 76060000031 HC ANESTHESIA FIRST 0.5 HR: Performed by: SPECIALIST

## 2021-12-10 PROCEDURE — 2709999900 HC NON-CHARGEABLE SUPPLY: Performed by: SPECIALIST

## 2021-12-10 DEVICE — WORKING LENGTH 235CM, WORKING CHANNEL 2.8MM
Type: IMPLANTABLE DEVICE | Site: TRANSVERSE COLON | Status: FUNCTIONAL
Brand: RESOLUTION 360 CLIP

## 2021-12-10 RX ORDER — OXYCODONE AND ACETAMINOPHEN 5; 325 MG/1; MG/1
1 TABLET ORAL
Qty: 3 TABLET | Refills: 0 | Status: SHIPPED | OUTPATIENT
Start: 2021-12-10 | End: 2021-12-10

## 2021-12-10 RX ORDER — MIDAZOLAM HYDROCHLORIDE 1 MG/ML
.25-5 INJECTION, SOLUTION INTRAMUSCULAR; INTRAVENOUS AS NEEDED
Status: DISCONTINUED | OUTPATIENT
Start: 2021-12-10 | End: 2021-12-10 | Stop reason: HOSPADM

## 2021-12-10 RX ORDER — POLYETHYLENE GLYCOL 3350 17 G/17G
17 POWDER, FOR SOLUTION ORAL
Qty: 15 PACKET | Refills: 0 | Status: SHIPPED
Start: 2021-12-10 | End: 2021-12-10

## 2021-12-10 RX ORDER — OXYCODONE AND ACETAMINOPHEN 5; 325 MG/1; MG/1
1 TABLET ORAL
Qty: 10 TABLET | Refills: 0 | Status: SHIPPED | OUTPATIENT
Start: 2021-12-10 | End: 2021-12-21

## 2021-12-10 RX ORDER — LIDOCAINE HYDROCHLORIDE 20 MG/ML
INJECTION, SOLUTION EPIDURAL; INFILTRATION; INTRACAUDAL; PERINEURAL AS NEEDED
Status: DISCONTINUED | OUTPATIENT
Start: 2021-12-10 | End: 2021-12-10 | Stop reason: HOSPADM

## 2021-12-10 RX ORDER — PREDNISONE 20 MG/1
20 TABLET ORAL
Qty: 9 TABLET | Refills: 0 | Status: SHIPPED
Start: 2021-12-10 | End: 2021-12-10

## 2021-12-10 RX ORDER — FLUMAZENIL 0.1 MG/ML
0.2 INJECTION INTRAVENOUS
Status: DISCONTINUED | OUTPATIENT
Start: 2021-12-10 | End: 2021-12-10 | Stop reason: HOSPADM

## 2021-12-10 RX ORDER — DEXTROMETHORPHAN/PSEUDOEPHED 2.5-7.5/.8
1.2 DROPS ORAL
Status: DISCONTINUED | OUTPATIENT
Start: 2021-12-10 | End: 2021-12-10 | Stop reason: HOSPADM

## 2021-12-10 RX ORDER — FENTANYL CITRATE 50 UG/ML
25 INJECTION, SOLUTION INTRAMUSCULAR; INTRAVENOUS AS NEEDED
Status: DISCONTINUED | OUTPATIENT
Start: 2021-12-10 | End: 2021-12-10 | Stop reason: HOSPADM

## 2021-12-10 RX ORDER — NALOXONE HYDROCHLORIDE 0.4 MG/ML
0.4 INJECTION, SOLUTION INTRAMUSCULAR; INTRAVENOUS; SUBCUTANEOUS
Status: DISCONTINUED | OUTPATIENT
Start: 2021-12-10 | End: 2021-12-10 | Stop reason: HOSPADM

## 2021-12-10 RX ORDER — PANTOPRAZOLE SODIUM 40 MG/1
40 TABLET, DELAYED RELEASE ORAL
Qty: 14 TABLET | Refills: 0 | Status: SHIPPED
Start: 2021-12-11 | End: 2021-12-10

## 2021-12-10 RX ORDER — SODIUM BICARBONATE 650 MG/1
650 TABLET ORAL 3 TIMES DAILY
Qty: 90 TABLET | Refills: 0 | Status: SHIPPED | OUTPATIENT
Start: 2021-12-10 | End: 2022-01-09

## 2021-12-10 RX ORDER — SODIUM CHLORIDE 9 MG/ML
50 INJECTION, SOLUTION INTRAVENOUS CONTINUOUS
Status: DISPENSED | OUTPATIENT
Start: 2021-12-10 | End: 2021-12-10

## 2021-12-10 RX ORDER — PROPOFOL 10 MG/ML
INJECTION, EMULSION INTRAVENOUS AS NEEDED
Status: DISCONTINUED | OUTPATIENT
Start: 2021-12-10 | End: 2021-12-10 | Stop reason: HOSPADM

## 2021-12-10 RX ORDER — SODIUM BICARBONATE 650 MG/1
650 TABLET ORAL 3 TIMES DAILY
Qty: 90 TABLET | Refills: 0 | Status: SHIPPED
Start: 2021-12-10 | End: 2021-12-10

## 2021-12-10 RX ORDER — PROPOFOL 10 MG/ML
INJECTION, EMULSION INTRAVENOUS
Status: DISCONTINUED | OUTPATIENT
Start: 2021-12-10 | End: 2021-12-10 | Stop reason: HOSPADM

## 2021-12-10 RX ORDER — PANTOPRAZOLE SODIUM 40 MG/1
40 TABLET, DELAYED RELEASE ORAL
Qty: 30 TABLET | Refills: 0 | Status: SHIPPED | OUTPATIENT
Start: 2021-12-11 | End: 2022-01-10

## 2021-12-10 RX ORDER — DOCUSATE SODIUM 100 MG/1
100 CAPSULE, LIQUID FILLED ORAL 2 TIMES DAILY
Qty: 60 CAPSULE | Refills: 2 | Status: SHIPPED
Start: 2021-12-10 | End: 2021-12-10

## 2021-12-10 RX ADMIN — PROPOFOL INJECTABLE EMULSION 80 MG: 10 INJECTION, EMULSION INTRAVENOUS at 09:40

## 2021-12-10 RX ADMIN — PREDNISONE 20 MG: 20 TABLET ORAL at 10:58

## 2021-12-10 RX ADMIN — FLUTICASONE PROPIONATE 2 SPRAY: 50 SPRAY, METERED NASAL at 10:59

## 2021-12-10 RX ADMIN — PROPOFOL INJECTABLE EMULSION 20 MG: 10 INJECTION, EMULSION INTRAVENOUS at 09:46

## 2021-12-10 RX ADMIN — LIDOCAINE HYDROCHLORIDE 80 MG: 20 INJECTION, SOLUTION INTRAVENOUS at 09:39

## 2021-12-10 RX ADMIN — SODIUM ZIRCONIUM CYCLOSILICATE 10 G: 10 POWDER, FOR SUSPENSION ORAL at 13:19

## 2021-12-10 RX ADMIN — SODIUM BICARBONATE 650 MG: 650 TABLET ORAL at 10:58

## 2021-12-10 RX ADMIN — ERTAPENEM SODIUM 1 G: 1 INJECTION, POWDER, LYOPHILIZED, FOR SOLUTION INTRAMUSCULAR; INTRAVENOUS at 13:22

## 2021-12-10 RX ADMIN — CARVEDILOL 12.5 MG: 12.5 TABLET, FILM COATED ORAL at 10:58

## 2021-12-10 RX ADMIN — SIMETHICONE 80 MG: 20 SUSPENSION/ DROPS ORAL at 09:48

## 2021-12-10 RX ADMIN — CLONIDINE HYDROCHLORIDE 0.1 MG: 0.1 TABLET ORAL at 10:58

## 2021-12-10 RX ADMIN — MEROPENEM 500 MG: 500 INJECTION, POWDER, FOR SOLUTION INTRAVENOUS at 04:03

## 2021-12-10 RX ADMIN — PROPOFOL INJECTABLE EMULSION 30 MG: 10 INJECTION, EMULSION INTRAVENOUS at 09:42

## 2021-12-10 RX ADMIN — INFLUENZA VIRUS VACCINE 0.5 ML: 15; 15; 15; 15 SUSPENSION INTRAMUSCULAR at 14:51

## 2021-12-10 RX ADMIN — PROPOFOL 150 MCG/KG/MIN: 10 INJECTION, EMULSION INTRAVENOUS at 09:45

## 2021-12-10 RX ADMIN — PROPOFOL INJECTABLE EMULSION 20 MG: 10 INJECTION, EMULSION INTRAVENOUS at 09:45

## 2021-12-10 RX ADMIN — PROPOFOL INJECTABLE EMULSION 30 MG: 10 INJECTION, EMULSION INTRAVENOUS at 09:44

## 2021-12-10 NOTE — PROGRESS NOTES
Dr. Gómez Shock discussed with spouse procedure findings and next steps. Glasses returned to patient.

## 2021-12-10 NOTE — PROGRESS NOTES
Discharge orders in    Discharge education, instructions, and documents given to patient and spouse and verbalized understanding. IV cannula aseptically removed. Tip intact. Tele removed. [facility] cath patent and intact, no bleeding noted- education provided by home health regarding IV ABT administration. Supplies were given to patient by [facility]. Patient no pain. No distress. Respirations even and unlabored. No complaints. Spouse to transfer patient home. AVS electronically signed by patient.

## 2021-12-10 NOTE — PROGRESS NOTES
Phone call from Dr. Kristal John acknowledging that heparin subcut is being held due to patient's planned endo 12/10/2021. Pt is NPO.

## 2021-12-10 NOTE — PERIOP NOTES
Allison Brain [address] [facility]  1950  [id number]    Situation:    Scheduled Procedure: Procedure(s):  ESOPHAGOGASTRODUODENOSCOPY (EGD)  COLONOSCOPY  Verbal report received from: JOSIAH Alba  Preoperative diagnosis: anemia    Background:    Procedure: Procedure(s):  ESOPHAGOGASTRODUODENOSCOPY (EGD)  COLONOSCOPY  Physician performing procedure; Dr. Marlene Preston RN    NPO Status/Last PO Intake: yes    Pregnancy Test:Not applicable If yes, result: none    Is the patient taking Blood Thinners: YES If yes, list: eliquis and heparin and last taken 12/8  Is the patient diabetic:no       If yes, what was the last BS:  n/a  Time taken?  n/a  Anything given? n/a          Does the patient have a Pacemaker/Defibrillator in place?: no   Does the patient need antibiotics before/during/after procedure: no   If the patient is having a colon, How much prep was drank? All    What were the Colon prep results? Liquid/brown   Does the patient have SCD in place:no   Is patient on CONTACT precautions:yes        If yes, what kind of CONTACT precautions: awaiting covid results    Assessment:  Are the vital signs stable prior to patient coming to ENDO?  yes  Is the patient alert/oriented and able to sign consent for the procedures:yes  How does the patient's abdomen feel prior to coming to ENDO? Will assess when patient arrives to unit   Does the patient have a patient IV in place?  Yes, hon cath R chest, R FA 20g     Recommendation:  Family or Friend present no     Permission to share finding with Family or Friend n/a

## 2021-12-10 NOTE — DISCHARGE INSTRUCTIONS
TRANSFER  INSTRUCTIONS    NAME: Manoj Hameed   :  1950   MRN:  [id number]     Date:    12/10/2021     ADMIT DATE: 12/3/2021     TRANSFER DATE: 12/10/2021     DISPOSITION: Rehab    DISCHARGE DIAGNOSIS:  Principal Problem:    Bacteremia due to Klebsiella pneumoniae (2021)    UTI due to extended-spectrum beta lactamase (ESBL) producing Escherichia coli (2021)    Left knee pain (2021)    Pulmonary hypertension ([facility] [address]): Overview: Ramon PEREZ at [facility]    CKD stage V not on dialysis    Chronic atrial fibrillation ([facility] [address]): Overview: [facility]    Cervical disc disorder: Overview: C5,C6 fractured disc and lumbar disc slipped    Weakness generalized (12/3/2021)    Anemia, chronic disease (12/3/2021)    Thrombocytopenia ([facility] [address] (2021)    VIK on CPAP     Hypertension     MEDICATIONS: See medication list on the AVS. Start taking Eliquis tomorrow 21 evening. Check BMP in 5 days and notify nephrology      Recommended diet:  Renal Diet    Recommended activity: Activity as tolerated    Follow Up:     Follow-up Information     Follow up With Specialties Details Why Contact Info    Pat Lock MD Orthopedic Surgery In 12 weeks  [address] [facility]  [address] [place] [address]., MD Rheumatology In 3 weeks Inflammatory arthropathy management  [address] [facility] [phone]      [facility],  Infectious Disease Call to schedule follow up and review in 2 weeks or after rehab [facility], [address]   [address] [place] [address]  [address] [place] [address] [phone]      Padmini Giron MD Nephrology, Hospitalist Call to confirm follow up for your kidney disease [address]  [address] [phone]            Information obtained by :  I understand that if any problems occur once I am at home I am to contact my physician. I understand and acknowledge receipt of the instructions indicated above.                                                                                                                                            Physician's or R.N.'s Signature                                                                  Date/Time                                                                                                                                              Patient or Representative Signature                                                          Date/Time

## 2021-12-10 NOTE — PROGRESS NOTES
1625 phone call to [phone], option 0, notified male answering service personnel that this Elizabeth Can is just letting the oncall GI provider know that this Elizabeth Juan José is holding patient's heparin since patient for endo/GI procedure on 12/10/2021. This Elizabeth Can does not need phone call back, but did provide patient's name, , rm number and this unit 5th floor [phone] number just as a reference.

## 2021-12-10 NOTE — PERIOP NOTES
2552 Procedure being performed under MAC; JOEY Cheatham Ala at bedside monitoring patient. See anesthesia notes. 1003 Endoscope was pre-cleaned at bedside immediately following procedure by [employer]. 1008 Patient received Lidocaine and Propofol, per JOEY Cheatham Ala. Care of patient assumed from the anesthesia provider. Patient tolerated procedure well. Abdomen remains soft and non tender post procedure, no complaints or indication of discomfort noted at this time. See anesthesia note. Patient transferred to Endoscopy Recovery and report given to recovery nurse. 1026 TRANSFER - OUT REPORT:    Verbal report given to Edwige RAHMAN (name) on Philipp Avila  being transferred to [facility] [phone] (unit) for routine progression of care       Report consisted of patients Situation, Background, Assessment and   Recommendations(SBAR). Information from the following report(s) SBAR, Procedure Summary and Recent Results was reviewed with the receiving nurse. Lines:   Double Lumen Joshua Catheter 12/09/21 Right Internal jugular (Active)   Central Line Being Utilized Yes 12/10/21 1016   Criteria for Appropriate Use Other (comment) 12/10/21 1016   Site Assessment Clean, dry, & intact 12/10/21 1016   Infiltration Assessment 0 12/10/21 1016   Affected Extremity/Extremities Color distal to insertion site pink (or appropriate for race) 12/10/21 0844   Date of Last Dressing Change 12/09/21 12/10/21 0745   Dressing Status Clean, dry, & intact 12/10/21 1016   Dressing Type Disk with Chlorhexadine gluconate (CHG); Transparent 12/10/21 0844   Action Taken Open ports on tubing capped 12/10/21 0844   Proximal Hub Color/Line Status Capped; Patent; Purple 12/10/21 0745   Positive Blood Return (Medial Site) Yes 12/10/21 0745   Distal Hub Color/Line Status Purple; Capped;  Patent 12/10/21 0745   Positive Blood Return (Lateral Site) Yes 12/10/21 0745   Alcohol Cap Used Yes 12/10/21 1016       Peripheral IV 12/03/21 Right Antecubital (Active)   Site Assessment Dry; Bleeding 12/10/21 0844   Phlebitis Assessment 0 12/10/21 0844   Infiltration Assessment 0 12/10/21 0844   Dressing Status Clean, dry, & intact 12/10/21 0844   Dressing Type Transparent 12/10/21 0844   Hub Color/Line Status Pink; Flushed 12/10/21 0844   Action Taken Open ports on tubing capped 12/10/21 0745   Alcohol Cap Used Yes 12/10/21 0844       Peripheral IV 12/03/21 Right Forearm (Active)   Site Assessment Clean, dry, & intact 12/10/21 0844   Phlebitis Assessment 0 12/10/21 0844   Infiltration Assessment 0 12/10/21 0844   Dressing Status Clean, dry, & intact 12/10/21 0844   Dressing Type Transparent 12/10/21 0844   Hub Color/Line Status Pink 12/10/21 0844   Action Taken Other (comment) 12/10/21 0844   Alcohol Cap Used Yes 12/10/21 0844        Opportunity for questions and clarification was provided.       Patient transported with:   Telebox   Patient chart   IV lock x 2

## 2021-12-10 NOTE — DISCHARGE SUMMARY
[facility] [address]  [address]  Tel: [phone]    Physician Discharge Summary    Patient ID:    Akanksha Maxwell  Age:              78 y.o.    : 1950  MRN:             [id number]     PCP: Roddy Shine MD     Date of Admission: 12/3/2021    Date of Discharge:  12/10/2021    Principal admission Diagnosis:   Hypotension [I95.9]  Bacteremia [R78.81]    Discharge Diagnoses:  Principal Problem:    Bacteremia due to Klebsiella pneumoniae (2021)    UTI due to extended-spectrum beta lactamase (ESBL) producing Escherichia coli (2021)    CKD stage V not on dialysis    Pulmonary hypertension ([facility]): Overview: Ramon NP at [facility]    Chronic atrial fibrillation [facility]): Overview: Kendal [facility]    Cervical disc disorder: Overview: C5,C6 fractured disc and lumbar disc slipped    Weakness generalized (12/3/2021)    Anemia, chronic disease (12/3/2021)    Thrombocytopenia ([address] (2021)    Left knee pain (2021)    VIK on CPAP     Hypertension       Hospital Course:     Mr. Dion Jorgensen is a 78 y.o. admitted to [facility] and treated for the following:    Bacteremia due to Klebsiella pneumoniae (2021) POA: likely due to the UTi. Echo with EF 40-45%. No obvious vegetations. Follow up blood cultures neg. Joshua catheter placed today. Seen and followed by ID. Has been on IV Meropenem that was changed to IV Ertapenem through 2021. Follow up with Dr Mariano Kanner      UTI due to extended-spectrum beta lactamase (ESBL) producing Escherichia coli (2021) + Non ESBL Klebsiella pneumoniae POA: CT abdomen and pelvis unremarkable. Continue IV Invanz as noted above     Anemia, chronic disease ns blood loss POA: had a +FOBT and was initially not willing to have further testing but he has did change his mind. Re-evaluated by Gi who did an EGD that was normal and a colonoscopy that revealed polyps that were removed.  Monitor Hgb as an outpatient. Transfuse as needed      Left knee pain (12/8/2021) POA: MRi noted a large effusion with synovitis. No osteo. Has a complex tear on medial meniscus. Re-evaluated by ortho today. Patient has minimal swelling or symptoms today and actually feels better. Likely due to gout. No aspiration indicated. Continue a short prednisone treatment, pain control. Outpatient rheumatology and ortho follow up after rehab    CKD stage 5 POA: seen and followed by renal. No indication for RRT at this time. Monitor renal function. Continue Bicarb. Outpatient follow up with nephrology    Chronic atrial fibrillation ([address] POA: rate controlled. Continue Coreg. Hold Eliquis but resume 12/11/21 in the evening given he had polypectomies. Pulmonary hypertension ([address] / VIK on CPAP POA: outpatient follow up     Hypertension POA: continue Coreg, Clonidine and Nifedipine    Weakness generalized (12/3/2021) / Cervical disc disorder POA: ambulate as tolerated    Thrombocytopenia ([address] (12/4/2021) POA: seems chronic. Discharge Exam:    Visit Vitals  BP (!) 168/99 (BP 1 Location: Left upper arm, BP Patient Position: At rest;Lying)   Pulse 87   Temp 97.4 °F (36.3 °C)   Resp 18   Ht 5' 10\" (1.778 m)   Wt 112.3 kg (247 lb 9.2 oz)   SpO2 99%   BMI 35.52 kg/m²        Patient has been seen and examined. General: well looking and in no acute distress  Pulm: clear breath sounds without wheezes  Card: no murmurs, normal S1, S2 without thrills, bruits   Abd:    soft, non-tender, normoactive bowel sounds  Skin: no rashes and skin turgor is good  Neuro: awake, alert and has a non focal     Activity: Activity as tolerated    Diet: Renal Diet    Current Discharge Medication List        START taking these medications    Details   ertapenem 1 gram 1 g IVPB 1 g by IntraVENous route every twenty-four (24) hours for 12 days.   Qty: 13 Dose, Refills: 0      pantoprazole (PROTONIX) 40 mg tablet Take 1 Tablet by mouth Daily (before breakfast) for 30 days.  Barbara Garter: 30 Tablet, Refills: 0      sodium bicarbonate 650 mg tablet Take 1 Tablet by mouth three (3) times daily for 30 days. Indications: excess body acid  Qty: 90 Tablet, Refills: 0      oxyCODONE-acetaminophen (PERCOCET) 5-325 mg per tablet Take 1 Tablet by mouth every six (6) hours as needed for Pain for up to 10 doses. Max Daily Amount: 4 Tablets. Qty: 10 Tablet, Refills: 0    Associated Diagnoses: Cervical disc disorder           CONTINUE these medications which have NOT CHANGED    Details   predniSONE (DELTASONE) 20 mg tablet Take 20 mg by mouth two (2) times a day. Taken as needed for inflammation may have up to 40mg a day  Qty: 60 Tablet, Refills: 0      calcitRIOL (ROCALTROL) 0.25 mcg capsule Take 0.25 mcg by mouth daily. carvediloL (COREG) 12.5 mg tablet Take 12.5 mg by mouth two (2) times daily (with meals). cloNIDine HCL (CATAPRES) 0.2 mg tablet Take 0.2 mg by mouth two (2) times a day. apixaban (Eliquis) 5 mg tablet Take 5 mg by mouth two (2) times a day. NIFEdipine ER (ADALAT CC) 30 mg ER tablet Take 30 mg by mouth daily. fluticasone propionate (FLONASE) 50 mcg/actuation nasal spray 2 Sprays by Both Nostrils route daily. STOP taking these medications       furosemide (LASIX) 80 mg tablet Comments:   Reason for Stopping:                Follow-up Information       Follow up With Specialties Details Why Contact Info    Nancy Álvarez MD Orthopedic Surgery In 12 weeks  [address] [facility]  [address] [place] [address]      Saeid Staley MD Rheumatology In 3 weeks Inflammatory arthropathy management  [address] [address] [phone]      Christiana Teixeira DO Infectious Disease Call to schedule follow up and review in 2 weeks or after rehab [facility]  [address]      Della Mares MD Family Medicine Go on 12/16/2021 [facility] UP  9:30am [address] [facility]  [phone]      Maura Vela MD Nephrology, Hospitalist Call to confirm follow up for your kidney disease [address]  [address] [facility] AT [facility] provider [address] [facility] [address]   [address] [facility] Vital   IV antibiotics provider [phone]            Follow-up tests or labs: None    Discharge Condition: Stable    Disposition: home    Time taken to arrange discharge:  35 minutes.     Signed:  Leticia Rachel MD     [facility]  12/10/2021   2:07 PM

## 2021-12-10 NOTE — PROGRESS NOTES
BEDSIDE_VERBAL_RECORDED_WRITTEN:[id number]::\"Bedside\"} shift change report given to Edwige (oncoming nurse) by West Johnson (offgoing nurse). Report included the following information SBAR, Kardex, ED Summary, Procedure Summary, Intake/Output, MAR, Recent Results and Med Rec Status, pt NPO since midnight for endo; HTN of which oncall Dr. Geraldine Kennedy was notified; no action required. Fall risk/seen ambulating w/o use of walker at bedside from bed to bathroom. Bowel prep completed; cardiac rhythm afib.

## 2021-12-10 NOTE — PROGRESS NOTES
Physical Therapy Note:  Pt just returned from ENDO procedure. Still groggy and requesting to rest for now. Noted discharge symbol on chart? Will defer per pt request and continue to follow if pt not discharged prior to return.   Tammy Moreno, PT

## 2021-12-10 NOTE — PROGRESS NOTES
GI note    Because of the multiple polypectomy today, I recommend his anticoagulation not restart until tomorrow night.     Homero Mc MD

## 2021-12-10 NOTE — PROGRESS NOTES
Bedside and Verbal shift change report given to Beverley Rosario (oncoming nurse) by Zechariah Miramontes RN (offgoing nurse). Report included the following information SBAR, Kardex, Intake/Output, MAR and Recent Results.

## 2021-12-10 NOTE — PROGRESS NOTES
Occupational Therapy: AM note- Chart reviewed, patient off floor for procedure. Will follow up later as able.    Flavio Mendoza OTR/L

## 2021-12-10 NOTE — PROGRESS NOTES
Ardi Das  1950  [id number]    Situation:  Verbal report received from: Елена Farley RN  Procedure: Procedure(s):  ESOPHAGOGASTRODUODENOSCOPY (EGD)  COLONOSCOPY  ESOPHAGOGASTRODUODENAL (EGD) BIOPSY  ENDOSCOPIC POLYPECTOMY  RESOLUTION CLIP    Background:    Preoperative diagnosis: anemia  Postoperative diagnosis: Normal EGD  Transverse colon polyp x2 removed by cold snare; resolution clip placed to second site  Sigmoid polyp x 2 removed by cold snare  Sigmoid polyp x 2 removed by hot snare  Diverticulosis    :  Dr. Reji Herrmann  Assistant(s): Endoscopy Technician-1: Lila Maldonado  Endoscopy RN-1: Erin Moy RN    Specimens:   ID Type Source Tests Collected by Time Destination   1 : bxs Preservative Duodenum  Aleta Xiong MD 12/10/2021 3717 Pathology   2 : polyps Preservative Colon, Transverse  Aleta Xiong MD 12/10/2021 8489 Pathology   3 : polyps Preservative Sigmoid  Aleta Xiong MD 12/10/2021 0957 Pathology     H. Pylori  no    Assessment:  Intra-procedure medications   Anesthesia gave intra-procedure sedation and medications, see anesthesia flow sheet yes    Intravenous fluids: NS@ KVO     Vital signs stable yes    Abdominal assessment: round and soft yes    Recommendation:  Discharge patient per MD order no.   Return to floor yes  Family or Friend family  Permission to share finding with family or friend yes

## 2021-12-10 NOTE — PROGRESS NOTES
12/10/2021   2:08 PM  Pt denied by [facility]. Additional referrals sent to [facility] and [facility] via [employer]. Accepted by [facility] JESSICA.    1:17 PM  Pt accepted by [facility] for infusion and nursing line care/labs. Agency to come to patient's room for teaching today. Referral for home health PT/OT sent to [facility], awaiting response. 12:22 PM  Medicare pt has received, reviewed, and signed 2nd IM letter informing them of their right to appeal the discharge. Signed copied has been placed on pt bedside chart. Elisabet with [facility] stated she did not yet know if she'd have a bed available later today and would be in touch with CM. ANYI met with patient to discuss IPR and discharge timeframe. At this time, patient is requesting that PT/OT return to eval him as he believes he is strong enough to discuss with outpatient PT. CM spoke with PT Zackery Richmond and passed this information along, she is in pt's room now completing re-eval.    CM sent referral to [facility] via [employer] for IV Ertapenem 1g daily x2 weeks, plus nursing care for central . Awaiting response.     Stephany Lee RN

## 2021-12-10 NOTE — ANESTHESIA POSTPROCEDURE EVALUATION
Procedure(s):  ESOPHAGOGASTRODUODENOSCOPY (EGD)  COLONOSCOPY  ESOPHAGOGASTRODUODENAL (EGD) BIOPSY  ENDOSCOPIC POLYPECTOMY  RESOLUTION CLIP. MAC    Anesthesia Post Evaluation        Patient location during evaluation: PACU  Level of consciousness: awake  Pain management: adequate  Airway patency: patent  Anesthetic complications: no  Cardiovascular status: acceptable  Respiratory status: acceptable  Hydration status: acceptable  Post anesthesia nausea and vomiting:  none      INITIAL Post-op Vital signs:   Vitals Value Taken Time   /105 12/10/21 1022   Temp 36.6 °C (97.8 °F) 12/10/21 1010   Pulse 88 12/10/21 1026   Resp 15 12/10/21 1026   SpO2 98 % 12/10/21 1026   Vitals shown include unvalidated device data.

## 2021-12-10 NOTE — PROGRESS NOTES
Urgent message (to Dr. Nasir Hartman)  via [employer] just now sent of copy of [id number] nursing progress notes, stating also that the antihypertensive med is hydralazine. Noting: Please advise. Awaiting call back from Dr. Nasir Hartman.

## 2021-12-10 NOTE — PROCEDURES
[address] [facility] D. Corlis Hatchet, MD  [phone]      December 10, 2021    Esophagogastroduodenoscopy & Colonoscopy Procedure Note  Akanksha Maxwell  : 1950  Neville Alvarez Medical Record Number: [id number]      Indications:    Anemia Heme pos stool. Referring Physician:  Roddy Shine MD  Anesthesia/Sedation: Conscious Sedation/Moderate Sedation/MAC  Endoscopist:  Dr. Tank Quintanilla  Complications:  None  Estimated Blood Loss:  None    Permit:  The indications, risks, benefits and alternatives were reviewed with the patient or their decision maker who was provided an opportunity to ask questions and all questions were answered. The specific risks of esophagogastroduodenoscopy with conscious sedation were reviewed, including but not limited to anesthetic complication, bleeding, adverse drug reaction, missed lesion, infection, IV site reactions, and intestinal perforation which would lead to the need for surgical repair. Alternatives to EGD and colonoscopy including radiographic imaging, observation without testing, or laboratory testing were reviewed as well as the limitations of those alternatives discussed. After considering the options and having all their questions answered, the patient or their decision maker provided both verbal and written consent to proceed. -----------EGD------------   Procedure in Detail:  After obtaining informed consent, positioning of the patient in the left lateral decubitus position, and conduction of a pre-procedure pause or \"time out\" the endoscope was introduced into the mouth and advanced to the duodenum. A careful inspection was made, and findings or interventions are described below. Findings:   Esophagus:normal  Stomach: normal   Duodenum/jejunum: normal.  Cold forceps biopsies taken for histology.         ----------Colonoscopy-----------    Procedure in Detail:  After obtaining informed consent, positioning of the patient in the left lateral decubitus position, and conduction of a pre-procedure pause or \"time out\" the endoscope was introduced into the anus and advanced to the cecum, which was identified by the ileocecal valve and appendiceal orifice. The quality of the colonic preparation was good. A careful inspection was made as the colonoscope was withdrawn, findings and interventions are described below. Findings:   Six polyps today. Ascending 8mm, 7mm; sigmoid 10mm, 8mm, 5mm, 6mm  All taken with snare, the larger polyps hot and the balance cold. One site in ascending required endoclip placement.    ------------------------------  Specimens:    See above    Complications:   None; patient tolerated the procedure well. Impressions:  EGD:  Normal.  Colonoscopy: Colon polyps six. Recommendations:     - Await pathology. - I see no GI indication for ongoing hospitalization. From my perspective discharge appropriate. I have no plans for additional GI testing. His anemia seems not GI in origin. Would consider hematologic consultation if additional evaluation of anemia required. I am signing off. Thank you for entrusting me with this patient's care. Please do not hesitate to contact me with any questions or if I can be of assistance with any of your other patients' GI needs. Signed By: Jaqui Hayes MD                        December 10, 2021    Surgical assistant none. Implants none unless specified.

## 2021-12-10 NOTE — PROGRESS NOTES
Problem: Mobility Impaired (Adult and Pediatric)  Goal: *Acute Goals and Plan of Care (Insert Text)  Description: Physical Therapy Goals  Revised 12/10/2021  1. Patient will move from supine to sit and sit to supine  in bed with independence within 7 day(s). 2.  Patient will transfer from bed to chair and chair to bed with modified independence using the least restrictive device within 7 day(s). 3.  Patient will perform sit to stand with modified independence within 7 day(s). 4.  Patient will ambulate with modified independence for 300 feet with the least restrictive device within 7 day(s). FUNCTIONAL STATUS PRIOR TO ADMISSION: Patient was modified independent using a single point cane for functional mobility. HOME SUPPORT PRIOR TO ADMISSION: The patient lived with wife but did not require assist.    Physical Therapy Goals  Initiated 12/4/2021  1. Patient will move from supine to sit and sit to supine  in bed with modified independence within 7 day(s). 2.  Patient will transfer from bed to chair and chair to bed with modified independence using the least restrictive device within 7 day(s). 3.  Patient will perform sit to stand with modified independence within 7 day(s). 4.  Patient will ambulate with modified independence for 150 feet with the least restrictive device within 7 day(s). Outcome: Progressing Towards Goal   PHYSICAL THERAPY TREATMENT: WEEKLY REASSESSMENT  Patient: Marine Smith (73 y.o. male)  Date: 12/10/2021  Primary Diagnosis: Hypotension [I95.9]  Bacteremia [R78.81]  Procedure(s) (LRB):  ESOPHAGOGASTRODUODENOSCOPY (EGD) (N/A)  COLONOSCOPY (N/A)  ESOPHAGOGASTRODUODENAL (EGD) BIOPSY (N/A)  ENDOSCOPIC POLYPECTOMY (N/A)  RESOLUTION CLIP (N/A) Day of Surgery   Precautions:    Contact      ASSESSMENT  Patient continues with skilled PT services and is progressing towards goals.  Pt received supine in a bed requesting PT re-assessment from original recommendation for IPR with evaluation. Pt, wife and RN stating that pt has been walking to bathroom with RW with Mod I at this time. Pt Mod I to I to EOB with supine to sit. Good sitting balance. Sit to stand with SBA. Gait of 10' into bathroom with RW and CGA. Stood safely for voiding. Gait of 250' tolerated well with SBA and RW. Pt has made great progress since admission. IPR not indicated. He is to continue antibx therapy at home upon discharge. Recommending HHPT due to IV port and antibx med administration at home to continue gait and strengthening progression. CM informed. Patient's progression toward goals since last assessment: per above    Current Level of Function Impacting Discharge (mobility/balance): SBA/good    Functional Outcome Measure: The patient scored 70/100 on the barthel outcome measure     Other factors to consider for discharge: per above         PLAN :  Goals have been updated based on progression since last assessment. Patient continues to benefit from skilled intervention to address the above impairments. Recommendations and Planned Interventions: bed mobility training, transfer training, gait training, therapeutic exercises, neuromuscular re-education, edema management/control, patient and family training/education, and therapeutic activities      Frequency/Duration: Patient will be followed by physical therapy:  5 times a week to address goals. Recommendation for discharge: (in order for the patient to meet his/her long term goals)  Physical therapy at least 2 days/week in the home     This discharge recommendation:  Has been made in collaboration with the attending provider and/or case management    IF patient discharges home will need the following DME: has RW and SPC         SUBJECTIVE:   Patient stated I am so much better now.     OBJECTIVE DATA SUMMARY:   HISTORY:    Past Medical History:   Diagnosis Date    Atrial fibrillation ([facility]     Cervical disc disorder     C5,C6 fractured disc and lumbar disc slipped    Chronic atrial fibrillation ([facility] [address] [facility]     [facility]    ESRD (end stage renal disease) on dialysis [facility])     Dr Garzon Fall with [facility]    Heart failure ([facility] [address]     Hypertension     VIK on CPAP     Pulmonary hypertension ([facility] [address]     Ramon NP at [employer]     Past Surgical History:   Procedure Laterality Date    COLONOSCOPY N/A 12/10/2021    COLONOSCOPY performed by Eboni Rob MD at [address]      right sinus polyp removal    HX ORTHOPAEDIC  2000    left ACL replacement    HX OTHER SURGICAL      cardioversion for AFIB    HX UROLOGICAL  08/2015    renal gland removal left    IR INSERT TUNL CVC W/O PORT OVER 5 YR  12/9/2021       Personal factors and/or comorbidities impacting plan of care: per above and below    Home Situation  Home Environment: Apartment  # Steps to Enter: 0  One/Two Story Residence: One story (3rd floor apartment with elevator)  Living Alone: No  Support Systems: Spouse/Significant Other  Patient Expects to be Discharged to[de-identified] Rehabilitation facility  Current DME Used/Available at Home: CPAP, Walker, rolling, Cane, straight  Tub or Shower Type: Tub/Shower combination    EXAMINATION/PRESENTATION/DECISION MAKING:   Critical Behavior:  Neurologic State: Alert, Appropriate for age, Eyes open spontaneously  Orientation Level: Appropriate for age, Oriented X4  Cognition: Appropriate decision making, Appropriate for age attention/concentration, Appropriate safety awareness, Follows commands  Safety/Judgement: Awareness of environment  Hearing:   Auditory  Auditory Impairment: None  Skin:  IV  Edema: BLE's  Range Of Motion:  AROM: Within functional limits           PROM: Within functional limits           Strength:    Strength: Generally decreased, functional                    Tone & Sensation:   Tone: Normal              Sensation: Intact               Coordination:  Coordination: Within functional limits  Vision:      Functional Mobility:  Bed Mobility:  Rolling: Independent  Supine to Sit: Independent  Sit to Supine: Independent  Scooting: Independent  Transfers:  Sit to Stand: Stand-by assistance; Modified independent  Stand to Sit: Stand-by assistance; Modified independent                       Balance:   Sitting: Intact  Standing: Intact  Standing - Static: Good  Standing - Dynamic : Good  Ambulation/Gait Training:  Distance (ft): 200 Feet (ft)  Assistive Device: Walker, rolling; Gait belt  Ambulation - Level of Assistance: Stand-by assistance                                              Stairs:  Number of Stairs Trained:  (has elevator)           Functional Measure:  Barthel Index:    Bathin  Bladder: 10  Bowels: 10  Groomin  Dressing: 10  Feeding: 10  Mobility: 10  Stairs: 0  Toilet Use: 5  Transfer (Bed to Chair and Back): 10  Total: 70/100       The Barthel ADL Index: Guidelines  1. The index should be used as a record of what a patient does, not as a record of what a patient could do. 2. The main aim is to establish degree of independence from any help, physical or verbal, however minor and for whatever reason. 3. The need for supervision renders the patient not independent. 4. A patient's performance should be established using the best available evidence. Asking the patient, friends/relatives and nurses are the usual sources, but direct observation and common sense are also important. However direct testing is not needed. 5. Usually the patient's performance over the preceding 24-48 hours is important, but occasionally longer periods will be relevant. 6. Middle categories imply that the patient supplies over 50 per cent of the effort. 7. Use of aids to be independent is allowed. Score Interpretation (from [address] [place] [address])    Independent   60-79 Minimally independent   40-59 Partially dependent   20-39 Very dependent   <20 Totally dependent     -Milana Darby, Barthel, D.W. (1965). Functional evaluation: the Barthel Index.  [address] [facility] (14)2.  -LARISA Thompson, [address] (1997). The Barthel activities of daily living index: self-reporting versus actual performance in the old (> or = 75 years). Journal [place] [address](7), [facility], [facility] Medical CenterCortneyCortney, Tanya Lujan., Bobby Onealer. (1999). Measuring the change in disability after inpatient rehabilitation; comparison of the responsiveness of the Barthel Index and Functional [place] Measure. Journal of Neurology, Neurosurgery, and Psychiatry, 66(4), 465-639. MAXIMINO Lockhart, TRACEE Alex, & Rob Cardoza MISHAN. (2004) Assessment of post-stroke quality of life in cost-effectiveness studies: The usefulness of the Barthel Index and the EuroQoL-5D. Quality of Life Research, 13, 427-43          Pain Rating:  none    Activity Tolerance:   Good    After treatment patient left in no apparent distress:   Sitting in chair, Heels elevated for pressure relief, Call bell within reach, and Caregiver / family present    COMMUNICATION/EDUCATION:   The patients plan of care was discussed with: Registered nurse and Case management. Fall prevention education was provided and the patient/caregiver indicated understanding., Patient/family have participated as able in goal setting and plan of care. , and Patient/family agree to work toward stated goals and plan of care.     Thank you for this referral.  Jamal Santana, PT   Time Calculation: 30 mins

## 2021-12-10 NOTE — PROGRESS NOTES
Serial /97, ; 151/98, ; now 162/97, ; pt NPO for endo. No IVF. Bowel prep completed. Denied having pain, dizziness, nausea, or trouble breathing. Remote tele box #59; afib, w/hx of afib. Prn antihypertensive for SBP>170.

## 2021-12-12 LAB
BACTERIA SPEC CULT: NORMAL
BACTERIA SPEC CULT: NORMAL
SERVICE CMNT-IMP: NORMAL
SERVICE CMNT-IMP: NORMAL

## 2021-12-13 ENCOUNTER — PATIENT OUTREACH (OUTPATIENT)
Dept: CASE MANAGEMENT | Age: 71
End: 2021-12-13

## 2021-12-13 NOTE — PROGRESS NOTES
Care Transitions Initial Call    Call within 2 business days of discharge: Yes     Patient: Brady Dowell Patient : 1950 MRN: [id number]    [facility]charge [facility]       Complaint Diagnosis Description Type Department Provider    12/3/21 Fall; Fatigue; Dizziness Sepsis without acute organ dysfunction, due to unspecified organism ([facility] [address] . .. ED to Hosp-Admission (Discharged) (ADMIT) [id number] Marisel Fuentes MD; Stan Montaño... Was this an external facility discharge? No Discharge Facility: [facility]    Challenges to be reviewed by the provider   Additional needs identified to be addressed with provider: no  none         Method of communication with provider : none    Discussed COVID-19 related testing which was available at this time. Test results were negative. Patient informed of results, if available? yes     Advance Care Planning:   Does patient have an Advance Directive:  Not on file. CTN will disccus next call. Inpatient Readmission Risk score: Unplanned Readmit Risk Score: 21.5 ( )    Was this a readmission? no   Patient stated reason for the admission: \" I fell and was weak from my infection. \"    Patients top risk factors for readmission: none   Interventions to address risk factors: Obtained and reviewed discharge summary and/or continuity of care documents    Care Transition Nurse (CTN) contacted the patient by telephone to perform post hospital discharge assessment. Verified name and  with patient as identifiers. Provided introduction to self, and explanation of the CTN role. CTN reviewed discharge instructions, medical action plan and red flags with patient who verbalized understanding. Were discharge instructions available to patient? yes. Reviewed appropriate site of care based on symptoms and resources available to patient including: PCP, Specialist, [address] [place] [address] Sebastian Vaughan and When to call 911.  Patient given an opportunity to ask questions and does not have any further questions or concerns at this time. The patient agrees to contact the PCP office for questions related to their healthcare. Medication reconciliation was performed with patient, who verbalizes understanding of administration of home medications. Advised obtaining a 90-day supply of all daily and as-needed medications. Referral to Pharm D needed: no     Home Health/Outpatient orders at discharge: home health care, PT, OT and [address]: [facility]   Date of initial visit: Scheduled for 12/13/2021 at 1:30pm    Durable Medical Equipment ordered at discharge: Home IV abx with [facility] Vital infusion. Patient received equipment. Covid Risk Education    Educated patient about risk for severe COVID-19 due to risk factors according to CDC guidelines. CTN reviewed discharge instructions, medical action plan and red flag symptoms with the patient who verbalized understanding. Discussed COVID vaccination status: yes. Education provided on COVID-19 vaccination as appropriate. Discussed exposure protocols and quarantine with CDC Guidelines. Patient was given an opportunity to verbalize any questions and concerns and agrees to contact CTN or health care provider for questions related to their healthcare. Was patient discharged with a pulse oximeter? no.       Discussed follow-up appointments. If no appointment was previously scheduled, appointment scheduling offered: yes. Is follow up appointment scheduled within 7 days of discharge? yes. [facility] follow up appointment(s): No future appointments. Non-[facility] follow up appointment(s): PCP 12/16/2021  Cardiology 12/14/2021    Plan for follow-up call in 5-7 days based on severity of symptoms and risk factors. Plan for next call: BP, daily weights, and follow up appointments  CTN provided contact information for future needs. Goals Addressed                 This Visit's Progress     Attends follow-up appointments as directed.         12/13/2021  -patient will attend PCP follow up on 12/16/2021. He is not currently driving due to weakness. His wife will provide transportation  -patient will attend cardiology follow up with [address] [place] [address] on 12/14  -has call out to nephrology and rheumatology for follow up appointments  -CTN will follow up in 1 week  SP         Prevent complications post hospitalization. 12/13/2021  -Patient hypotensive on admission and hypertensive at discharge. Patient to monitor BP daily and record. Will notify provider with BP >180/100 or <80/40. Patient to take BP in left arm due to Central line on right side.   -Lasix discontinued by hospitalist. Patient has history of CHF. CTN educated patient on signs of fluid overload. Will report edema and/or SOB. Will weigh self daily and record.  Will report weight gain >3lbs in 1 day or <5lbs in 1 week  CTN to follow up in 1 week  SP

## 2021-12-14 ENCOUNTER — ANESTHESIA (OUTPATIENT)
Dept: ENDOSCOPY | Age: 71
DRG: 920 | End: 2021-12-14
Payer: MEDICARE

## 2021-12-14 ENCOUNTER — APPOINTMENT (OUTPATIENT)
Dept: CT IMAGING | Age: 71
DRG: 920 | End: 2021-12-14
Attending: STUDENT IN AN ORGANIZED HEALTH CARE EDUCATION/TRAINING PROGRAM
Payer: MEDICARE

## 2021-12-14 ENCOUNTER — ANESTHESIA EVENT (OUTPATIENT)
Dept: ENDOSCOPY | Age: 71
DRG: 920 | End: 2021-12-14
Payer: MEDICARE

## 2021-12-14 ENCOUNTER — HOSPITAL ENCOUNTER (INPATIENT)
Age: 71
LOS: 7 days | Discharge: HOME HEALTH | DRG: 920 | End: 2021-12-21
Attending: STUDENT IN AN ORGANIZED HEALTH CARE EDUCATION/TRAINING PROGRAM | Admitting: INTERNAL MEDICINE
Payer: MEDICARE

## 2021-12-14 DIAGNOSIS — K92.2 GASTROINTESTINAL HEMORRHAGE, UNSPECIFIED GASTROINTESTINAL HEMORRHAGE TYPE: Primary | ICD-10-CM

## 2021-12-14 PROBLEM — D62 ACUTE BLOOD LOSS ANEMIA: Status: ACTIVE | Noted: 2021-12-14

## 2021-12-14 PROBLEM — N18.5 CKD (CHRONIC KIDNEY DISEASE) STAGE 5, GFR LESS THAN 15 ML/MIN (HCC): Chronic | Status: ACTIVE | Noted: 2021-12-14

## 2021-12-14 LAB
ALBUMIN SERPL-MCNC: 2.2 G/DL (ref 3.5–5)
ALBUMIN/GLOB SERPL: 0.8 {RATIO} (ref 1.1–2.2)
ALP SERPL-CCNC: 69 U/L (ref 45–117)
ALT SERPL-CCNC: 18 U/L (ref 12–78)
ANION GAP SERPL CALC-SCNC: 12 MMOL/L (ref 5–15)
ANION GAP SERPL CALC-SCNC: 8 MMOL/L (ref 5–15)
AST SERPL-CCNC: 15 U/L (ref 15–37)
BASOPHILS # BLD: 0 K/UL (ref 0–0.1)
BASOPHILS NFR BLD: 0 % (ref 0–1)
BILIRUB SERPL-MCNC: 0.3 MG/DL (ref 0.2–1)
BUN SERPL-MCNC: 101 MG/DL (ref 6–20)
BUN SERPL-MCNC: 103 MG/DL (ref 6–20)
BUN/CREAT SERPL: 20 (ref 12–20)
BUN/CREAT SERPL: 20 (ref 12–20)
CALCIUM SERPL-MCNC: 7.3 MG/DL (ref 8.5–10.1)
CALCIUM SERPL-MCNC: 7.5 MG/DL (ref 8.5–10.1)
CHLORIDE SERPL-SCNC: 112 MMOL/L (ref 97–108)
CHLORIDE SERPL-SCNC: 115 MMOL/L (ref 97–108)
CO2 SERPL-SCNC: 18 MMOL/L (ref 21–32)
CO2 SERPL-SCNC: 22 MMOL/L (ref 21–32)
COMMENT, HOLDF: NORMAL
COVID-19 RAPID TEST, COVR: NOT DETECTED
CREAT SERPL-MCNC: 4.98 MG/DL (ref 0.7–1.3)
CREAT SERPL-MCNC: 5.1 MG/DL (ref 0.7–1.3)
DIFFERENTIAL METHOD BLD: ABNORMAL
EOSINOPHIL # BLD: 0 K/UL (ref 0–0.4)
EOSINOPHIL NFR BLD: 0 % (ref 0–7)
ERYTHROCYTE [DISTWIDTH] IN BLOOD BY AUTOMATED COUNT: 17.4 % (ref 11.5–14.5)
FERRITIN SERPL-MCNC: 199 NG/ML (ref 26–388)
FOLATE SERPL-MCNC: 6.5 NG/ML (ref 5–21)
GLOBULIN SER CALC-MCNC: 2.8 G/DL (ref 2–4)
GLUCOSE SERPL-MCNC: 139 MG/DL (ref 65–100)
GLUCOSE SERPL-MCNC: 148 MG/DL (ref 65–100)
HCT VFR BLD AUTO: 23.7 % (ref 36.6–50.3)
HGB BLD-MCNC: 6.7 G/DL (ref 12.1–17)
HGB BLD-MCNC: 6.8 G/DL (ref 12.1–17)
HGB BLD-MCNC: 7.5 G/DL (ref 12.1–17)
HISTORY CHECKED?,CKHIST: NORMAL
HISTORY CHECKED?,CKHIST: NORMAL
IMM GRANULOCYTES # BLD AUTO: 0 K/UL
IMM GRANULOCYTES NFR BLD AUTO: 0 %
IRON SATN MFR SERPL: 18 % (ref 20–50)
IRON SERPL-MCNC: 47 UG/DL (ref 35–150)
LYMPHOCYTES # BLD: 0.5 K/UL (ref 0.8–3.5)
LYMPHOCYTES NFR BLD: 4 % (ref 12–49)
MCH RBC QN AUTO: 28.7 PG (ref 26–34)
MCHC RBC AUTO-ENTMCNC: 31.6 G/DL (ref 30–36.5)
MCV RBC AUTO: 90.8 FL (ref 80–99)
METAMYELOCYTES NFR BLD MANUAL: 5 %
MONOCYTES # BLD: 0.4 K/UL (ref 0–1)
MONOCYTES NFR BLD: 3 % (ref 5–13)
MYELOCYTES NFR BLD MANUAL: 1 %
NEUTS BAND NFR BLD MANUAL: 1 % (ref 0–6)
NEUTS SEG # BLD: 10.6 K/UL (ref 1.8–8)
NEUTS SEG NFR BLD: 86 % (ref 32–75)
NRBC # BLD: 0.17 K/UL (ref 0–0.01)
NRBC BLD-RTO: 1.4 PER 100 WBC
PLATELET # BLD AUTO: 168 K/UL (ref 150–400)
PMV BLD AUTO: 12 FL (ref 8.9–12.9)
POTASSIUM SERPL-SCNC: 5.1 MMOL/L (ref 3.5–5.1)
POTASSIUM SERPL-SCNC: 5.6 MMOL/L (ref 3.5–5.1)
PROT SERPL-MCNC: 5 G/DL (ref 6.4–8.2)
RBC # BLD AUTO: 2.61 M/UL (ref 4.1–5.7)
RBC MORPH BLD: ABNORMAL
SAMPLES BEING HELD,HOLD: NORMAL
SODIUM SERPL-SCNC: 142 MMOL/L (ref 136–145)
SODIUM SERPL-SCNC: 145 MMOL/L (ref 136–145)
SOURCE, COVRS: NORMAL
TIBC SERPL-MCNC: 258 UG/DL (ref 250–450)
VIT B12 SERPL-MCNC: 856 PG/ML (ref 193–986)
WBC # BLD AUTO: 12.2 K/UL (ref 4.1–11.1)

## 2021-12-14 PROCEDURE — 65270000029 HC RM PRIVATE

## 2021-12-14 PROCEDURE — 74011250637 HC RX REV CODE- 250/637: Performed by: INTERNAL MEDICINE

## 2021-12-14 PROCEDURE — 0W3P8ZZ CONTROL BLEEDING IN GASTROINTESTINAL TRACT, VIA NATURAL OR ARTIFICIAL OPENING ENDOSCOPIC: ICD-10-PCS | Performed by: SPECIALIST

## 2021-12-14 PROCEDURE — 2709999900 HC NON-CHARGEABLE SUPPLY: Performed by: SPECIALIST

## 2021-12-14 PROCEDURE — 85025 COMPLETE CBC W/AUTO DIFF WBC: CPT

## 2021-12-14 PROCEDURE — 85018 HEMOGLOBIN: CPT

## 2021-12-14 PROCEDURE — 76060000032 HC ANESTHESIA 0.5 TO 1 HR: Performed by: SPECIALIST

## 2021-12-14 PROCEDURE — 93005 ELECTROCARDIOGRAM TRACING: CPT

## 2021-12-14 PROCEDURE — 36415 COLL VENOUS BLD VENIPUNCTURE: CPT

## 2021-12-14 PROCEDURE — 99285 EMERGENCY DEPT VISIT HI MDM: CPT

## 2021-12-14 PROCEDURE — 74011250636 HC RX REV CODE- 250/636: Performed by: NURSE ANESTHETIST, CERTIFIED REGISTERED

## 2021-12-14 PROCEDURE — 65660000000 HC RM CCU STEPDOWN

## 2021-12-14 PROCEDURE — 87635 SARS-COV-2 COVID-19 AMP PRB: CPT

## 2021-12-14 PROCEDURE — C9113 INJ PANTOPRAZOLE SODIUM, VIA: HCPCS | Performed by: INTERNAL MEDICINE

## 2021-12-14 PROCEDURE — P9016 RBC LEUKOCYTES REDUCED: HCPCS

## 2021-12-14 PROCEDURE — 74011250636 HC RX REV CODE- 250/636: Performed by: INTERNAL MEDICINE

## 2021-12-14 PROCEDURE — 80053 COMPREHEN METABOLIC PANEL: CPT

## 2021-12-14 PROCEDURE — 77030010936 HC CLP LIG BSC -C: Performed by: SPECIALIST

## 2021-12-14 PROCEDURE — 82746 ASSAY OF FOLIC ACID SERUM: CPT

## 2021-12-14 PROCEDURE — 74011000250 HC RX REV CODE- 250: Performed by: SPECIALIST

## 2021-12-14 PROCEDURE — 82728 ASSAY OF FERRITIN: CPT

## 2021-12-14 PROCEDURE — 82607 VITAMIN B-12: CPT

## 2021-12-14 PROCEDURE — 74176 CT ABD & PELVIS W/O CONTRAST: CPT

## 2021-12-14 PROCEDURE — 86923 COMPATIBILITY TEST ELECTRIC: CPT

## 2021-12-14 PROCEDURE — 83540 ASSAY OF IRON: CPT

## 2021-12-14 PROCEDURE — 74011000250 HC RX REV CODE- 250: Performed by: INTERNAL MEDICINE

## 2021-12-14 PROCEDURE — 86900 BLOOD TYPING SEROLOGIC ABO: CPT

## 2021-12-14 PROCEDURE — 76040000007: Performed by: SPECIALIST

## 2021-12-14 PROCEDURE — 74011000250 HC RX REV CODE- 250: Performed by: NURSE ANESTHETIST, CERTIFIED REGISTERED

## 2021-12-14 DEVICE — WORKING LENGTH 235CM, WORKING CHANNEL 2.8MM
Type: IMPLANTABLE DEVICE | Site: ASCENDING COLON | Status: FUNCTIONAL
Brand: RESOLUTION 360 CLIP

## 2021-12-14 RX ORDER — SODIUM CHLORIDE 0.9 % (FLUSH) 0.9 %
5-40 SYRINGE (ML) INJECTION EVERY 8 HOURS
Status: DISCONTINUED | OUTPATIENT
Start: 2021-12-14 | End: 2021-12-21 | Stop reason: HOSPADM

## 2021-12-14 RX ORDER — FACIAL-BODY WIPES
10 EACH TOPICAL DAILY PRN
Status: DISCONTINUED | OUTPATIENT
Start: 2021-12-14 | End: 2021-12-21 | Stop reason: HOSPADM

## 2021-12-14 RX ORDER — SODIUM CHLORIDE 9 MG/ML
250 INJECTION, SOLUTION INTRAVENOUS AS NEEDED
Status: DISCONTINUED | OUTPATIENT
Start: 2021-12-14 | End: 2021-12-18

## 2021-12-14 RX ORDER — SODIUM CHLORIDE 9 MG/ML
50 INJECTION, SOLUTION INTRAVENOUS CONTINUOUS
Status: DISCONTINUED | OUTPATIENT
Start: 2021-12-14 | End: 2021-12-15

## 2021-12-14 RX ORDER — ONDANSETRON 2 MG/ML
4 INJECTION INTRAMUSCULAR; INTRAVENOUS
Status: DISCONTINUED | OUTPATIENT
Start: 2021-12-14 | End: 2021-12-21 | Stop reason: HOSPADM

## 2021-12-14 RX ORDER — SODIUM BICARBONATE 650 MG/1
650 TABLET ORAL 3 TIMES DAILY
Status: DISCONTINUED | OUTPATIENT
Start: 2021-12-14 | End: 2021-12-21 | Stop reason: HOSPADM

## 2021-12-14 RX ORDER — FUROSEMIDE 40 MG/1
40 TABLET ORAL 2 TIMES DAILY
Status: DISCONTINUED | OUTPATIENT
Start: 2021-12-14 | End: 2021-12-17

## 2021-12-14 RX ORDER — ACETAMINOPHEN 650 MG/1
650 SUPPOSITORY RECTAL
Status: DISCONTINUED | OUTPATIENT
Start: 2021-12-14 | End: 2021-12-21 | Stop reason: HOSPADM

## 2021-12-14 RX ORDER — DEXTROMETHORPHAN/PSEUDOEPHED 2.5-7.5/.8
1.2 DROPS ORAL
Status: DISCONTINUED | OUTPATIENT
Start: 2021-12-14 | End: 2021-12-14 | Stop reason: HOSPADM

## 2021-12-14 RX ORDER — FLUMAZENIL 0.1 MG/ML
0.2 INJECTION INTRAVENOUS
Status: ACTIVE | OUTPATIENT
Start: 2021-12-14 | End: 2021-12-14

## 2021-12-14 RX ORDER — PROPOFOL 10 MG/ML
INJECTION, EMULSION INTRAVENOUS AS NEEDED
Status: DISCONTINUED | OUTPATIENT
Start: 2021-12-14 | End: 2021-12-14 | Stop reason: HOSPADM

## 2021-12-14 RX ORDER — ACETAMINOPHEN 325 MG/1
650 TABLET ORAL
Status: DISCONTINUED | OUTPATIENT
Start: 2021-12-14 | End: 2021-12-21 | Stop reason: HOSPADM

## 2021-12-14 RX ORDER — HYDROMORPHONE HYDROCHLORIDE 1 MG/ML
0.5 INJECTION, SOLUTION INTRAMUSCULAR; INTRAVENOUS; SUBCUTANEOUS
Status: DISCONTINUED | OUTPATIENT
Start: 2021-12-14 | End: 2021-12-21 | Stop reason: HOSPADM

## 2021-12-14 RX ORDER — SODIUM CHLORIDE 9 MG/ML
50 INJECTION, SOLUTION INTRAVENOUS CONTINUOUS
Status: DISPENSED | OUTPATIENT
Start: 2021-12-14 | End: 2021-12-14

## 2021-12-14 RX ORDER — PROPOFOL 10 MG/ML
INJECTION, EMULSION INTRAVENOUS
Status: DISCONTINUED | OUTPATIENT
Start: 2021-12-14 | End: 2021-12-14 | Stop reason: HOSPADM

## 2021-12-14 RX ORDER — PROMETHAZINE HYDROCHLORIDE 25 MG/1
12.5 TABLET ORAL
Status: DISCONTINUED | OUTPATIENT
Start: 2021-12-14 | End: 2021-12-21 | Stop reason: HOSPADM

## 2021-12-14 RX ORDER — SODIUM CHLORIDE 0.9 % (FLUSH) 0.9 %
5-40 SYRINGE (ML) INJECTION AS NEEDED
Status: DISCONTINUED | OUTPATIENT
Start: 2021-12-14 | End: 2021-12-21 | Stop reason: HOSPADM

## 2021-12-14 RX ORDER — SODIUM CHLORIDE 9 MG/ML
INJECTION, SOLUTION INTRAVENOUS
Status: DISCONTINUED | OUTPATIENT
Start: 2021-12-14 | End: 2021-12-14 | Stop reason: HOSPADM

## 2021-12-14 RX ORDER — LIDOCAINE HYDROCHLORIDE 20 MG/ML
INJECTION, SOLUTION EPIDURAL; INFILTRATION; INTRACAUDAL; PERINEURAL AS NEEDED
Status: DISCONTINUED | OUTPATIENT
Start: 2021-12-14 | End: 2021-12-14 | Stop reason: HOSPADM

## 2021-12-14 RX ORDER — CARVEDILOL 12.5 MG/1
12.5 TABLET ORAL 2 TIMES DAILY WITH MEALS
Status: DISCONTINUED | OUTPATIENT
Start: 2021-12-14 | End: 2021-12-21 | Stop reason: HOSPADM

## 2021-12-14 RX ORDER — NALOXONE HYDROCHLORIDE 0.4 MG/ML
0.4 INJECTION, SOLUTION INTRAMUSCULAR; INTRAVENOUS; SUBCUTANEOUS
Status: ACTIVE | OUTPATIENT
Start: 2021-12-14 | End: 2021-12-14

## 2021-12-14 RX ORDER — FLUTICASONE PROPIONATE 50 MCG
2 SPRAY, SUSPENSION (ML) NASAL DAILY
Status: DISCONTINUED | OUTPATIENT
Start: 2021-12-14 | End: 2021-12-21 | Stop reason: HOSPADM

## 2021-12-14 RX ORDER — FENTANYL CITRATE 50 UG/ML
25 INJECTION, SOLUTION INTRAMUSCULAR; INTRAVENOUS AS NEEDED
Status: DISCONTINUED | OUTPATIENT
Start: 2021-12-14 | End: 2021-12-14 | Stop reason: HOSPADM

## 2021-12-14 RX ORDER — SODIUM BICARBONATE 650 MG/1
650 TABLET ORAL 3 TIMES DAILY
Status: DISCONTINUED | OUTPATIENT
Start: 2021-12-14 | End: 2021-12-14 | Stop reason: SDUPTHER

## 2021-12-14 RX ORDER — POLYETHYLENE GLYCOL 3350, SODIUM SULFATE, SODIUM CHLORIDE, POTASSIUM CHLORIDE, SODIUM ASCORBATE, AND ASCORBIC ACID 7.5-2.691G
1 KIT ORAL
Status: COMPLETED | OUTPATIENT
Start: 2021-12-14 | End: 2021-12-14

## 2021-12-14 RX ORDER — SODIUM CHLORIDE 9 MG/ML
25 INJECTION, SOLUTION INTRAVENOUS AS NEEDED
Status: DISCONTINUED | OUTPATIENT
Start: 2021-12-14 | End: 2021-12-18

## 2021-12-14 RX ORDER — MIDAZOLAM HYDROCHLORIDE 1 MG/ML
.25-5 INJECTION, SOLUTION INTRAMUSCULAR; INTRAVENOUS AS NEEDED
Status: DISCONTINUED | OUTPATIENT
Start: 2021-12-14 | End: 2021-12-14 | Stop reason: HOSPADM

## 2021-12-14 RX ORDER — CALCITRIOL 0.25 UG/1
0.25 CAPSULE ORAL DAILY
Status: DISCONTINUED | OUTPATIENT
Start: 2021-12-14 | End: 2021-12-21 | Stop reason: HOSPADM

## 2021-12-14 RX ADMIN — PROPOFOL 60 MG: 10 INJECTION, EMULSION INTRAVENOUS at 16:33

## 2021-12-14 RX ADMIN — Medication 10 ML: at 23:07

## 2021-12-14 RX ADMIN — SODIUM ZIRCONIUM CYCLOSILICATE 10 G: 10 POWDER, FOR SUSPENSION ORAL at 11:57

## 2021-12-14 RX ADMIN — PHENYLEPHRINE HYDROCHLORIDE 40 MCG: 10 INJECTION INTRAVENOUS at 16:49

## 2021-12-14 RX ADMIN — PROPOFOL 100 MCG/KG/MIN: 10 INJECTION, EMULSION INTRAVENOUS at 16:33

## 2021-12-14 RX ADMIN — LIDOCAINE HYDROCHLORIDE 70 MG: 20 INJECTION, SOLUTION INTRAVENOUS at 16:32

## 2021-12-14 RX ADMIN — MEROPENEM 500 MG: 500 INJECTION, POWDER, FOR SOLUTION INTRAVENOUS at 13:43

## 2021-12-14 RX ADMIN — PHENYLEPHRINE HYDROCHLORIDE 40 MCG: 10 INJECTION INTRAVENOUS at 16:57

## 2021-12-14 RX ADMIN — SODIUM ZIRCONIUM CYCLOSILICATE 10 G: 10 POWDER, FOR SUSPENSION ORAL at 06:19

## 2021-12-14 RX ADMIN — FUROSEMIDE 40 MG: 40 TABLET ORAL at 11:56

## 2021-12-14 RX ADMIN — PHENYLEPHRINE HYDROCHLORIDE 80 MCG: 10 INJECTION INTRAVENOUS at 17:03

## 2021-12-14 RX ADMIN — SODIUM CHLORIDE: 9 INJECTION, SOLUTION INTRAVENOUS at 16:02

## 2021-12-14 RX ADMIN — FUROSEMIDE 40 MG: 40 TABLET ORAL at 23:06

## 2021-12-14 RX ADMIN — PANTOPRAZOLE SODIUM 40 MG: 40 INJECTION, POWDER, FOR SOLUTION INTRAVENOUS at 13:42

## 2021-12-14 RX ADMIN — HYDROMORPHONE HYDROCHLORIDE 0.5 MG: 1 INJECTION, SOLUTION INTRAMUSCULAR; INTRAVENOUS; SUBCUTANEOUS at 22:19

## 2021-12-14 RX ADMIN — PEG-3350, SODIUM SULFATE, SODIUM CHLORIDE, POTASSIUM CHLORIDE, SODIUM ASCORBATE AND ASCORBIC ACID 1 L: KIT at 13:25

## 2021-12-14 RX ADMIN — SODIUM BICARBONATE 650 MG: 650 TABLET ORAL at 22:19

## 2021-12-14 RX ADMIN — CARVEDILOL 12.5 MG: 12.5 TABLET, FILM COATED ORAL at 23:07

## 2021-12-14 NOTE — PROGRESS NOTES
Occupational Therapy Note:  Orders received and appreciated. Chart reviewed. Pt admitted with active GI bleed, hgb 6.8 and pt scheduled for colonoscopy at 1600 today. Will hold OT eval at this time and follow up tomorrow as pt able to actively participate. Thank you for the consult.   Bahman Yanes, OTR/L, CBIS

## 2021-12-14 NOTE — ANESTHESIA POSTPROCEDURE EVALUATION
Procedure(s):  COLONOSCOPY  RESOLUTION CLIP. MAC    Anesthesia Post Evaluation        Patient location during evaluation: PACU  Level of consciousness: awake  Pain management: adequate  Airway patency: patent  Anesthetic complications: no  Cardiovascular status: acceptable  Respiratory status: acceptable  Hydration status: acceptable  Post anesthesia nausea and vomiting:  none      INITIAL Post-op Vital signs:   Vitals Value Taken Time   /91 12/14/21 1730   Temp     Pulse 120 12/14/21 1731   Resp 18 12/14/21 1731   SpO2 93 % 12/14/21 1730   Vitals shown include unvalidated device data.

## 2021-12-14 NOTE — H&P
Nathanael Sinha leonel [address]  [address] [facility], [address] [place] [address]  [phone]    Admission History and Physical      NAME:  Lorie Pittman   :   1950   MRN:  [id number]     PCP:  Kathy De Luna MD     Date/Time of service:  2021  6:20 AM        Subjective:     CHIEF COMPLAINT: rectal bleed      HISTORY OF PRESENT ILLNESS:     The patient is a 79 yo hx of HTN, chronic afib, pulm HTN, CKD 5, recent polypectomy, presented w/ rectal bleeding, blood loss anemia. The patient was admitted last week for a Klebsiella pneumonia/bacteremia and an ESBL UTI. He was found to be anemic. Colonoscopy and polypectomy was performed. After discharge, the patient was told to hold Eliquis for 2 days. Since he resumed Eliquis this , he began to have rectal bleeding. Denied abd pain, chest pain, SOB, nausea, vomiting. In the ED, Hgb was 7.5 (last Hgb 9.3 six days ago). GI told him to come to the ED. Allergies   Allergen Reactions    Levofloxacin Other (comments)     Insombnia, negative thoughts. [place] like I was going to die.  Pcn [Penicillins] Rash       Prior to Admission medications    Medication Sig Start Date End Date Taking? Authorizing Provider   ertapenem 1 gram 1 g IVPB 1 g by IntraVENous route every twenty-four (24) hours for 12 days. 12/10/21 12/22/21  Bhargav Abrams MD   pantoprazole (PROTONIX) 40 mg tablet Take 1 Tablet by mouth Daily (before breakfast) for 30 days. 12/11/21 1/10/22  Bhargav Abrams MD   sodium bicarbonate 650 mg tablet Take 1 Tablet by mouth three (3) times daily for 30 days. Indications: excess body acid 12/10/21 1/9/22  Bhargav Abrams MD   oxyCODONE-acetaminophen (PERCOCET) 5-325 mg per tablet Take 1 Tablet by mouth every six (6) hours as needed for Pain for up to 10 doses. Max Daily Amount: 4 Tablets.   Patient not taking: Reported on 2021 12/10/21 12/14/21  Bhargav Abrams MD   predniSONE (DELTASONE) 20 mg tablet Take 20 mg by mouth two (2) times a day. Taken as needed for inflammation may have up to 40mg a day 10/31/21   Froylan Holley MD   calcitRIOL (ROCALTROL) 0.25 mcg capsule Take 0.25 mcg by mouth daily. Saurav Mancuso MD   carvediloL (COREG) 12.5 mg tablet Take 12.5 mg by mouth two (2) times daily (with meals). Saurav Mancuso MD   cloNIDine HCL (CATAPRES) 0.2 mg tablet Take 0.2 mg by mouth two (2) times a day. Saurav Mancuso MD   apixaban (Eliquis) 5 mg tablet Take 5 mg by mouth two (2) times a day. Saurav Mancuso MD   NIFEdipine ER (ADALAT CC) 30 mg ER tablet Take 30 mg by mouth daily. Saurav Mancuso MD   fluticasone propionate (FLONASE) 50 mcg/actuation nasal spray 2 Sprays by Both Nostrils route daily.     Saurav Mancuso MD       Past Medical History:   Diagnosis Date    Atrial fibrillation ([address]     Cervical disc disorder     C5,C6 fractured disc and lumbar disc slipped    Chronic atrial fibrillation ([address]     [facility]    ESRD (end stage renal disease) on dialysis ([address]     Dr Bates Dopp with [facility]    Heart failure ([address]     Hypertension     VIK on CPAP     Pulmonary hypertension ([address]     [facility]amon NP at [facility]        Past Surgical History:   Procedure Laterality Date    COLONOSCOPY N/A 12/10/2021    COLONOSCOPY performed by David Nixon MD at [address] [facility] HX HEENT      right sinus polyp removal    HX ORTHOPAEDIC  2000    left ACL replacement    HX OTHER SURGICAL      cardioversion for AFIB    HX UROLOGICAL  08/2015    renal gland removal left    IR INSERT TUNL CVC W/O PORT OVER 5 YR  12/9/2021       Social History     Tobacco Use    Smoking status: Never Smoker    Smokeless tobacco: Never Used   Substance Use Topics    Alcohol use: No        Family History   Problem Relation Age of Onset    Hypertension Father         Review of Systems:  (bold if positive, if negative)    Gen:  Eyes:  ENT:  CVS:  Pulm:  GI:  :  MS:  , weakness, sSkin:  Psych:  Endo:  Hem:  Renal:  Neuro:          Objective:      VITALS: Vital signs reviewed; most recent are:    Visit Vitals  BP (!) 132/98   Pulse 86   Temp 97.6 °F (36.4 °C)   Resp 17   Wt 108.9 kg (240 lb)   SpO2 99%   BMI 34.44 kg/m²     SpO2 Readings from Last 6 Encounters:   12/14/21 99%   12/10/21 99%   10/31/21 98%   01/07/18 94%        No intake or output data in the 24 hours ending 12/14/21 0620     Exam:     Physical Exam:    Gen:  Well-developed, well-nourished, morbidly obese, in no acute distress  HEENT:  Pink conjunctivae, PERRL, hearing intact to voice, pale mucous membranes  Neck:  Supple, without masses, thyroid non-tender  Resp:  No accessory muscle use, clear breath sounds without wheezes rales or rhonchi  Card:  No murmurs, normal S1, S2 without thrills, 2+ edema  Abd:  Soft, non-tender, non-distended, normoactive bowel sounds are present  Lymph:  No cervical adenopathy  Musc:  No cyanosis or clubbing  Skin:  No rashes  Neuro:  Cranial nerves 3-12 are grossly intact, follows commands appropriately  Psych:  Alert with good insight. Oriented to person, place, and time    Labs:    Recent Labs     12/14/21  0407   WBC 12.2*   HGB 7.5*   HCT 23.7*        Recent Labs     12/14/21  0407      K 5.6*   *   CO2 22   *   *   CREA 5.10*   CA 7.5*   ALB 2.2*   TBILI 0.3   ALT 18     Lab Results   Component Value Date/Time    Glucose (POC) 110 10/29/2021 09:15 PM     No results for input(s): PH, PCO2, PO2, HCO3, FIO2 in the last 72 hours. No results for input(s): INR, INREXT in the last 72 hours. Radiology and EKG reviewed:   pending    **Old Records reviewed in [address] [facility]**       Assessment/Plan:       Principal Problem:    79 yo hx of HTN, chronic afib, pulm HTN, CKD 5, recent polypectomy, presented w/ rectal bleeding, blood loss anemia    1) Acute GI bleeding: due to recent polypectomy and Eliquis. Will hold all anticoagulation. Monitor Hgb closely. Consult GI for a repeat colonoscopy    2) Acute blood loss anemia: due to above. Will check iron studies. Monitor Hgb every 6h, transfuse prn (already consented)    3) CKD 5/hyperkalemia: approaching dialysis. Will give 1 dose of Lokelma. Monitor BMP. Consult Renal    4) Recent ESBL E. Coli UTI/Klebsiella bacteremia: cont Invanz through PICC till 12/22    5) Chronic afib: hold eliquis. Cont coreg    6) HTN: hold clonidine.   Cont coreg    7) Pulm HTN: follows by CHRIS Bee at [address] of deterioration: high      Total time spent with patient: 79 Minutes **I personally saw and examined the patient during this time period**                 Care Plan discussed with: Patient, nursing, wife     Discussed:  Care Plan    Prophylaxis:  SCD's    Probable Disposition:  Home w/Family           ___________________________________________________    Attending Physician: Sal Shaw MD

## 2021-12-14 NOTE — PROGRESS NOTES
Admission Medication Reconciliation:    Comments/Recommendations:  -Patient was recently discharged from [facility] on 12/10. Was prescribed ertapenem 1 g IV every 24 hours through , although for renal dysfunction, appropriate dose would be 500 mg daily - unclear exact time of last dose  -Patient was instructed to resume Eliquis on  evening    Medications added: None  Medications removed: None  Medications changed: None    Information obtained from: Patient, RxQuery, chart review    Allergies:  Levofloxacin and Pcn [penicillins]    Prior to Admission Medications:   Prior to Admission Medications   Prescriptions Last Dose Informant Patient Reported? Taking? NIFEdipine ER (ADALAT CC) 30 mg ER tablet   Yes Yes   Sig: Take 30 mg by mouth daily. apixaban (Eliquis) 5 mg tablet   Yes Yes   Sig: Take 5 mg by mouth two (2) times a day. calcitRIOL (ROCALTROL) 0.25 mcg capsule   Yes Yes   Sig: Take 0.25 mcg by mouth daily. carvediloL (COREG) 12.5 mg tablet   Yes Yes   Sig: Take 12.5 mg by mouth two (2) times daily (with meals). cloNIDine HCL (CATAPRES) 0.2 mg tablet   Yes Yes   Sig: Take 0.2 mg by mouth two (2) times a day. ertapenem 1 gram 1 g IVPB   No Yes   Si g by IntraVENous route every twenty-four (24) hours for 12 days. fluticasone propionate (FLONASE) 50 mcg/actuation nasal spray   Yes Yes   Si Sprays by Both Nostrils route daily. oxyCODONE-acetaminophen (PERCOCET) 5-325 mg per tablet   No Yes   Sig: Take 1 Tablet by mouth every six (6) hours as needed for Pain for up to 10 doses. Max Daily Amount: 4 Tablets. pantoprazole (PROTONIX) 40 mg tablet   No Yes   Sig: Take 1 Tablet by mouth Daily (before breakfast) for 30 days. predniSONE (DELTASONE) 20 mg tablet   No Yes   Sig: Take 20 mg by mouth two (2) times a day. Taken as needed for inflammation may have up to 40mg a day   sodium bicarbonate 650 mg tablet   No Yes   Sig: Take 1 Tablet by mouth three (3) times daily for 30 days. Indications: excess body acid      Facility-Administered Medications: None     Thank you,  Kierra Garg, PHARMD

## 2021-12-14 NOTE — PROGRESS NOTES
Elvira [address] [facility]  1950  [id number]    Situation:  Verbal report received from: Noemí Wray RN  Procedure: Procedure(s):  COLONOSCOPY  RESOLUTION CLIP    Background:    Preoperative diagnosis: post polypectomy bleed  Postoperative diagnosis: * No post-op diagnosis entered *    :  Dr. Yusuf Lynch  Assistant(s): Endoscopy Technician-1: Lizandro Hutchinson  Endoscopy RN-1: Torrie Rosa RN    Specimens: * No specimens in log *  H. Pylori  no    Assessment:  Intra-procedure medications     Anesthesia gave intra-procedure sedation and medications, see anesthesia flow sheet yes    Intravenous fluids: NS@ KVO     Vital signs stable Yes    Abdominal assessment: round and soft yes    Recommendation:  Discharge patient per MD order yes.   Return to floor na  Family or Friend family  Permission to share finding with family or friend yes

## 2021-12-14 NOTE — PERIOP NOTES
Mandy Guallpa  1950  [id number]       1500    Situation:    Scheduled Procedure: Procedure(s):  COLONOSCOPY  Verbal report received from: Jerad Abarca RN  Preoperative diagnosis: post polypectomy bleed    Background:    Procedure: Procedure(s):  COLONOSCOPY  Physician performing procedure; Dr. Jennifer Archer PO Intake: prep finished 1400    Pregnancy Test:Not applicable If yes, result: none    Is the patient taking Blood Thinners: YES If yes, list: eliquis and last taken 12/12 pm  Is the patient diabetic:no       If yes, what was the last BS:    Time taken? Anything given? no           Does the patient have a Pacemaker/Defibrillator in place?: no   Does the patient need antibiotics before/during/after procedure: no   If the patient is having a colon, How much prep was drank? 1 liter   What were the Colon prep results? Liquid, bloody   Does the patient have SCD in place:no   Is patient on CONTACT precautions:yes ESBL       If yes, what kind of CONTACT precautions: contact    Assessment:  Are the vital signs stable prior to patient coming to ENDO?  yes  Is the patient alert/oriented and able to sign consent for the procedures:yes  How does the patient's abdomen feel prior to coming to ENDO? round and soft yes   Does the patient have a patient IV in place? Yes, chest line, 2 port access. Recommendation:  Family or Friend present yes     Permission to share finding with Family or Friend yes      1632  Timeout performed. Anesthesia staff at patient's bedside administering anesthesia and monitoring patients vital signs throughout procedure. See anesthesia note. Post procedure, report received from Bello COOK. [id number]  Endoscope was pre-cleaned at bedside immediately following procedure by endo techRadha Gos[address]  Patient tolerated procedure. Abdomen soft and patient arousable and voices no complaints.    Patient transported to endoscopy recovery area. Report given to post procedure RN,   Concepcion Valencia. [id number]  TRANSFER - OUT REPORT:    Verbal report given to Bari Shin RN on [address] [place]  being transferred to ER #17 for routine progression of care       Report consisted of patients Situation, Background, Assessment and   Recommendations(SBAR). Information from the following report(s) SBAR and Procedure Summary was reviewed with the receiving nurse. Lines:   Double Lumen Joshua Catheter 12/09/21 Right Internal jugular (Active)   Site Assessment Clean, dry, & intact 12/14/21 0420   Infiltration Assessment 0 12/14/21 0420   Dressing Status Clean, dry, & intact 12/14/21 0420        Opportunity for questions and clarification was provided. Patient transported with:   Pt chart  Patient IV  Pt has glass on. Wife at bedside.

## 2021-12-14 NOTE — ED TRIAGE NOTES
Pt. States had colonoscopy/endoscopy on Friday, states that removed 6 polyps, states started having blood in stool over the weekend, on call physician said to watch it and restart eliquis on Sunday night. States tonight at 0200 noted all blood in toilet.

## 2021-12-14 NOTE — PROGRESS NOTES
Dr. Imagene Runner discussed with patient procedure findings and next steps. Glasses returned to patient.

## 2021-12-14 NOTE — PROCEDURES
[address] [facility] KRISTIE Germain MD  [phone]      2021    Colonoscopy Procedure Note  Martín Pool  :  1950  [facility] Medical Record Number: [id number]    Indications:     Hematochezia/melena  PCP:  Carrie Marcelino MD  Anesthesia/Sedation: Conscious Sedation/Moderate Sedation/GETA, see notes  Endoscopist:  Dr. Glen Street  Complications:  None  Estimated Blood Loss:  None    Permit:  The indications, risks, benefits and alternatives were reviewed with the patient or their decision maker who was provided an opportunity to ask questions and all questions were answered. The specific risks of colonoscopy with conscious sedation were reviewed, including but not limited to anesthetic complication, bleeding, adverse drug reaction, missed lesion, infection, IV site reactions, and intestinal perforation which would lead to the need for surgical repair. Alternatives to colonoscopy including radiographic imaging, observation without testing, or laboratory testing were reviewed including the limitations of those alternatives. After considering the options and having all their questions answered, the patient or their decision maker provided both verbal and written consent to proceed. Procedure in Detail:  After obtaining informed consent, positioning of the patient in the left lateral decubitus position, and conduction of a pre-procedure pause or \"time out\" the endoscope was introduced into the anus and advanced to the cecum, which was identified by the ileocecal valve. The quality of the colonic preparation was unsatisfactory. A careful inspection was made as the colonoscope was withdrawn, findings and interventions are described below. Findings:   Residual blood, clot, and food particles remain. We were able to safely visualize the lumen and arrive at the cecum.   We washed and aspirated and discovered polypectomy sites in the ascending colon. One with an endoclip upon it, and there was a large active fresh clot atop. Hemostasis was noted at this site but we placed and additional endoclip across the base of the mucosal ulcer for ongoing hemostasis. The second polypectomy site in the ascending colon had active bleeding noted. A single endoclip applied and hemostasis achieved. Additional 2 small polypectomy sites in sigmoid noted (and were hemostatic without overlying clot), but the other two could not be seen because of the residual which could not be cleansed. I washed and aspirated as much as possible and the fluid became clearer without evidence of new blood welling up. I concluded likely source of bleeding in the ascending colon as noted; treated with clip placement but good visualization of the sigmoid could not be accomplished due to the prep. Specimens:    none    Complications:   None; patient tolerated the procedure well. Impression:  Post polypectomy bleeding in ascending colon, with endoclip placement. Recommendations:      - Clear liquids      - Remain hospitalized     - Transfuse if hgb <7     - No anticoagulation 10 days     - GI will follow. Thank you for entrusting me with this patient's care. Please do not hesitate to contact me with any questions or if I can be of assistance with any of your other patients' GI needs. Signed By: Tameka Bella MD                        December 14, 2021      Surgical assistant none. Implants none unless specified.

## 2021-12-14 NOTE — PROGRESS NOTES
12/14/2021  9:17 AM  Case management note    Reason for Readmission:     Acute GI Bleed  12/3/2021 12/10/2021 hypotension  Patient returned to hospital GI bleed. Patient is , uses a walker and needs some assistance for ADL's. Patient has history of pulmonary HTN, afib, ESRD, afib, HTN, and Klebisella pneumonia. Patient was open to [facility] and receiving antibodics with [facility]  [employer] [facility]         RUR Score/Risk Level:     24%    PCP: First and Last name:  Carrie Marcelino   Name of Practice:    Are you a current patient: Yes/No: yes   Approximate date of last visit: month   Can you participate in a virtual visit with your PCP:     Is a Care Conference indicated: goals of care      Did you attend your follow up appointment (s): If not, why not:scheduled for this Thursday 12/16         Resources/supports as identified by patient/family:   Wife at bedside       Top Challenges facing patient (as identified by patient/family and CM): Finances/Medication cost?    Medicare/ [employer]      wife  Support system or lack thereof?     wife  Living arrangements? Lives with spouse      Self-care/ADLs/Cognition? Can do some self care, poor health cognition       Current Advanced Directive/Advance Care Plan:  NO AD           Plan for utilizing home health:   OPEN to [address]             Transition of Care Plan:    Based on readmission, the patient's previous Plan of Care   has been evaluated and/or modified. The current Transition of Care Plan is:           1.home with family home health  2. PCP follow up  3. AD planning  4. CM to follow for discharge needs    Care Management Interventions  PCP Verified by CM:  Yes (Dr. Dayo Andres)  Mode of Transport at Discharge: Dimple Ortega [address]: Spouse/Significant Other  Confirm Follow Up Transport: Family  The Plan for Transition of Care is Related to the Following Treatment Goals : Rectal bleeding  Discharge Location  Discharge Placement: Home with family assistance  Readmission Assessment  Number of days since last admission?: 1-7 days  Previous disposition: Home with Home Health  Who is being interviewed?: Patient, Caregiver  What was the patient's/caregiver's perception as to why they think they needed to return back to the hospital?: Other (Comment) (rectal bleeding)  Did you visit your Primary Care Physician after you left the hospital, before you returned this time?: No (appointment is this thursday 12/16)  Why weren't you able to visit your PCP?: Other (Comment) (appointment 12/16)  Did you see a specialist, such as Cardiac, Pulmonary, Orthopedic Physician, etc. after you left the hospital?: No  Who advised the patient to return to the hospital?: Self-referral  Does the patient report anything that got in the way of taking their medications?: No  Tammy MELGOZA

## 2021-12-14 NOTE — CONSULTS
NEPHROLOGY CONSULT NOTE           Patient: Pedro Fallon MRN: [id number]  PCP: Connor Bronw MD   :     1950  Age:   78 y.o. Sex:  male      Referring physician: James Cool MD      REASON FOR CONSULT:  CKD stage V    HPI:  Pedro Fallon is a 78 y.o. male with a history of CKD stage V with the last serum creatinine level of 5.0, hypertension, chronic atrial fibrillation, pulmonary hypertension who presented to the emergency department with a complaint of feeling weak and rectal bleeding for 2 days. Patient was recently admitted and was discharged last week for Klebsiella pneumonia bacteremia and ESBL UTI. During that admission patient has a colonoscopy and polypectomy was performed. Soon after starting Eliquis he noticed bright red blood per rectum. His hemoglobin dropped down from 9.3-7.5. Chemistry shows BUN of 100, creatinine 5.1, potassium 5.6. Patient is complaining of worsening lower extremity edema.   Review of Systems  Positive for edema, weakness    Past Medical History:   Diagnosis Date    Atrial fibrillation ([address]     Cervical disc disorder     C5,C6 fractured disc and lumbar disc slipped    Chronic atrial fibrillation (HCC)     [facility]    ESRD (end stage renal disease) on dialysis [facility])     [facility]    Heart failure ([address]     Hypertension     VIK on CPAP     Pulmonary hypertension ([address]     Ramon NP at [facility]       Past Surgical History:   Procedure Laterality Date    COLONOSCOPY N/A 12/10/2021    COLONOSCOPY performed by Theodora Huber MD at [address] [place] [address] HX HEENT      right sinus polyp removal    HX ORTHOPAEDIC      left ACL replacement    HX OTHER SURGICAL      cardioversion for AFIB    HX UROLOGICAL  2015    renal gland removal left    IR INSERT TUNL CVC W/O PORT OVER 5 YR  2021       Allergies   Allergen Reactions    Levofloxacin Other (comments)     Insombnia, negative thoughts. [place] like I was going to die.  Pcn [Penicillins] Rash       Current Facility-Administered Medications   Medication Dose Route Frequency    furosemide (LASIX) tablet 40 mg  40 mg Oral BID    sodium zirconium cyclosilicate (LOKELMA) powder packet 10 g  10 g Oral NOW    sodium bicarbonate tablet 650 mg  650 mg Oral TID     Current Outpatient Medications   Medication Sig    ertapenem 1 gram 1 g IVPB 1 g by IntraVENous route every twenty-four (24) hours for 12 days.  pantoprazole (PROTONIX) 40 mg tablet Take 1 Tablet by mouth Daily (before breakfast) for 30 days.  sodium bicarbonate 650 mg tablet Take 1 Tablet by mouth three (3) times daily for 30 days. Indications: excess body acid    oxyCODONE-acetaminophen (PERCOCET) 5-325 mg per tablet Take 1 Tablet by mouth every six (6) hours as needed for Pain for up to 10 doses. Max Daily Amount: 4 Tablets. (Patient not taking: Reported on 12/13/2021)    predniSONE (DELTASONE) 20 mg tablet Take 20 mg by mouth two (2) times a day. Taken as needed for inflammation may have up to 40mg a day    calcitRIOL (ROCALTROL) 0.25 mcg capsule Take 0.25 mcg by mouth daily.  carvediloL (COREG) 12.5 mg tablet Take 12.5 mg by mouth two (2) times daily (with meals).  cloNIDine HCL (CATAPRES) 0.2 mg tablet Take 0.2 mg by mouth two (2) times a day.  apixaban (Eliquis) 5 mg tablet Take 5 mg by mouth two (2) times a day.  NIFEdipine ER (ADALAT CC) 30 mg ER tablet Take 30 mg by mouth daily.  fluticasone propionate (FLONASE) 50 mcg/actuation nasal spray 2 Sprays by Both Nostrils route daily.        Family History   Problem Relation Age of Onset    Hypertension Father        Social History     Socioeconomic History    Marital status:      Spouse name: Not on file    Number of children: Not on file    Years of education: Not on file    Highest education level: Not on file   Occupational History    Not on file   Tobacco Use    Smoking status: Never Smoker    Smokeless tobacco: Never Used   Substance and Sexual Activity    Alcohol use: No    Drug use: Never    Sexual activity: Not on file   Other Topics Concern    Not on file   Social History Narrative    Not on file     Social Determinants of Health     Financial Resource Strain:     Difficulty of Paying Living Expenses: Not on file   Food Insecurity:     Worried About Running Out of Food in the Last Year: Not on file    Ray of Food in the Last Year: Not on file   Transportation Needs:     Lack of Transportation (Medical): Not on file    Lack of Transportation (Non-Medical):  Not on file   Physical Activity:     Days of Exercise per Week: Not on file    Minutes of Exercise per Session: Not on file   Stress:     Feeling of Stress : Not on file   Social Connections:     Frequency of Communication with Friends and Family: Not on file    Frequency of Social Gatherings with Friends and Family: Not on file    Attends Judaism Services: Not on file    Active Member of [address] [place] [address] or Organizations: Not on file    Attends Club or Organization Meetings: Not on file    Marital Status: Not on file   Intimate Partner Violence:     Fear of Current or Ex-Partner: Not on file    Emotionally Abused: Not on file    Physically Abused: Not on file    Sexually Abused: Not on file   Housing Stability:     Unable to Pay for Housing in the Last Year: Not on file    Number of [place] in the Last Year: Not on file    Unstable Housing in the Last Year: Not on file       Vitals:    12/14/21 0446 12/14/21 0500 12/14/21 0600 12/14/21 0807   BP:  132/81 (!) 132/98 (!) 133/98   Pulse:    (!) 103   Resp:    22   Temp:    97.7 °F (36.5 °C)   SpO2: 99% 97% 99% 100%   Weight:           No intake or output data in the 24 hours ending 12/14/21 1018    PHYSICAL EXAM:  GENERAL : Lying down in bed with no acute distress  HEENT: AT [facility]   NECK: Supple no JVP  CVS: S1-S2 irregularly irregular tachycardic  RS: CTABL, no rhonchi,or wheezing heard  ABDOMEN: soft NT ND positive BS  EXTREMITY: 1+ edema no clubbing or cyanosis, pedal pulse +  NEUROLOGY: AAA X3, no focal deficit or asterixis        LABS/STUDIES:  BMP:   Lab Results   Component Value Date/Time     12/14/2021 04:07 AM    K 5.6 (H) 12/14/2021 04:07 AM     (H) 12/14/2021 04:07 AM    CO2 22 12/14/2021 04:07 AM    AGAP 8 12/14/2021 04:07 AM     (H) 12/14/2021 04:07 AM     (H) 12/14/2021 04:07 AM    CREA 5.10 (H) 12/14/2021 04:07 AM    GFRAA 14 (L) 12/14/2021 04:07 AM    GFRNA 11 (L) 12/14/2021 04:07 AM        CBC:    Lab Results   Component Value Date/Time    WBC 12.2 (H) 12/14/2021 04:07 AM    HGB 7.5 (L) 12/14/2021 04:07 AM    HCT 23.7 (L) 12/14/2021 04:07 AM     12/14/2021 04:07 AM       No results found for: MCACR, MCA1, MCA2, MCA3, MCAU, MCAU2, MCALPOCT    Lab Results   Component Value Date/Time    Color YELLOW/STRAW 12/04/2021 05:32 AM    Appearance TURBID (A) 12/04/2021 05:32 AM    Specific gravity 1.013 12/04/2021 05:32 AM    pH (UA) 5.5 12/04/2021 05:32 AM    Protein 300 (A) 12/04/2021 05:32 AM    Glucose Negative 12/04/2021 05:32 AM    Ketone Negative 12/04/2021 05:32 AM    Bilirubin Negative 12/04/2021 05:32 AM    Urobilinogen 0.2 12/04/2021 05:32 AM    Nitrites Negative 12/04/2021 05:32 AM    Leukocyte Esterase LARGE (A) 12/04/2021 05:32 AM    Epithelial cells FEW 12/04/2021 05:32 AM    Bacteria 2+ (A) 12/04/2021 05:32 AM    WBC >100 (H) 12/04/2021 05:32 AM    RBC 10-20 12/04/2021 05:32 AM         ASSESSMENT/PLAN:    CKD stage V: Serum creatinine and GFR near baseline  Hypertension  Bilateral lower extremity edema  Anemia likely due to GI bleed  Hyperkalemia due to underlying worsening CKD  NAGMA    -I will resume Lasix 40 mg twice daily  -Give Lokelma 10 g p.o. x1  -I will start sodium bicarb 650 mg 1 tablet 3 times daily with meals  -Awaiting GI input  -Renally dose all medication with current GFR  -Hold prednisone for gout in the setting of GI bleed  -Renal function panel daily      Mary Osborne MD  12/14/2021    [facility]:  [email or web address]  [place] office:  [address], [address] [place] [address],[address]  [address]  Phone: [phone]  Fax :     [phone]     [place] office:  [address] [facility], [address] [place] [address]  Phone - [phone]  Fax - [phone]

## 2021-12-14 NOTE — CONSULTS
Parke Cogan. Matthew Wilkins MD  [phone] office  [phone] voicemail   Gastroenterology Consultation Note      Admit Date: 12/14/2021  Consult Date: 12/14/2021   I greatly appreciate your asking me to see Mandy Guallpa, thank you very much for the opportunity to participate in his care. Narrative Assessment and Plan   · Hematochezia  · Anemia (baseline) with acute decline due to above  · Anticoagulation, chronic  · AF  · Kidney disease    I suspect post polypectomy bleeding related to anticoagulant use. He had some bleeding over the weekend and held his anticoag until his stool was brown, but upon initiation of eliquis BRBPR. Will prep today 1 liter go lyte stat, colonoscopy 1600 today   Will need repeat covid testing per endoscopy unit protocol. Subjective:     Chief Complaint: Gastrointestinal Bleeding    History of Present Illness: Hematochezia, Friday, Saturday described as pasty/brick colored. Stool brown on Sunday  Restarted anticoag Monday  Awoke today, Tuesday, with liquid fresh BRBPR. Denies pain, dyspnea.     PCP:  Isis Joshi MD    Past Medical History:   Diagnosis Date    Atrial fibrillation ([address]     Cervical disc disorder     C5,C6 fractured disc and lumbar disc slipped    Chronic atrial fibrillation ([address]     [facility]    ESRD (end stage renal disease) on dialysis [facility])     [facility]    Heart failure ([address]     Hypertension     VIK on CPAP     Pulmonary hypertension ([address]     Ramon NP at [facility]        Past Surgical History:   Procedure Laterality Date    COLONOSCOPY N/A 12/10/2021    COLONOSCOPY performed by Arleth Copeland MD at [address] [facility] HX HEENT      right sinus polyp removal    HX ORTHOPAEDIC  2000    left ACL replacement    HX OTHER SURGICAL      cardioversion for AFIB    HX UROLOGICAL  08/2015    renal gland removal left    IR INSERT TUNL CVC W/O PORT OVER 5 YR  12/9/2021       Social History     Tobacco Use    Smoking status: Never Smoker    Smokeless tobacco: Never Used   Substance Use Topics    Alcohol use: No        Family History   Problem Relation Age of Onset    Hypertension Father         Allergies   Allergen Reactions    Levofloxacin Other (comments)     Insombnia, negative thoughts. [place] like I was going to die.  Pcn [Penicillins] Rash            Home Medications:  Prior to Admission Medications   Prescriptions Last Dose Informant Patient Reported? Taking? NIFEdipine ER (ADALAT CC) 30 mg ER tablet   Yes Yes   Sig: Take 30 mg by mouth daily. apixaban (Eliquis) 5 mg tablet   Yes Yes   Sig: Take 5 mg by mouth two (2) times a day. calcitRIOL (ROCALTROL) 0.25 mcg capsule   Yes Yes   Sig: Take 0.25 mcg by mouth daily. carvediloL (COREG) 12.5 mg tablet   Yes Yes   Sig: Take 12.5 mg by mouth two (2) times daily (with meals). cloNIDine HCL (CATAPRES) 0.2 mg tablet   Yes Yes   Sig: Take 0.2 mg by mouth two (2) times a day. ertapenem 1 gram 1 g IVPB   No Yes   Si g by IntraVENous route every twenty-four (24) hours for 12 days. fluticasone propionate (FLONASE) 50 mcg/actuation nasal spray   Yes Yes   Si Sprays by Both Nostrils route daily. oxyCODONE-acetaminophen (PERCOCET) 5-325 mg per tablet   No Yes   Sig: Take 1 Tablet by mouth every six (6) hours as needed for Pain for up to 10 doses. Max Daily Amount: 4 Tablets. pantoprazole (PROTONIX) 40 mg tablet   No Yes   Sig: Take 1 Tablet by mouth Daily (before breakfast) for 30 days. predniSONE (DELTASONE) 20 mg tablet   No Yes   Sig: Take 20 mg by mouth two (2) times a day. Taken as needed for inflammation may have up to 40mg a day   sodium bicarbonate 650 mg tablet   No Yes   Sig: Take 1 Tablet by mouth three (3) times daily for 30 days.  Indications: excess body acid      Facility-Administered Medications: None       Hospital Medications:  Current Facility-Administered Medications   Medication Dose Route Frequency    calcitRIOL (ROCALTROL) capsule 0.25 mcg  0.25 mcg Oral DAILY    carvediloL (COREG) tablet 12.5 mg  12.5 mg Oral BID WITH MEALS    fluticasone propionate (FLONASE) 50 mcg/actuation nasal spray 2 Spray  2 Spray Both Nostrils DAILY    sodium bicarbonate tablet 650 mg  650 mg Oral TID    pantoprazole (PROTONIX) 40 mg in 0.9% sodium chloride 10 mL injection  40 mg IntraVENous DAILY    0.9% sodium chloride infusion  50 mL/hr IntraVENous CONTINUOUS    sodium chloride (NS) flush 5-40 mL  5-40 mL IntraVENous Q8H    sodium chloride (NS) flush 5-40 mL  5-40 mL IntraVENous PRN    0.9% sodium chloride infusion 25 mL  25 mL IntraVENous PRN    acetaminophen (TYLENOL) tablet 650 mg  650 mg Oral Q6H PRN    Or    acetaminophen (TYLENOL) suppository 650 mg  650 mg Rectal Q6H PRN    bisacodyL (DULCOLAX) suppository 10 mg  10 mg Rectal DAILY PRN    promethazine (PHENERGAN) tablet 12.5 mg  12.5 mg Oral Q6H PRN    Or    ondansetron (ZOFRAN) injection 4 mg  4 mg IntraVENous Q6H PRN    HYDROmorphone (DILAUDID) syringe 0.5 mg  0.5 mg IntraVENous Q4H PRN    furosemide (LASIX) tablet 40 mg  40 mg Oral BID    meropenem (MERREM) 500 mg in sterile water (preservative free) 10 mL IV syringe  0.5 g IntraVENous Q12H    peg 3350-Electrolytes-Vit C (MOVIPREP) oral solution 1 L  1 L Oral NOW     Current Outpatient Medications   Medication Sig    ertapenem 1 gram 1 g IVPB 1 g by IntraVENous route every twenty-four (24) hours for 12 days.  pantoprazole (PROTONIX) 40 mg tablet Take 1 Tablet by mouth Daily (before breakfast) for 30 days.  sodium bicarbonate 650 mg tablet Take 1 Tablet by mouth three (3) times daily for 30 days. Indications: excess body acid    oxyCODONE-acetaminophen (PERCOCET) 5-325 mg per tablet Take 1 Tablet by mouth every six (6) hours as needed for Pain for up to 10 doses. Max Daily Amount: 4 Tablets.  predniSONE (DELTASONE) 20 mg tablet Take 20 mg by mouth two (2) times a day.  Taken as needed for inflammation may have up to 40mg a day  calcitRIOL (ROCALTROL) 0.25 mcg capsule Take 0.25 mcg by mouth daily.  carvediloL (COREG) 12.5 mg tablet Take 12.5 mg by mouth two (2) times daily (with meals).  cloNIDine HCL (CATAPRES) 0.2 mg tablet Take 0.2 mg by mouth two (2) times a day.  apixaban (Eliquis) 5 mg tablet Take 5 mg by mouth two (2) times a day.  NIFEdipine ER (ADALAT CC) 30 mg ER tablet Take 30 mg by mouth daily.  fluticasone propionate (FLONASE) 50 mcg/actuation nasal spray 2 Sprays by Both Nostrils route daily. Review of Systems: Admission ROS by Ghassan Goddard MD from 12/14/2021 were reviewed with the patient and changes (other than per HPI) include: none      Objective:     Physical Exam:  Visit Vitals  /67 (BP 1 Location: Left upper arm, BP Patient Position: Lying)   Pulse (!) 114   Temp 97.7 °F (36.5 °C)   Resp 15   Wt 108.9 kg (240 lb)   SpO2 100%   BMI 34.44 kg/m²     SpO2 Readings from Last 6 Encounters:   12/14/21 100%   12/10/21 99%   10/31/21 98%   01/07/18 94%            Intake/Output Summary (Last 24 hours) at 12/14/2021 1226  Last data filed at 12/14/2021 1155  Gross per 24 hour   Intake    Output 201 ml   Net -201 ml      General: no distress, comfortable  Skin:  No rash or palpable dermatologic mass lesions  HEENT: Pupils equal, sclera anicteric, oropharynx with no gross lesions  Cardiovascular: No abnormal audible heart sounds, well perfused, no edema  Respiratory:  No abnormal audible breath sounds, normal respiratory effort, no throacic deformity  GI:  Abdomen nondistended, nontender, no mass, no free fluid, no rebound or guarding. Musculoskeletal:  No skeletal deformity nor acute arthritis noted.   Neurological:  Motor and sensory function intact in upper extremeties  Psychiatric:  Normal affect, memory intact, appears to have insight into current illness  Lymphatic:  No cervical, supraclavicular, or periumbilic lymphadenopathy    Laboratory:    Recent Results (from the past 24 hour(s))   CBC WITH AUTOMATED DIFF    Collection Time: 12/14/21  4:07 AM   Result Value Ref Range    WBC 12.2 (H) 4.1 - 11.1 K/uL    RBC 2.61 (L) 4.10 - 5.70 M/uL    HGB 7.5 (L) 12.1 - 17.0 g/dL    HCT 23.7 (L) 36.6 - 50.3 %    MCV 90.8 80.0 - 99.0 FL    MCH 28.7 26.0 - 34.0 PG    MCHC 31.6 30.0 - 36.5 g/dL    RDW 17.4 (H) 11.5 - 14.5 %    PLATELET 324 829 - 830 K/uL    MPV 12.0 8.9 - 12.9 FL    NRBC 1.4 (H) 0  WBC    ABSOLUTE NRBC 0.17 (H) 0.00 - 0.01 K/uL    NEUTROPHILS 86 (H) 32 - 75 %    BAND NEUTROPHILS 1 0 - 6 %    LYMPHOCYTES 4 (L) 12 - 49 %    MONOCYTES 3 (L) 5 - 13 %    EOSINOPHILS 0 0 - 7 %    BASOPHILS 0 0 - 1 %    METAMYELOCYTES 5 (H) 0 %    MYELOCYTES 1 (H) 0 %    IMMATURE GRANULOCYTES 0 %    ABS. NEUTROPHILS 10.6 (H) 1.8 - 8.0 K/UL    ABS. LYMPHOCYTES 0.5 (L) 0.8 - 3.5 K/UL    ABS. MONOCYTES 0.4 0.0 - 1.0 K/UL    ABS. EOSINOPHILS 0.0 0.0 - 0.4 K/UL    ABS. BASOPHILS 0.0 0.0 - 0.1 K/UL    ABS. IMM. GRANS. 0.0 K/UL    DF MANUAL      RBC COMMENTS ANISOCYTOSIS  1+       METABOLIC PANEL, COMPREHENSIVE    Collection Time: 12/14/21  4:07 AM   Result Value Ref Range    Sodium 142 136 - 145 mmol/L    Potassium 5.6 (H) 3.5 - 5.1 mmol/L    Chloride 112 (H) 97 - 108 mmol/L    CO2 22 21 - 32 mmol/L    Anion gap 8 5 - 15 mmol/L    Glucose 139 (H) 65 - 100 mg/dL     (H) 6 - 20 MG/DL    Creatinine 5.10 (H) 0.70 - 1.30 MG/DL    BUN/Creatinine ratio 20 12 - 20      GFR est AA 14 (L) >60 ml/min/1.73m2    GFR est non-AA 11 (L) >60 ml/min/1.73m2    Calcium 7.5 (L) 8.5 - 10.1 MG/DL    Bilirubin, total 0.3 0.2 - 1.0 MG/DL    ALT (SGPT) 18 12 - 78 U/L    AST (SGOT) 15 15 - 37 U/L    Alk.  phosphatase 69 45 - 117 U/L    Protein, total 5.0 (L) 6.4 - 8.2 g/dL    Albumin 2.2 (L) 3.5 - 5.0 g/dL    Globulin 2.8 2.0 - 4.0 g/dL    A-G Ratio 0.8 (L) 1.1 - 2.2     SAMPLES BEING HELD    Collection Time: 12/14/21  4:07 AM   Result Value Ref Range    SAMPLES BEING HELD 1RD,1DRK GRN     COMMENT        Add-on orders for these samples will be processed based on acceptable specimen integrity and analyte stability, which may vary by analyte. TYPE & SCREEN    Collection Time: 12/14/21  4:07 AM   Result Value Ref Range    Crossmatch Expiration 12/17/2021,2359     ABO/Rh(D) Jason Speaks POSITIVE     Antibody screen NEG    IRON PROFILE    Collection Time: 12/14/21  4:07 AM   Result Value Ref Range    Iron 47 35 - 150 ug/dL    TIBC 258 250 - 450 ug/dL    Iron % saturation 18 (L) 20 - 50 %   FERRITIN    Collection Time: 12/14/21  4:07 AM   Result Value Ref Range    Ferritin 199 26 - 388 NG/ML   VITAMIN B12    Collection Time: 12/14/21  4:07 AM   Result Value Ref Range    Vitamin B12 856 193 - 986 pg/mL   FOLATE    Collection Time: 12/14/21  4:07 AM   Result Value Ref Range    Folate 6.5 5.0 - 21.0 ng/mL   EKG, 12 LEAD, INITIAL    Collection Time: 12/14/21  5:53 AM   Result Value Ref Range    Ventricular Rate 108 BPM    QRS Duration 98 ms    Q-T Interval 348 ms    QTC Calculation (Bezet) 466 ms    Calculated R Axis 7 degrees    Calculated T Axis -6 degrees    Diagnosis       Atrial fibrillation with rapid ventricular response  Abnormal ECG  When compared with ECG of 03-DEC-2021 15:44,  QT has lengthened     HEMOGLOBIN    Collection Time: 12/14/21 10:04 AM   Result Value Ref Range    HGB 6.8 (L) 12.1 - 17.0 g/dL         Assessment/Plan:     Principal Problem:    Acute GI bleeding (12/14/2021)    Active Problems:    Pulmonary hypertension (HCC) ()      Overview: Ram[facility]      Chronic atrial fibrillation (HCC) ()      Overview: [facility]      Bacteremia due to Klebsiella pneumoniae (12/4/2021)      Acute blood loss anemia (12/14/2021)      CKD (chronic kidney disease) stage 5, GFR less than 15 ml/min ([address] (12/14/2021)         See above narrative for full detail.

## 2021-12-14 NOTE — ANESTHESIA PREPROCEDURE EVALUATION
Relevant Problems   RESPIRATORY SYSTEM   (+) VIK on CPAP      CARDIOVASCULAR   (+) Chronic atrial fibrillation (HCC)   (+) Hypertension      RENAL FAILURE   (+) ESRD (end stage renal disease) on dialysis (HCC)      HEMATOLOGY   (+) Anemia, chronic disease       Anesthetic History   No history of anesthetic complications            Review of Systems / Medical History  Patient summary reviewed, nursing notes reviewed and pertinent labs reviewed    Pulmonary        Sleep apnea: CPAP           Neuro/Psych   Within defined limits          Comments: Cervical disc disease Cardiovascular    Hypertension      CHF  Dysrhythmias : atrial fibrillation           GI/Hepatic/Renal         Renal disease (stage 5): CRI       Endo/Other        Anemia (hg=9.3)     Other Findings   Comments: Receiving blood           Physical Exam    Airway  Mallampati: II  TM Distance: > 6 cm  Neck ROM: normal range of motion   Mouth opening: Normal     Cardiovascular    Rhythm: regular  Rate: normal         Dental    Dentition: Lower dentition intact and Upper dentition intact     Pulmonary  Breath sounds clear to auscultation               Abdominal         Other Findings            Anesthetic Plan    ASA: 4  Anesthesia type: MAC            Anesthetic plan and risks discussed with: Patient      Informed consent obtained.

## 2021-12-14 NOTE — ED NOTES
Bedside and Verbal shift change report given to Holger reid RN (oncoming nurse) by Patience Gregory RN (offgoing nurse). Report included the following information SBAR, Kardex, ED Summary, Intake/Output, MAR, Recent Results and Cardiac Rhythm Afib.

## 2021-12-14 NOTE — ED PROVIDER NOTES
Patient is a 81-year-old male presenting emergency department with rectal bleeding. Patient was recently admitted to the hospital for sepsis found to have urine and blood cultures positive for Klebsiella pneumonia PICC line was placed currently on antibiotics until 12/22 followed by infectious disease. Patient during hospital stay had a colonoscopy performed were 6 polyps were removed one requiring clipping patient is on Eliquis secondary to A. fib after being discharged from the hospital on 12/10 he had an episode of rectal bleeding he spoke to GI who advised him to withhold his Eliquis dose he did rectal bleeding was resolving he started back on Eliquis on Sunday had an episode of rectal bleeding and then at 2:00 this morning had a large amount of rectal bleeding when he went to the bathroom. Patient denies any abdominal pain, dizziness, lightheadedness. Called GI and was advised to come to the ER for likely to have a repeat colonoscopy performed. Past Medical History:   Diagnosis Date    Atrial fibrillation ([address]     Cervical disc disorder     C5,C6 fractured disc and lumbar disc slipped    Chronic atrial fibrillation (HCC)     [facility]    ESRD (end stage renal disease) on dialysis [facility])     [facility] Or[facility]ez Vevay [facility]    [facility] failure [facility]     Hypertension     VIK on CPAP     Pulmonary hypertension ([facility]     Ramon NP at [facility]       Past Surgical History:   Procedure Laterality Date    COLONOSCOPY N/A 12/10/2021    COLONOSCOPY performed by Shady Hernandez MD at [address] [facility] HX HEENT      right sinus polyp removal    HX ORTHOPAEDIC  2000    left ACL replacement    HX OTHER SURGICAL      cardioversion for AFIB    HX UROLOGICAL  08/2015    renal gland removal left    IR INSERT TUNL CVC W/O PORT OVER 5 YR  12/9/2021         No family history on file.     Social History     Socioeconomic History    Marital status:      Spouse name: Not on file    Number of children: Not on file    Years of education: Not on file    Highest education level: Not on file   Occupational History    Not on file   Tobacco Use    Smoking status: Never Smoker    Smokeless tobacco: Never Used   Substance and Sexual Activity    Alcohol use: No    Drug use: Never    Sexual activity: Not on file   Other Topics Concern    Not on file   Social History Narrative    Not on file     Social Determinants of Health     Financial Resource Strain:     Difficulty of Paying Living Expenses: Not on file   Food Insecurity:     Worried About Running Out of Food in the Last Year: Not on file    Ray of Food in the Last Year: Not on file   Transportation Needs:     Lack of Transportation (Medical): Not on file    Lack of Transportation (Non-Medical): Not on file   Physical Activity:     Days of Exercise per Week: Not on file    Minutes of Exercise per Session: Not on file   Stress:     Feeling of Stress : Not on file   Social Connections:     Frequency of Communication with Friends and Family: Not on file    Frequency of Social Gatherings with Friends and Family: Not on file    Attends Baptism Services: Not on file    Active Member of [address] [place] [address] or Organizations: Not on file    Attends Club or Organization Meetings: Not on file    Marital Status: Not on file   Intimate Partner Violence:     Fear of Current or Ex-Partner: Not on file    Emotionally Abused: Not on file    Physically Abused: Not on file    Sexually Abused: Not on file   Housing Stability:     Unable to Pay for Housing in the Last Year: Not on file    Number of [place] in the Last Year: Not on file    Unstable Housing in the Last Year: Not on file         ALLERGIES: Levofloxacin and Pcn [penicillins]    Review of Systems   Gastrointestinal: Positive for blood in stool. Negative for abdominal pain. All other systems reviewed and are negative.       Vitals:    12/14/21 0402 12/14/21 0446 12/14/21 0500   BP: 125/71  132/81   Pulse: 86 Resp: 17     Temp: 97.6 °F (36.4 °C)     SpO2: 99% 99% 97%   Weight: 108.9 kg (240 lb)              Physical Exam  Vitals and nursing note reviewed. Constitutional:       Appearance: Normal appearance. He is obese. He is ill-appearing (Chronically ill-appearing). HENT:      Head: Normocephalic and atraumatic. Nose: Nose normal.   Eyes:      Extraocular Movements: Extraocular movements intact. Pupils: Pupils are equal, round, and reactive to light. Cardiovascular:      Rate and Rhythm: Normal rate. Rhythm irregular. Pulses: Normal pulses. Heart sounds: Normal heart sounds. Pulmonary:      Effort: Pulmonary effort is normal.      Breath sounds: Normal breath sounds. Chest:          Comments: PICC line site clean dry and intact no erythema or drainage. Abdominal:      General: Bowel sounds are normal.      Palpations: Abdomen is soft. There is no mass. Tenderness: There is no abdominal tenderness. There is no guarding or rebound. Musculoskeletal:         General: Normal range of motion. Cervical back: Normal range of motion and neck supple. Skin:     General: Skin is warm and dry. Coloration: Skin is pale. Neurological:      General: No focal deficit present. Mental Status: He is alert and oriented to person, place, and time. Psychiatric:         Mood and Affect: Mood normal.         Behavior: Behavior normal.          MDM  Number of Diagnoses or Management Options  Diagnosis management comments: A/P: GI bleed. 77-year-old male present emergency department with GI bleeding likely secondary to recent colonoscopy, clipping, polyp removal as well as currently being on Eliquis. Will evaluate with labs we will be unable to obtain CT with contrast due to patient's underlying renal dysfunction.        Amount and/or Complexity of Data Reviewed  Clinical lab tests: ordered and reviewed  Tests in the radiology section of CPT®: ordered and reviewed Procedures      Perfect Serve Consult for Admission  5:46 AM    ED Room Number: ZH19/40  Patient Name and age:  Tucker Lewis 78 y.o.  male  Working Diagnosis:   1.  Gastrointestinal hemorrhage, unspecified gastrointestinal hemorrhage type        COVID-19 Suspicion:  no  Sepsis present:  no  Reassessment needed: no  Code Status:  Full Code  Readmission: yes  Isolation Requirements:  no  Recommended Level of Care:  med/surg  Department:[facility] ED - [phone]  Other:

## 2021-12-15 LAB
ALBUMIN SERPL-MCNC: 1.9 G/DL (ref 3.5–5)
ALBUMIN/GLOB SERPL: 0.7 {RATIO} (ref 1.1–2.2)
ALP SERPL-CCNC: 60 U/L (ref 45–117)
ALT SERPL-CCNC: 21 U/L (ref 12–78)
ANION GAP SERPL CALC-SCNC: 11 MMOL/L (ref 5–15)
AST SERPL-CCNC: 15 U/L (ref 15–37)
ATRIAL RATE: 127 BPM
BILIRUB DIRECT SERPL-MCNC: 0.1 MG/DL (ref 0–0.2)
BILIRUB SERPL-MCNC: 0.4 MG/DL (ref 0.2–1)
BUN SERPL-MCNC: 96 MG/DL (ref 6–20)
BUN/CREAT SERPL: 20 (ref 12–20)
CALCIUM SERPL-MCNC: 7.4 MG/DL (ref 8.5–10.1)
CALCULATED R AXIS, ECG10: 17 DEGREES
CALCULATED R AXIS, ECG10: 7 DEGREES
CALCULATED T AXIS, ECG11: -27 DEGREES
CALCULATED T AXIS, ECG11: -6 DEGREES
CHLORIDE SERPL-SCNC: 116 MMOL/L (ref 97–108)
CO2 SERPL-SCNC: 21 MMOL/L (ref 21–32)
CREAT SERPL-MCNC: 4.88 MG/DL (ref 0.7–1.3)
DIAGNOSIS, 93000: NORMAL
DIAGNOSIS, 93000: NORMAL
ERYTHROCYTE [DISTWIDTH] IN BLOOD BY AUTOMATED COUNT: 17.5 % (ref 11.5–14.5)
EST. AVERAGE GLUCOSE BLD GHB EST-MCNC: 117 MG/DL
GLOBULIN SER CALC-MCNC: 2.7 G/DL (ref 2–4)
GLUCOSE SERPL-MCNC: 94 MG/DL (ref 65–100)
HBA1C MFR BLD: 5.7 % (ref 4–5.6)
HCT VFR BLD AUTO: 20.2 % (ref 36.6–50.3)
HCT VFR BLD AUTO: 22.1 % (ref 36.6–50.3)
HGB BLD-MCNC: 6.4 G/DL (ref 12.1–17)
HGB BLD-MCNC: 7 G/DL (ref 12.1–17)
HISTORY CHECKED?,CKHIST: NORMAL
MAGNESIUM SERPL-MCNC: 2.5 MG/DL (ref 1.6–2.4)
MCH RBC QN AUTO: 29.3 PG (ref 26–34)
MCHC RBC AUTO-ENTMCNC: 31.7 G/DL (ref 30–36.5)
MCV RBC AUTO: 92.5 FL (ref 80–99)
NRBC # BLD: 0.16 K/UL (ref 0–0.01)
NRBC BLD-RTO: 1.7 PER 100 WBC
PHOSPHATE SERPL-MCNC: 7.3 MG/DL (ref 2.6–4.7)
PLATELET # BLD AUTO: 125 K/UL (ref 150–400)
PMV BLD AUTO: 12.4 FL (ref 8.9–12.9)
POTASSIUM SERPL-SCNC: 4.5 MMOL/L (ref 3.5–5.1)
PROT SERPL-MCNC: 4.6 G/DL (ref 6.4–8.2)
Q-T INTERVAL, ECG07: 326 MS
Q-T INTERVAL, ECG07: 348 MS
QRS DURATION, ECG06: 94 MS
QRS DURATION, ECG06: 98 MS
QTC CALCULATION (BEZET), ECG08: 466 MS
QTC CALCULATION (BEZET), ECG08: 470 MS
RBC # BLD AUTO: 2.39 M/UL (ref 4.1–5.7)
SODIUM SERPL-SCNC: 148 MMOL/L (ref 136–145)
VENTRICULAR RATE, ECG03: 108 BPM
VENTRICULAR RATE, ECG03: 125 BPM
WBC # BLD AUTO: 9.4 K/UL (ref 4.1–11.1)

## 2021-12-15 PROCEDURE — 36415 COLL VENOUS BLD VENIPUNCTURE: CPT

## 2021-12-15 PROCEDURE — 85018 HEMOGLOBIN: CPT

## 2021-12-15 PROCEDURE — 80076 HEPATIC FUNCTION PANEL: CPT

## 2021-12-15 PROCEDURE — 65270000029 HC RM PRIVATE

## 2021-12-15 PROCEDURE — 74011250637 HC RX REV CODE- 250/637: Performed by: HOSPITALIST

## 2021-12-15 PROCEDURE — 80048 BASIC METABOLIC PNL TOTAL CA: CPT

## 2021-12-15 PROCEDURE — 83735 ASSAY OF MAGNESIUM: CPT

## 2021-12-15 PROCEDURE — 83036 HEMOGLOBIN GLYCOSYLATED A1C: CPT

## 2021-12-15 PROCEDURE — 74011250637 HC RX REV CODE- 250/637: Performed by: INTERNAL MEDICINE

## 2021-12-15 PROCEDURE — 74011250636 HC RX REV CODE- 250/636: Performed by: INTERNAL MEDICINE

## 2021-12-15 PROCEDURE — 84100 ASSAY OF PHOSPHORUS: CPT

## 2021-12-15 PROCEDURE — 93005 ELECTROCARDIOGRAM TRACING: CPT

## 2021-12-15 PROCEDURE — 85027 COMPLETE CBC AUTOMATED: CPT

## 2021-12-15 PROCEDURE — 65660000000 HC RM CCU STEPDOWN

## 2021-12-15 PROCEDURE — 74011000250 HC RX REV CODE- 250: Performed by: INTERNAL MEDICINE

## 2021-12-15 RX ORDER — HYDRALAZINE HYDROCHLORIDE 20 MG/ML
20 INJECTION INTRAMUSCULAR; INTRAVENOUS
Status: DISCONTINUED | OUTPATIENT
Start: 2021-12-15 | End: 2021-12-18

## 2021-12-15 RX ORDER — CLONIDINE HYDROCHLORIDE 0.1 MG/1
0.2 TABLET ORAL 2 TIMES DAILY
Status: DISCONTINUED | OUTPATIENT
Start: 2021-12-15 | End: 2021-12-18

## 2021-12-15 RX ORDER — SODIUM CHLORIDE 9 MG/ML
250 INJECTION, SOLUTION INTRAVENOUS AS NEEDED
Status: DISCONTINUED | OUTPATIENT
Start: 2021-12-15 | End: 2021-12-18

## 2021-12-15 RX ORDER — NIFEDIPINE 30 MG/1
30 TABLET, EXTENDED RELEASE ORAL
Status: DISCONTINUED | OUTPATIENT
Start: 2021-12-15 | End: 2021-12-21 | Stop reason: HOSPADM

## 2021-12-15 RX ORDER — PANTOPRAZOLE SODIUM 40 MG/1
40 TABLET, DELAYED RELEASE ORAL
Status: DISCONTINUED | OUTPATIENT
Start: 2021-12-15 | End: 2021-12-21 | Stop reason: HOSPADM

## 2021-12-15 RX ADMIN — CARVEDILOL 12.5 MG: 12.5 TABLET, FILM COATED ORAL at 17:01

## 2021-12-15 RX ADMIN — CARVEDILOL 12.5 MG: 12.5 TABLET, FILM COATED ORAL at 04:47

## 2021-12-15 RX ADMIN — CLONIDINE HYDROCHLORIDE 0.2 MG: 0.1 TABLET ORAL at 18:04

## 2021-12-15 RX ADMIN — FLUTICASONE PROPIONATE 2 SPRAY: 50 SPRAY, METERED NASAL at 19:00

## 2021-12-15 RX ADMIN — SODIUM BICARBONATE 650 MG: 650 TABLET ORAL at 09:44

## 2021-12-15 RX ADMIN — NIFEDIPINE 30 MG: 30 TABLET, FILM COATED, EXTENDED RELEASE ORAL at 09:44

## 2021-12-15 RX ADMIN — FUROSEMIDE 40 MG: 40 TABLET ORAL at 18:05

## 2021-12-15 RX ADMIN — MEROPENEM 500 MG: 500 INJECTION, POWDER, FOR SOLUTION INTRAVENOUS at 13:20

## 2021-12-15 RX ADMIN — SODIUM BICARBONATE 650 MG: 650 TABLET ORAL at 17:01

## 2021-12-15 RX ADMIN — CLONIDINE HYDROCHLORIDE 0.2 MG: 0.1 TABLET ORAL at 09:44

## 2021-12-15 RX ADMIN — CALCITRIOL CAPSULES 0.25 MCG 0.25 MCG: 0.25 CAPSULE ORAL at 09:45

## 2021-12-15 RX ADMIN — PANTOPRAZOLE SODIUM 40 MG: 40 TABLET, DELAYED RELEASE ORAL at 09:45

## 2021-12-15 RX ADMIN — Medication 10 ML: at 05:34

## 2021-12-15 RX ADMIN — Medication 10 ML: at 13:21

## 2021-12-15 RX ADMIN — MEROPENEM 500 MG: 500 INJECTION, POWDER, FOR SOLUTION INTRAVENOUS at 01:15

## 2021-12-15 RX ADMIN — FUROSEMIDE 40 MG: 40 TABLET ORAL at 09:44

## 2021-12-15 RX ADMIN — SODIUM BICARBONATE 650 MG: 650 TABLET ORAL at 21:44

## 2021-12-15 RX ADMIN — Medication 10 ML: at 21:44

## 2021-12-15 NOTE — PROGRESS NOTES
Bedside and Verbal shift change report given to Ashleigh Rosales RN (oncoming nurse) by Maureen Gresham RN (offgoing nurse). Report included the following information SBAR, Kardex and ED Summary.

## 2021-12-15 NOTE — PROGRESS NOTES
Problem: Risk for Spread of Infection  Goal: Prevent transmission of infectious organism to others  Description: Prevent the transmission of infectious organisms to other patients, staff members, and visitors. Outcome: Progressing Towards Goal     Problem: Falls - Risk of  Goal: *Absence of Falls  Description: Document Nick Landa Fall Risk and appropriate interventions in the flowsheet.   Outcome: Progressing Towards Goal  Note: Fall Risk Interventions:  Mobility Interventions: Bed/chair exit alarm,Patient to call before getting OOB,Utilize walker, cane, or other assistive device,Utilize gait belt for transfers/ambulation         Medication Interventions: Bed/chair exit alarm,Patient to call before getting OOB,Teach patient to arise slowly    Elimination Interventions: Call light in reach,Patient to call for help with toileting needs,Toilet paper/wipes in reach

## 2021-12-15 NOTE — PROGRESS NOTES
Occupational Therapy:  12/15/21    Orders received and appreciated, chart reviewed. Pt politely requesting OT defer secondary to just returning to bed after working with PT and sitting up OOB for majority of morning, reporting fatigue. Will follow up later this afternoon for OT eval as able and appropriate.      Thank you,  Falguni Baron, OTR/L

## 2021-12-15 NOTE — PROGRESS NOTES
Bedside and Verbal shift change report given to Kalia Veras RN (oncoming nurse) by Becky JARVIS (offgoing nurse). Report included the following information SBAR and MAR.

## 2021-12-15 NOTE — PROGRESS NOTES
Heart rate bumped to the 130's. Checked patient and denies CP, heart palpitations and SOB. EKG was completed and it revealed afib with RVR. Then patient heart rate went back down to 105. Bp 151/99. PS Dr. Stewart Concepcion and he is aware. Advised to give Coreg 12.5 early. Administered.

## 2021-12-15 NOTE — PROGRESS NOTES
Problem: Mobility Impaired (Adult and Pediatric)  Goal: *Acute Goals and Plan of Care (Insert Text)  Description: FUNCTIONAL STATUS PRIOR TO ADMISSION: Patient was modified independent using a rolling walker for functional mobility. HOME SUPPORT PRIOR TO ADMISSION: The patient lived with wife but did not require assist.    Physical Therapy Goals  Initiated 12/15/2021  1. Patient will move from supine to sit and sit to supine  in bed with modified independence within 7 day(s). 2.  Patient will transfer from bed to chair and chair to bed with modified independence using the least restrictive device within 7 day(s). 3.  Patient will perform sit to stand with modified independence within 7 day(s). 4.  Patient will ambulate with supervision/set-up for 150 feet with the least restrictive device within 7 day(s). Outcome: Progressing Towards Goal  Note:   PHYSICAL THERAPY EVALUATION  Patient: Lorie Pittman (37 y.o. male)  Date: 12/15/2021  Primary Diagnosis: Acute GI bleeding [K92.2]  Procedure(s) (LRB):  COLONOSCOPY (N/A)  RESOLUTION CLIP (N/A) 1 Day Post-Op   Precautions:   Fall      ASSESSMENT  Based on the objective data described below, the patient presents with decreased endurance and strength following admission for GI bleed and low Hgb. Patient today with Hgb of 7, s/p colonoscopy yesterday. He is received sitting up in chair, patient overall CGA with all mobility. She was able to ambulate 150 feet in hallway without loss of balance or instability. Patient required 3 standing rest breaks for completion. Patient would benefit from home health at discharge. Current Level of Function Impacting Discharge (mobility/balance): CGA with RW    Functional Outcome Measure: The patient scored Total Score: 19/28 on the Tinetti outcome measure which is indicative of moderate fall risk.       Other factors to consider for discharge:      Patient will benefit from skilled therapy intervention to address the above noted impairments. PLAN :  Recommendations and Planned Interventions: bed mobility training, transfer training, gait training, therapeutic exercises, and therapeutic activities      Frequency/Duration: Patient will be followed by physical therapy:  5 times a week to address goals. Recommendation for discharge: (in order for the patient to meet his/her long term goals)  Physical therapy at least 2 days/week in the home     This discharge recommendation:  A follow-up discussion with the attending provider and/or case management is planned    IF patient discharges home will need the following DME: patient owns DME required for discharge         SUBJECTIVE:   Patient stated i am weak.     OBJECTIVE DATA SUMMARY:   HISTORY:    Past Medical History:   Diagnosis Date    [facility]     Cervical disc disorder     C5,C6 fractured disc and lumbar disc slipped    Chronic atrial fibrillation (HCC)     [facility]    ESRD (end stage renal disease) on dialysis ([facility]     [facility]    Heart failure ([facility]     Hypertension     VIK on CPAP     Pulmonary hypertension ([facility]     Ramon NP at [facility]     Past Surgical History:   Procedure Laterality Date    COLONOSCOPY N/A 12/10/2021    COLONOSCOPY performed by Mike Jay [facility] at [facility] COLONOSCOPY N/A 12/14/2021    COLONOSCOPY performed by Mike Jay MD at [facility] 112 HX HEENT      right sinus polyp removal    HX ORTHOPAEDIC  2000    left ACL replacement    HX OTHER SURGICAL      cardioversion for AFIB    HX UROLOGICAL  08/2015    renal gland removal left    IR INSERT TUNL CVC W/O PORT OVER 5 YR  12/9/2021       Personal factors and/or comorbidities impacting plan of care: yecenia varner    Home Situation  Home Environment: Apartment  # Steps to Enter:  (elevator)  One/Two Story Residence: One story  Living Alone: No  Support Systems: Spouse/Significant Other  Patient Expects to be Discharged to[de-identified] Apartment  Current DME Used/Available at Home: Cane, straight,Walker, rolling    EXAMINATION/PRESENTATION/DECISION MAKING:   Vitals    Blood pressure Heart rate(bpm)   Pre-activity 118/68   85  During activity   120  Post activity 138/72   86      Range Of Motion:  AROM: Within functional limits           PROM: Within functional limits           Strength:    Strength: Generally decreased, functional                    Tone & Sensation:                                  Coordination:  Coordination: Generally decreased, functional  Vision:      Functional Mobility:  Bed Mobility:              Transfers:  Sit to Stand: Contact guard assistance  Stand to Sit: Contact guard assistance        Bed to Chair: Contact guard assistance              Balance:   Sitting: Intact  Standing: Intact; With support  Ambulation/Gait Training:  Distance (ft): 150 Feet (ft)  Assistive Device: Walker, rolling;Gait belt  Ambulation - Level of Assistance: Contact guard assistance     Gait Description (WDL): Exceptions to WDL                   Functional Measure:  Tinetti test:    Sitting Balance: 1  Arises: 1  Attempts to Rise: 1  Immediate Standing Balance: 1  Standing Balance: 1  Nudged: 1  Eyes Closed: 1  Turn 360 Degrees - Continuous/Discontinuous: 1  Turn 360 Degrees - Steady/Unsteady: 1  Sitting Down: 1  Balance Score: 10 Balance total score  Indication of Gait: 1  R Step Length/Height: 1  L Step Length/Height: 1  R Foot Clearance: 1  L Foot Clearance: 1  Step Symmetry: 1  Step Continuity: 1  Path: 1  Trunk: 0  Walking Time: 1  Gait Score: 9 Gait total score  Total Score: 19/28 Overall total score         Tinetti Tool Score Risk of Falls  <19 = High Fall Risk  19-24 = Moderate Fall Risk  25-28 = Low Fall Risk  Tinetti ME. Performance-Oriented Assessment of Mobility Problems in Elderly Patients. Farmer 66; [id number].  (Scoring Description: PT Bulletin Feb. 10, 1993)    Older adults: Sudhir Warren et al, 2009; n = [address] elderly evaluated with ABC, FLAVIA, ADL, and IADL)  · Mean FLAVIA score for males aged 69-68 years = 26.21(3.40)  · Mean FLAVIA score for females age 69-68 years = 25.16(4.30)  · Mean FLAVIA score for males over 80 years = 23.29(6.02)  · Mean FLAVIA score for females over 80 years = 17.20(8.32)        Physical Therapy Evaluation Charge Determination   History Examination Presentation Decision-Making   HIGH Complexity :3+ comorbidities / personal factors will impact the outcome/ POC  MEDIUM Complexity : 3 Standardized tests and measures addressing body structure, function, activity limitation and / or participation in recreation  MEDIUM Complexity : Evolving with changing characteristics  Other outcome measures tinetti  MEDIUM      Based on the above components, the patient evaluation is determined to be of the following complexity level: MEDIUM    Pain Rating:  None reported    Activity Tolerance:   Good and Fair      After treatment patient left in no apparent distress:   Sitting in chair and Call bell within reach    COMMUNICATION/EDUCATION:   The patients plan of care was discussed with: Registered nurse. Fall prevention education was provided and the patient/caregiver indicated understanding., Patient/family have participated as able in goal setting and plan of care. , and Patient/family agree to work toward stated goals and plan of care.     Thank you for this referral.  Vikki Holly, PT, DPT   Time Calculation: 21 mins

## 2021-12-15 NOTE — PROGRESS NOTES
NEPHROLOGY PROGRESS NOTE         NAME:Kendall Moses                   UTN:[id number]   :1950    Chief complaints: f/u CKD stage V    Subjective:  Feels fine  24 hr interval history:  S/p colonoscopy : Post polypectomy bleeding and s/p clipping  Scr 4.88, edema +      Objective:  Vitals:    12/15/21 0000 12/15/21 0427 12/15/21 0700 12/15/21 0741   BP:  (!) 148/87  (!) 151/93   Pulse: (!) 108 (!) 118 (!) 119 (!) 107   Resp:  16  20   Temp:  97.8 °F (36.6 °C)  97.7 °F (36.5 °C)   SpO2:  97%  98%   Weight:           Intake/Output Summary (Last 24 hours) at 12/15/2021 0957  Last data filed at 12/15/2021 0400  Gross per 24 hour   Intake 2720.5 ml   Output 601 ml   Net 2119.5 ml       PHYSICAL EXAM:  GENERAL : Lying down in bed with no acute distress  HEENT: AT NC PEERLA   NECK: Supple no JVP  CVS: S1 S2 RRR, no murmur or gallops heard  RS: CTABL, no rhonchi,or wheezing heard  ABDOMEN: soft NT ND positive BS  EXTREMITY: No edema clubbing or cyanosis, pedal pulse +  NEUROLOGY: AAA X3, no focal deficit or asterixis  VASCULAR ACCESS:     Labs:  CBC:    Lab Results   Component Value Date/Time    WBC 9.4 12/15/2021 03:34 AM    HGB 7.0 (L) 12/15/2021 03:34 AM    HCT 22.1 (L) 12/15/2021 03:34 AM     (L) 12/15/2021 03:34 AM     BMP:   Lab Results   Component Value Date/Time     (H) 12/15/2021 03:34 AM    K 4.5 12/15/2021 03:34 AM     (H) 12/15/2021 03:34 AM    CO2 21 12/15/2021 03:34 AM    AGAP 11 12/15/2021 03:34 AM    GLU 94 12/15/2021 03:34 AM    BUN 96 (H) 12/15/2021 03:34 AM    CREA 4.88 (H) 12/15/2021 03:34 AM    GFRAA 14 (L) 12/15/2021 03:34 AM    GFRNA 12 (L) 12/15/2021 03:34 AM        Lab Results   Component Value Date/Time    Color YELLOW/STRAW 2021 05:32 AM    Appearance TURBID (A) 2021 05:32 AM    Specific gravity 1.013 2021 05:32 AM    pH (UA) 5.5 2021 05:32 AM    Protein 300 (A) 2021 05:32 AM    Glucose Negative 2021 05:32 AM    Ketone Negative 12/04/2021 05:32 AM    Bilirubin Negative 12/04/2021 05:32 AM    Urobilinogen 0.2 12/04/2021 05:32 AM    Nitrites Negative 12/04/2021 05:32 AM    Leukocyte Esterase LARGE (A) 12/04/2021 05:32 AM    Epithelial cells FEW 12/04/2021 05:32 AM    Bacteria 2+ (A) 12/04/2021 05:32 AM    WBC >100 (H) 12/04/2021 05:32 AM    RBC 10-20 12/04/2021 05:32 AM       Imaging study:    Assessment &Plan    CKD stage V: Serum creatinine and GFR near baseline  Hypertension  Bilateral lower extremity edema  Anemia likely due to GI bleed  Hyperkalemia due to underlying worsening CKD> stable  NAGMA     -scr/GFR near baseline  -I will continue Lasix 40 mg twice daily  -DC IVF  -Continue sodium bicarb 650 mg 1 tablet 3 times daily with meals  -Renally dose all medication with current GFR  -Hold prednisone for gout in the setting of GI bleed  -Renal function panel daily    Care Plan discussed with:   Medical Team    Mary Hull MD  12/15/2021    [facility]:  [email or web address]  Radha Mar office:  [facility] [address] [facility], [address] [place] [address] [address]  [address]  Phone: [phone]  Fax :     [phone]     [facility] office:  [address] [place] [address] Eliane Jordan Laure  Phone - [phone]  Fax - [phone]

## 2021-12-15 NOTE — PROGRESS NOTES
Heart rate 128, went in to see patient, awake and alert. Denies CP, SOB, or heart palpitations. Recheck pulse and it was in the low 100;s. Will continue to monitor.

## 2021-12-15 NOTE — PROGRESS NOTES
Hospitalist Progress Note    NAME: Regina Patrick   :  1950   MRN:  [id number]     Subjective:   Daily Progress Note: 12/15/2021 7:49 AM      Chief complaint: Rectal bleed  Patient seen and examined, chart was reviewed. Patient was walking with a walker. Patient is going to the restroom. Patient did not offer any other complaints at this time. Current Facility-Administered Medications   Medication Dose Route Frequency    calcitRIOL (ROCALTROL) capsule 0.25 mcg  0.25 mcg Oral DAILY    carvediloL (COREG) tablet 12.5 mg  12.5 mg Oral BID WITH MEALS    fluticasone propionate (FLONASE) 50 mcg/actuation nasal spray 2 Spray  2 Spray Both Nostrils DAILY    sodium bicarbonate tablet 650 mg  650 mg Oral TID    pantoprazole (PROTONIX) 40 mg in 0.9% sodium chloride 10 mL injection  40 mg IntraVENous DAILY    0.9% sodium chloride infusion  50 mL/hr IntraVENous CONTINUOUS    sodium chloride (NS) flush 5-40 mL  5-40 mL IntraVENous Q8H    sodium chloride (NS) flush 5-40 mL  5-40 mL IntraVENous PRN    0.9% sodium chloride infusion 25 mL  25 mL IntraVENous PRN    acetaminophen (TYLENOL) tablet 650 mg  650 mg Oral Q6H PRN    Or    acetaminophen (TYLENOL) suppository 650 mg  650 mg Rectal Q6H PRN    bisacodyL (DULCOLAX) suppository 10 mg  10 mg Rectal DAILY PRN    promethazine (PHENERGAN) tablet 12.5 mg  12.5 mg Oral Q6H PRN    Or    ondansetron (ZOFRAN) injection 4 mg  4 mg IntraVENous Q6H PRN    HYDROmorphone (DILAUDID) syringe 0.5 mg  0.5 mg IntraVENous Q4H PRN    furosemide (LASIX) tablet 40 mg  40 mg Oral BID    meropenem (MERREM) 500 mg in sterile water (preservative free) 10 mL IV syringe  0.5 g IntraVENous Q12H    0.9% sodium chloride infusion 250 mL  250 mL IntraVENous PRN    0.9% sodium chloride infusion 250 mL  250 mL IntraVENous PRN          Objective:     Visit Vitals  BP (!) 148/87 (BP 1 Location: Left upper arm, BP Patient Position: At rest) Comment: RN notified!    Pulse (!) 118   Temp 97.8 °F (36.6 °C)   Resp 16   Wt 108.9 kg (240 lb)   SpO2 97%   BMI 34.44 kg/m²      O2 Device: None (Room air)    Temp (24hrs), Av.9 °F (36.6 °C), Min:97.6 °F (36.4 °C), Max:98.1 °F (36.7 °C)        PHYSICAL EXAM:  General WDWN  Neck supple  CVS tachycardic  Respiratory symmetric expansion  Abdomen nondistended  Extremities edema  Skin venous dermatitis LE  Neuro alert normal speech  Psych normal affect        Data Review    Recent Results (from the past 24 hour(s))   HEMOGLOBIN    Collection Time: 21 10:04 AM   Result Value Ref Range    HGB 6.8 (L) 12.1 - 27.1 g/dL   METABOLIC PANEL, BASIC    Collection Time: 21 10:04 AM   Result Value Ref Range    Sodium 145 136 - 145 mmol/L    Potassium 5.1 3.5 - 5.1 mmol/L    Chloride 115 (H) 97 - 108 mmol/L    CO2 18 (L) 21 - 32 mmol/L    Anion gap 12 5 - 15 mmol/L    Glucose 148 (H) 65 - 100 mg/dL     (H) 6 - 20 MG/DL    Creatinine 4.98 (H) 0.70 - 1.30 MG/DL    BUN/Creatinine ratio 20 12 - 20      GFR est AA 14 (L) >60 ml/min/1.73m2    GFR est non-AA 12 (L) >60 ml/min/1.73m2    Calcium 7.3 (L) 8.5 - 10.1 MG/DL   RBC, ALLOCATE    Collection Time: 21 12:45 PM   Result Value Ref Range    HISTORY CHECKED? Historical check performed    COVID-19 RAPID TEST    Collection Time: 21  1:12 PM   Result Value Ref Range    Specimen source Nasopharyngeal      COVID-19 rapid test Not detected NOTD     POC HEMOGLOBIN    Collection Time: 21  3:56 PM   Result Value Ref Range    Hemoglobin (POC) 6.7 (L) 12.1 - 17.0 g/dL   RBC, ALLOCATE    Collection Time: 21  5:15 PM   Result Value Ref Range    HISTORY CHECKED?  Historical check performed    METABOLIC PANEL, BASIC    Collection Time: 12/15/21  3:34 AM   Result Value Ref Range    Sodium 148 (H) 136 - 145 mmol/L    Potassium 4.5 3.5 - 5.1 mmol/L    Chloride 116 (H) 97 - 108 mmol/L    CO2 21 21 - 32 mmol/L    Anion gap 11 5 - 15 mmol/L    Glucose 94 65 - 100 mg/dL    BUN 96 (H) 6 - 20 MG/DL    Creatinine 4.88 (H) 0.70 - 1.30 MG/DL    BUN/Creatinine ratio 20 12 - 20      GFR est AA 14 (L) >60 ml/min/1.73m2    GFR est non-AA 12 (L) >60 ml/min/1.73m2    Calcium 7.4 (L) 8.5 - 10.1 MG/DL   HEPATIC FUNCTION PANEL    Collection Time: 12/15/21  3:34 AM   Result Value Ref Range    Protein, total 4.6 (L) 6.4 - 8.2 g/dL    Albumin 1.9 (L) 3.5 - 5.0 g/dL    Globulin 2.7 2.0 - 4.0 g/dL    A-G Ratio 0.7 (L) 1.1 - 2.2      Bilirubin, total 0.4 0.2 - 1.0 MG/DL    Bilirubin, direct 0.1 0.0 - 0.2 MG/DL    Alk. phosphatase 60 45 - 117 U/L    AST (SGOT) 15 15 - 37 U/L    ALT (SGPT) 21 12 - 78 U/L   MAGNESIUM    Collection Time: 12/15/21  3:34 AM   Result Value Ref Range    Magnesium 2.5 (H) 1.6 - 2.4 mg/dL   CBC W/O DIFF    Collection Time: 12/15/21  3:34 AM   Result Value Ref Range    WBC 9.4 4.1 - 11.1 K/uL    RBC 2.39 (L) 4.10 - 5.70 M/uL    HGB 7.0 (L) 12.1 - 17.0 g/dL    HCT 22.1 (L) 36.6 - 50.3 %    MCV 92.5 80.0 - 99.0 FL    MCH 29.3 26.0 - 34.0 PG    MCHC 31.7 30.0 - 36.5 g/dL    RDW 17.5 (H) 11.5 - 14.5 %    PLATELET 987 (L) 957 - 400 K/uL    MPV 12.4 8.9 - 12.9 FL    NRBC 1.7 (H) 0  WBC    ABSOLUTE NRBC 0.16 (H) 0.00 - 0.01 K/uL   PHOSPHORUS    Collection Time: 12/15/21  3:34 AM   Result Value Ref Range    Phosphorus 7.3 (H) 2.6 - 4.7 MG/DL     No results found for this visit on 12/14/21. All Micro Results     Procedure Component Value Units Date/Time    COVID-19 RAPID TEST [[id number]] Collected: 12/14/21 1312    Order Status: Completed Specimen: Nasopharyngeal Updated: 12/14/21 1342     Specimen source Nasopharyngeal        COVID-19 rapid test Not detected        Comment: Rapid Abbott ID Now       Rapid NAAT:  The specimen is NEGATIVE for SARS-CoV-2, the novel coronavirus associated with COVID-19.        Negative results should be treated as presumptive and, if inconsistent with clinical signs and symptoms or necessary for patient management, should be tested with an alternative molecular assay.  Negative results do not preclude SARS-CoV-2 infection and should not be used as the sole basis for patient management decisions. This test has been authorized by the FDA under an Emergency Use Authorization (EUA) for use by authorized laboratories. Fact sheet for Healthcare Providers: ConventionUpdate.co.nz  Fact sheet for Patients: ConventionUpdate.co.nz       Methodology: Isothermal Nucleic Acid Amplification                     Assessment/Plan:        77 yo hx of HTN, chronic afib, pulm HTN, CKD 5, recent polypectomy, presented w/ rectal bleeding, blood loss anemia       Acute GI bleed due to recent polypectomy and Eliquis. Hold all anticoagulation. Monitor Hgb closely. GI  Did   repeat colonoscopy 12/14/21 showed post polypectomy bleeding in the ascending colon and Endo Clip was placed. No anticoagulation for 10 days per GI note.      Acute blood loss anemia: due to above. Monitor hemoglobin, transfuse if below 7    CKD 5 approaching dialysis. S/p  Lokelma for elevated potassium. .  Monitor BMP. Renal on case      Recent ESBL E. Coli UTI/Klebsiella bacteremia: cont Invanz through PICC till 12/22     Chronic afib: hold eliquis for 10 days per GI.   Cont coreg      HTN: .  Cont coreg and clonidine      Pulm HTN: follows by [facility] at [facility]      ADFC  NOK  Prophylaxis:  SCD's  Probable Disposition:  Home w/Family 48Hrs if medically progressing    Signed By: Fanny Serrano MD     December 15, 2021

## 2021-12-15 NOTE — PROGRESS NOTES
Progress Note  Date:12/15/2021       Room:[facility]/  Patient Mamie Orellana     YOB: 1950     Age:70 y.o. Subjective    Subjective:  Diet:  Adequate intake. No nausea or vomiting. Activity level: Impaired due to weakness. Pain:  He reports no pain. Review of Systems   Constitutional: Positive for fatigue. Negative for appetite change and fever. Cardiovascular: Negative for leg swelling. Gastrointestinal: Positive for blood in stool (Per nursing). Negative for abdominal pain, nausea and vomiting. Objective         Vitals Last 24 Hours:  TEMPERATURE:  Temp  Av.9 °F (36.6 °C)  Min: 97.6 °F (36.4 °C)  Max: 98.1 °F (36.7 °C)  RESPIRATIONS RANGE: Resp  Av.2  Min: 11  Max: 27  PULSE OXIMETRY RANGE: SpO2  Av.3 %  Min: 97 %  Max: 100 %  PULSE RANGE: Pulse  Av.6  Min: 106  Max: 128  BLOOD PRESSURE RANGE: Systolic (59RUT), SGH:735 , Min:99 , GJD:746   ; Diastolic (24VMO), HHK:40, Min:66, Max:112    I/O (24Hr): Intake/Output Summary (Last 24 hours) at 12/15/2021 0905  Last data filed at 12/15/2021 0400  Gross per 24 hour   Intake 2720.5 ml   Output 601 ml   Net 2119.5 ml     Objective:  General Appearance:  Comfortable and not in pain (Chronically ill). Vital signs: (most recent): Blood pressure (!) 151/93, pulse (!) 107, temperature 97.7 °F (36.5 °C), resp. rate 20, weight 108.9 kg (240 lb), SpO2 98 %. No fever. Output: Producing stool. HEENT: Normal HEENT exam.    Lungs:  Normal effort and normal respiratory rate. Breath sounds clear to auscultation. Heart: Normal rate. Regular rhythm. S1 normal and S2 normal.  No murmur. Abdomen: Abdomen is soft and non-distended. Bowel sounds are normal.   There is no abdominal tenderness. Extremities: Normal range of motion. There is venous stasis and dependent edema. Pulses: Distal pulses are intact.     Neurological: Patient is alert and oriented to person, place and time. Pupils:  Pupils are equal, round, and reactive to light. Skin:  Warm and dry. Labs/Imaging/Diagnostics    Labs:  CBC:  Recent Labs     12/15/21  0334 12/14/21  1004 12/14/21  0407   WBC 9.4  --  12.2*   RBC 2.39*  --  2.61*   HGB 7.0* 6.8* 7.5*   HCT 22.1*  --  23.7*   MCV 92.5  --  90.8   RDW 17.5*  --  17.4*   *  --  168     CHEMISTRIES:  Recent Labs     12/15/21  0334 12/14/21  1004 12/14/21  0407   * 145 142   K 4.5 5.1 5.6*   * 115* 112*   CO2 21 18* 22   BUN 96* 101* 103*   CA 7.4* 7.3* 7.5*   PHOS 7.3*  --   --    MG 2.5*  --   --    PT/INR:No results for input(s): INR, INREXT in the last 72 hours. No lab exists for component: PROTIME  APTT:No results for input(s): APTT in the last 72 hours. LIVER PROFILE:  Recent Labs     12/15/21  0334 12/14/21  0407   AST 15 15   ALT 21 18     Lab Results   Component Value Date/Time    ALT (SGPT) 21 12/15/2021 03:34 AM    AST (SGOT) 15 12/15/2021 03:34 AM    Alk. phosphatase 60 12/15/2021 03:34 AM    Bilirubin, direct 0.1 12/15/2021 03:34 AM    Bilirubin, total 0.4 12/15/2021 03:34 AM       Imaging Last 24 Hours:  No results found. Assessment//Plan   Principal Problem:    Acute GI bleeding (12/14/2021)    Active Problems:    Pulmonary hypertension (HCC) ()      Overview: Ramon [facility] at [facility]      Chronic atrial fibrillation (HCC) ()      Overview: [facility]      Bacteremia due to Klebsiella pneumoniae (12/4/2021)      Acute blood loss anemia (12/14/2021)      CKD (chronic kidney disease) stage 5, GFR less than 15 ml/min ([facility]) (12/14/2021)      Assessment:  (80 y/o male admitted with rectal bleeding after recent colonoscopy with polypectomy. Repeat colonoscopy yesterday with placement of additional clip at previous endoclipped site which was intact with large fresh clot atop site. Additional polypectomy sites not seen to be bleeding but his prep was poor so visualization was difficult. Hgb dropped slightly to 7.  Nursing describes BRB in stool this morning. When I discussed with him another colonoscopy he is adamant he does not want to prep for or have another colonoscopy for further evaluation. Anticoagulation being held. He denies abdominal pain, nausea, or vomiting. ). Plan:   (- At this point he is refusing another colonoscopy. He says he will speak with his wife regarding her desires. I will not proceed with prepping for another procedure given his resistance. Will see tomorrow. - Follow H&H, transfuse prn.   - Continue to hold anticoagulation, recommend to hold for 10 dayst. ).        Electronically signed by Abraham Melara NP on 12/15/2021 at 9:05 AM    I have interviewed and examined patient with addendum to note above and formulation care plan to reflect my evaluation    Ricardo Houser M.D.

## 2021-12-15 NOTE — PROGRESS NOTES
12/15/2021  Case Management Progress Note    12:22 PM  Patient is 78year old male admitted 12/14 with acute GI bleeding  Patient's RUR is 25% red/high risk for readmission  Covid test: negative 12/14  Chart reviewed--patient discussed at IDR rounds  Patient is familiar to this CM from previous admission. PT has already seen patient and he is appropriate for home health per their assessment. Patient is open to [facility] already and will need resumption orders. Patient is also open to [facility] 123 for home antibiotics. This plan remains the same at discharge unless anything is otherwise indicated during clinical course. Will continue to follow and assist with discharge planning. Transition of Care Plan   1. Continue medical management/treatment  2. Home with [facility] when ready; will need orders   3. [facility] 1237 on board to finish course of antibiotics   4. Family will transport at discharge   5.  CM will follow    MICKY Pelaez

## 2021-12-16 LAB
ANION GAP SERPL CALC-SCNC: 11 MMOL/L (ref 5–15)
BASOPHILS # BLD: 0 K/UL (ref 0–0.1)
BASOPHILS NFR BLD: 0 % (ref 0–1)
BUN SERPL-MCNC: 85 MG/DL (ref 6–20)
BUN/CREAT SERPL: 19 (ref 12–20)
CALCIUM SERPL-MCNC: 7.2 MG/DL (ref 8.5–10.1)
CHLORIDE SERPL-SCNC: 110 MMOL/L (ref 97–108)
CO2 SERPL-SCNC: 21 MMOL/L (ref 21–32)
CREAT SERPL-MCNC: 4.52 MG/DL (ref 0.7–1.3)
DIFFERENTIAL METHOD BLD: ABNORMAL
EOSINOPHIL # BLD: 0 K/UL (ref 0–0.4)
EOSINOPHIL NFR BLD: 0 % (ref 0–7)
ERYTHROCYTE [DISTWIDTH] IN BLOOD BY AUTOMATED COUNT: 16.9 % (ref 11.5–14.5)
GLUCOSE SERPL-MCNC: 88 MG/DL (ref 65–100)
HCT VFR BLD AUTO: 23 % (ref 36.6–50.3)
HCT VFR BLD AUTO: 23.3 % (ref 36.6–50.3)
HGB BLD-MCNC: 7.4 G/DL (ref 12.1–17)
HGB BLD-MCNC: 7.7 G/DL (ref 12.1–17)
IMM GRANULOCYTES # BLD AUTO: 0 K/UL
IMM GRANULOCYTES NFR BLD AUTO: 0 %
LYMPHOCYTES # BLD: 0.5 K/UL (ref 0.8–3.5)
LYMPHOCYTES NFR BLD: 6 % (ref 12–49)
MCH RBC QN AUTO: 29.4 PG (ref 26–34)
MCHC RBC AUTO-ENTMCNC: 32.2 G/DL (ref 30–36.5)
MCV RBC AUTO: 91.3 FL (ref 80–99)
MONOCYTES # BLD: 0.3 K/UL (ref 0–1)
MONOCYTES NFR BLD: 4 % (ref 5–13)
NEUTS SEG # BLD: 7.2 K/UL (ref 1.8–8)
NEUTS SEG NFR BLD: 90 % (ref 32–75)
NRBC # BLD: 0.08 K/UL (ref 0–0.01)
NRBC BLD-RTO: 1 PER 100 WBC
PLATELET # BLD AUTO: 114 K/UL (ref 150–400)
PMV BLD AUTO: 12.2 FL (ref 8.9–12.9)
POTASSIUM SERPL-SCNC: 4 MMOL/L (ref 3.5–5.1)
RBC # BLD AUTO: 2.52 M/UL (ref 4.1–5.7)
RBC MORPH BLD: ABNORMAL
RBC MORPH BLD: ABNORMAL
SODIUM SERPL-SCNC: 142 MMOL/L (ref 136–145)
WBC # BLD AUTO: 8 K/UL (ref 4.1–11.1)

## 2021-12-16 PROCEDURE — 30233N1 TRANSFUSION OF NONAUTOLOGOUS RED BLOOD CELLS INTO PERIPHERAL VEIN, PERCUTANEOUS APPROACH: ICD-10-PCS | Performed by: INTERNAL MEDICINE

## 2021-12-16 PROCEDURE — 74011250637 HC RX REV CODE- 250/637: Performed by: HOSPITALIST

## 2021-12-16 PROCEDURE — 2709999900 HC NON-CHARGEABLE SUPPLY

## 2021-12-16 PROCEDURE — 36415 COLL VENOUS BLD VENIPUNCTURE: CPT

## 2021-12-16 PROCEDURE — 97116 GAIT TRAINING THERAPY: CPT

## 2021-12-16 PROCEDURE — 36430 TRANSFUSION BLD/BLD COMPNT: CPT

## 2021-12-16 PROCEDURE — 74011250636 HC RX REV CODE- 250/636: Performed by: INTERNAL MEDICINE

## 2021-12-16 PROCEDURE — 97530 THERAPEUTIC ACTIVITIES: CPT

## 2021-12-16 PROCEDURE — 74011000250 HC RX REV CODE- 250: Performed by: INTERNAL MEDICINE

## 2021-12-16 PROCEDURE — 97110 THERAPEUTIC EXERCISES: CPT

## 2021-12-16 PROCEDURE — 80048 BASIC METABOLIC PNL TOTAL CA: CPT

## 2021-12-16 PROCEDURE — 65270000029 HC RM PRIVATE

## 2021-12-16 PROCEDURE — 97165 OT EVAL LOW COMPLEX 30 MIN: CPT

## 2021-12-16 PROCEDURE — P9016 RBC LEUKOCYTES REDUCED: HCPCS

## 2021-12-16 PROCEDURE — 97535 SELF CARE MNGMENT TRAINING: CPT

## 2021-12-16 PROCEDURE — 74011000250 HC RX REV CODE- 250: Performed by: NURSE PRACTITIONER

## 2021-12-16 PROCEDURE — 74011250637 HC RX REV CODE- 250/637: Performed by: INTERNAL MEDICINE

## 2021-12-16 PROCEDURE — 85018 HEMOGLOBIN: CPT

## 2021-12-16 PROCEDURE — 85025 COMPLETE CBC W/AUTO DIFF WBC: CPT

## 2021-12-16 PROCEDURE — 65660000000 HC RM CCU STEPDOWN

## 2021-12-16 RX ORDER — POLYETHYLENE GLYCOL 3350, SODIUM SULFATE, SODIUM CHLORIDE, POTASSIUM CHLORIDE, SODIUM ASCORBATE, AND ASCORBIC ACID 7.5-2.691G
2 KIT ORAL ONCE
Status: COMPLETED | OUTPATIENT
Start: 2021-12-16 | End: 2021-12-16

## 2021-12-16 RX ADMIN — Medication 10 ML: at 22:50

## 2021-12-16 RX ADMIN — Medication 10 ML: at 18:22

## 2021-12-16 RX ADMIN — CARVEDILOL 12.5 MG: 12.5 TABLET, FILM COATED ORAL at 18:18

## 2021-12-16 RX ADMIN — NIFEDIPINE 30 MG: 30 TABLET, FILM COATED, EXTENDED RELEASE ORAL at 08:58

## 2021-12-16 RX ADMIN — FLUTICASONE PROPIONATE 2 SPRAY: 50 SPRAY, METERED NASAL at 09:04

## 2021-12-16 RX ADMIN — Medication 10 ML: at 04:34

## 2021-12-16 RX ADMIN — CALCITRIOL CAPSULES 0.25 MCG 0.25 MCG: 0.25 CAPSULE ORAL at 08:58

## 2021-12-16 RX ADMIN — MEROPENEM 500 MG: 500 INJECTION, POWDER, FOR SOLUTION INTRAVENOUS at 00:12

## 2021-12-16 RX ADMIN — HYDROMORPHONE HYDROCHLORIDE 0.5 MG: 1 INJECTION, SOLUTION INTRAMUSCULAR; INTRAVENOUS; SUBCUTANEOUS at 08:59

## 2021-12-16 RX ADMIN — SODIUM BICARBONATE 650 MG: 650 TABLET ORAL at 18:19

## 2021-12-16 RX ADMIN — MEROPENEM 500 MG: 500 INJECTION, POWDER, FOR SOLUTION INTRAVENOUS at 13:38

## 2021-12-16 RX ADMIN — FUROSEMIDE 40 MG: 40 TABLET ORAL at 08:57

## 2021-12-16 RX ADMIN — PANTOPRAZOLE SODIUM 40 MG: 40 TABLET, DELAYED RELEASE ORAL at 06:17

## 2021-12-16 RX ADMIN — EPOETIN ALFA-EPBX 20000 UNITS: 10000 INJECTION, SOLUTION INTRAVENOUS; SUBCUTANEOUS at 13:39

## 2021-12-16 RX ADMIN — IRON SUCROSE 200 MG: 20 INJECTION, SOLUTION INTRAVENOUS at 13:39

## 2021-12-16 RX ADMIN — SODIUM BICARBONATE 650 MG: 650 TABLET ORAL at 08:58

## 2021-12-16 RX ADMIN — PEG-3350, SODIUM SULFATE, SODIUM CHLORIDE, POTASSIUM CHLORIDE, SODIUM ASCORBATE AND ASCORBIC ACID 2 L: KIT at 18:22

## 2021-12-16 RX ADMIN — FUROSEMIDE 40 MG: 40 TABLET ORAL at 18:19

## 2021-12-16 RX ADMIN — SODIUM BICARBONATE 650 MG: 650 TABLET ORAL at 22:46

## 2021-12-16 RX ADMIN — CARVEDILOL 12.5 MG: 12.5 TABLET, FILM COATED ORAL at 08:58

## 2021-12-16 RX ADMIN — CLONIDINE HYDROCHLORIDE 0.2 MG: 0.1 TABLET ORAL at 18:19

## 2021-12-16 RX ADMIN — CLONIDINE HYDROCHLORIDE 0.2 MG: 0.1 TABLET ORAL at 08:58

## 2021-12-16 NOTE — PROGRESS NOTES
Spiritual Care Assessment/Progress Note  [address] [place] [address] Dr      NAME: Toro Parker      MRN: [id number]  AGE: 78 y.o. SEX: male  Episcopal Affiliation: Denominational   Language: English     12/16/2021     Total Time (in minutes): 12     Spiritual Assessment begun in [facility] 5M1 MED SURG 1 through conversation with:         []Patient        [] Family    [] Friend(s)        Reason for Consult: Advance medical directive consult     Spiritual beliefs: (Please include comment if needed)     [] Identifies with a adria tradition:         [] Supported by a adria community:            [] Claims no spiritual orientation:           [] Seeking spiritual identity:                [] Adheres to an individual form of spirituality:           [x] Not able to assess:                           Identified resources for coping:      [] Prayer                               [] Music                  [] Guided Imagery     [] Family/friends                 [] Pet visits     [] Devotional reading                         [x] Unknown     [] Other:                                            Interventions offered during this visit: (See comments for more details)    Patient Interventions: Advance medical directive consult (Attempted)           Plan of Care:     [] Support spiritual and/or cultural needs    [] Support AMD and/or advance care planning process      [] Support grieving process   [] Coordinate Rites and/or Rituals    [] Coordination with community clergy   [] No spiritual needs identified at this time   [] Detailed Plan of Care below (See Comments)  [] Make referral to Music Therapy  [] Make referral to Pet Therapy     [] Make referral to Addiction services  [] Make referral to [facility]  [] Make referral to Spiritual Care Partner  [] No future visits requested        [x] Contact Spiritual Care for further referrals     Comments: Responded to In Basket request for and Advance Medical Directive (AMD) consult.   Patient appeared to be sleeping. Left AMD form, information booklet and  Pastoral Care card informing patient of my visit with nurse to give to patient when he awakes. Advised nurse to [facility] for any further referrals.   Sarwat Baca., MS., [address] [place] ([id number])

## 2021-12-16 NOTE — PROGRESS NOTES
Problem: Risk for Spread of Infection  Goal: Prevent transmission of infectious organism to others  Description: Prevent the transmission of infectious organisms to other patients, staff members, and visitors. Outcome: Progressing Towards Goal     Problem: Falls - Risk of  Goal: *Absence of Falls  Description: Document Rosalba Sherwoodjohanna Fall Risk and appropriate interventions in the flowsheet.   Outcome: Progressing Towards Goal  Note: Fall Risk Interventions:  Mobility Interventions: Bed/chair exit alarm,Patient to call before getting OOB,Utilize walker, cane, or other assistive device,Utilize gait belt for transfers/ambulation         Medication Interventions: Patient to call before getting OOB,Teach patient to arise slowly    Elimination Interventions: Call light in reach,Bed/chair exit alarm,Patient to call for help with toileting needs,Toilet paper/wipes in reach

## 2021-12-16 NOTE — PROGRESS NOTES
Progress Note  Date:2021       Room:[facility]/  Patient Saintclair Beck     YOB: 1950     Age:70 y.o. Subjective    Subjective:  Diet:  Adequate intake. No nausea or vomiting. Activity level: Impaired due to weakness. Pain:  He reports no pain. Review of Systems   Constitutional: Positive for fatigue. Negative for appetite change and fever. Cardiovascular: Negative for leg swelling. Gastrointestinal: Positive for blood in stool (Per nursing). Negative for abdominal pain, nausea and vomiting. Objective         Vitals Last 24 Hours:  TEMPERATURE:  Temp  Av.8 °F (36.6 °C)  Min: 97.1 °F (36.2 °C)  Max: 98.2 °F (36.8 °C)  RESPIRATIONS RANGE: Resp  Av.3  Min: 17  Max: 20  PULSE OXIMETRY RANGE: SpO2  Av.3 %  Min: 95 %  Max: 99 %  PULSE RANGE: Pulse  Av.4  Min: 83  Max: 113  BLOOD PRESSURE RANGE: Systolic (58PEP), ZBD:941 , Min:123 , ISW:173   ; Diastolic (65ULC), FRIED:61, Min:78, Max:99    I/O (24Hr): Intake/Output Summary (Last 24 hours) at 2021 1249  Last data filed at 2021 0910  Gross per 24 hour   Intake 291.3 ml   Output 1100 ml   Net -808.7 ml     Objective:  General Appearance:  Comfortable and not in pain (Chronically ill). Vital signs: (most recent): Blood pressure 123/78, pulse 83, temperature 98 °F (36.7 °C), resp. rate 20, weight 108.9 kg (240 lb), SpO2 96 %. No fever. Output: Producing stool. HEENT: Normal HEENT exam.    Lungs:  Normal effort and normal respiratory rate. Breath sounds clear to auscultation. Heart: Normal rate. Regular rhythm. S1 normal and S2 normal.  No murmur. Abdomen: Abdomen is soft and non-distended. Bowel sounds are normal.   There is no abdominal tenderness. Extremities: Normal range of motion. There is venous stasis and dependent edema. Pulses: Distal pulses are intact. Neurological: Patient is alert and oriented to person, place and time. Pupils:  Pupils are equal, round, and reactive to light. Skin:  Warm and dry. Labs/Imaging/Diagnostics    Labs:  CBC:  Recent Labs     12/16/21  0436 12/15/21  1905 12/15/21  0334 12/14/21  1004 12/14/21  0407   WBC 8.0  --  9.4  --  12.2*   RBC 2.52*  --  2.39*  --  2.61*   HGB 7.4* 6.4* 7.0*   < > 7.5*   HCT 23.0* 20.2* 22.1*  --  23.7*   MCV 91.3  --  92.5  --  90.8   RDW 16.9*  --  17.5*  --  17.4*   *  --  125*  --  168    < > = values in this interval not displayed. CHEMISTRIES:  Recent Labs     12/16/21  0436 12/15/21  0334 12/14/21  1004    148* 145   K 4.0 4.5 5.1   * 116* 115*   CO2 21 21 18*   BUN 85* 96* 101*   CA 7.2* 7.4* 7.3*   PHOS  --  7.3*  --    MG  --  2.5*  --    PT/INR:No results for input(s): INR, INREXT, INREXT in the last 72 hours. No lab exists for component: PROTIME  APTT:No results for input(s): APTT in the last 72 hours. LIVER PROFILE:  Recent Labs     12/15/21  0334 12/14/21  0407   AST 15 15   ALT 21 18     Lab Results   Component Value Date/Time    ALT (SGPT) 21 12/15/2021 03:34 AM    AST (SGOT) 15 12/15/2021 03:34 AM    Alk. phosphatase 60 12/15/2021 03:34 AM    Bilirubin, direct 0.1 12/15/2021 03:34 AM    Bilirubin, total 0.4 12/15/2021 03:34 AM       Imaging Last 24 Hours:  No results found. Assessment//Plan   Principal Problem:    Acute GI bleeding (12/14/2021)    Active Problems:    Pulmonary hypertension (HCC) ()      Overview: [facility]      Chronic atrial fibrillation (HCC) ()      Overview: [facility]      Bacteremia due to Klebsiella pneumoniae (12/4/2021)      Acute blood loss anemia (12/14/2021)      CKD (chronic kidney disease) stage 5, GFR less than 15 ml/min ([facility]) (12/14/2021)      Assessment:  (78 y/o male admitted with rectal bleeding after recent colonoscopy with polypectomy. Repeat colonoscopy yesterday with placement of additional clip at previous endoclipped site which was intact with large fresh clot atop site. Additional polypectomy sites not seen to be bleeding but his prep was poor so visualization was difficult. Hgb dropped slightly to 7. Nursing describes BRB in stool this morning. When I discussed with him another colonoscopy he is adamant he does not want to prep for or have another colonoscopy for further evaluation. Anticoagulation being held. He denies abdominal pain, nausea, or vomiting.     12/16/2021: Mr. Maik Morales is now agreeable for repeat colonoscopy tomorrow. Has had clear liquids all day. Blood in stool reported by nursing. Hgb down slightly to 7.4. No other GI complains. Anticoagulation still being held. ). Plan:   (- Plan on colonoscopy tomorrow afternoon. Prep this afternoon and NPO past midnight.   - Follow H&H, transfuse prn.   - Continue to hold anticoagulation. ). Electronically signed by Joe Medrano NP on 12/16/2021 at 9:05 AM    Describes resolution of symptoms; bowel movements normal yesterday and again today. He is followed for his stage V renal disease at [facility]; he does not know what his baseline hemoglobin is. I have advised that we repeat evacuation of his colon today; if hemoglobin is stable without additional visible blood loss follow-up colonoscopy may not be necessary. If however additional visible blood loss occurs, hemoglobin declines we would at least have the option of colonoscopy. He is agreeable as outlined. I've discussed colonoscopy possible biopsy, polypectomy, cautery, injection, alternatives, complications including but not limited to pain, cardiopulmonary event, bleeding, perforation requiring additional blood transfusion or operative repair; all questions answered.   I have interviewed and examined patient with addendum to note above and formulation care plan to reflect my evaluation    Elena Bahsir M.D.

## 2021-12-16 NOTE — PROGRESS NOTES
Physical therapy reports orthostatic BP when standing during therapy sessions x2 days. Md notified, continue to monitor at this time. Assistance is provided with all transfers/ambulation for safety and fall precautions.

## 2021-12-16 NOTE — ACP (ADVANCE CARE PLANNING)
Advance Care Planning   Advance Care Planning Inpatient Note  [address] [facility] [employer] Department    Today's Date: 12/16/2021  Unit: [facility] 5M1 MED SURG 1    Received request from In Basket request. Upon review of chart and communication with care team, patient's decision making abilities are not in question. Patient was/were present in the room during visit. Patient appeared to be sleeping, did not wake. Goals of ACP Conversation:  Discuss Advance Care planning documents    Health Care Decision Makers:    No healthcare decision makers have been documented. Click here to complete [address] including selection of the Healthcare Decision Maker Relationship (ie \"Primary\")    Summary:  No Decision Maker named by patient at this time    Advance Care Planning Documents (Patient Wishes) on file:  None     Assessment:    Responded to In [facility] [address] request for and Advance Medical Directive (AMD) consult. Patient appeared to be sleeping. Left AMD form, information booklet and  Pastoral Care card informing patient of my visit with nurse to give to patient when he awakes. Interventions:  Left AMD form and information for patient    Care Preferences Communicated:  No    Outcomes/Plan:  Advised nurse to contact [facility] for any further referrals.       JAYLAN Stevens on 12/16/2021 at 11:38 AM

## 2021-12-16 NOTE — PROGRESS NOTES
Problem: Self Care Deficits Care Plan (Adult)  Goal: *Acute Goals and Plan of Care (Insert Text)  Description: FUNCTIONAL STATUS PRIOR TO ADMISSION: Patient was modified independent using a walker for functional mobility. Patient was modified independent for basic ADLs, with wife available to assist as needed. HOME SUPPORT: The patient lived with wife. Occupational Therapy Goals  Initiated 12/16/2021  1. Patient will perform grooming, standing at sink > 2 minutes, with modified independence within 7 day(s). 2.  Patient will perform lower body dressing with supervision/set-up within 7 day(s). 3.  Patient will perform bathing, sitting and standing PRN, with supervision/set-up within 7 day(s). 4.  Patient will perform toilet transfers with modified independence within 7 day(s). 5.  Patient will perform all aspects of toileting with supervision/set-up within 7 day(s). 6.  Patient will participate in upper extremity therapeutic exercise/activities with modified independence for 10 minutes within 7 day(s). 7.  Patient will utilize energy conservation techniques during functional activities with verbal cues within 7 day(s). Outcome: Progressing Towards Goal     OCCUPATIONAL THERAPY EVALUATION  Patient: Amina Quintana (75 y.o. male)  Date: 12/16/2021  Primary Diagnosis: Acute GI bleeding [K92.2]  Procedure(s) (LRB):  COLONOSCOPY (N/A)  RESOLUTION CLIP (N/A) 2 Days Post-Op   Precautions: Contact; Fall    ASSESSMENT  Based on the objective data described below, the patient presents with generalized weakness, orthostatic hypotension, decreased activity tolerance, and impaired balance following admission for GIB. Pt underwent repeat colonoscopy on 12/14 and reports will likely require a third while in hospital. Hgb 7.4 today and pt cleared to participate with OT. He is received sitting EOB with supportive wife present. /85 sitting.  RN arrived to administer IV pain medication, after which pt reported increased lightheadedness. After increased time sitting EOB, pt reporting improvement and agreeable to trial standing. BP dropped to 95/69 in standing with increased symptoms, therefore returned to supine for recovery. Further OOB activity deferred and notified treating PT and RN. Pending progress, pt will likely return home with supportive family and [facility]. Current Level of Function Impacting Discharge (ADLs/self-care): up to min A for ADLs    Functional Outcome Measure: The patient scored Total: 70/100 on the Barthel Index outcome measure which is indicative of being minimally impaired in basic self-care. Other factors to consider for discharge: high fall risk; orthostatic hypotension; very supportive family     Patient will benefit from skilled therapy intervention to address the above noted impairments. PLAN :  Recommendations and Planned Interventions: self care training, functional mobility training, therapeutic exercise, balance training, therapeutic activities, endurance activities, patient education, home safety training and family training/education    Frequency/Duration: Patient will be followed by occupational therapy 5 times a week to address goals. Recommendation for discharge: (in order for the patient to meet his/her long term goals)  Occupational therapy at least 2 days/week in the home     This discharge recommendation:  Has been made in collaboration with the attending provider and/or case management    IF patient discharges home will need the following DME: TBD       SUBJECTIVE:   Patient stated [place]oah, I feel that medication hitting me.     OBJECTIVE DATA SUMMARY:   HISTORY:   Past Medical History:   Diagnosis Date    [facility] [address]     Cervical disc disorder     C5,C6 fractured disc and lumbar disc slipped    Chronic atrial fibrillation (HCC)     [facility]    ESRD (end stage renal disease) on dialysis [facility])     [facility]    [facility] failure ([facility] [address]     Hypertension     VIK on CPAP     Pulmonary hypertension ([facility] [address]     R[facility] at [facility]     Past Surgical History:   Procedure Laterality Date    COLONOSCOPY N/A 12/10/2021    COLONOSCOPY performed by Berenice Burkitt, MD at [facility] COLONOSCOPY N/A 12/14/2021    COLONOSCOPY performed by Berenice Burkitt, MD at [address]      right sinus polyp removal    HX ORTHOPAEDIC  2000    left ACL replacement    HX OTHER SURGICAL      cardioversion for AFIB    HX UROLOGICAL  08/2015    renal gland removal left    IR INSERT TUNL CVC W/O PORT OVER 5 YR  12/9/2021       Expanded or extensive additional review of patient history:     Home Situation  Home Environment: Apartment  # Steps to Enter:  (elevator)  One/Two Story Residence: One story  Living Alone: No  Support Systems: Spouse/Significant Other  Patient Expects to be Discharged to[de-identified] House  Current DME Used/Available at Home: Walker    Hand dominance: Right    EXAMINATION OF PERFORMANCE DEFICITS:  Cognitive/Behavioral Status:  Neurologic State: Alert  Orientation Level: Oriented X4  Cognition: Appropriate decision making; Appropriate for age attention/concentration; Appropriate safety awareness; Follows commands  Perception: Appears intact  Perseveration: No perseveration noted  Safety/Judgement: Awareness of environment; Fall prevention;Good awareness of safety precautions; Home safety; Insight into deficits    Hearing: Auditory  Auditory Impairment: None    Range of Motion:  AROM: Within functional limits  PROM: Within functional limits    Strength:  Strength: Generally decreased, functional    Coordination:  Coordination: Within functional limits  Fine Motor Skills-Upper: Left Intact; Right Intact    Gross Motor Skills-Upper: Left Intact; Right Intact    Balance:  Sitting: Intact  Standing: With support  Standing - Static: Good  Standing - Dynamic : Good    Functional Mobility and Transfers for ADLs:  Bed Mobility:  Supine to Sit: (pt received sitting EOB)  Sit to Supine: Modified independent  Scooting: Supervision;Stand-by assistance    Transfers:  Sit to Stand: Contact guard assistance  Stand to Sit: Contact guard assistance  Bed to Chair: Contact guard assistance (with RW)  Toilet Transfer : Contact guard assistance (infer based on observations)    ADL Assessment:  Feeding: Independent    Oral Facial Hygiene/Grooming: Contact guard assistance (for standing tasks; set up for seated)    Bathing: Contact guard assistance    Upper Body Dressing: Independent    Lower Body Dressing: Minimum assistance (for distal mgmt)    Toileting: Contact guard assistance    ADL Intervention and task modifications:  Feeding  Feeding Assistance: Independent  Utensil Management: Independent  Food to Mouth: Independent  Drink to Mouth: Independent    Lower Body Dressing Assistance  Socks: Minimum assistance  Leg Crossed Method Used: No  Position Performed: Seated edge of bed  Cues: Don;Physical assistance    Cognitive Retraining  Safety/Judgement: Awareness of environment; Fall prevention;Good awareness of safety precautions; Home safety; Insight into deficits    Functional Measure:    Barthel Index:  Bathin  Bladder: 10  Bowels: 10  Groomin  Dressin  Feeding: 10  Mobility: 10  Stairs: 5  Toilet Use: 5  Transfer (Bed to Chair and Back): 10  Total: 70/100      The Barthel ADL Index: Guidelines  1. The index should be used as a record of what a patient does, not as a record of what a patient could do. 2. The main aim is to establish degree of independence from any help, physical or verbal, however minor and for whatever reason. 3. The need for supervision renders the patient not independent. 4. A patient's performance should be established using the best available evidence. Asking the patient, friends/relatives and nurses are the usual sources, but direct observation and common sense are also important. However direct testing is not needed.   5. Usually the patient's performance over the preceding 24-48 hours is important, but occasionally longer periods will be relevant. 6. Middle categories imply that the patient supplies over 50 per cent of the effort. 7. Use of aids to be independent is allowed. Score Interpretation (from [facility])    Independent   60-79 Minimally independent   40-59 Partially dependent   20-39 Very dependent   <20 Totally dependent     -Benito Darby., BarthelLILO. (1965). Functional evaluation: the Barthel Index. [address] ([address] (1997). The Barthel activities of daily living index: self-reporting versus actual performance in the old (> or = 75 years). Journal of [address] [place] [address] 45(7), [facility], SEBASTIÁNF, Javier Goss., [facility]. (1999). Measuring the change in disability after inpatient rehabilitation; comparison of the responsiveness of the Barthel Index and Functional Denio Measure. Journal of Neurology, Neurosurgery, and Psychiatry, 66(4), 186-944. Peyton Loo, NCortneyJ.SERGE, TRACEE Alex, & Suzan Watts, M.A. (2004) Assessment of post-stroke quality of life in cost-effectiveness studies: The usefulness of the Barthel Index and the EuroQoL-5D.  Quality of Life Research, 15, 299-79     Occupational Therapy Evaluation Charge Determination   History Examination Decision-Making   LOW Complexity : Brief history review  LOW Complexity : 1-3 performance deficits relating to physical, cognitive , or psychosocial skils that result in activity limitations and / or participation restrictions  LOW Complexity : No comorbidities that affect functional and no verbal or physical assistance needed to complete eval tasks       Based on the above components, the patient evaluation is determined to be of the following complexity level: LOW   Pain Rating:  Pt reporting minimal pain    Activity Tolerance:   Fair and signs and symptoms of orthostatic hypotension    After treatment patient left in no apparent distress:    Supine in bed, Call bell within reach, Bed / chair alarm activated, Caregiver / family present and Side rails x 3    COMMUNICATION/EDUCATION:   The patients plan of care was discussed with: Physical therapist and Registered nurse. Home safety education was provided and the patient/caregiver indicated understanding., Patient/family have participated as able in goal setting and plan of care. and Patient/family agree to work toward stated goals and plan of care. This patients plan of care is appropriate for delegation to [facility].     Thank you for this referral.  Luisito Rocha, OT  Time Calculation: 37 mins

## 2021-12-16 NOTE — PROGRESS NOTES
Patient assisted to bathroom, continent of medium brown soft stool x1, no visible blood. Patient does c/o dizziness at intervals, educated on slowly rising from lying/sitting to standing positions. Staff present to assist with all transfers.

## 2021-12-16 NOTE — PROGRESS NOTES
Bedside, Verbal and Written shift change report given to Chris Vasquez (oncoming nurse) by Sonido Matson RN (offgoing nurse). Report included the following information SBAR, Kardex, Procedure Summary, Intake/Output, MAR, Recent Results and Med Rec Status.

## 2021-12-16 NOTE — PROGRESS NOTES
Bedside shift change report given to Tammie RAHMAN (oncoming nurse) by Frank Woodward (offgoing nurse). Report included the following information SBAR, Kardex, Intake/Output, MAR, Recent Results and Med Rec Status.

## 2021-12-16 NOTE — PROGRESS NOTES
Marleni 55 [address] [facility]  YOB: 1950          Assessment & Plan:   DRAKE, improved  CKD 4/5, Cr near baseline. Has followed with [facility] and recently Dr. Kymberly Hernandez  UTI on abx  GIB  HTN  Edema  Anemia with low Fe Sat    REC:  Back on furosemide  Encourage POI  Monitor labs. Give IV Fe and epo  Wants to go back on prednisone for arthritis. OK from renal standpoint. Defer to primary  No acute indication HD.  Close nephrology f/u p d/c       Subjective:   CC: f/u DRAKE  HPI: Cr down to 4.5  ROS: No sob +edema +leg pain/joint pain  Current Facility-Administered Medications   Medication Dose Route Frequency    hydrALAZINE (APRESOLINE) 20 mg/mL injection 20 mg  20 mg IntraVENous Q6H PRN    cloNIDine HCL (CATAPRES) tablet 0.2 mg  0.2 mg Oral BID    NIFEdipine ER (PROCARDIA XL) tablet 30 mg  30 mg Oral DAILY AFTER BREAKFAST    pantoprazole (PROTONIX) tablet 40 mg  40 mg Oral ACB    0.9% sodium chloride infusion 250 mL  250 mL IntraVENous PRN    calcitRIOL (ROCALTROL) capsule 0.25 mcg  0.25 mcg Oral DAILY    carvediloL (COREG) tablet 12.5 mg  12.5 mg Oral BID WITH MEALS    fluticasone propionate (FLONASE) 50 mcg/actuation nasal spray 2 Spray  2 Spray Both Nostrils DAILY    sodium bicarbonate tablet 650 mg  650 mg Oral TID    sodium chloride (NS) flush 5-40 mL  5-40 mL IntraVENous Q8H    sodium chloride (NS) flush 5-40 mL  5-40 mL IntraVENous PRN    0.9% sodium chloride infusion 25 mL  25 mL IntraVENous PRN    acetaminophen (TYLENOL) tablet 650 mg  650 mg Oral Q6H PRN    Or    acetaminophen (TYLENOL) suppository 650 mg  650 mg Rectal Q6H PRN    bisacodyL (DULCOLAX) suppository 10 mg  10 mg Rectal DAILY PRN    promethazine (PHENERGAN) tablet 12.5 mg  12.5 mg Oral Q6H PRN    Or    ondansetron (ZOFRAN) injection 4 mg  4 mg IntraVENous Q6H PRN    HYDROmorphone (DILAUDID) syringe 0.5 mg  0.5 mg IntraVENous Q4H PRN    furosemide (LASIX) tablet 40 mg  40 mg Oral BID    meropenem (MERREM) 500 mg in sterile water (preservative free) 10 mL IV syringe  0.5 g IntraVENous Q12H    0.9% sodium chloride infusion 250 mL  250 mL IntraVENous PRN    0.9% sodium chloride infusion 250 mL  250 mL IntraVENous PRN          Objective:     Vitals:  Blood pressure (!) 142/92, pulse 93, temperature 98 °F (36.7 °C), resp. rate 17, weight 108.9 kg (240 lb), SpO2 98 %. Temp (24hrs), Av.8 °F (36.6 °C), Min:97.1 °F (36.2 °C), Max:98.2 °F (36.8 °C)      Intake and Output:  No intake/output data recorded. 1901 -  0700  In: 1291.3 [P.O.:1000]  Out: 1500 [Urine:1500]    Physical Exam:               GENERAL ASSESSMENT: NAD  CHEST: CTA  HEART: S1S2  ABDOMEN: Soft,NT,  EXTREMITY: +EDEMA  NEURO:Grossly non focal          ECG/rhythm:    Data Review      No results for input(s): TNIPOC in the last 72 hours. No lab exists for component: ITNL   No results for input(s): CPK, CKMB, TROIQ in the last 72 hours. Recent Labs     21  0436 12/15/21  1905 12/15/21  0334 21  1004 21  1004 21  0407 21  0407     --  148*  --  145   < > 142   K 4.0  --  4.5  --  5.1   < > 5.6*   *  --  116*  --  115*   < > 112*   CO2 21  --  21  --  18*   < > 22   BUN 85*  --  96*  --  101*   < > 103*   CREA 4.52*  --  4.88*  --  4.98*   < > 5.10*   GLU 88  --  94  --  148*   < > 139*   PHOS  --   --  7.3*  --   --   --   --    MG  --   --  2.5*  --   --   --   --    CA 7.2*  --  7.4*  --  7.3*   < > 7.5*   ALB  --   --  1.9*  --   --   --  2.2*   WBC 8.0  --  9.4  --   --   --  12.2*   HGB 7.4* 6.4* 7.0*   < > 6.8*   < > 7.5*   HCT 23.0* 20.2* 22.1*  --   --    < > 23.7*   *  --  125*  --   --   --  168    < > = values in this interval not displayed. No results for input(s): INR, PTP, APTT, INREXT in the last 72 hours.   Needs: urine analysis, urine sodium, protein and creatinine  No results found for: JING DENT        : Jonnie Dance, MD  12/16/2021        [facility]:  [email or web address]  [facility] office:  [address] [place] [address], [address] [place] [address],[address]  [address]  Phone: [phone]  Fax :     [phone]    [place] office:  [address] [facility], [facility] [address]   Phone - [phone]  Fax - [phone]

## 2021-12-16 NOTE — PROGRESS NOTES
Problem: Risk for Spread of Infection  Goal: Prevent transmission of infectious organism to others  Description: Prevent the transmission of infectious organisms to other patients, staff members, and visitors. Outcome: Progressing Towards Goal     Problem: Falls - Risk of  Goal: *Absence of Falls  Description: Document Angela Dimas Fall Risk and appropriate interventions in the flowsheet.   Outcome: Progressing Towards Goal  Note: Fall Risk Interventions:  Mobility Interventions: Patient to call before getting OOB         Medication Interventions: Patient to call before getting OOB    Elimination Interventions: Call light in reach

## 2021-12-16 NOTE — PROGRESS NOTES
Hospitalist Progress Note    NAME: Corie Shaver   :  1950   MRN:  [id number]     Subjective:   Daily Progress Note: 2021 7:49 AM      Chief complaint: Rectal bleed  Patient seen and examined, chart was reviewed. Patient said he had 1 bowel movement late yesterday was dark in color. His hemoglobin dropped needing another unit of transfusion. Patient is willing to have another repeat colonoscopy if needed. Denies chest pain, nausea, vomiting. No other acute issues reported to me by staff at this time.     Current Facility-Administered Medications   Medication Dose Route Frequency    hydrALAZINE (APRESOLINE) 20 mg/mL injection 20 mg  20 mg IntraVENous Q6H PRN    cloNIDine HCL (CATAPRES) tablet 0.2 mg  0.2 mg Oral BID    NIFEdipine ER (PROCARDIA XL) tablet 30 mg  30 mg Oral DAILY AFTER BREAKFAST    pantoprazole (PROTONIX) tablet 40 mg  40 mg Oral ACB    0.9% sodium chloride infusion 250 mL  250 mL IntraVENous PRN    calcitRIOL (ROCALTROL) capsule 0.25 mcg  0.25 mcg Oral DAILY    carvediloL (COREG) tablet 12.5 mg  12.5 mg Oral BID WITH MEALS    fluticasone propionate (FLONASE) 50 mcg/actuation nasal spray 2 Spray  2 Spray Both Nostrils DAILY    sodium bicarbonate tablet 650 mg  650 mg Oral TID    sodium chloride (NS) flush 5-40 mL  5-40 mL IntraVENous Q8H    sodium chloride (NS) flush 5-40 mL  5-40 mL IntraVENous PRN    0.9% sodium chloride infusion 25 mL  25 mL IntraVENous PRN    acetaminophen (TYLENOL) tablet 650 mg  650 mg Oral Q6H PRN    Or    acetaminophen (TYLENOL) suppository 650 mg  650 mg Rectal Q6H PRN    bisacodyL (DULCOLAX) suppository 10 mg  10 mg Rectal DAILY PRN    promethazine (PHENERGAN) tablet 12.5 mg  12.5 mg Oral Q6H PRN    Or    ondansetron (ZOFRAN) injection 4 mg  4 mg IntraVENous Q6H PRN    HYDROmorphone (DILAUDID) syringe 0.5 mg  0.5 mg IntraVENous Q4H PRN    furosemide (LASIX) tablet 40 mg  40 mg Oral BID    meropenem (MERREM) 500 mg in sterile water (preservative free) 10 mL IV syringe  0.5 g IntraVENous Q12H    0.9% sodium chloride infusion 250 mL  250 mL IntraVENous PRN    0.9% sodium chloride infusion 250 mL  250 mL IntraVENous PRN          Objective:     Visit Vitals  BP (!) 142/92 (BP 1 Location: Right upper arm, BP Patient Position: At rest)   Pulse 97   Temp 98 °F (36.7 °C)   Resp 17   Wt 108.9 kg (240 lb)   SpO2 98%   BMI 34.44 kg/m²      O2 Device: None (Room air)    Temp (24hrs), Av.8 °F (36.6 °C), Min:97.1 °F (36.2 °C), Max:98.2 °F (36.8 °C)        PHYSICAL EXAM:  General WDWN  Neck supple  CVS tachycardic  Respiratory symmetric expansion  Abdomen nondistended  Extremities edema  Skin venous dermatitis LE  Neuro alert normal speech  Psych normal affect        Data Review    Recent Results (from the past 24 hour(s))   HGB & HCT    Collection Time: 12/15/21  7:05 PM   Result Value Ref Range    HGB 6.4 (L) 12.1 - 17.0 g/dL    HCT 20.2 (L) 36.6 - 50.3 %   RBC, ALLOCATE    Collection Time: 12/15/21  9:00 PM   Result Value Ref Range    HISTORY CHECKED? Historical check performed    CBC WITH AUTOMATED DIFF    Collection Time: 21  4:36 AM   Result Value Ref Range    WBC 8.0 4.1 - 11.1 K/uL    RBC 2.52 (L) 4.10 - 5.70 M/uL    HGB 7.4 (L) 12.1 - 17.0 g/dL    HCT 23.0 (L) 36.6 - 50.3 %    MCV 91.3 80.0 - 99.0 FL    MCH 29.4 26.0 - 34.0 PG    MCHC 32.2 30.0 - 36.5 g/dL    RDW 16.9 (H) 11.5 - 14.5 %    PLATELET 739 (L) 418 - 400 K/uL    MPV 12.2 8.9 - 12.9 FL    NRBC 1.0 (H) 0  WBC    ABSOLUTE NRBC 0.08 (H) 0.00 - 0.01 K/uL    NEUTROPHILS 90 (H) 32 - 75 %    LYMPHOCYTES 6 (L) 12 - 49 %    MONOCYTES 4 (L) 5 - 13 %    EOSINOPHILS 0 0 - 7 %    BASOPHILS 0 0 - 1 %    IMMATURE GRANULOCYTES 0 %    ABS. NEUTROPHILS 7.2 1.8 - 8.0 K/UL    ABS. LYMPHOCYTES 0.5 (L) 0.8 - 3.5 K/UL    ABS. MONOCYTES 0.3 0.0 - 1.0 K/UL    ABS. EOSINOPHILS 0.0 0.0 - 0.4 K/UL    ABS. BASOPHILS 0.0 0.0 - 0.1 K/UL    ABS. IMM.  GRANS. 0.0 K/UL    DF MANUAL      RBC COMMENTS ANISOCYTOSIS  1+        RBC COMMENTS MACROCYTOSIS  1+       METABOLIC PANEL, BASIC    Collection Time: 12/16/21  4:36 AM   Result Value Ref Range    Sodium 142 136 - 145 mmol/L    Potassium 4.0 3.5 - 5.1 mmol/L    Chloride 110 (H) 97 - 108 mmol/L    CO2 21 21 - 32 mmol/L    Anion gap 11 5 - 15 mmol/L    Glucose 88 65 - 100 mg/dL    BUN 85 (H) 6 - 20 MG/DL    Creatinine 4.52 (H) 0.70 - 1.30 MG/DL    BUN/Creatinine ratio 19 12 - 20      GFR est AA 16 (L) >60 ml/min/1.73m2    GFR est non-AA 13 (L) >60 ml/min/1.73m2    Calcium 7.2 (L) 8.5 - 10.1 MG/DL     No results found for this visit on 12/14/21. All Micro Results     Procedure Component Value Units Date/Time    COVID-19 RAPID TEST [[id number]] Collected: 12/14/21 1312    Order Status: Completed Specimen: Nasopharyngeal Updated: 12/14/21 1342     Specimen source Nasopharyngeal        COVID-19 rapid test Not detected        Comment: Rapid Abbott ID Now       Rapid NAAT:  The specimen is NEGATIVE for SARS-CoV-2, the novel coronavirus associated with COVID-19. Negative results should be treated as presumptive and, if inconsistent with clinical signs and symptoms or necessary for patient management, should be tested with an alternative molecular assay. Negative results do not preclude SARS-CoV-2 infection and should not be used as the sole basis for patient management decisions. This test has been authorized by the FDA under an Emergency Use Authorization (EUA) for use by authorized laboratories. Fact sheet for Healthcare Providers: ConventionUpdate.co.nz  Fact sheet for Patients: ConventionUpdate.co.nz       Methodology: Isothermal Nucleic Acid Amplification                     Assessment/Plan:        77 yo hx of HTN, chronic afib, pulm HTN, CKD 5, recent polypectomy, presented w/ rectal bleeding, blood loss anemia       Acute GI bleed due to recent polypectomy and Eliquis. Hold all anticoagulation.   Monitor Hgb closely. GI  Did   repeat colonoscopy 12/14/21 showed post polypectomy bleeding in the ascending colon and Endo Clip was placed. No anticoagulation for 10 days per GI note. GI would like to repeat another colonoscopy patient is agreed. Await GI input      Acute blood loss anemia: due to above. Monitor hemoglobin, transfuse if below 7    CKD 5 approaching dialysis. Monitor BMP. Renal on case      Recent ESBL E. Coli UTI/Klebsiella bacteremia: cont Invanz through PICC till 12/22. Currently on IV meropenem     Chronic afib: hold eliquis for 10 days per GI.   Cont coreg      HTN: .  Cont coreg and clonidine      Pulm HTN: follows by CHRIS Bee at [facility]      ADFC  NOK  Prophylaxis:  SCD's  Probable Disposition:  Home w/Family 48Hrs if medically progressing    Signed By: Harvey Juarez MD     December 16, 2021

## 2021-12-16 NOTE — PROGRESS NOTES
12/16/2021  Case Management Progress Note    12:27 PM  Patient is 78year old male admitted 12/14 with acute GI bleeding  Patient's RUR is 25% red/high risk for readmission  Covid test: negative 12/14  Chart reviewed--patient discussed at IDR rounds  Per rounds this morning patient is going to be having a repeat colonoscopy. He has still been anemic overnight. Patient is open to home health with [facility] already and will resume with them when going home, preliminary referral sent, however will need orders for resumption closer to discharge. Will continue to follow and assist as needed. Transition of Care Plan   1. Continue medical management/treatment  2. Home with [address] for antibiotics (still open from last time)  3. Will need resumption orders   4. Family to transport   5.  CM will continue to follow    MICKY Balderas

## 2021-12-16 NOTE — PROGRESS NOTES
Problem: Mobility Impaired (Adult and Pediatric)  Goal: *Acute Goals and Plan of Care (Insert Text)  Description: FUNCTIONAL STATUS PRIOR TO ADMISSION: Patient was modified independent using a rolling walker for functional mobility. HOME SUPPORT PRIOR TO ADMISSION: The patient lived with wife but did not require assist.    Physical Therapy Goals  Initiated 12/15/2021  1. Patient will move from supine to sit and sit to supine  in bed with modified independence within 7 day(s). 2.  Patient will transfer from bed to chair and chair to bed with modified independence using the least restrictive device within 7 day(s). 3.  Patient will perform sit to stand with modified independence within 7 day(s). 4.  Patient will ambulate with supervision/set-up for 150 feet with the least restrictive device within 7 day(s). Outcome: Progressing Towards Goal   PHYSICAL THERAPY TREATMENT  Patient: Ezeuqiel Strauss (78 y.o. male)  Date: 12/16/2021  Diagnosis: Acute GI bleeding [K92.2] Acute GI bleeding  Procedure(s) (LRB):  COLONOSCOPY (N/A)  RESOLUTION CLIP (N/A) 2 Days Post-Op  Precautions: Fall  Chart, physical therapy assessment, plan of care and goals were reviewed. ASSESSMENT  Patient continues with skilled PT services and is progressing towards goals. Pt remains limited by generalized weakness, orthostatic hypotension, high fall risk, and impaired balance. Pt symptomatic for orthostatic hypotension x 2 times this day and was unable to ambulate further than 15ft into the bathroom. BP does recover with sitting. Overall requires CGA-Min A for mobility with RW. Provided handout for HEP and reviewed exercises in sitting and supine. Pt has very supportive family and plans to D/C home with HHPT. Awaiting colonoscopy tomorrow. Hgb 7.4 today.     Patient Vitals for the past 4 hrs:   Temp Pulse  BP SpO2   12/16/21 1505 -- 82 --supine 120/85 --   12/16/21 1458 -- 82 --standing (!) 80/55 --   12/16/21 1438 -- -- --standing 112/79 --   12/16/21 1431 -- (!) 103 --sitting 117/86 --   12/16/21 1429 -- 98 --standing (!) 87/62 --   12/16/21 1427 -- (!) 102 --standing 104/62 --   12/16/21 1425 -- 86 --standing 114/68 --   12/16/21 1422 -- -- --supine 137/86 --                   Current Level of Function Impacting Discharge (mobility/balance): Min A - CGA    Other factors to consider for discharge: high fall risk, Hgb low, orthostatic         PLAN :  Patient continues to benefit from skilled intervention to address the above impairments. Continue treatment per established plan of care. to address goals. Recommendation for discharge: (in order for the patient to meet his/her long term goals)  Physical therapy at least 2 days/week in the home     This discharge recommendation:  Has been made in collaboration with the attending provider and/or case management    IF patient discharges home will need the following DME: patient owns DME required for discharge       SUBJECTIVE:   Patient stated I like to get around with this walker 99.9% of the time.     OBJECTIVE DATA SUMMARY:   Critical Behavior:  Neurologic State: Alert  Orientation Level: Oriented X4  Cognition: Appropriate decision making,Appropriate for age attention/concentration,Appropriate safety awareness,Follows commands  Safety/Judgement: Awareness of environment,Fall prevention,Good awareness of safety precautions,Home safety,Insight into deficits  Functional Mobility Training:  Bed Mobility:     Supine to Sit: Modified independent  Sit to Supine: Modified independent; Additional time  Scooting: Supervision        Transfers:  Sit to Stand: Contact guard assistance  Stand to Sit: Contact guard assistance        Bed to Chair: Contact guard assistance                    Balance:  Sitting: Intact  Standing: Impaired  Standing - Static: Fair;Constant support  Standing - Dynamic : Fair;Constant support  Ambulation/Gait Training:  Distance (ft): 15 Feet (ft)  Assistive Device: Walker, rolling;Gait belt  Ambulation - Level of Assistance: Contact guard assistance                                      Activity Tolerance:   Fair, requires rest breaks, and signs and symptoms of orthostatic hypotension    After treatment patient left in no apparent distress:   Supine in bed, Call bell within reach, Caregiver / family present, and Side rails x 3    COMMUNICATION/COLLABORATION:   The patients plan of care was discussed with: Registered nurse.      Twan Moore, PT   Time Calculation: 48 mins

## 2021-12-16 NOTE — PROGRESS NOTES
Notified by RN that patient's Hb is 6.4. Will give one unit of pRBC. Check posttransfusion hemoglobin.

## 2021-12-17 ENCOUNTER — ANESTHESIA (OUTPATIENT)
Dept: ENDOSCOPY | Age: 71
DRG: 920 | End: 2021-12-17
Payer: MEDICARE

## 2021-12-17 ENCOUNTER — ANESTHESIA EVENT (OUTPATIENT)
Dept: ENDOSCOPY | Age: 71
DRG: 920 | End: 2021-12-17
Payer: MEDICARE

## 2021-12-17 LAB
ABO + RH BLD: NORMAL
BLD PROD TYP BPU: NORMAL
BLD PROD TYP BPU: NORMAL
BLOOD GROUP ANTIBODIES SERPL: NORMAL
BPU ID: NORMAL
BPU ID: NORMAL
COMMENT, HOLDF: NORMAL
CROSSMATCH RESULT,%XM: NORMAL
CROSSMATCH RESULT,%XM: NORMAL
HCT VFR BLD AUTO: 18.2 % (ref 36.6–50.3)
HCT VFR BLD AUTO: 22.5 % (ref 36.6–50.3)
HGB BLD-MCNC: 6 G/DL (ref 12.1–17)
HGB BLD-MCNC: 7.3 G/DL (ref 12.1–17)
SAMPLES BEING HELD,HOLD: NORMAL
SPECIMEN EXP DATE BLD: NORMAL
STATUS OF UNIT,%ST: NORMAL
STATUS OF UNIT,%ST: NORMAL
UNIT DIVISION, %UDIV: 0
UNIT DIVISION, %UDIV: 0

## 2021-12-17 PROCEDURE — 74011250636 HC RX REV CODE- 250/636: Performed by: INTERNAL MEDICINE

## 2021-12-17 PROCEDURE — 36415 COLL VENOUS BLD VENIPUNCTURE: CPT

## 2021-12-17 PROCEDURE — 74011250637 HC RX REV CODE- 250/637: Performed by: INTERNAL MEDICINE

## 2021-12-17 PROCEDURE — 76040000019: Performed by: INTERNAL MEDICINE

## 2021-12-17 PROCEDURE — 74011000250 HC RX REV CODE- 250: Performed by: INTERNAL MEDICINE

## 2021-12-17 PROCEDURE — 0DJD8ZZ INSPECTION OF LOWER INTESTINAL TRACT, VIA NATURAL OR ARTIFICIAL OPENING ENDOSCOPIC: ICD-10-PCS | Performed by: INTERNAL MEDICINE

## 2021-12-17 PROCEDURE — 65660000000 HC RM CCU STEPDOWN

## 2021-12-17 PROCEDURE — 97530 THERAPEUTIC ACTIVITIES: CPT

## 2021-12-17 PROCEDURE — 74011000258 HC RX REV CODE- 258: Performed by: NURSE ANESTHETIST, CERTIFIED REGISTERED

## 2021-12-17 PROCEDURE — 2709999900 HC NON-CHARGEABLE SUPPLY: Performed by: INTERNAL MEDICINE

## 2021-12-17 PROCEDURE — 74011250637 HC RX REV CODE- 250/637: Performed by: HOSPITALIST

## 2021-12-17 PROCEDURE — 76060000031 HC ANESTHESIA FIRST 0.5 HR: Performed by: INTERNAL MEDICINE

## 2021-12-17 PROCEDURE — 74011000250 HC RX REV CODE- 250: Performed by: NURSE ANESTHETIST, CERTIFIED REGISTERED

## 2021-12-17 PROCEDURE — 85018 HEMOGLOBIN: CPT

## 2021-12-17 PROCEDURE — 74011250636 HC RX REV CODE- 250/636: Performed by: NURSE ANESTHETIST, CERTIFIED REGISTERED

## 2021-12-17 RX ORDER — MIDAZOLAM HYDROCHLORIDE 1 MG/ML
.25-5 INJECTION, SOLUTION INTRAMUSCULAR; INTRAVENOUS
Status: DISCONTINUED | OUTPATIENT
Start: 2021-12-17 | End: 2021-12-17 | Stop reason: HOSPADM

## 2021-12-17 RX ORDER — LIDOCAINE HYDROCHLORIDE 20 MG/ML
INJECTION, SOLUTION EPIDURAL; INFILTRATION; INTRACAUDAL; PERINEURAL AS NEEDED
Status: DISCONTINUED | OUTPATIENT
Start: 2021-12-17 | End: 2021-12-17 | Stop reason: HOSPADM

## 2021-12-17 RX ORDER — ATROPINE SULFATE 0.1 MG/ML
0.5 INJECTION INTRAVENOUS
Status: DISCONTINUED | OUTPATIENT
Start: 2021-12-17 | End: 2021-12-17 | Stop reason: HOSPADM

## 2021-12-17 RX ORDER — DEXTROMETHORPHAN/PSEUDOEPHED 2.5-7.5/.8
1.2 DROPS ORAL
Status: DISCONTINUED | OUTPATIENT
Start: 2021-12-17 | End: 2021-12-17 | Stop reason: HOSPADM

## 2021-12-17 RX ORDER — FLUMAZENIL 0.1 MG/ML
0.2 INJECTION INTRAVENOUS
Status: DISCONTINUED | OUTPATIENT
Start: 2021-12-17 | End: 2021-12-17 | Stop reason: HOSPADM

## 2021-12-17 RX ORDER — SODIUM CHLORIDE 9 MG/ML
INJECTION, SOLUTION INTRAVENOUS
Status: DISCONTINUED | OUTPATIENT
Start: 2021-12-17 | End: 2021-12-17 | Stop reason: HOSPADM

## 2021-12-17 RX ORDER — PROPOFOL 10 MG/ML
INJECTION, EMULSION INTRAVENOUS AS NEEDED
Status: DISCONTINUED | OUTPATIENT
Start: 2021-12-17 | End: 2021-12-17 | Stop reason: HOSPADM

## 2021-12-17 RX ORDER — FENTANYL CITRATE 50 UG/ML
100 INJECTION, SOLUTION INTRAMUSCULAR; INTRAVENOUS
Status: DISCONTINUED | OUTPATIENT
Start: 2021-12-17 | End: 2021-12-17 | Stop reason: HOSPADM

## 2021-12-17 RX ORDER — FUROSEMIDE 10 MG/ML
40 INJECTION INTRAMUSCULAR; INTRAVENOUS 2 TIMES DAILY
Status: DISCONTINUED | OUTPATIENT
Start: 2021-12-17 | End: 2021-12-20

## 2021-12-17 RX ORDER — SODIUM CHLORIDE 9 MG/ML
50 INJECTION, SOLUTION INTRAVENOUS CONTINUOUS
Status: DISCONTINUED | OUTPATIENT
Start: 2021-12-17 | End: 2021-12-17

## 2021-12-17 RX ORDER — PREDNISONE 5 MG/1
5 TABLET ORAL
Status: DISCONTINUED | OUTPATIENT
Start: 2021-12-18 | End: 2021-12-18

## 2021-12-17 RX ORDER — NALOXONE HYDROCHLORIDE 0.4 MG/ML
0.4 INJECTION, SOLUTION INTRAMUSCULAR; INTRAVENOUS; SUBCUTANEOUS
Status: DISCONTINUED | OUTPATIENT
Start: 2021-12-17 | End: 2021-12-17 | Stop reason: HOSPADM

## 2021-12-17 RX ORDER — EPINEPHRINE 0.1 MG/ML
1 INJECTION INTRACARDIAC; INTRAVENOUS
Status: DISCONTINUED | OUTPATIENT
Start: 2021-12-17 | End: 2021-12-17 | Stop reason: HOSPADM

## 2021-12-17 RX ADMIN — FLUTICASONE PROPIONATE 2 SPRAY: 50 SPRAY, METERED NASAL at 10:00

## 2021-12-17 RX ADMIN — PROPOFOL INJECTABLE EMULSION 40 MG: 10 INJECTION, EMULSION INTRAVENOUS at 14:50

## 2021-12-17 RX ADMIN — HYDROMORPHONE HYDROCHLORIDE 0.5 MG: 1 INJECTION, SOLUTION INTRAMUSCULAR; INTRAVENOUS; SUBCUTANEOUS at 14:00

## 2021-12-17 RX ADMIN — CARVEDILOL 12.5 MG: 12.5 TABLET, FILM COATED ORAL at 09:54

## 2021-12-17 RX ADMIN — PROPOFOL INJECTABLE EMULSION 20 MG: 10 INJECTION, EMULSION INTRAVENOUS at 15:01

## 2021-12-17 RX ADMIN — IRON SUCROSE 200 MG: 20 INJECTION, SOLUTION INTRAVENOUS at 09:53

## 2021-12-17 RX ADMIN — FUROSEMIDE 40 MG: 10 INJECTION INTRAMUSCULAR; INTRAVENOUS at 18:00

## 2021-12-17 RX ADMIN — CARVEDILOL 12.5 MG: 12.5 TABLET, FILM COATED ORAL at 16:50

## 2021-12-17 RX ADMIN — PHENYLEPHRINE HYDROCHLORIDE 80 MCG: 10 INJECTION INTRAVENOUS at 15:04

## 2021-12-17 RX ADMIN — PROPOFOL INJECTABLE EMULSION 20 MG: 10 INJECTION, EMULSION INTRAVENOUS at 14:56

## 2021-12-17 RX ADMIN — Medication 10 ML: at 21:39

## 2021-12-17 RX ADMIN — SODIUM CHLORIDE: 9 INJECTION, SOLUTION INTRAVENOUS at 14:35

## 2021-12-17 RX ADMIN — NIFEDIPINE 30 MG: 30 TABLET, FILM COATED, EXTENDED RELEASE ORAL at 09:54

## 2021-12-17 RX ADMIN — SODIUM CHLORIDE 50 ML/HR: 9 INJECTION, SOLUTION INTRAVENOUS at 16:59

## 2021-12-17 RX ADMIN — CALCITRIOL CAPSULES 0.25 MCG 0.25 MCG: 0.25 CAPSULE ORAL at 09:54

## 2021-12-17 RX ADMIN — Medication 10 ML: at 14:07

## 2021-12-17 RX ADMIN — SODIUM BICARBONATE 650 MG: 650 TABLET ORAL at 21:39

## 2021-12-17 RX ADMIN — Medication 10 ML: at 01:35

## 2021-12-17 RX ADMIN — MEROPENEM 500 MG: 500 INJECTION, POWDER, FOR SOLUTION INTRAVENOUS at 14:00

## 2021-12-17 RX ADMIN — FUROSEMIDE 40 MG: 40 TABLET ORAL at 09:53

## 2021-12-17 RX ADMIN — PANTOPRAZOLE SODIUM 40 MG: 40 TABLET, DELAYED RELEASE ORAL at 06:15

## 2021-12-17 RX ADMIN — PROPOFOL INJECTABLE EMULSION 70 MG: 10 INJECTION, EMULSION INTRAVENOUS at 14:48

## 2021-12-17 RX ADMIN — PHENYLEPHRINE HYDROCHLORIDE 120 MCG: 10 INJECTION INTRAVENOUS at 15:06

## 2021-12-17 RX ADMIN — MEROPENEM 500 MG: 500 INJECTION, POWDER, FOR SOLUTION INTRAVENOUS at 01:35

## 2021-12-17 RX ADMIN — SODIUM BICARBONATE 650 MG: 650 TABLET ORAL at 09:55

## 2021-12-17 RX ADMIN — LIDOCAINE HYDROCHLORIDE 40 MG: 20 INJECTION, SOLUTION INTRAVENOUS at 14:48

## 2021-12-17 RX ADMIN — SODIUM BICARBONATE 650 MG: 650 TABLET ORAL at 16:00

## 2021-12-17 NOTE — PROGRESS NOTES
Physician Progress Note      PATIENT:               [facility]mero [facility]  CSN #:                  [id number]  :                       1950  ADMIT DATE:       2021 4:04 AM  DISCH DATE:  RESPONDING  PROVIDER #:        Osmin Nix MD          QUERY TEXT:     [place]  Pt admitted with ABLA, GI Bleeding and has history of CHF documented. If possible, please document in progress notes and discharge summary further specificity regarding the type and acuity of CHF:    The medical record reflects the following:  Risk Factors: presents with GI bleed, ABLA, HTN, CKD  Clinical Indicators: echo 21; EF 40-45%; mild dilated left ventricle, with history of CHF  Treatment: Lasix resumed  Options provided:  -- Chronic Systolic CHF/HFrEF  -- Chronic Diastolic CHF/HFpEF  -- Chronic Systolic and Diastolic CHF  -- Other - I will add my own diagnosis  -- Disagree - Not applicable / Not valid  -- Disagree - Clinically unable to determine / Unknown  -- Refer to Clinical Documentation Reviewer    PROVIDER RESPONSE TEXT:    This patient has chronic diastolic CHF/HFpEF. Query created by: Mitch Cleveland on 2021 11:23 AM      QUERY TEXT:    Dr Leah Martin  Patient admitted with GI bleed, noted to have chronic atrial fibrillation and is maintained on Eliquis. If possible, please document in progress notes and discharge summary if you are evaluating and/or treating any of the following: The medical record reflects the following:  Risk Factors: presents with GI bleeding, ABLA, CKD  Clinical Indicators: Maintained on Eliquis for Afib  Treatment: holding Eliquis for 10days due to GI bleeding.  monitor H/Hs  Options provided:  -- Secondary hypercoagulable state in a patient with atrial fibrillation  -- Other - I will add my own diagnosis  -- Disagree - Not applicable / Not valid  -- Disagree - Clinically unable to determine / Unknown  -- Refer to Clinical Documentation Reviewer    PROVIDER RESPONSE TEXT:    Chr Afib    Query created by:  aNcho Matson on 12/16/2021 11:25 AM      Electronically signed by:  Tanja Da Silva MD 12/17/2021 1:21 PM

## 2021-12-17 NOTE — PROGRESS NOTES
Problem: Mobility Impaired (Adult and Pediatric)  Goal: *Acute Goals and Plan of Care (Insert Text)  Description: FUNCTIONAL STATUS PRIOR TO ADMISSION: Patient was modified independent using a rolling walker for functional mobility. HOME SUPPORT PRIOR TO ADMISSION: The patient lived with wife but did not require assist.    Physical Therapy Goals  Initiated 12/15/2021  1. Patient will move from supine to sit and sit to supine  in bed with modified independence within 7 day(s). 2.  Patient will transfer from bed to chair and chair to bed with modified independence using the least restrictive device within 7 day(s). 3.  Patient will perform sit to stand with modified independence within 7 day(s). 4.  Patient will ambulate with supervision/set-up for 150 feet with the least restrictive device within 7 day(s). Note:   PHYSICAL THERAPY TREATMENT  Patient: Yandy Dewitt (59 y.o. male)  Date: 12/17/2021  Diagnosis: Acute GI bleeding [K92.2] Acute GI bleeding  Procedure(s) (LRB):  COLONOSCOPY (N/A)  RESOLUTION CLIP (N/A) 3 Days Post-Op  Precautions: Fall  Chart, physical therapy assessment, plan of care and goals were reviewed. ASSESSMENT  Patient continues with skilled PT services. Pt sitting on EOB on arrival of PT. Pt needing to use bedside commode. Pt sit to stand with CGA. Pt moves slowly CGA bed to chair with RW. Pt left sitting with family present. Pt instructed to call nurse when ready to stand. Continue goals. PLAN :  Patient continues to benefit from skilled intervention to address the above impairments. Continue treatment per established plan of care. to address goals.     Recommendation for discharge: (in order for the patient to meet his/her long term goals)  Physical therapy at least 2 days/week in the home     This discharge recommendation:  Has been made in collaboration with the attending provider and/or case management    IF patient discharges home will need the following DME: rolling walker       SUBJECTIVE:       OBJECTIVE DATA SUMMARY:   Critical Behavior:  Neurologic State: Alert  Orientation Level: Oriented X4  Cognition: Follows commands  Safety/Judgement: Awareness of environment,Fall prevention,Good awareness of safety precautions,Home safety,Insight into deficits  Functional Mobility Training:       Transfers:  Sit to Stand: Contact guard assistance  Stand to Sit: Contact guard assistance             Balance:  Sitting: Intact  Standing: With support  Standing - Static: Good  Ambulation/Gait Training:  Distance (ft): 3 Feet (ft)     Ambulation - Level of Assistance: Contact guard assistance          Activity Tolerance:   Good    After treatment patient left in no apparent distress:   Sitting in chair    COMMUNICATION/COLLABORATION:   The patients plan of care was discussed with: Physical therapist.     Huma Sosa, PTA

## 2021-12-17 NOTE — PROCEDURES
[address]  [phone]      2021    Colonoscopy Procedure Note  Amina Quintana  :  1950  [facility] Medical Record Number: [id number]    Indications:     Anemia, Hematochezia/melena  PCP:  Ele Serrato MD  Anesthesia/Sedation: see nursing notes  Endoscopist:  Dr. Ayad Lambert  Assistants: None  Complications:  None  Estimate Blood Loss:  None    Permit:  The indications, risks, benefits and alternatives were reviewed with the patient or their decision maker who was provided an opportunity to ask questions and all questions were answered. The specific risks of colonoscopy with conscious sedation were reviewed, including but not limited to anesthetic complication, bleeding, adverse drug reaction, missed lesion, infection, IV site reactions, and intestinal perforation which would lead to the need for surgical repair. Alternatives to colonoscopy including radiographic imaging, observation without testing, or laboratory testing were reviewed including the limitations of those alternatives. After considering the options and having all their questions answered, the patient or their decision maker provided both verbal and written consent to proceed. Procedure in Detail:  After obtaining informed consent, positioning of the patient in the left lateral decubitus position, and conduction of a pre-procedure pause or \"time out\" the endoscope was introduced into the anus and advanced to the cecum, which was identified by the ileocecal valve and appendiceal orifice. The quality of the colonic preparation was inadequate for sessile lesions. A careful inspection was made as the colonoscope was withdrawn, findings and interventions are described below.     Appendiceal orifice photographed    Findings:   no mucosal lesion appreciated      - Diverticulosis  No blood in any amount seen on this exam  Previously placed endoclips seen ascending; no ulcers at these locations    Specimens:    none    Implants:  none    Complications:   None; patient tolerated the procedure well. Estimated blood loss: none    Impression:  diverticulosis; all bleeding appears to have already stopped    Recommendations:      - no additional follow up colon exam planned       Resume all usual medications  May need a dose Redicrit as anemia returns without any visible blood loss    From GI perspective can discharge home  May resume all usual daily medications, but I am dubious as to exact diagnosis behind bleed. If there is a Dieulafoy type lesion, any anticoagulation in future can be problematic  Thank you for entrusting me with this patient's care. Please do not hesitate to contact me with any questions or if I can be of assistance with any of your other patients' GI needs.     Signed By: Yumiko Leyva MD                        December 17, 2021

## 2021-12-17 NOTE — PROGRESS NOTES
Problem: Risk for Spread of Infection  Goal: Prevent transmission of infectious organism to others  Description: Prevent the transmission of infectious organisms to other patients, staff members, and visitors. Outcome: Progressing Towards Goal     Problem: Patient Education:  Go to Education Activity  Goal: Patient/Family Education  Outcome: Progressing Towards Goal     Problem: Falls - Risk of  Goal: *Absence of Falls  Description: Document Rosalba Fothergill Fall Risk and appropriate interventions in the flowsheet.   Outcome: Progressing Towards Goal  Note: Fall Risk Interventions:  Mobility Interventions: Patient to call before getting OOB         Medication Interventions: Patient to call before getting OOB,Teach patient to arise slowly    Elimination Interventions: Call light in reach

## 2021-12-17 NOTE — PROGRESS NOTES
Bedside shift change report given to Tammie RAHMAN (oncoming nurse) by Colin Mccray (offgoing nurse). Report included the following information SBAR, Kardex, MAR, Recent Results and Med Rec Status.

## 2021-12-17 NOTE — ANESTHESIA PREPROCEDURE EVALUATION
Relevant Problems   RESPIRATORY SYSTEM   (+) VIK on CPAP      CARDIOVASCULAR   (+) Chronic atrial fibrillation (HCC)   (+) Hypertension      RENAL FAILURE   (+) CKD (chronic kidney disease) stage 5, GFR less than 15 ml/min (HCC)   (+) ESRD (end stage renal disease) on dialysis (HCC)      HEMATOLOGY   (+) Acute blood loss anemia   (+) Anemia, chronic disease       Anesthetic History   No history of anesthetic complications            Review of Systems / Medical History  Patient summary reviewed, nursing notes reviewed and pertinent labs reviewed    Pulmonary        Sleep apnea: CPAP           Neuro/Psych   Within defined limits          Comments: Cervical disc disease Cardiovascular    Hypertension      CHF  Dysrhythmias : atrial fibrillation           GI/Hepatic/Renal         Renal disease (stage 5):  CRI       Endo/Other        Anemia (hg=9.3)     Other Findings   Comments: Pt had previous colonoscopy with polypectomy then had rectal bleeding associated with eliquis    Received several units pRBCs since admission, Hgb now 7.3 after continued bleeding           Physical Exam    Airway  Mallampati: II  TM Distance: > 6 cm  Neck ROM: normal range of motion   Mouth opening: Normal     Cardiovascular    Rhythm: regular  Rate: normal         Dental    Dentition: Lower dentition intact and Upper dentition intact     Pulmonary  Breath sounds clear to auscultation               Abdominal         Other Findings            Anesthetic Plan    ASA: 3  Anesthesia type: MAC            Anesthetic plan and risks discussed with: Patient

## 2021-12-17 NOTE — PROGRESS NOTES
1400  Bedside report received from PTS floor RN, will draw H&H prior to procedure, pts wife and PT agree for wife to sign consent as Pt to be medicated with dilaudid.

## 2021-12-17 NOTE — ANESTHESIA POSTPROCEDURE EVALUATION
Procedure(s):  COLONOSCOPY. MAC    Anesthesia Post Evaluation      Multimodal analgesia: multimodal analgesia not used between 6 hours prior to anesthesia start to PACU discharge  Patient location during evaluation: PACU  Patient participation: complete - patient participated  Level of consciousness: awake  Pain management: adequate  Airway patency: patent  Anesthetic complications: no  Cardiovascular status: acceptable, blood pressure returned to baseline and hemodynamically stable  Respiratory status: acceptable  Hydration status: acceptable  Post anesthesia nausea and vomiting:  controlled      INITIAL Post-op Vital signs:   Vitals Value Taken Time   /80 12/17/21 1525   Temp     Pulse 97 12/17/21 1530   Resp 15 12/17/21 1530   SpO2 95 % 12/17/21 1530   Vitals shown include unvalidated device data.

## 2021-12-17 NOTE — PROGRESS NOTES
GI    Colonoscopy  Impression:  diverticulosis; all bleeding appears to have already stopped    Recommendations:      - no additional follow up colon exam planned   Resume all usual medications  May need a dose Redicrit as anemia returns without any visible blood loss    From GI perspective can discharge home  May resume all usual daily medications, but I am dubious as to exact diagnosis behind bleed.   If there is a Dieulafoy type lesion, any anticoagulation in future can be problematic  GI on call available as needed over weekend

## 2021-12-17 NOTE — PROGRESS NOTES
Seamus Escamilla  1950  [id number]    Situation:  Verbal report received from: [address] [facility], RN  Procedure: Procedure(s):  COLONOSCOPY    Background:    Preoperative diagnosis: post polypectomy bleed  Postoperative diagnosis: 1.- diverticulosis      :  Dr. Guillermina Reza  Assistant(s): Endoscopy Technician-1: Mindy Marcelo  Endoscopy RN-1: Black Ornelas    Specimens: * No specimens in log *  H. Pylori  no    Assessment:  Intra-procedure medications   Anesthesia gave intra-procedure sedation and medications, see anesthesia flow sheet yes    Intravenous fluids: NS@ KVO     Vital signs stable yes    Abdominal assessment: round and soft yes    Recommendation:  Discharge patient per MD order.   Return to floor yes  Family or Friend family  Permission to share finding with family or friend yes

## 2021-12-17 NOTE — PROGRESS NOTES
Progress Note  Date:2021       Room:[facility]  Patient Leesa Izquierdo     YOB: 1950     Age:70 y.o. Subjective    Subjective:  Diet:  NPO. No nausea or vomiting. Activity level: Impaired due to weakness. Pain:  He reports no pain. Review of Systems   Constitutional: Positive for fatigue. Negative for appetite change and fever. Cardiovascular: Negative for leg swelling. Gastrointestinal: Positive for blood in stool (Per nursing). Negative for abdominal pain, nausea and vomiting. Objective         Vitals Last 24 Hours:  TEMPERATURE:  Temp  Av.2 °F (36.8 °C)  Min: 97.8 °F (36.6 °C)  Max: 98.8 °F (37.1 °C)  RESPIRATIONS RANGE: Resp  Av  Min: 16  Max: 20  PULSE OXIMETRY RANGE: SpO2  Av %  Min: 94 %  Max: 97 %  PULSE RANGE: Pulse  Av.5  Min: 82  Max: 104  BLOOD PRESSURE RANGE: Systolic (39EUD), TDO:492 , Min:80 , CPS:694   ; Diastolic (77HVU), FGU:81, Min:55, Max:92    I/O (24Hr): Intake/Output Summary (Last 24 hours) at 2021 0955  Last data filed at 2021 1916  Gross per 24 hour   Intake 480 ml   Output 0 ml   Net 480 ml     Objective:  General Appearance:  Comfortable and not in pain (Chronically ill). Vital signs: (most recent): Blood pressure 137/86, pulse 91, temperature 98.8 °F (37.1 °C), resp. rate 16, weight 108.9 kg (240 lb), SpO2 97 %. No fever. Output: Producing stool. HEENT: Normal HEENT exam.    Lungs:  Normal effort and normal respiratory rate. Breath sounds clear to auscultation. Heart: Normal rate. Regular rhythm. S1 normal and S2 normal.  No murmur. Abdomen: Abdomen is soft and non-distended. Bowel sounds are normal.   There is no abdominal tenderness. Extremities: Normal range of motion. There is venous stasis and dependent edema. Pulses: Distal pulses are intact. Neurological: Patient is alert and oriented to person, place and time.     Pupils:  Pupils are equal, round, and reactive to light. Skin:  Warm and dry. Labs/Imaging/Diagnostics    Labs:  CBC:  Recent Labs     12/17/21  0423 12/16/21  1825 12/16/21  0436 12/15/21  1905 12/15/21  0334   WBC  --   --  8.0  --  9.4   RBC  --   --  2.52*  --  2.39*   HGB 7.3* 7.7* 7.4*   < > 7.0*   HCT 22.5* 23.3* 23.0*   < > 22.1*   MCV  --   --  91.3  --  92.5   RDW  --   --  16.9*  --  17.5*   PLT  --   --  114*  --  125*    < > = values in this interval not displayed. CHEMISTRIES:  Recent Labs     12/16/21  0436 12/15/21  0334 12/14/21  1004    148* 145   K 4.0 4.5 5.1   * 116* 115*   CO2 21 21 18*   BUN 85* 96* 101*   CA 7.2* 7.4* 7.3*   PHOS  --  7.3*  --    MG  --  2.5*  --    PT/INR:No results for input(s): INR, INREXT, INREXT in the last 72 hours. No lab exists for component: PROTIME  APTT:No results for input(s): APTT in the last 72 hours. LIVER PROFILE:  Recent Labs     12/15/21  0334   AST 15   ALT 21     Lab Results   Component Value Date/Time    ALT (SGPT) 21 12/15/2021 03:34 AM    AST (SGOT) 15 12/15/2021 03:34 AM    Alk. phosphatase 60 12/15/2021 03:34 AM    Bilirubin, direct 0.1 12/15/2021 03:34 AM    Bilirubin, total 0.4 12/15/2021 03:34 AM       Imaging Last 24 Hours:  No results found. Assessment//Plan   Principal Problem:    Acute GI bleeding (12/14/2021)    Active Problems:    Pulmonary hypertension (HCC) ()      Overview: Ramon NP at [facility]      Chronic atrial fibrillation (HCC) ()      Overview: [facility]      Bacteremia due to Klebsiella pneumoniae (12/4/2021)      Acute blood loss anemia (12/14/2021)      CKD (chronic kidney disease) stage 5, GFR less than 15 ml/min (HCC) (12/14/2021)      Assessment:  (80 y/o male admitted with rectal bleeding after recent colonoscopy with polypectomy. Repeat colonoscopy yesterday with placement of additional clip at previous endoclipped site which was intact with large fresh clot atop site.  Additional polypectomy sites not seen to be bleeding but his prep was poor so visualization was difficult. Hgb dropped slightly to 7. Nursing describes BRB in stool this morning. When I discussed with him another colonoscopy he is adamant he does not want to prep for or have another colonoscopy for further evaluation. Anticoagulation being held. He denies abdominal pain, nausea, or vomiting.     12/16/2021: Mr. Harlan Nuñez is now agreeable for repeat colonoscopy tomorrow. Has had clear liquids all day. Blood in stool reported by nursing. Hgb down slightly to 7.4. No other GI complains. Anticoagulation still being held. 12/17/2021: Colonoscopy planned for today but cancelled due to no further episodes of rectal bleeding and stabilization of his hgb. Mr. Harlan Nuñez' family is concerned about \"finding other places in his GI tract where he could be bleeding\". Discussed that he had an EGD and colonoscopy 1 week ago and another colonoscopy earlier this week with identification of an oozing polypectomy site with a fresh clot that was further clipped so we have a source of blood loss from that colonoscopy. Their concerns are that he had several more polyps removed that could not be visualized due to the poor prep and they want those sites looked at via colonoscopy. I advised that there would be no guarantee those would be found in his colonoscopy and if there were not signs of fresh bleeding or clots I'm not sure of the benefit to risk ratio of a colonoscopy to simply confirm hemostasis when his hgb is relatively stable (dropped 0.4 grams and again with no overt GI bleeding). Advised that there could be other reasons for anemia such as his kidney disease (formation of the RBC) which are hindering a rapid rise in his hgb and that it could take time for it to get back up to his baseline. I offered to let Mr. Harlan Nuñez eat but his family would like to discuss with Dr. Cristiane Olmstead so will keep him NPO for now. ).      Plan:   (- Discussed family's concerns with Dr. Cristiane Olmstead and will proceed with colonoscopy as planned. - Follow H&H, transfuse prn.   - Continue to hold anticoagulation, can resume 12/24/2021 per Dr. Pedro Pablo Veliz recommendations after most recent colonoscopy. ).        Electronically signed by Jennifer Schmidt NP on 12/17/2021 at 9:05 AM

## 2021-12-17 NOTE — PROGRESS NOTES
12/17/2021  Case Management Progress Note    12:42 PM  Patient is 78year old male admitted 12/14 with acute GI bleeding  Patient's RUR is 25% red/high risk for readmission  Covid test: negative 12/14  Chart reviewed--patient discussed at IDR rounds  Per chart patient is having a repeat colonoscopy today with GI. Patient is open with [facility] and resumption orders have been sent. Plan remains for him to return home with [facility] and [place] [address] for IVs unless otherwise indicated. [address] [place] [address] will need to be alerted of discharge when date is more certain. Will continue to follow and assist with discharge planning. Transition of Care Plan   1. Continue medical management/treatment  2. Home with  and IV antibiotics   3. [facility] and [place] [phone]  4. Family will transport   5. Follow up with PCP, specialities as needed  6.  CM will continue to follow    MICKY Matute

## 2021-12-17 NOTE — PROGRESS NOTES
Hospitalist Progress Note    NAME: Ezequiel Strauss   :  1950   MRN:  [id number]     Subjective:   Daily Progress Note: 2021 7:49 AM      Chief complaint: Rectal bleed  Patient seen and examined, chart was reviewed. Patient had orthostatic blood pressure yesterday. Patient had no further rectal bleed. Colonoscopy planned later today. No other acute issues reported to me by patient or staff at this time. Patient does not want to go home and return soon as happened last time.     Current Facility-Administered Medications   Medication Dose Route Frequency    iron sucrose (VENOFER) injection 200 mg  200 mg IntraVENous DAILY    epoetin sylvie-epbx (RETACRIT) injection 20,000 Units  20,000 Units SubCUTAneous Q7D    hydrALAZINE (APRESOLINE) 20 mg/mL injection 20 mg  20 mg IntraVENous Q6H PRN    cloNIDine HCL (CATAPRES) tablet 0.2 mg  0.2 mg Oral BID    NIFEdipine ER (PROCARDIA XL) tablet 30 mg  30 mg Oral DAILY AFTER BREAKFAST    pantoprazole (PROTONIX) tablet 40 mg  40 mg Oral ACB    0.9% sodium chloride infusion 250 mL  250 mL IntraVENous PRN    calcitRIOL (ROCALTROL) capsule 0.25 mcg  0.25 mcg Oral DAILY    carvediloL (COREG) tablet 12.5 mg  12.5 mg Oral BID WITH MEALS    fluticasone propionate (FLONASE) 50 mcg/actuation nasal spray 2 Spray  2 Spray Both Nostrils DAILY    sodium bicarbonate tablet 650 mg  650 mg Oral TID    sodium chloride (NS) flush 5-40 mL  5-40 mL IntraVENous Q8H    sodium chloride (NS) flush 5-40 mL  5-40 mL IntraVENous PRN    0.9% sodium chloride infusion 25 mL  25 mL IntraVENous PRN    acetaminophen (TYLENOL) tablet 650 mg  650 mg Oral Q6H PRN    Or    acetaminophen (TYLENOL) suppository 650 mg  650 mg Rectal Q6H PRN    bisacodyL (DULCOLAX) suppository 10 mg  10 mg Rectal DAILY PRN    promethazine (PHENERGAN) tablet 12.5 mg  12.5 mg Oral Q6H PRN    Or    ondansetron (ZOFRAN) injection 4 mg  4 mg IntraVENous Q6H PRN    HYDROmorphone (DILAUDID) syringe 0.5 mg  0.5 mg IntraVENous Q4H PRN    furosemide (LASIX) tablet 40 mg  40 mg Oral BID    meropenem (MERREM) 500 mg in sterile water (preservative free) 10 mL IV syringe  0.5 g IntraVENous Q12H    0.9% sodium chloride infusion 250 mL  250 mL IntraVENous PRN    0.9% sodium chloride infusion 250 mL  250 mL IntraVENous PRN          Objective:     Visit Vitals  /86 (BP 1 Location: Right upper arm, BP Patient Position: At rest)   Pulse 91   Temp 98.8 °F (37.1 °C)   Resp 16   Wt 108.9 kg (240 lb)   SpO2 97%   BMI 34.44 kg/m²      O2 Device: None (Room air)    Temp (24hrs), Av.2 °F (36.8 °C), Min:97.8 °F (36.6 °C), Max:98.8 °F (37.1 °C)        PHYSICAL EXAM:  General WDWN  Neck supple  CVS RRR  Respiratory symmetric expansion  Abdomen nondistended  Extremities edema UE/LE  Skin venous dermatitis LE  Neuro alert normal speech  Psych normal affect        Data Review    Recent Results (from the past 24 hour(s))   HGB & HCT    Collection Time: 21  6:25 PM   Result Value Ref Range    HGB 7.7 (L) 12.1 - 17.0 g/dL    HCT 23.3 (L) 36.6 - 50.3 %   HGB & HCT    Collection Time: 21  4:23 AM   Result Value Ref Range    HGB 7.3 (L) 12.1 - 17.0 g/dL    HCT 22.5 (L) 36.6 - 50.3 %   SAMPLES BEING HELD    Collection Time: 21  4:23 AM   Result Value Ref Range    SAMPLES BEING HELD 1PST     COMMENT        Add-on orders for these samples will be processed based on acceptable specimen integrity and analyte stability, which may vary by analyte. No results found for this visit on 21. All Micro Results     Procedure Component Value Units Date/Time    COVID-19 RAPID TEST [[id number]] Collected: 21 1312    Order Status: Completed Specimen: Nasopharyngeal Updated: 21 1342     Specimen source Nasopharyngeal        COVID-19 rapid test Not detected        Comment: Rapid Abbott ID Now       Rapid NAAT:  The specimen is NEGATIVE for SARS-CoV-2, the novel coronavirus associated with COVID-19. Negative results should be treated as presumptive and, if inconsistent with clinical signs and symptoms or necessary for patient management, should be tested with an alternative molecular assay. Negative results do not preclude SARS-CoV-2 infection and should not be used as the sole basis for patient management decisions. This test has been authorized by the FDA under an Emergency Use Authorization (EUA) for use by authorized laboratories. Fact sheet for Healthcare Providers: ConventionUpdate.co.nz  Fact sheet for Patients: ConventionUpdate.co.nz       Methodology: Isothermal Nucleic Acid Amplification                     Assessment/Plan:        79 yo hx of HTN, chronic afib, pulm HTN, CKD 5, recent polypectomy, presented w/ rectal bleeding, blood loss anemia       Acute GI bleed due to recent polypectomy and Eliquis. Hold all anticoagulation. Monitor Hgb closely. GI  Did   repeat colonoscopy 12/14/21 showed post polypectomy bleeding in the ascending colon and Endo Clip was placed. No anticoagulation for 10 days per GI note. GI would like to repeat another colonoscopy scheduled for today 12/17/2021 F/u  GI input and recommendations for further plan      Acute blood loss anemia: due to above. Monitor hemoglobin, transfuse if below 7    CKD 5 approaching dialysis. Monitor BMP. Renal on case defer to them      Recent ESBL E. Coli UTI/Klebsiella bacteremia: cont Invanz through PICC till 12/22. Currently on IV meropenem     Chronic afib: hold eliquis f per GI.   Cont coreg      HTN: .  Cont coreg hold clonidine as has orthostatic hypotension.      Pulm HTN: follows by NP Rohit at [facility]    Generalized edema/anasarca-on Lasix as per renal team.    Debility/deconditioning continue PT OT may need home PT OT at [facility]  NOK  Prophylaxis:  SCD's  Probable Disposition:  Home w/Family 48Hrs if medically progressing and cleared by all consultants    Signed By: Nessa Lowe Parvez Manjarrez MD     December 17, 2021

## 2021-12-17 NOTE — PROGRESS NOTES

## 2021-12-17 NOTE — PROGRESS NOTES
TRANSFER - OUT REPORT:    Verbal report given to Thomas(name) on [facility]  being transferred to [facility](unit) for routine progression of care       Report consisted of patients Situation, Background, Assessment and   Recommendations(SBAR). Information from the following report(s) SBAR was reviewed with the receiving nurse. Lines:   Double Lumen Joshua Catheter 12/09/21 Right Internal jugular (Active)   Central Line Being Utilized Yes 12/17/21 1423   Criteria for Appropriate Use Long term IV/antibiotic administration 12/17/21 1423   Site Assessment Clean, dry, & intact 12/17/21 1423   Infiltration Assessment 0 12/17/21 1423   Affected Extremity/Extremities Color distal to insertion site pink (or appropriate for race) 12/16/21 2039   Date of Last Dressing Change 12/15/21 12/16/21 2039   Dressing Status Clean, dry, & intact; New 12/16/21 2039   Dressing Type Transparent 12/16/21 2039   Action Taken Open ports on tubing capped 12/16/21 2039   Positive Blood Return (Lateral Site) Yes 12/16/21 1833   Alcohol Cap Used Yes 12/16/21 1833        Opportunity for questions and clarification was provided.       Patient transported with:   tele box

## 2021-12-17 NOTE — PROGRESS NOTES
Marleni 55 [address] [facility]  YOB: 1950          Assessment & Plan:   DRAKE, improved  CKD 4/5, Cr near baseline. Has followed with [facility] and recently Dr. Henrik Bernstein  UTI on abx  GIB  HTN  Edema  Anemia with low Fe Sat    REC:  Scr better but edema worse  Change lasix 40 mg IV BID  Encourage POI  Monitor labs. Continue IV Fe and epo  Start prednisone 5 mg daily  No acute indication HD.  Close nephrology f/u p d/c       Subjective:   CC: f/u DRAKE  HPI: Cr down to 4.5 from 12/16  ROS: No sob +edema +leg pain/joint pain  Current Facility-Administered Medications   Medication Dose Route Frequency    furosemide (LASIX) injection 40 mg  40 mg IntraVENous BID    [START ON 12/18/2021] predniSONE (DELTASONE) tablet 5 mg  5 mg Oral DAILY WITH BREAKFAST    iron sucrose (VENOFER) injection 200 mg  200 mg IntraVENous DAILY    epoetin sylvie-epbx (RETACRIT) injection 20,000 Units  20,000 Units SubCUTAneous Q7D    hydrALAZINE (APRESOLINE) 20 mg/mL injection 20 mg  20 mg IntraVENous Q6H PRN    [Held by provider] cloNIDine HCL (CATAPRES) tablet 0.2 mg  0.2 mg Oral BID    NIFEdipine ER (PROCARDIA XL) tablet 30 mg  30 mg Oral DAILY AFTER BREAKFAST    pantoprazole (PROTONIX) tablet 40 mg  40 mg Oral ACB    0.9% sodium chloride infusion 250 mL  250 mL IntraVENous PRN    calcitRIOL (ROCALTROL) capsule 0.25 mcg  0.25 mcg Oral DAILY    carvediloL (COREG) tablet 12.5 mg  12.5 mg Oral BID WITH MEALS    fluticasone propionate (FLONASE) 50 mcg/actuation nasal spray 2 Spray  2 Spray Both Nostrils DAILY    sodium bicarbonate tablet 650 mg  650 mg Oral TID    sodium chloride (NS) flush 5-40 mL  5-40 mL IntraVENous Q8H    sodium chloride (NS) flush 5-40 mL  5-40 mL IntraVENous PRN    0.9% sodium chloride infusion 25 mL  25 mL IntraVENous PRN    acetaminophen (TYLENOL) tablet 650 mg  650 mg Oral Q6H PRN    Or    acetaminophen (TYLENOL) suppository 650 mg  650 mg Rectal Q6H PRN    bisacodyL (DULCOLAX) suppository 10 mg  10 mg Rectal DAILY PRN    promethazine (PHENERGAN) tablet 12.5 mg  12.5 mg Oral Q6H PRN    Or    ondansetron (ZOFRAN) injection 4 mg  4 mg IntraVENous Q6H PRN    HYDROmorphone (DILAUDID) syringe 0.5 mg  0.5 mg IntraVENous Q4H PRN    meropenem (MERREM) 500 mg in sterile water (preservative free) 10 mL IV syringe  0.5 g IntraVENous Q12H    0.9% sodium chloride infusion 250 mL  250 mL IntraVENous PRN    0.9% sodium chloride infusion 250 mL  250 mL IntraVENous PRN          Objective:     Vitals:  Blood pressure 137/86, pulse 91, temperature 98.8 °F (37.1 °C), resp. rate 16, weight 108.9 kg (240 lb), SpO2 97 %. Temp (24hrs), Av.2 °F (36.8 °C), Min:97.8 °F (36.6 °C), Max:98.8 °F (37.1 °C)      Intake and Output:  No intake/output data recorded. 12/15 1901 -  0700  In: 771.3 [P.O.:480]  Out: 850 [Urine:850]    Physical Exam:               GENERAL ASSESSMENT: NAD  CHEST: CTA  HEART: S1S2  ABDOMEN: Soft,NT,  EXTREMITY: +EDEMA  NEURO:Grossly non focal          ECG/rhythm:    Data Review      No results for input(s): TNIPOC in the last 72 hours. No lab exists for component: ITNL   No results for input(s): CPK, CKMB, TROIQ in the last 72 hours. Recent Labs     21  0423 21  1825 21  0436 12/15/21  1905 12/15/21  0334   NA  --   --  142  --  148*   K  --   --  4.0  --  4.5   CL  --   --  110*  --  116*   CO2  --   --  -     BUN  --   --  85*  --  96*   CREA  --   --  4.52*  --  4.88*   GLU  --   --  88  --  94   PHOS  --   --   --   --  7.3*   MG  --   --   --   --  2.5*   CA  --   --  7.2*  --  7.4*   ALB  --   --   --   --  1.9*   WBC  --   --  8.0  --  9.4   HGB 7.3* 7.7* 7.4*   < > 7.0*   HCT 22.5* 23.3* 23.0*   < > 22.1*   PLT  --   --  114*  --  125*    < > = values in this interval not displayed. No results for input(s): INR, PTP, APTT, INREXT, INREXT in the last 72 hours.   Needs: urine analysis, urine sodium, protein and creatinine  No results found for: Unique Carmencita        : Mary Espinoza MD  12/17/2021        [facility]:  [email or web address]  Jose Santoyo office:  [address], [address] [place] [address],[address]  [place], [address] [place] [address]  Phone: [phone]  Fax :     [phone]    [place] office:  [address] [facility], [address] [place] [address]  Phone - [phone]  Fax - [phone]

## 2021-12-17 NOTE — PROGRESS NOTES
Bedside, Verbal and Written shift change report given to Giorgi Correa RN (oncoming nurse) by Kelsi Damon (offgoing nurse). Report included the following information SBAR, Kardex, OR Summary, Procedure Summary, Intake/Output, MAR, Recent Results and Med Rec Status.

## 2021-12-18 PROBLEM — Z79.52 CURRENT CHRONIC USE OF SYSTEMIC STEROIDS: Chronic | Status: ACTIVE | Noted: 2021-12-18

## 2021-12-18 LAB
ANION GAP SERPL CALC-SCNC: 12 MMOL/L (ref 5–15)
BASOPHILS # BLD: 0 K/UL (ref 0–0.1)
BASOPHILS NFR BLD: 0 % (ref 0–1)
BUN SERPL-MCNC: 67 MG/DL (ref 6–20)
BUN/CREAT SERPL: 16 (ref 12–20)
CALCIUM SERPL-MCNC: 6.6 MG/DL (ref 8.5–10.1)
CHLORIDE SERPL-SCNC: 111 MMOL/L (ref 97–108)
CO2 SERPL-SCNC: 22 MMOL/L (ref 21–32)
CREAT SERPL-MCNC: 4.31 MG/DL (ref 0.7–1.3)
DIFFERENTIAL METHOD BLD: ABNORMAL
EOSINOPHIL # BLD: 0 K/UL (ref 0–0.4)
EOSINOPHIL NFR BLD: 0 % (ref 0–7)
ERYTHROCYTE [DISTWIDTH] IN BLOOD BY AUTOMATED COUNT: 17.2 % (ref 11.5–14.5)
GLUCOSE SERPL-MCNC: 117 MG/DL (ref 65–100)
HCT VFR BLD AUTO: 22.1 % (ref 36.6–50.3)
HGB BLD-MCNC: 7.2 G/DL (ref 12.1–17)
IMM GRANULOCYTES # BLD AUTO: 0 K/UL
IMM GRANULOCYTES NFR BLD AUTO: 0 %
LYMPHOCYTES # BLD: 0.6 K/UL (ref 0.8–3.5)
LYMPHOCYTES NFR BLD: 8 % (ref 12–49)
MAGNESIUM SERPL-MCNC: 1.8 MG/DL (ref 1.6–2.4)
MCH RBC QN AUTO: 29.8 PG (ref 26–34)
MCHC RBC AUTO-ENTMCNC: 32.6 G/DL (ref 30–36.5)
MCV RBC AUTO: 91.3 FL (ref 80–99)
METAMYELOCYTES NFR BLD MANUAL: 4 %
MONOCYTES # BLD: 0.5 K/UL (ref 0–1)
MONOCYTES NFR BLD: 7 % (ref 5–13)
NEUTS BAND NFR BLD MANUAL: 4 % (ref 0–6)
NEUTS SEG # BLD: 5.6 K/UL (ref 1.8–8)
NEUTS SEG NFR BLD: 77 % (ref 32–75)
NRBC # BLD: 0.04 K/UL (ref 0–0.01)
NRBC BLD-RTO: 0.6 PER 100 WBC
PLATELET # BLD AUTO: 112 K/UL (ref 150–400)
PMV BLD AUTO: 12.2 FL (ref 8.9–12.9)
POTASSIUM SERPL-SCNC: 3.6 MMOL/L (ref 3.5–5.1)
RBC # BLD AUTO: 2.42 M/UL (ref 4.1–5.7)
RBC MORPH BLD: ABNORMAL
SODIUM SERPL-SCNC: 145 MMOL/L (ref 136–145)
WBC # BLD AUTO: 6.9 K/UL (ref 4.1–11.1)

## 2021-12-18 PROCEDURE — 74011250636 HC RX REV CODE- 250/636: Performed by: INTERNAL MEDICINE

## 2021-12-18 PROCEDURE — 74011250637 HC RX REV CODE- 250/637: Performed by: INTERNAL MEDICINE

## 2021-12-18 PROCEDURE — 74011636637 HC RX REV CODE- 636/637: Performed by: INTERNAL MEDICINE

## 2021-12-18 PROCEDURE — 85025 COMPLETE CBC W/AUTO DIFF WBC: CPT

## 2021-12-18 PROCEDURE — 80048 BASIC METABOLIC PNL TOTAL CA: CPT

## 2021-12-18 PROCEDURE — 65660000000 HC RM CCU STEPDOWN

## 2021-12-18 PROCEDURE — 74011000250 HC RX REV CODE- 250: Performed by: INTERNAL MEDICINE

## 2021-12-18 PROCEDURE — 36415 COLL VENOUS BLD VENIPUNCTURE: CPT

## 2021-12-18 PROCEDURE — 97530 THERAPEUTIC ACTIVITIES: CPT

## 2021-12-18 PROCEDURE — 74011250637 HC RX REV CODE- 250/637: Performed by: HOSPITALIST

## 2021-12-18 PROCEDURE — 83735 ASSAY OF MAGNESIUM: CPT

## 2021-12-18 RX ORDER — PREDNISONE 20 MG/1
40 TABLET ORAL DAILY
Status: DISCONTINUED | OUTPATIENT
Start: 2021-12-19 | End: 2021-12-21 | Stop reason: HOSPADM

## 2021-12-18 RX ORDER — SODIUM CHLORIDE 9 MG/ML
125 INJECTION, SOLUTION INTRAVENOUS AS NEEDED
Status: DISCONTINUED | OUTPATIENT
Start: 2021-12-18 | End: 2021-12-21 | Stop reason: HOSPADM

## 2021-12-18 RX ORDER — PREDNISONE 5 MG/1
10 TABLET ORAL 2 TIMES DAILY
Status: DISCONTINUED | OUTPATIENT
Start: 2021-12-18 | End: 2021-12-18

## 2021-12-18 RX ORDER — CLONIDINE HYDROCHLORIDE 0.1 MG/1
0.1 TABLET ORAL 2 TIMES DAILY
Status: DISCONTINUED | OUTPATIENT
Start: 2021-12-18 | End: 2021-12-21 | Stop reason: HOSPADM

## 2021-12-18 RX ORDER — HYDRALAZINE HYDROCHLORIDE 20 MG/ML
10 INJECTION INTRAMUSCULAR; INTRAVENOUS
Status: DISCONTINUED | OUTPATIENT
Start: 2021-12-18 | End: 2021-12-21 | Stop reason: HOSPADM

## 2021-12-18 RX ADMIN — CARVEDILOL 12.5 MG: 12.5 TABLET, FILM COATED ORAL at 08:13

## 2021-12-18 RX ADMIN — Medication 10 ML: at 22:00

## 2021-12-18 RX ADMIN — PREDNISONE 5 MG: 5 TABLET ORAL at 08:13

## 2021-12-18 RX ADMIN — PANTOPRAZOLE SODIUM 40 MG: 40 TABLET, DELAYED RELEASE ORAL at 06:11

## 2021-12-18 RX ADMIN — IRON SUCROSE 200 MG: 20 INJECTION, SOLUTION INTRAVENOUS at 08:20

## 2021-12-18 RX ADMIN — NIFEDIPINE 30 MG: 30 TABLET, FILM COATED, EXTENDED RELEASE ORAL at 08:12

## 2021-12-18 RX ADMIN — SODIUM BICARBONATE 650 MG: 650 TABLET ORAL at 08:13

## 2021-12-18 RX ADMIN — HYDROMORPHONE HYDROCHLORIDE 0.5 MG: 1 INJECTION, SOLUTION INTRAMUSCULAR; INTRAVENOUS; SUBCUTANEOUS at 00:29

## 2021-12-18 RX ADMIN — FUROSEMIDE 40 MG: 10 INJECTION INTRAMUSCULAR; INTRAVENOUS at 08:15

## 2021-12-18 RX ADMIN — CALCITRIOL CAPSULES 0.25 MCG 0.25 MCG: 0.25 CAPSULE ORAL at 08:13

## 2021-12-18 RX ADMIN — CLONIDINE HYDROCHLORIDE 0.1 MG: 0.1 TABLET ORAL at 17:19

## 2021-12-18 RX ADMIN — FUROSEMIDE 40 MG: 10 INJECTION INTRAMUSCULAR; INTRAVENOUS at 17:20

## 2021-12-18 RX ADMIN — CLONIDINE HYDROCHLORIDE 0.1 MG: 0.1 TABLET ORAL at 10:10

## 2021-12-18 RX ADMIN — PREDNISONE 10 MG: 5 TABLET ORAL at 10:10

## 2021-12-18 RX ADMIN — SODIUM BICARBONATE 650 MG: 650 TABLET ORAL at 20:37

## 2021-12-18 RX ADMIN — SODIUM BICARBONATE 650 MG: 650 TABLET ORAL at 22:00

## 2021-12-18 RX ADMIN — Medication 10 ML: at 17:20

## 2021-12-18 RX ADMIN — SODIUM BICARBONATE 650 MG: 650 TABLET ORAL at 17:19

## 2021-12-18 RX ADMIN — Medication 10 ML: at 20:37

## 2021-12-18 RX ADMIN — CARVEDILOL 12.5 MG: 12.5 TABLET, FILM COATED ORAL at 17:19

## 2021-12-18 RX ADMIN — HYDROMORPHONE HYDROCHLORIDE 0.5 MG: 1 INJECTION, SOLUTION INTRAMUSCULAR; INTRAVENOUS; SUBCUTANEOUS at 06:29

## 2021-12-18 RX ADMIN — FLUTICASONE PROPIONATE 2 SPRAY: 50 SPRAY, METERED NASAL at 08:21

## 2021-12-18 RX ADMIN — MEROPENEM 500 MG: 500 INJECTION, POWDER, FOR SOLUTION INTRAVENOUS at 00:30

## 2021-12-18 RX ADMIN — MEROPENEM 500 MG: 500 INJECTION, POWDER, FOR SOLUTION INTRAVENOUS at 14:15

## 2021-12-18 RX ADMIN — Medication 10 ML: at 00:32

## 2021-12-18 RX ADMIN — PREDNISONE 30 MG: 5 TABLET ORAL at 14:23

## 2021-12-18 NOTE — PROGRESS NOTES
[address] MD regarding Pts pain and gout hx. Pt requesting 20 mg of prednisone to help with inflammation. [address] ordered 10mg of prednisone    1025  Pt requesting 20mg of prednisone twice a day.  Md will discuss this with pt

## 2021-12-18 NOTE — PROGRESS NOTES
Progress Note      Pt Name  [address] [facility]   Date of Birth 1950   Medical Record Number  [id number]      Age  78 y.o. PCP Vivi Lan MD   Admit date:  12/14/2021    Room Number  36/36  @ [address] [facility]   Date of Service  12/18/2021     Admission Diagnoses:  Acute GI bleeding     Admission Summary:  \" The patient is a 79 yo hx of HTN, chronic afib, pulm HTN, CKD 5, recent polypectomy, presented w/ rectal bleeding, blood loss anemia. The patient was admitted last week for a Klebsiella pneumonia/bacteremia and an ESBL UTI. He was found to be anemic. Colonoscopy and polypectomy was performed. After discharge, the patient was told to hold Eliquis for 2 days. Since he resumed Eliquis this Sunday, he began to have rectal bleeding. Denied abd pain, chest pain, SOB, nausea, vomiting. In the ED, Hgb was 7.5 (last Hgb 9.3 six days ago). GI told him to come to the ED. \"     Assessment and plan:     Acute GI bleed   Acute blood loss anemia  Anemia of CKD stage V    Possibly due to recent polypectomy and resumption of Eliquis. Repeat colonoscopy on 12/14/21 showed post polypectomy site in the ascending colon was bleeding; Endo Clip was placed  Follow up colonoscopy on 12/17/21 showed no further bleed, diverticulosis. · Holding all anticoagulation at this time   · Monitor Hgb closely. · No anticoagulation for 10 days per GI recommendation      DRAKE on CKD stage 4-5 -followed by Dr. Russell Moser at [facility]   Close monitoring of lytes -  Lasix switched to IV due to  Anasarca   Appreciate guidance from nephrologist      Recent ESBL E. coli UTI & Klebsiella Bacteremia   Cont Invanz through PICC till 12/22. on IV meropenem     Chronic afib   Rate controlled   Off of anticoagulation due to GI bleed   Is he a candidate for Watchman device ?    Continue Coreg with hold parameters      HTN  Cont coreg   Clonidine was placed on hold due to reported orthostatic hypotension   We will resume Clonidine at half dose and monitor his BP      Pulm HTN   Followed by SANDY Bee at [address] pulmonologist team      Generalized edema/anasarca-  Continue Lasix per nephrologist recommendation      Debility/deconditioning   Continue PT OT   Might need home PT OT at [place]     Gout   Rheumatism   Chronic prednisone use   Pt reported taking Prednisone for gout flare-ups. We will give him his home dose during these flare-ups 40mg daily for short period   He will need to follow up with Rheumatologist           CODE STATUS   Full     Functional Status  Pt is  and lives with his wife in [place]     Surrogate decision maker:  Pt's wife      Prophylaxis   SCD's   Discharge Plan:  [facility] PT, OT, RN, ? 12/19/21     Misc   Benefit:  [place]or: [place] / Plan: [place] MEDICARE PART A & B / Product Type: Medicare /    Isolation :  Contact   ADT status:  INPATIENT      Query   None noted today    Prognosis   . fair    Social issues  Date  Comment     12/18/21  2:09 PM - I met with pt's wife, son in the room  We discussed Past Medical history, his worsening renal functions. Then we talked about his need for steroid use and tried to understand etiology behind use of this. We talked about risk of Addisonian crisis and asked them to remind doctors in future urgent/emergent situations.    We then talked about pathophysiology of anemia, especially anemia of CKD   We talked about plan of care after discharge under guidance of his PCP                   Subjective Data     \"I am all swollen and I am barely able to lift my arm \"  Review of Systems - History obtained from the patient  Respiratory ROS: no cough, shortness of breath, or wheezing  Cardiovascular ROS: no chest pain or dyspnea on exertion  Gastrointestinal ROS: no abdominal pain, change in bowel habits, or black or bloody stools  Musculoskeletal ROS: positive for - joint pain, joint stiffness, joint swelling and muscular weakness      Objective Data       Comments  Pleasant gentleman lying in bed in no distress   His wife and son are in the room. Patient Vitals for the past 24 hrs:   BP   12/18/21 1145 129/85   12/18/21 0850 138/86   12/18/21 0826 (!) 160/92   12/18/21 0804 (!) 146/89   12/18/21 0400 136/79   12/17/21 2327 (!) 149/80   12/17/21 1939 (!) 155/57   12/17/21 1525 110/80   12/17/21 1521 109/77   12/17/21 1515 119/74   12/17/21 1510 108/70   12/17/21 1426 115/83      Patient Vitals for the past 24 hrs:   Pulse   12/18/21 1145 98   12/18/21 0850 91   12/18/21 0826 91   12/18/21 0804 (!) 121   12/18/21 0400 92   12/17/21 2327 89   12/17/21 2304 86   12/17/21 1939 92   12/17/21 1525 100   12/17/21 1521 90   12/17/21 1515 92   12/17/21 1510 99   12/17/21 1500 90   12/17/21 1426 97      Patient Vitals for the past 24 hrs:   Resp   12/18/21 1145 18   12/18/21 0826 18   12/18/21 0804 21   12/18/21 0400 18   12/17/21 2327 18   12/17/21 1939 18   12/17/21 1525 14   12/17/21 1521 13   12/17/21 1515 15   12/17/21 1510 19   12/17/21 1426 20      Patient Vitals for the past 24 hrs:   Temp   12/18/21 1145 99.8 °F (37.7 °C)   12/18/21 0826 98.7 °F (37.1 °C)   12/18/21 0804 98.8 °F (37.1 °C)   12/18/21 0400 98.5 °F (36.9 °C)   12/17/21 2327 98 °F (36.7 °C)   12/17/21 1939 97.9 °F (36.6 °C)   12/17/21 1510 98 °F (36.7 °C)   12/17/21 1426 98.7 °F (37.1 °C)        SpO2 Readings from Last 6 Encounters:   12/18/21 96%   12/10/21 99%   10/31/21 98%   01/07/18 94%          O2 Device: None (Room air) Body mass index is 34.44 kg/m².  -  Wt Readings from Last 10 Encounters:   12/14/21 108.9 kg (240 lb)   12/10/21 112.3 kg (247 lb 9.2 oz)   10/24/21 113.3 kg (249 lb 12.5 oz)   01/07/18 120.2 kg (265 lb)        Intake/Output Summary (Last 24 hours) at 12/18/2021 1405  Last data filed at 12/18/2021 1013  Gross per 24 hour   Intake 100 ml   Output 750 ml   Net -650 ml         Physical Exam:             General:  Alert, cooperative,   well noursished,   well developed,   appears stated age    Ears/Eyes:  Hearing intact  Sclera anicteric. Pupils equal   Mouth/Throat:  Mucous membranes normal pink and moist     Neck:     Lungs:  Chest excursion symmetrical   Auscultation B/L Symmetrical with   Vesicular breath sounds     No Crepitations noted          CVS:  IRRegular rate and rhythm   no  murmur,   No click, rub or gallop  S1 normal   S2 normal   Pedal pulses  b/l symmetrical   Abdomen:  Obese  Soft, non-tender  Bowel sounds normal  No distension   Percussion note tympanitic   Extremities:  Chronic stasis dermatitis noted on both legs and shins   No cyanosis, jaundice  2+  edema noted on both legs   Both hands are swollen and tender - reminiscent of non-specific arthritis combined with anasarca   .     Skin:     Lymph nodes:     Musculoskeletal Muscle bulk B/L symmetrical   Neuro Cranial nerves are intact,   motor movement b/l symmetrical,   Sensory evaluation b/l symmetrical    Psych:  Alert and oriented,   normal mood & affect          Medications reviewed     Current Facility-Administered Medications   Medication Dose Route Frequency    predniSONE (DELTASONE) tablet 10 mg  10 mg Oral BID    hydrALAZINE (APRESOLINE) 20 mg/mL injection 10 mg  10 mg IntraVENous Q6H PRN    cloNIDine HCL (CATAPRES) tablet 0.1 mg  0.1 mg Oral BID    0.9% sodium chloride infusion  125 mL/hr IntraVENous PRN    furosemide (LASIX) injection 40 mg  40 mg IntraVENous BID    iron sucrose (VENOFER) injection 200 mg  200 mg IntraVENous DAILY    epoetin sylvie-epbx (RETACRIT) injection 20,000 Units  20,000 Units SubCUTAneous Q7D    NIFEdipine ER (PROCARDIA XL) tablet 30 mg  30 mg Oral DAILY AFTER BREAKFAST    pantoprazole (PROTONIX) tablet 40 mg  40 mg Oral ACB    calcitRIOL (ROCALTROL) capsule 0.25 mcg  0.25 mcg Oral DAILY    carvediloL (COREG) tablet 12.5 mg  12.5 mg Oral BID WITH MEALS    fluticasone propionate (FLONASE) 50 mcg/actuation nasal spray 2 Spray  2 Spray Both Nostrils DAILY    sodium bicarbonate tablet 650 mg  650 mg Oral TID  sodium chloride (NS) flush 5-40 mL  5-40 mL IntraVENous Q8H    sodium chloride (NS) flush 5-40 mL  5-40 mL IntraVENous PRN    acetaminophen (TYLENOL) tablet 650 mg  650 mg Oral Q6H PRN    Or    acetaminophen (TYLENOL) suppository 650 mg  650 mg Rectal Q6H PRN    bisacodyL (DULCOLAX) suppository 10 mg  10 mg Rectal DAILY PRN    promethazine (PHENERGAN) tablet 12.5 mg  12.5 mg Oral Q6H PRN    Or    ondansetron (ZOFRAN) injection 4 mg  4 mg IntraVENous Q6H PRN    HYDROmorphone (DILAUDID) syringe 0.5 mg  0.5 mg IntraVENous Q4H PRN    meropenem (MERREM) 500 mg in sterile water (preservative free) 10 mL IV syringe  0.5 g IntraVENous Q12H       Relevant other informations: Other medical conditions listed in [facility] problem list section; all of these and other pertinent data were taken into consideration when treatment plan is developed and customized to this patient's unique overall circumstances and needs. We have reviewed available old medical records within the constraints of this admission process. Data Review:   Recent Days:  All Micro Results     Procedure Component Value Units Date/Time    COVID-19 RAPID TEST [[id number]] Collected: 12/14/21 1312    Order Status: Completed Specimen: Nasopharyngeal Updated: 12/14/21 1342     Specimen source Nasopharyngeal        COVID-19 rapid test Not detected        Comment: Rapid Abbott ID Now       Rapid NAAT:  The specimen is NEGATIVE for SARS-CoV-2, the novel coronavirus associated with COVID-19. Negative results should be treated as presumptive and, if inconsistent with clinical signs and symptoms or necessary for patient management, should be tested with an alternative molecular assay. Negative results do not preclude SARS-CoV-2 infection and should not be used as the sole basis for patient management decisions. This test has been authorized by the FDA under an Emergency Use Authorization (EUA) for use by authorized laboratories. Fact sheet for Healthcare Providers: ConventionUpdate.co.nz  Fact sheet for Patients: ConventionUpdate.co.nz       Methodology: Isothermal Nucleic Acid Amplification               Recent Labs     12/18/21  0034 12/17/21  1426 12/17/21  0423 12/16/21  1825 12/16/21  0436   WBC 6.9  --   --   --  8.0   HGB 7.2* 6.0* 7.3* 7.7* 7.4*   HCT 22.1* 18.2* 22.5* 23.3* 23.0*   *  --   --   --  114*     Recent Labs     12/18/21  0034 12/16/21  0436    142   K 3.6 4.0   * 110*   CO2 22 21   * 88   BUN 67* 85*   CREA 4.31* 4.52*   CA 6.6* 7.2*   MG 1.8  --       No results found for: TSH, TSHEXT, TSHEXT         Care Plan discussed with:Patient/Family and Nurse   Other medical conditions are listed in the active hospital problem list section; these and other pertinent data were taken into consideration when the treatment plan was developed and customized to this patient's unique overall circumstances and needs. High complexity decision making was performed for this patient who is at high risk for decompensation with multiple organ involvement. Today total floor/unit time was 40 minutes while caring for this patient and greater than 50% of that time was spent with patient (and/or family) coordinating patients clinical issues; this includes time spent during multidisciplinary rounds.         Murray Campbell MD MPH FACP    12/18/2021

## 2021-12-18 NOTE — PROGRESS NOTES
Bedside and Verbal shift change report given to Alek García RN (oncoming nurse) by Nehal Mendoza RN (offgoing nurse). Report included the following information SBAR, Kardex, Procedure Summary, Intake/Output, MAR, Accordion, Recent Results and Med Rec Status.

## 2021-12-18 NOTE — ADVANCED PRACTICE NURSE
Bedside, Verbal and Written shift change report given to Osei Son RN (oncoming nurse) by Monico Sandifer (offgoing nurse). Report included the following information SBAR, Kardex, ED Summary, Procedure Summary, Intake/Output, MAR, Recent Results and Med Rec Status.

## 2021-12-18 NOTE — PROGRESS NOTES
Scr better with iv lasix. Continue on IV Lasix inpatient setting and change to oral Lasix 40 mg BID upon DC home.

## 2021-12-18 NOTE — PROGRESS NOTES
Problem: Mobility Impaired (Adult and Pediatric)  Goal: *Acute Goals and Plan of Care (Insert Text)  Description: FUNCTIONAL STATUS PRIOR TO ADMISSION: Patient was modified independent using a rolling walker for functional mobility. HOME SUPPORT PRIOR TO ADMISSION: The patient lived with wife but did not require assist.    Physical Therapy Goals  Initiated 12/15/2021  1. Patient will move from supine to sit and sit to supine  in bed with modified independence within 7 day(s). 2.  Patient will transfer from bed to chair and chair to bed with modified independence using the least restrictive device within 7 day(s). 3.  Patient will perform sit to stand with modified independence within 7 day(s). 4.  Patient will ambulate with supervision/set-up for 150 feet with the least restrictive device within 7 day(s). Outcome: Progressing Towards Goal  Note:   PHYSICAL THERAPY TREATMENT  Patient: Regina Patrick (75 y.o. male)  Date: 12/18/2021  Diagnosis: Acute GI bleeding [K92.2] Acute GI bleeding  Procedure(s) (LRB):  COLONOSCOPY (N/A) 1 Day Post-Op  Precautions: Fall  Chart, physical therapy assessment, plan of care and goals were reviewed. ASSESSMENT  Patient continues with skilled PT services and progressing towards goals. Pt received in bed, wife at bedside. Pt reports increased joint pain/ swelling after not receiving prednisone for several days. Difficult with BUE for bed mobility. Unable to come to standing at bedside with height of bed elevated. Min A for scooting toward [facility] and return to supine with bed placed in chair position. Pt required much more assistance today and unable to tolerate standing and transfer to chair. PT will continue to follow. Current Level of Function Impacting Discharge (mobility/balance): MaxA     Other factors to consider for discharge:          PLAN :  Patient continues to benefit from skilled intervention to address the above impairments.   Continue treatment per established plan of care. to address goals. Recommendation for discharge: (in order for the patient to meet his/her long term goals)  To be determined: pending progress    This discharge recommendation:  Has been made in collaboration with the attending provider and/or case management    IF patient discharges home will need the following DME: to be determined (TBD)       SUBJECTIVE:   Patient stated i am weak today.     OBJECTIVE DATA SUMMARY:   Critical Behavior:  Neurologic State: Alert  Orientation Level: Oriented X4  Cognition: Follows commands  Safety/Judgement: Awareness of environment,Fall prevention,Good awareness of safety precautions,Home safety,Insight into deficits  Functional Mobility Training:  Bed Mobility:     Supine to Sit: Minimum assistance  Sit to Supine: Moderate assistance  Scooting: Moderate assistance        Transfers:                                   Balance:  Sitting: Intact; High guard  Ambulation/Gait Training:                                                        Stairs: Therapeutic Exercises:     Pain Ratin/10    Activity Tolerance:   Fair    After treatment patient left in no apparent distress:   Supine in bed, Call bell within reach, and Caregiver / family present    COMMUNICATION/COLLABORATION:   The patients plan of care was discussed with: Registered nurseCortney Aguayo   Time Calculation: 30 mins

## 2021-12-18 NOTE — PROGRESS NOTES
Problem: Risk for Spread of Infection  Goal: Prevent transmission of infectious organism to others  Description: Prevent the transmission of infectious organisms to other patients, staff members, and visitors. Outcome: Progressing Towards Goal     Problem: Falls - Risk of  Goal: *Absence of Falls  Description: Document Angela Dimas Fall Risk and appropriate interventions in the flowsheet.   Outcome: Progressing Towards Goal  Note: Fall Risk Interventions:  Mobility Interventions: Bed/chair exit alarm,Patient to call before getting OOB,Utilize gait belt for transfers/ambulation,Utilize walker, cane, or other assistive device         Medication Interventions: Teach patient to arise slowly,Patient to call before getting OOB    Elimination Interventions: Call light in reach,Stay With Me (per policy),Patient to call for help with toileting needs,Toilet paper/wipes in reach

## 2021-12-19 LAB
ANION GAP SERPL CALC-SCNC: 10 MMOL/L (ref 5–15)
BUN SERPL-MCNC: 67 MG/DL (ref 6–20)
BUN/CREAT SERPL: 14 (ref 12–20)
CALCIUM SERPL-MCNC: 6.8 MG/DL (ref 8.5–10.1)
CHLORIDE SERPL-SCNC: 110 MMOL/L (ref 97–108)
CO2 SERPL-SCNC: 22 MMOL/L (ref 21–32)
CREAT SERPL-MCNC: 4.67 MG/DL (ref 0.7–1.3)
GLUCOSE SERPL-MCNC: 152 MG/DL (ref 65–100)
POTASSIUM SERPL-SCNC: 3.9 MMOL/L (ref 3.5–5.1)
SODIUM SERPL-SCNC: 142 MMOL/L (ref 136–145)

## 2021-12-19 PROCEDURE — 74011250636 HC RX REV CODE- 250/636: Performed by: INTERNAL MEDICINE

## 2021-12-19 PROCEDURE — 74011000250 HC RX REV CODE- 250: Performed by: INTERNAL MEDICINE

## 2021-12-19 PROCEDURE — 74011250637 HC RX REV CODE- 250/637: Performed by: INTERNAL MEDICINE

## 2021-12-19 PROCEDURE — 74011250637 HC RX REV CODE- 250/637: Performed by: HOSPITALIST

## 2021-12-19 PROCEDURE — 36415 COLL VENOUS BLD VENIPUNCTURE: CPT

## 2021-12-19 PROCEDURE — 80048 BASIC METABOLIC PNL TOTAL CA: CPT

## 2021-12-19 PROCEDURE — 65660000000 HC RM CCU STEPDOWN

## 2021-12-19 PROCEDURE — 74011636637 HC RX REV CODE- 636/637: Performed by: INTERNAL MEDICINE

## 2021-12-19 RX ADMIN — CALCITRIOL CAPSULES 0.25 MCG 0.25 MCG: 0.25 CAPSULE ORAL at 08:42

## 2021-12-19 RX ADMIN — PANTOPRAZOLE SODIUM 40 MG: 40 TABLET, DELAYED RELEASE ORAL at 08:42

## 2021-12-19 RX ADMIN — SODIUM BICARBONATE 650 MG: 650 TABLET ORAL at 08:42

## 2021-12-19 RX ADMIN — PREDNISONE 40 MG: 20 TABLET ORAL at 08:41

## 2021-12-19 RX ADMIN — MEROPENEM 500 MG: 500 INJECTION, POWDER, FOR SOLUTION INTRAVENOUS at 04:51

## 2021-12-19 RX ADMIN — MEROPENEM 500 MG: 500 INJECTION, POWDER, FOR SOLUTION INTRAVENOUS at 17:52

## 2021-12-19 RX ADMIN — FLUTICASONE PROPIONATE 2 SPRAY: 50 SPRAY, METERED NASAL at 08:59

## 2021-12-19 RX ADMIN — SODIUM BICARBONATE 650 MG: 650 TABLET ORAL at 21:31

## 2021-12-19 RX ADMIN — Medication 10 ML: at 04:53

## 2021-12-19 RX ADMIN — Medication 10 ML: at 17:52

## 2021-12-19 RX ADMIN — SODIUM BICARBONATE 650 MG: 650 TABLET ORAL at 17:51

## 2021-12-19 RX ADMIN — IRON SUCROSE 200 MG: 20 INJECTION, SOLUTION INTRAVENOUS at 08:45

## 2021-12-19 RX ADMIN — CARVEDILOL 12.5 MG: 12.5 TABLET, FILM COATED ORAL at 08:42

## 2021-12-19 RX ADMIN — CLONIDINE HYDROCHLORIDE 0.1 MG: 0.1 TABLET ORAL at 08:42

## 2021-12-19 RX ADMIN — Medication 10 ML: at 21:31

## 2021-12-19 NOTE — PROGRESS NOTES
Progress Note      Pt Name  [address] [facility]   Date of Birth 1950   Medical Record Number  [id number]      Age  78 y.o. PCP Leonidas Ariza MD   Admit date:  12/14/2021    Room Number  36/36  @ [address] [facility]   Date of Service  12/19/2021     Admission Diagnoses:  Acute GI bleeding     Admission Summary:  \" The patient is a 79 yo hx of HTN, chronic afib, pulm HTN, CKD 5, recent polypectomy, presented w/ rectal bleeding, blood loss anemia. The patient was admitted last week for a Klebsiella pneumonia/bacteremia and an ESBL UTI. He was found to be anemic. Colonoscopy and polypectomy was performed. After discharge, the patient was told to hold Eliquis for 2 days. Since he resumed Eliquis this Sunday, he began to have rectal bleeding. Denied abd pain, chest pain, SOB, nausea, vomiting. In the ED, Hgb was 7.5 (last Hgb 9.3 six days ago). GI told him to come to the ED. \"     Assessment and plan:     Discharge plan: 12/20/21 to home with [facility]. Increase activity today. Continue steroid (empiric) as is - pt will need to see rheumatologist as OP  Will need nephrologist follow up -his long time nephrologist retired. (Dr. Aman Bailey). I have asked the pt and family to look into Watchman device and consult with cardiologist.     Acute GI bleed   Acute blood loss anemia  Anemia of CKD stage V    Possibly due to recent polypectomy and resumption of Eliquis. Repeat colonoscopy on 12/14/21 showed post polypectomy site in the ascending colon was bleeding; Endo Clip was placed  Follow up colonoscopy on 12/17/21 showed no further bleed, diverticulosis. · Holding all anticoagulation at this time   · Monitor Hgb closely.    · No anticoagulation for 10 days per GI recommendation      DRAKE on CKD stage 4-5 -followed by Dr. Aman Bailey at [facility]   Close monitoring of lytes -  Lasix switched to IV due to  Anasarca   Appreciate guidance from nephrologist      Recent ESBL E. coli UTI & Klebsiella Bacteremia   Cont Marbella Russell through PICC till 12/22. Cont IV meropenem while in house      Chronic afib   Rate controlled   Off of anticoagulation due to GI bleed   Is he a candidate for Watchman device ? Continue Coreg with hold parameters      HTN  Cont coreg   Clonidine was placed on hold due to reported orthostatic hypotension   We will resume Clonidine at half dose and monitor his BP      Pulm HTN   Followed by SANDY Bee at [facility] pulmonologist team      Generalized edema/anasarca-  Continue Lasix per nephrologist recommendation      Debility/deconditioning   Continue PT OT   Might need home PT OT at [place]     Gout   Rheumatism   Chronic prednisone use   Pt reported taking Prednisone for gout flare-ups. We will give him his home dose during these flare-ups 40mg daily for short period   He will need to follow up with Rheumatologist           CODE STATUS   Full     Functional Status  Pt is  and lives with his wife in [place]     Surrogate decision maker:  Pt's wife      Prophylaxis   SCD's   Discharge Plan:  Jay Malone PT, OT, RN,   12/20/21 AM with resumption of home health for completion of Abx    Misc   Benefit:  [place]or: [place] / Plan: [place] MED[place]ARE PART A & B / Product Type: Medicare /    Isolation :  Contact   ADT status:  INPATIENT      Query   None noted today    Prognosis   . fair    Social issues  Date  Comment     12/18/21  2:09 PM - I met with pt's wife, son in the room  We discussed Past Medical history, his worsening renal functions. Then we talked about his need for steroid use and tried to understand etiology behind use of this. We talked about risk of Addisonian crisis and asked them to remind doctors in future urgent/emergent situations. We then talked about pathophysiology of anemia, especially anemia of CKD   We talked about plan of care after discharge under guidance of his PCP    12/19/21 . 10:40 AM   Met with pt's wife in the room.    We talked about plan of care and discharge possibly tomorrow Subjective Data     \"I feel a little better  \"  Review of Systems - History obtained from the patient  Respiratory ROS: no cough, shortness of breath, or wheezing  Cardiovascular ROS: no chest pain or dyspnea on exertion  Gastrointestinal ROS: no abdominal pain, change in bowel habits, or black or bloody stools  Musculoskeletal ROS: positive for - joint pain, joint stiffness, joint swelling and muscular weakness      Objective Data       Comments  Pleasant gentleman lying in bed in no distress   His wife in in the room. Patient Vitals for the past 24 hrs:   BP   12/19/21 0330 114/76   12/18/21 2315 99/76   12/18/21 1901 126/79   12/18/21 1458 122/71   12/18/21 1145 129/85      Patient Vitals for the past 24 hrs:   Pulse   12/19/21 0700 98   12/19/21 0330 83   12/18/21 2354 (!) 104   12/18/21 2315 98   12/18/21 1901 (!) 104   12/18/21 1500 (!) 101   12/18/21 1458 96   12/18/21 1145 98      Patient Vitals for the past 24 hrs:   Resp   12/19/21 0330 16   12/18/21 2315 18   12/18/21 1901 20   12/18/21 1458 19   12/18/21 1145 18      Patient Vitals for the past 24 hrs:   Temp   12/19/21 0330 97.4 °F (36.3 °C)   12/18/21 2315 98 °F (36.7 °C)   12/18/21 1901 98.7 °F (37.1 °C)   12/18/21 1522 99.5 °F (37.5 °C)   12/18/21 1458 100 °F (37.8 °C)   12/18/21 1145 99.8 °F (37.7 °C)        SpO2 Readings from Last 6 Encounters:   12/19/21 98%   12/10/21 99%   10/31/21 98%   01/07/18 94%          O2 Device: None (Room air) Body mass index is 34.44 kg/m².  -  Wt Readings from Last 10 Encounters:   12/14/21 108.9 kg (240 lb)   12/10/21 112.3 kg (247 lb 9.2 oz)   10/24/21 113.3 kg (249 lb 12.5 oz)   01/07/18 120.2 kg (265 lb)        Intake/Output Summary (Last 24 hours) at 12/19/2021 0930  Last data filed at 12/19/2021 0334  Gross per 24 hour   Intake 240 ml   Output 1220 ml   Net -980 ml         Physical Exam:             General:  Alert, cooperative,   well noursished,   well developed,   appears stated age    Ears/Eyes: Hearing intact  Sclera anicteric. Pupils equal   Mouth/Throat:  Mucous membranes normal pink and moist     Neck:     Lungs:  Chest excursion symmetrical   Auscultation B/L Symmetrical with   Vesicular breath sounds     No Crepitations noted          CVS:  IRRegular rate and rhythm   no  murmur,   No click, rub or gallop  S1 normal   S2 normal   Pedal pulses  b/l symmetrical   Abdomen:  Obese  Soft, non-tender  Bowel sounds normal  No distension   Percussion note tympanitic   Extremities:  Chronic stasis dermatitis noted on both legs and shins -some improvement in swelling on the LEFT hand, Right leg. RIGHT hand still swollen but improved   No cyanosis, jaundice  2+  edema noted on both legs   Both hands are swollen and tender - reminiscent of non-specific arthritis combined with anasarca   .     Skin:     Lymph nodes:     Musculoskeletal Muscle bulk B/L symmetrical   Neuro Cranial nerves are intact,   motor movement b/l symmetrical,   Sensory evaluation b/l symmetrical    Psych:  Alert and oriented,   normal mood & affect          Medications reviewed     Current Facility-Administered Medications   Medication Dose Route Frequency    hydrALAZINE (APRESOLINE) 20 mg/mL injection 10 mg  10 mg IntraVENous Q6H PRN    cloNIDine HCL (CATAPRES) tablet 0.1 mg  0.1 mg Oral BID    0.9% sodium chloride infusion  125 mL/hr IntraVENous PRN    predniSONE (DELTASONE) tablet 40 mg  40 mg Oral DAILY    furosemide (LASIX) injection 40 mg  40 mg IntraVENous BID    iron sucrose (VENOFER) injection 200 mg  200 mg IntraVENous DAILY    epoetin sylvie-epbx (RETACRIT) injection 20,000 Units  20,000 Units SubCUTAneous Q7D    NIFEdipine ER (PROCARDIA XL) tablet 30 mg  30 mg Oral DAILY AFTER BREAKFAST    pantoprazole (PROTONIX) tablet 40 mg  40 mg Oral ACB    calcitRIOL (ROCALTROL) capsule 0.25 mcg  0.25 mcg Oral DAILY    carvediloL (COREG) tablet 12.5 mg  12.5 mg Oral BID WITH MEALS    fluticasone propionate (FLONASE) 50 mcg/actuation nasal spray 2 Spray  2 Spray Both Nostrils DAILY    sodium bicarbonate tablet 650 mg  650 mg Oral TID    sodium chloride (NS) flush 5-40 mL  5-40 mL IntraVENous Q8H    sodium chloride (NS) flush 5-40 mL  5-40 mL IntraVENous PRN    acetaminophen (TYLENOL) tablet 650 mg  650 mg Oral Q6H PRN    Or    acetaminophen (TYLENOL) suppository 650 mg  650 mg Rectal Q6H PRN    bisacodyL (DULCOLAX) suppository 10 mg  10 mg Rectal DAILY PRN    promethazine (PHENERGAN) tablet 12.5 mg  12.5 mg Oral Q6H PRN    Or    ondansetron (ZOFRAN) injection 4 mg  4 mg IntraVENous Q6H PRN    HYDROmorphone (DILAUDID) syringe 0.5 mg  0.5 mg IntraVENous Q4H PRN    meropenem (MERREM) 500 mg in sterile water (preservative free) 10 mL IV syringe  0.5 g IntraVENous Q12H       Relevant other informations: Other medical conditions listed in [facility] problem list section; all of these and other pertinent data were taken into consideration when treatment plan is developed and customized to this patient's unique overall circumstances and needs. We have reviewed available old medical records within the constraints of this admission process. Data Review:   Recent Days:  All Micro Results     Procedure Component Value Units Date/Time    COVID-19 RAPID TEST [[id number]] Collected: 12/14/21 1312    Order Status: Completed Specimen: Nasopharyngeal Updated: 12/14/21 1342     Specimen source Nasopharyngeal        COVID-19 rapid test Not detected        Comment: Rapid Abbott ID Now       Rapid NAAT:  The specimen is NEGATIVE for SARS-CoV-2, the novel coronavirus associated with COVID-19. Negative results should be treated as presumptive and, if inconsistent with clinical signs and symptoms or necessary for patient management, should be tested with an alternative molecular assay. Negative results do not preclude SARS-CoV-2 infection and should not be used as the sole basis for patient management decisions.        This test has been authorized by the FDA under an Emergency Use Authorization (EUA) for use by authorized laboratories. Fact sheet for Healthcare Providers: ConventionUpdate.co.nz  Fact sheet for Patients: ConventionUpdate.co.nz       Methodology: Isothermal Nucleic Acid Amplification               Recent Labs     12/18/21  0034 12/17/21  1426 12/17/21  0423 12/16/21  1825   WBC 6.9  --   --   --    HGB 7.2* 6.0* 7.3* 7.7*   HCT 22.1* 18.2* 22.5* 23.3*   *  --   --   --      Recent Labs     12/19/21  0457 12/18/21  0034    145   K 3.9 3.6   * 111*   CO2 22 22   * 117*   BUN 67* 67*   CREA 4.67* 4.31*   CA 6.8* 6.6*   MG  --  1.8      No results found for: TSH, TSHEXT, TSHEXT         Care Plan discussed with:Patient/Family and Nurse   Other medical conditions are listed in the active hospital problem list section; these and other pertinent data were taken into consideration when the treatment plan was developed and customized to this patient's unique overall circumstances and needs. High complexity decision making was performed for this patient who is at high risk for decompensation with multiple organ involvement. Today total floor/unit time was 40 minutes while caring for this patient and greater than 50% of that time was spent with patient (and/or family) coordinating patients clinical issues; this includes time spent during multidisciplinary rounds.         Severo Seltzer MD MPH FACP    12/19/2021

## 2021-12-19 NOTE — PROGRESS NOTES
Problem: Risk for Spread of Infection  Goal: Prevent transmission of infectious organism to others  Description: Prevent the transmission of infectious organisms to other patients, staff members, and visitors. Outcome: Progressing Towards Goal     Problem: Falls - Risk of  Goal: *Absence of Falls  Description: Document Amalia Ground Fall Risk and appropriate interventions in the flowsheet.   Outcome: Progressing Towards Goal  Note: Fall Risk Interventions:  Mobility Interventions: Bed/chair exit alarm,Patient to call before getting OOB,Strengthening exercises (ROM-active/passive)         Medication Interventions: Patient to call before getting OOB,Evaluate medications/consider consulting pharmacy    Elimination Interventions: Call light in reach,Patient to call for help with toileting needs

## 2021-12-19 NOTE — PROGRESS NOTES
Bedside shift change report given to Sy[address] (oncoming nurse) by Colin Mccray (offgoing nurse). Report included the following information SBAR, Kardex, MAR, Recent Results and Med Rec Status.

## 2021-12-20 ENCOUNTER — TELEPHONE (OUTPATIENT)
Dept: FAMILY MEDICINE CLINIC | Age: 71
End: 2021-12-20

## 2021-12-20 LAB
ANION GAP SERPL CALC-SCNC: 11 MMOL/L (ref 5–15)
BASOPHILS # BLD: 0 K/UL (ref 0–0.1)
BASOPHILS NFR BLD: 0 % (ref 0–1)
BUN SERPL-MCNC: 70 MG/DL (ref 6–20)
BUN/CREAT SERPL: 14 (ref 12–20)
CALCIUM SERPL-MCNC: 6.8 MG/DL (ref 8.5–10.1)
CHLORIDE SERPL-SCNC: 109 MMOL/L (ref 97–108)
CO2 SERPL-SCNC: 21 MMOL/L (ref 21–32)
CREAT SERPL-MCNC: 5.12 MG/DL (ref 0.7–1.3)
DIFFERENTIAL METHOD BLD: ABNORMAL
EOSINOPHIL # BLD: 0 K/UL (ref 0–0.4)
EOSINOPHIL NFR BLD: 0 % (ref 0–7)
ERYTHROCYTE [DISTWIDTH] IN BLOOD BY AUTOMATED COUNT: 17.5 % (ref 11.5–14.5)
GLUCOSE SERPL-MCNC: 116 MG/DL (ref 65–100)
HCT VFR BLD AUTO: 21.1 % (ref 36.6–50.3)
HGB BLD-MCNC: 6.7 G/DL (ref 12.1–17)
IMM GRANULOCYTES # BLD AUTO: 0 K/UL
IMM GRANULOCYTES NFR BLD AUTO: 0 %
LYMPHOCYTES # BLD: 0.2 K/UL (ref 0.8–3.5)
LYMPHOCYTES NFR BLD: 3 % (ref 12–49)
MCH RBC QN AUTO: 29 PG (ref 26–34)
MCHC RBC AUTO-ENTMCNC: 31.8 G/DL (ref 30–36.5)
MCV RBC AUTO: 91.3 FL (ref 80–99)
METAMYELOCYTES NFR BLD MANUAL: 2 %
MONOCYTES # BLD: 0.2 K/UL (ref 0–1)
MONOCYTES NFR BLD: 3 % (ref 5–13)
NEUTS BAND NFR BLD MANUAL: 2 % (ref 0–6)
NEUTS SEG # BLD: 7.1 K/UL (ref 1.8–8)
NEUTS SEG NFR BLD: 90 % (ref 32–75)
NRBC # BLD: 0.02 K/UL (ref 0–0.01)
NRBC BLD-RTO: 0.3 PER 100 WBC
PLATELET # BLD AUTO: 148 K/UL (ref 150–400)
PMV BLD AUTO: 12.4 FL (ref 8.9–12.9)
POTASSIUM SERPL-SCNC: 4.2 MMOL/L (ref 3.5–5.1)
RBC # BLD AUTO: 2.31 M/UL (ref 4.1–5.7)
RBC MORPH BLD: ABNORMAL
SODIUM SERPL-SCNC: 141 MMOL/L (ref 136–145)
WBC # BLD AUTO: 7.7 K/UL (ref 4.1–11.1)

## 2021-12-20 PROCEDURE — 74011250636 HC RX REV CODE- 250/636: Performed by: INTERNAL MEDICINE

## 2021-12-20 PROCEDURE — 80048 BASIC METABOLIC PNL TOTAL CA: CPT

## 2021-12-20 PROCEDURE — 74011000250 HC RX REV CODE- 250: Performed by: INTERNAL MEDICINE

## 2021-12-20 PROCEDURE — 74011250637 HC RX REV CODE- 250/637: Performed by: INTERNAL MEDICINE

## 2021-12-20 PROCEDURE — 97110 THERAPEUTIC EXERCISES: CPT | Performed by: OCCUPATIONAL THERAPIST

## 2021-12-20 PROCEDURE — 97535 SELF CARE MNGMENT TRAINING: CPT | Performed by: OCCUPATIONAL THERAPIST

## 2021-12-20 PROCEDURE — 85025 COMPLETE CBC W/AUTO DIFF WBC: CPT

## 2021-12-20 PROCEDURE — 74011636637 HC RX REV CODE- 636/637: Performed by: INTERNAL MEDICINE

## 2021-12-20 PROCEDURE — 97116 GAIT TRAINING THERAPY: CPT

## 2021-12-20 PROCEDURE — 74011250637 HC RX REV CODE- 250/637: Performed by: HOSPITALIST

## 2021-12-20 PROCEDURE — 97530 THERAPEUTIC ACTIVITIES: CPT | Performed by: OCCUPATIONAL THERAPIST

## 2021-12-20 PROCEDURE — 65660000000 HC RM CCU STEPDOWN

## 2021-12-20 PROCEDURE — 97110 THERAPEUTIC EXERCISES: CPT

## 2021-12-20 PROCEDURE — 36415 COLL VENOUS BLD VENIPUNCTURE: CPT

## 2021-12-20 RX ADMIN — MEROPENEM 500 MG: 500 INJECTION, POWDER, FOR SOLUTION INTRAVENOUS at 17:14

## 2021-12-20 RX ADMIN — Medication 10 ML: at 04:57

## 2021-12-20 RX ADMIN — CARVEDILOL 12.5 MG: 12.5 TABLET, FILM COATED ORAL at 17:13

## 2021-12-20 RX ADMIN — CALCITRIOL CAPSULES 0.25 MCG 0.25 MCG: 0.25 CAPSULE ORAL at 08:30

## 2021-12-20 RX ADMIN — FUROSEMIDE 40 MG: 10 INJECTION INTRAMUSCULAR; INTRAVENOUS at 08:28

## 2021-12-20 RX ADMIN — Medication 10 ML: at 13:29

## 2021-12-20 RX ADMIN — Medication 10 ML: at 04:56

## 2021-12-20 RX ADMIN — SODIUM BICARBONATE 650 MG: 650 TABLET ORAL at 08:30

## 2021-12-20 RX ADMIN — FLUTICASONE PROPIONATE 2 SPRAY: 50 SPRAY, METERED NASAL at 08:30

## 2021-12-20 RX ADMIN — MEROPENEM 500 MG: 500 INJECTION, POWDER, FOR SOLUTION INTRAVENOUS at 04:56

## 2021-12-20 RX ADMIN — SODIUM BICARBONATE 650 MG: 650 TABLET ORAL at 17:13

## 2021-12-20 RX ADMIN — PANTOPRAZOLE SODIUM 40 MG: 40 TABLET, DELAYED RELEASE ORAL at 08:30

## 2021-12-20 RX ADMIN — PREDNISONE 40 MG: 20 TABLET ORAL at 08:30

## 2021-12-20 RX ADMIN — CLONIDINE HYDROCHLORIDE 0.1 MG: 0.1 TABLET ORAL at 17:13

## 2021-12-20 RX ADMIN — Medication 10 ML: at 22:16

## 2021-12-20 RX ADMIN — SODIUM BICARBONATE 650 MG: 650 TABLET ORAL at 21:51

## 2021-12-20 RX ADMIN — IRON SUCROSE 200 MG: 20 INJECTION, SOLUTION INTRAVENOUS at 08:28

## 2021-12-20 NOTE — PROGRESS NOTES
Problem: Risk for Spread of Infection  Goal: Prevent transmission of infectious organism to others  Description: Prevent the transmission of infectious organisms to other patients, staff members, and visitors. Outcome: Progressing Towards Goal     Problem: Falls - Risk of  Goal: *Absence of Falls  Description: Document Angela Dimas Fall Risk and appropriate interventions in the flowsheet. Outcome: Progressing Towards Goal  Note: Fall Risk Interventions:  Mobility Interventions: Bed/chair exit alarm,Communicate number of staff needed for ambulation/transfer         Medication Interventions: Teach patient to arise slowly,Patient to call before getting OOB    Elimination Interventions: Urinal in reach,Call light in reach    History of Falls Interventions:  Investigate reason for fall

## 2021-12-20 NOTE — PROGRESS NOTES
12/20/2021  Case Management Progress Note    11:14 AM  Patient is 78year old male admitted 12/14 with acute GI bleeding  Patient's RUR is 25% red/high risk for readmission  Covid test: negative 12/14  Chart reviewed--patient discussed at IDR rounds  Per chart patient may be ready for discharge today. I have already sent [facility] resumption orders, and [facility] Leander has a resumption referral as well--we will just have to call them when patient gets a discharge order. No other noted needs at this time, will continue to follow and assist with discharge planning. Transition of Care Plan   1. Continue medical management/treatment  2. Home with family and [facility] when ready   3. Collette Ground on board to finish IV antibiotics   4. Family will transport at discharge   5.  CM will continue to follow    MICKY Arteaga

## 2021-12-20 NOTE — TELEPHONE ENCOUNTER
Please call Fatoumata Tran from [address] [place] [address] to confirm end of therapy for pt of 12/22/21 at [phone].  Ldm

## 2021-12-20 NOTE — PROGRESS NOTES
Nutrition Assessment     Type and Reason for Visit: Initial,RD nutrition re-screen/LOS    Nutrition Recommendations/Plan:   1. Continue Regular, Cardiac diet   2. Please obtain measured weight. Nutrition Assessment:    Pt is a 78year old male admitted with Acute GI bleeding [K92.2]. He  has a past medical history of Atrial fibrillation ([facility] Cervical disc disorder, Chronic atrial fibrillation ([facility] ESRD (end stage renal disease) on dialysis ([facility] Hypertension, VIK on CPAP, and Pulmonary hypertension ([facility] Patient familiar to this RD. PO intake averaging % of all meals. Patient states -250#, denies recent weight or appetite changes. Most recent weight is stated. No N/V/D at this time. NKFA. No chewing/swallowing problems. Declines Nepro at this time despite having it in the past and enjoying it. Wt Readings from Last 10 Encounters:   12/14/21 108.9 kg (240 lb)   12/10/21 112.3 kg (247 lb 9.2 oz)   10/24/21 113.3 kg (249 lb 12.5 oz)   01/07/18 120.2 kg (265 lb)     Last 3 Recorded Weights in this Encounter    12/14/21 0402   Weight: 108.9 kg (240 lb)       Malnutrition Assessment:  Malnutrition Status:  Mild malnutrition    Context:  Chronic illness     Findings of the 6 clinical characteristics of malnutrition:   Energy Intake:  No significant decrease in energy intake  Weight Loss:  No significant weight loss     Body Fat Loss:  No significant body fat loss,     Muscle Mass Loss:  No significant muscle mass loss,    Fluid Accumulation:  7 - Severe, Extremities   Strength:  Not performed     Estimated Daily Nutrient Needs:  Energy (kcal):  8644-2218 (25-30)  Protein (g):  108-129 (1.0-1.2)       Fluid (ml/day):  1500 or per MD    Nutrition Related Findings:    ABD: WNL with active bowel sounds and fair appetite 12/20  Last BM: 12/19  Edema: +2 generalized; non-pitting LUE; +2 LLE and RUE; +3 RLE noted 12/20    Nutr.  Labs:  Lab Results   Component Value Date/Time GFR est AA 14 (L) 12/20/2021 05:03 AM    GFR est non-AA 11 (L) 12/20/2021 05:03 AM    Creatinine 5.12 (H) 12/20/2021 05:03 AM    BUN 70 (H) 12/20/2021 05:03 AM    Sodium 141 12/20/2021 05:03 AM    Potassium 4.2 12/20/2021 05:03 AM    Chloride 109 (H) 12/20/2021 05:03 AM    CO2 21 12/20/2021 05:03 AM     Lab Results   Component Value Date/Time    Glucose 116 (H) 12/20/2021 05:03 AM    Glucose (POC) 110 10/29/2021 09:15 PM     Lab Results   Component Value Date/Time    Hemoglobin A1c 5.7 (H) 12/15/2021 03:34 AM       Nutr. Meds:  Dulcolax, Coreg, Catapres, Retacrit, Merrem, Protonix, Deltasone, NaHCO3    Current Nutrition Therapies:  ADULT DIET Regular; Low Fat/Low Chol/High Fiber/2 gm Na    Anthropometric Measures:  · Height:  5' 10\" (177.8 cm)  · Current Body Wt:  108.9 kg (240 lb)  · BMI: 34.4    Nutrition Diagnosis:   · Mild malnutrition related to renal dysfunction as evidenced by localized or generalized fluid accumulation,dialysis    Nutrition Intervention:  Food and/or Nutrient Delivery: Continue current diet  Nutrition Education and Counseling: Survival skills/brief education completed  Coordination of Nutrition Care: Continue to monitor while inpatient,Interdisciplinary rounds    Goals:  PO intake >/= 75% of all meals and supplements within 2-3 days       Nutrition Monitoring and Evaluation:   Behavioral-Environmental Outcomes: None identified  Food/Nutrient Intake Outcomes: Food and nutrient intake  Physical Signs/Symptoms Outcomes: Biochemical data,Weight    Discharge Planning:     Too soon to determine     Electronically signed by Genna Mulligan RD on 41/15/4940  Contact Number: [phone] or via [employer]

## 2021-12-20 NOTE — PROGRESS NOTES
Problem: Mobility Impaired (Adult and Pediatric)  Goal: *Acute Goals and Plan of Care (Insert Text)  Description: FUNCTIONAL STATUS PRIOR TO ADMISSION: Patient was modified independent using a rolling walker for functional mobility. HOME SUPPORT PRIOR TO ADMISSION: The patient lived with wife but did not require assist.    Physical Therapy Goals  Initiated 12/15/2021  1. Patient will move from supine to sit and sit to supine  in bed with modified independence within 7 day(s). 2.  Patient will transfer from bed to chair and chair to bed with modified independence using the least restrictive device within 7 day(s). 3.  Patient will perform sit to stand with modified independence within 7 day(s). 4.  Patient will ambulate with supervision/set-up for 150 feet with the least restrictive device within 7 day(s). Outcome: Progressing Towards Goal   PHYSICAL THERAPY TREATMENT  Patient: Seamus Escamilla (59 y.o. male)  Date: 12/20/2021  Diagnosis: Acute GI bleeding [K92.2] Acute GI bleeding  Procedure(s) (LRB):  COLONOSCOPY (N/A) 3 Days Post-Op  Precautions: Fall  Chart, physical therapy assessment, plan of care and goals were reviewed. ASSESSMENT  Patient continues with skilled PT services and is progressing towards goals. Patient gets SOB with activity but O2 sats stable at 98% on room air. HR fluctuates ranging from  with activity. BP stable (see doc flow sheets). Hgb is 6.7 today but patient reports no dizziness when up. Patient will need HHPT; he has all DME. Current Level of Function Impacting Discharge (mobility/balance): Received seated in chair; ambulated 40 feet with rolling walker and CGA in room. Performed standing and seated BLE exercises with supervision. Pursed lip breathing when SOB. Other factors to consider for discharge: none         PLAN :  Patient continues to benefit from skilled intervention to address the above impairments.   Continue treatment per established plan of care. to address goals. Recommendation for discharge: (in order for the patient to meet his/her long term goals)  Physical therapy at least 2 days/week in the home     This discharge recommendation:  Has been made in collaboration with the attending provider and/or case management    IF patient discharges home will need the following DME: patient owns DME required for discharge       SUBJECTIVE:   Patient stated Chuyita Mueller put me back on my steroids so now I can function better.     OBJECTIVE DATA SUMMARY:   Critical Behavior:  Neurologic State: Alert,Eyes open spontaneously  Orientation Level: Oriented X4  Cognition: Follows commands  Safety/Judgement: Awareness of environment,Fall prevention,Home safety,Insight into deficits  Functional Mobility Training:  Bed Mobility:     Supine to Sit: Supervision              Transfers:  Sit to Stand: Supervision  Stand to Sit: Supervision        Bed to Chair: Stand-by assistance                    Balance:  Sitting: Intact  Standing: Intact; With support  Standing - Static: Constant support;Good  Ambulation/Gait Training:  Distance (ft): 40 Feet (ft)  Assistive Device: Gait belt;Walker, rolling  Ambulation - Level of Assistance: Contact guard assistance                                                             Therapeutic Exercises: Ankle pumps, sitting LAQ and hip flexion and abduction; standing marching   Pain Rating:  None at this time    Activity Tolerance:   Good, requires rest breaks, and observed SOB with activity    After treatment patient left in no apparent distress:   Sitting in chair, Call bell within reach, and Caregiver / family present    COMMUNICATION/COLLABORATION:   The patients plan of care was discussed with: Registered nurse and Case management.      Neida Curling, PT   Time Calculation: 23 mins

## 2021-12-20 NOTE — PROGRESS NOTES
.    [address] [facility] Adult  Hospitalist Group                                                                                          Hospitalist Progress Note  Deonte Duenas MD  Answering service: [phone] OR [phone] from in house phone        Date of Service:  2021  NAME:  Roque eRbollar  :  1950  MRN:  [id number]      Admission Summary:   \" The patient is a 79 yo hx of HTN, chronic afib, pulm HTN, CKD 5, recent polypectomy, presented w/ rectal bleeding, blood loss anemia.  The patient was admitted last week for a Klebsiella pneumonia/bacteremia and an ESBL UTI.  He was found to be anemic.  Colonoscopy and polypectomy was performed.  After discharge, the patient was told to hold Eliquis for 2 days.  Since he resumed Eliquis this , he began to have rectal bleeding.   Denied abd pain, chest pain, SOB, nausea, vomiting.  In the ED, Hgb was 7.5 (last Hgb 9.3 six days ago). Bernardine Dakins told him to come to the ED.   \"    Interval history / Subjective:    2021. No acute events overnight. Saw patient is morning during rounds, awake alert and oriented, by his wife, denies any fever, chills, nausea patient waiting to be evaluated by PT, patient was planned to be discharged home tomorrow but unfortunately his hemoglobin has dropped to 6.7 with no apparent. Possible discharge home tomorrow. Assessment & Plan:     Acute GI bleed   Acute blood loss anemia  Anemia of CKD stage V    Possibly due to recent polypectomy and resumption of Eliquis. Repeat colonoscopy on 21 showed post polypectomy site in the ascending colon was bleeding; Endo Clip was placed  Follow up colonoscopy on 21 showed no further bleed, diverticulosis.      · Holding all anticoagulation at this time   · Monitor Hgb closely.    · No anticoagulation for 10 days per GI recommendation   · Hemoglobin 6.7 today, but no apparent sign of bleeding, discussed possible transfusion with patient however he would like to hold off transfusion as he is considering kidney transplantation in the future. Monitor H&H closely, if symptomatic will transfuse, otherwise will monitor closely and repeat hemoglobin tomorrow.     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 Angie Kong at [facility]   Close monitoring of lytes -  Lasix switched to IV due to  Anasarca   Appreciate guidance from nephrologist      Recent ESBL E. coli UTI & Klebsiella Bacteremia   Cont Invanz through PICC till 12/22.    Cont IV meropenem while in house      Chronic afib   Rate controlled   Off of anticoagulation due to GI bleed   Is he a candidate for Watchman device ? Continue Coreg with hold parameters      HTN  Cont coreg   Clonidine was placed on hold due to reported orthostatic hypotension   We will resume Clonidine at half dose and monitor his BP      Pulm HTN   Followed by SANDY Bee at [facility] pulmonologist team      Generalized edema/anasarca-  Continue Lasix per nephrologist recommendation      Debility/deconditioning   Continue PT OT   Might need home PT OT at [place]     Gout   Rheumatism   Chronic prednisone use   Pt reported taking Prednisone for gout flare-ups.    We will give him his home dose during these flare-ups 40mg daily for short period   He will need to follow up with Kyree Correa discussed with: Patient/Family  Anticipated Disposition: Home w/Family  Anticipated Discharge: Less than 24 hours     Hospital Problems  Date Reviewed: 12/14/2021          Codes Class Noted POA    Current chronic use of systemic steroids (Chronic) ICD-10-CM: Z79.52  ICD-9-CM: V58.65  12/18/2021 Yes    Overview Signed 12/18/2021  2:05 PM by Jeremiah Weathers MD     Pt has been taking Prednisone for joint pain and swelling (unclear etiology)              * (Principal) Acute GI bleeding ICD-10-CM: K92.2  ICD-9-CM: 578.9  12/14/2021 Yes        Acute blood loss anemia ICD-10-CM: D62  ICD-9-CM: 285.1  12/14/2021 Yes        CKD (chronic kidney disease) stage 5, GFR less than 15 ml/min [facility]) (Chronic) ICD-10-CM: N18.5  ICD-9-CM: 585.5  12/14/2021 Yes        UTI due to extended-spectrum beta lactamase (ESBL) producing Escherichia coli ICD-10-CM: N39.0, B96.29, Z16.12  ICD-9-CM: 599.0, 041.49, V09.1  12/8/2021 Yes        Bacteremia due to Klebsiella pneumoniae ICD-10-CM: R78.81, B96.1  ICD-9-CM: 790.7, 041.3  12/4/2021 Yes        Weakness generalized ICD-10-CM: R53.1  ICD-9-CM: 780.79  12/3/2021 Yes        Anemia, chronic disease ICD-10-CM: D63.8  ICD-9-CM: 285.29  12/3/2021 Yes        Anemia of chronic kidney failure, stage 5 (HCC) (Chronic) ICD-10-CM: N18.5, D63.1  ICD-9-CM: 285.21, 585.5  Unknown Yes    Overview Signed 10/24/2021  3:36 PM by MD Ramon Carr NP at [facility]             VIK on CPAP ICD-10-CM: G47.33, Z99.89  ICD-9-CM: 327.23, V46.8  Unknown Yes        Hypertension ICD-10-CM: I10  ICD-9-CM: 401.9  Unknown Yes        Chronic atrial fibrillation ([facility] [address] ICD-10-CM: I48.20  ICD-9-CM: 427.31  Unknown Yes    Overview Signed 10/24/2021  3:36 PM by Marcia Mandujano MD     [facility]                     Review of Systems:   A comprehensive review of systems was negative except for that written in the HPI. Vital Signs:    Last 24hrs VS reviewed since prior progress note. Most recent are:  Visit Vitals  /77 (BP 1 Location: Left upper arm, BP Patient Position: Sitting)   Pulse (!) 101   Temp 98.2 °F (36.8 °C)   Resp 20   Ht 5' 10\" (1.778 m)   Wt 108.9 kg (240 lb)   SpO2 97%   BMI 34.44 kg/m²         Intake/Output Summary (Last 24 hours) at 12/20/2021 1359  Last data filed at 12/19/2021 2305  Gross per 24 hour   Intake 100 ml   Output 50 ml   Net 50 ml        Physical Examination:     I had a face to face encounter with this patient and independently examined them on 12/20/2021 as outlined below:          Constitutional:  No acute distress, cooperative, pleasant    ENT:  Oral mucosa moist, oropharynx benign. Resp:  CTA bilaterally. No wheezing/rhonchi/rales.  No accessory muscle use   CV:  Regular rhythm, normal rate, no murmurs, gallops, rubs    GI:  Soft, non distended, non tender. normoactive bowel sounds, no hepatosplenomegaly     Musculoskeletal:  No edema, warm, 2+ pulses throughout    Neurologic:  Moves all extremities. AAOx3, CN II-XII reviewed            Data Review:    Review and/or order of clinical lab test      Labs:     Recent Labs     12/20/21  0503 12/18/21  0034   WBC 7.7 6.9   HGB 6.7* 7.2*   HCT 21.1* 22.1*   * 112*     Recent Labs     12/20/21  0503 12/19/21  0457 12/18/21  0034    142 145   K 4.2 3.9 3.6   * 110* 111*   CO2 21 22 22   BUN 70* 67* 67*   CREA 5.12* 4.67* 4.31*   * 152* 117*   CA 6.8* 6.8* 6.6*   MG  --   --  1.8     No results for input(s): ALT, AP, TBIL, TBILI, TP, ALB, GLOB, GGT, AML, LPSE in the last 72 hours. No lab exists for component: SGOT, GPT, AMYP, HLPSE  No results for input(s): INR, PTP, APTT, INREXT in the last 72 hours. No results for input(s): FE, TIBC, PSAT, FERR in the last 72 hours. Lab Results   Component Value Date/Time    Folate 6.5 12/14/2021 04:07 AM      No results for input(s): PH, PCO2, PO2 in the last 72 hours. No results for input(s): CPK, CKNDX, TROIQ in the last 72 hours.     No lab exists for component: CPKMB  No results found for: CHOL, CHOLX, CHLST, CHOLV, HDL, HDLP, LDL, LDLC, DLDLP, TGLX, TRIGL, TRIGP, CHHD, CHHDX  Lab Results   Component Value Date/Time    Glucose (POC) 110 10/29/2021 09:15 PM     Lab Results   Component Value Date/Time    Color YELLOW/STRAW 12/04/2021 05:32 AM    Appearance TURBID (A) 12/04/2021 05:32 AM    Specific gravity 1.013 12/04/2021 05:32 AM    pH (UA) 5.5 12/04/2021 05:32 AM    Protein 300 (A) 12/04/2021 05:32 AM    Glucose Negative 12/04/2021 05:32 AM    Ketone Negative 12/04/2021 05:32 AM    Bilirubin Negative 12/04/2021 05:32 AM    Urobilinogen 0.2 12/04/2021 05:32 AM    Nitrites Negative 12/04/2021 05:32 AM    Leukocyte Esterase LARGE (A) 12/04/2021 05:32 AM    Epithelial cells FEW 12/04/2021 05:32 AM    Bacteria 2+ (A) 12/04/2021 05:32 AM    WBC >100 (H) 12/04/2021 05:32 AM    RBC 10-20 12/04/2021 05:32 AM         Medications Reviewed:     Current Facility-Administered Medications   Medication Dose Route Frequency    hydrALAZINE (APRESOLINE) 20 mg/mL injection 10 mg  10 mg IntraVENous Q6H PRN    cloNIDine HCL (CATAPRES) tablet 0.1 mg  0.1 mg Oral BID    0.9% sodium chloride infusion  125 mL/hr IntraVENous PRN    predniSONE (DELTASONE) tablet 40 mg  40 mg Oral DAILY    epoetin sylvie-epbx (RETACRIT) injection 20,000 Units  20,000 Units SubCUTAneous Q7D    NIFEdipine ER (PROCARDIA XL) tablet 30 mg  30 mg Oral DAILY AFTER BREAKFAST    pantoprazole (PROTONIX) tablet 40 mg  40 mg Oral ACB    calcitRIOL (ROCALTROL) capsule 0.25 mcg  0.25 mcg Oral DAILY    carvediloL (COREG) tablet 12.5 mg  12.5 mg Oral BID WITH MEALS    fluticasone propionate (FLONASE) 50 mcg/actuation nasal spray 2 Spray  2 Spray Both Nostrils DAILY    sodium bicarbonate tablet 650 mg  650 mg Oral TID    sodium chloride (NS) flush 5-40 mL  5-40 mL IntraVENous Q8H    sodium chloride (NS) flush 5-40 mL  5-40 mL IntraVENous PRN    acetaminophen (TYLENOL) tablet 650 mg  650 mg Oral Q6H PRN    Or    acetaminophen (TYLENOL) suppository 650 mg  650 mg Rectal Q6H PRN    bisacodyL (DULCOLAX) suppository 10 mg  10 mg Rectal DAILY PRN    promethazine (PHENERGAN) tablet 12.5 mg  12.5 mg Oral Q6H PRN    Or    ondansetron (ZOFRAN) injection 4 mg  4 mg IntraVENous Q6H PRN    HYDROmorphone (DILAUDID) syringe 0.5 mg  0.5 mg IntraVENous Q4H PRN    meropenem (MERREM) 500 mg in sterile water (preservative free) 10 mL IV syringe  0.5 g IntraVENous Q12H     ______________________________________________________________________  EXPECTED LENGTH OF STAY: 2d 21h  ACTUAL LENGTH OF STAY:          6                 Otto Ham MD

## 2021-12-20 NOTE — ROUTINE PROCESS
Bedside shift change report given to JOSIAH Erickson  (oncoming nurse) by Marianela Monaco (offgoing nurse).  Report included the following information: SBAR/KARDEX/EMAR/LABS

## 2021-12-20 NOTE — PROGRESS NOTES
Bedside shift change report given to Justin Anand (oncoming nurse) by Colin Mccray (offgoing nurse). Report included the following information SBAR, Kardex, MAR, Recent Results and Med Rec Status.

## 2021-12-20 NOTE — PROGRESS NOTES
Marleni 55 [address] [facility]  YOB: 1950          Assessment & Plan:   DRAKE, improved  CKD 4/5, Cr near baseline. Has followed with [facility] and recently Dr. Melissa Griffin  UTI on abx  GIB  HTN  Edema  Anemia with low Fe Sat    REC:  Scr slightly up d/t hypoperfusion from anemia  Stop Lasix IV today  Monitor and transfuse to keep HB>7  Encourage POI  Monitor labs. Continue IV Fe and epo  No acute indication HD.  Close nephrology f/u p d/c       Subjective:   CC: f/u DRAKE  HPI: Cr up 5.1  ROS: No sob +edema +leg pain/joint pain  Current Facility-Administered Medications   Medication Dose Route Frequency    hydrALAZINE (APRESOLINE) 20 mg/mL injection 10 mg  10 mg IntraVENous Q6H PRN    cloNIDine HCL (CATAPRES) tablet 0.1 mg  0.1 mg Oral BID    0.9% sodium chloride infusion  125 mL/hr IntraVENous PRN    predniSONE (DELTASONE) tablet 40 mg  40 mg Oral DAILY    epoetin sylvie-epbx (RETACRIT) injection 20,000 Units  20,000 Units SubCUTAneous Q7D    NIFEdipine ER (PROCARDIA XL) tablet 30 mg  30 mg Oral DAILY AFTER BREAKFAST    pantoprazole (PROTONIX) tablet 40 mg  40 mg Oral ACB    calcitRIOL (ROCALTROL) capsule 0.25 mcg  0.25 mcg Oral DAILY    carvediloL (COREG) tablet 12.5 mg  12.5 mg Oral BID WITH MEALS    fluticasone propionate (FLONASE) 50 mcg/actuation nasal spray 2 Spray  2 Spray Both Nostrils DAILY    sodium bicarbonate tablet 650 mg  650 mg Oral TID    sodium chloride (NS) flush 5-40 mL  5-40 mL IntraVENous Q8H    sodium chloride (NS) flush 5-40 mL  5-40 mL IntraVENous PRN    acetaminophen (TYLENOL) tablet 650 mg  650 mg Oral Q6H PRN    Or    acetaminophen (TYLENOL) suppository 650 mg  650 mg Rectal Q6H PRN    bisacodyL (DULCOLAX) suppository 10 mg  10 mg Rectal DAILY PRN    promethazine (PHENERGAN) tablet 12.5 mg  12.5 mg Oral Q6H PRN    Or    ondansetron (ZOFRAN) injection 4 mg  4 mg IntraVENous Q6H PRN    HYDROmorphone (DILAUDID) syringe 0.5 mg  0.5 mg IntraVENous Q4H PRN    meropenem (MERREM) 500 mg in sterile water (preservative free) 10 mL IV syringe  0.5 g IntraVENous Q12H          Objective:     Vitals:  Blood pressure 107/66, pulse (!) 104, temperature 97.8 °F (36.6 °C), resp. rate 16, height 5' 10\" (1.778 m), weight 108.9 kg (240 lb), SpO2 98 %. Temp (24hrs), Av.8 °F (36.6 °C), Min:97.4 °F (36.3 °C), Max:98.1 °F (36.7 °C)      Intake and Output:  No intake/output data recorded.  1901 -  0700  In: 340 [P.O.:340]  Out: 400 [Urine:400]    Physical Exam:               GENERAL ASSESSMENT: NAD  CHEST: CTA  HEART: S1S2  ABDOMEN: Soft,NT,  EXTREMITY: +EDEMA  NEURO:Grossly non focal          ECG/rhythm:    Data Review      No results for input(s): TNIPOC in the last 72 hours. No lab exists for component: ITNL   No results for input(s): CPK, CKMB, TROIQ in the last 72 hours. Recent Labs     21  0503 21  0457 21  0034 21  1426    142 145  --    K 4.2 3.9 3.6  --    * 110* 111*  --    CO2   --    BUN 70* 67* 67*  --    CREA 5.12* 4.67* 4.31*  --    * 152* 117*  --    MG  --   --  1.8  --    CA 6.8* 6.8* 6.6*  --    WBC 7.7  --  6.9  --    HGB 6.7*  --  7.2* 6.0*   HCT 21.1*  --  22.1* 18.2*   *  --  112*  --       No results for input(s): INR, PTP, APTT, INREXT, INREXT in the last 72 hours. Needs: urine analysis, urine sodium, protein and creatinine  No results found for: [facility]        : Mary Stevenson MD  2021        [facility]:  [email or web address]  Genaromyrnacresencio Raegan office:  [address] [place] [address], [address] [place] [address],[address]  [address]  Phone: [phone]  Fax :     [phone]    [place] office:  [address] [address] Eliane Kelsey  Phone - [phone]  Fax - [phone]

## 2021-12-20 NOTE — PROGRESS NOTES
Problem: Self Care Deficits Care Plan (Adult)  Goal: *Acute Goals and Plan of Care (Insert Text)  Description: FUNCTIONAL STATUS PRIOR TO ADMISSION: Patient was modified independent using a walker for functional mobility. Patient was modified independent for basic ADLs, with wife available to assist as needed. HOME SUPPORT: The patient lived with wife. Occupational Therapy Goals  Initiated 12/16/2021  1. Patient will perform grooming, standing at sink > 2 minutes, with modified independence within 7 day(s). 2.  Patient will perform lower body dressing with supervision/set-up within 7 day(s). 3.  Patient will perform bathing, sitting and standing PRN, with supervision/set-up within 7 day(s). 4.  Patient will perform toilet transfers with modified independence within 7 day(s). 5.  Patient will perform all aspects of toileting with supervision/set-up within 7 day(s). 6.  Patient will participate in upper extremity therapeutic exercise/activities with modified independence for 10 minutes within 7 day(s). 7.  Patient will utilize energy conservation techniques during functional activities with verbal cues within 7 day(s). Outcome: Progressing Towards Goal    OCCUPATIONAL THERAPY TREATMENT  Patient: Rolly Simon (65 y.o. male)  Date: 12/20/2021  Diagnosis: Acute GI bleeding [K92.2] Acute GI bleeding  Procedure(s) (LRB):  COLONOSCOPY (N/A) 3 Days Post-Op  Precautions: Fall  Chart, occupational therapy assessment, plan of care, and goals were reviewed. ASSESSMENT  Patient continues with skilled OT services and is progressing towards goals. Patient very motivated and with good participation, initially upon sitting blood pressure increased and after standing briefly and transfer to chair decreased (se doc flowsheet). Patient not symptomatic and completed chair push ups prior to transfer to bathroom. Tolerated increased time in bathroom and only slight decrease in blood pressure.  Educated on benefit of increased time out of bed, increased activity for shorter duration and with good understanding     Current Level of Function Impacting Discharge (ADLs): SBA for functional transfers, supervision toileting    Other factors to consider for discharge: supportive wife an son, Hgb 6.7         PLAN :  Patient continues to benefit from skilled intervention to address the above impairments. Continue treatment per established plan of care to address goals. Recommend with staff: up for meals, to and from bathroom for toileting    Recommend next OT session: ADL s in bathroom    Recommendation for discharge: (in order for the patient to meet his/her long term goals)  Occupational therapy at least 2 days/week in the home     This discharge recommendation:  Has been made in collaboration with the attending provider and/or case management    IF patient discharges home will need the following DME: none       SUBJECTIVE:   Patient stated I walked yesterday with my son.     OBJECTIVE DATA SUMMARY:   Cognitive/Behavioral Status:  Neurologic State: Alert;Eyes open spontaneously  Orientation Level: Oriented X4  Cognition: Follows commands  Perception: Appears intact  Perseveration: No perseveration noted  Safety/Judgement: Awareness of environment; Fall prevention;Home safety; Insight into deficits    Functional Mobility and Transfers for ADLs:  Bed Mobility:  Supine to Sit: Supervision    Transfers:  Sit to Stand: Supervision  Functional Transfers  Bathroom Mobility: Stand-by assistance  Toilet Transfer : Supervision;Stand-by assistance; Adaptive equipment;Assist x1  Bed to Chair: Stand-by assistance    Balance:  Sitting: Intact  Standing: Intact; With support  Standing - Static: Constant support;Good    ADL Intervention:   Patient instructed and indicated understanding the benefits of maintaining activity tolerance, functional mobility, and independence with self care tasks during acute stay  to ensure safe return home and to baseline. Encouraged patient to increase frequency and duration OOB, be out of bed for all meals, perform daily ADLs (as approved by RN/MD regarding bathing etc), and performing functional mobility to/from bathroom. Grooming  Grooming Assistance: Set-up  Position Performed: Seated edge of bed  Washing Face: Set-up       Toileting  Toileting Assistance: Supervision  Bowel Hygiene: Modified indpendent  Clothing Management: Supervision  Adaptive Equipment: Walker;Elevated seat;Grab bars (used bedside commode next to toilet for armrest on right)    Cognitive Retraining  Safety/Judgement: Awareness of environment; Fall prevention;Home safety; Insight into deficits    Therapeutic Exercises:   Instructed on chair push up and performance throughout day, completed 3 reps before fatigue. Instructed to vary hold time and reps    Educated on benefit of incorporating smaller amounts of exercise more frequently throughout day. Instructed in performance and benefit of isometric elbow extension seated in chair and in supine, instructed in grading exercise to tolerance    Instructed on tips to incorporate exercises throughout day    Pain:  No complaint    Activity Tolerance:   Good, Fair, and requires rest breaks    After treatment patient left in no apparent distress:   Sitting in chair, Call bell within reach, and Caregiver / family present    COMMUNICATION/COLLABORATION:   The patients plan of care was discussed with: Physical therapist and Registered nurse.      Fabiana Medrano OTR/L  Time Calculation: 46 mins

## 2021-12-21 VITALS
HEIGHT: 70 IN | RESPIRATION RATE: 14 BRPM | SYSTOLIC BLOOD PRESSURE: 136 MMHG | TEMPERATURE: 97.8 F | DIASTOLIC BLOOD PRESSURE: 83 MMHG | WEIGHT: 240 LBS | OXYGEN SATURATION: 99 % | HEART RATE: 99 BPM | BODY MASS INDEX: 34.36 KG/M2

## 2021-12-21 LAB
ALBUMIN SERPL-MCNC: 1.7 G/DL (ref 3.5–5)
ALBUMIN/GLOB SERPL: 0.5 {RATIO} (ref 1.1–2.2)
ALP SERPL-CCNC: 142 U/L (ref 45–117)
ALT SERPL-CCNC: 12 U/L (ref 12–78)
ANION GAP SERPL CALC-SCNC: 10 MMOL/L (ref 5–15)
AST SERPL-CCNC: 12 U/L (ref 15–37)
BASOPHILS # BLD: 0 K/UL (ref 0–0.1)
BASOPHILS NFR BLD: 0 % (ref 0–1)
BILIRUB SERPL-MCNC: 0.3 MG/DL (ref 0.2–1)
BUN SERPL-MCNC: 70 MG/DL (ref 6–20)
BUN/CREAT SERPL: 15 (ref 12–20)
CALCIUM SERPL-MCNC: 6.8 MG/DL (ref 8.5–10.1)
CHLORIDE SERPL-SCNC: 108 MMOL/L (ref 97–108)
CO2 SERPL-SCNC: 24 MMOL/L (ref 21–32)
CREAT SERPL-MCNC: 4.78 MG/DL (ref 0.7–1.3)
DIFFERENTIAL METHOD BLD: ABNORMAL
EOSINOPHIL # BLD: 0 K/UL (ref 0–0.4)
EOSINOPHIL NFR BLD: 0 % (ref 0–7)
ERYTHROCYTE [DISTWIDTH] IN BLOOD BY AUTOMATED COUNT: 18.2 % (ref 11.5–14.5)
GLOBULIN SER CALC-MCNC: 3.2 G/DL (ref 2–4)
GLUCOSE SERPL-MCNC: 103 MG/DL (ref 65–100)
HCT VFR BLD AUTO: 23.3 % (ref 36.6–50.3)
HCT VFR BLD AUTO: 31.9 % (ref 36.6–50.3)
HGB BLD-MCNC: 10.2 G/DL (ref 12.1–17)
HGB BLD-MCNC: 7.4 G/DL (ref 12.1–17)
IMM GRANULOCYTES # BLD AUTO: 0 K/UL
IMM GRANULOCYTES NFR BLD AUTO: 0 %
LYMPHOCYTES # BLD: 0.3 K/UL (ref 0.8–3.5)
LYMPHOCYTES NFR BLD: 5 % (ref 12–49)
MCH RBC QN AUTO: 29.3 PG (ref 26–34)
MCHC RBC AUTO-ENTMCNC: 32 G/DL (ref 30–36.5)
MCV RBC AUTO: 91.7 FL (ref 80–99)
MONOCYTES # BLD: 0.1 K/UL (ref 0–1)
MONOCYTES NFR BLD: 2 % (ref 5–13)
MYELOCYTES NFR BLD MANUAL: 1 %
NEUTS SEG # BLD: 5 K/UL (ref 1.8–8)
NEUTS SEG NFR BLD: 92 % (ref 32–75)
NRBC # BLD: 0.07 K/UL (ref 0–0.01)
NRBC BLD-RTO: 1.3 PER 100 WBC
PLATELET # BLD AUTO: 130 K/UL (ref 150–400)
PMV BLD AUTO: 12 FL (ref 8.9–12.9)
POTASSIUM SERPL-SCNC: 3.9 MMOL/L (ref 3.5–5.1)
PROT SERPL-MCNC: 4.9 G/DL (ref 6.4–8.2)
RBC # BLD AUTO: 3.48 M/UL (ref 4.1–5.7)
RBC MORPH BLD: ABNORMAL
RBC MORPH BLD: ABNORMAL
SODIUM SERPL-SCNC: 142 MMOL/L (ref 136–145)
WBC # BLD AUTO: 5.4 K/UL (ref 4.1–11.1)

## 2021-12-21 PROCEDURE — 74011250637 HC RX REV CODE- 250/637: Performed by: INTERNAL MEDICINE

## 2021-12-21 PROCEDURE — 85025 COMPLETE CBC W/AUTO DIFF WBC: CPT

## 2021-12-21 PROCEDURE — 85018 HEMOGLOBIN: CPT

## 2021-12-21 PROCEDURE — 74011250637 HC RX REV CODE- 250/637: Performed by: HOSPITALIST

## 2021-12-21 PROCEDURE — 74011636637 HC RX REV CODE- 636/637: Performed by: INTERNAL MEDICINE

## 2021-12-21 PROCEDURE — 74011000250 HC RX REV CODE- 250: Performed by: INTERNAL MEDICINE

## 2021-12-21 PROCEDURE — 74011250636 HC RX REV CODE- 250/636: Performed by: INTERNAL MEDICINE

## 2021-12-21 PROCEDURE — 36415 COLL VENOUS BLD VENIPUNCTURE: CPT

## 2021-12-21 PROCEDURE — 80053 COMPREHEN METABOLIC PANEL: CPT

## 2021-12-21 RX ORDER — FUROSEMIDE 40 MG/1
40 TABLET ORAL 2 TIMES DAILY
Qty: 60 TABLET | Refills: 0 | Status: SHIPPED | OUTPATIENT
Start: 2021-12-21

## 2021-12-21 RX ORDER — FUROSEMIDE 40 MG/1
40 TABLET ORAL 2 TIMES DAILY
Status: DISCONTINUED | OUTPATIENT
Start: 2021-12-21 | End: 2021-12-21 | Stop reason: HOSPADM

## 2021-12-21 RX ADMIN — PREDNISONE 40 MG: 20 TABLET ORAL at 09:53

## 2021-12-21 RX ADMIN — SODIUM BICARBONATE 650 MG: 650 TABLET ORAL at 09:52

## 2021-12-21 RX ADMIN — FLUTICASONE PROPIONATE 2 SPRAY: 50 SPRAY, METERED NASAL at 09:55

## 2021-12-21 RX ADMIN — NIFEDIPINE 30 MG: 30 TABLET, FILM COATED, EXTENDED RELEASE ORAL at 09:53

## 2021-12-21 RX ADMIN — FUROSEMIDE 40 MG: 40 TABLET ORAL at 09:52

## 2021-12-21 RX ADMIN — Medication 10 ML: at 05:26

## 2021-12-21 RX ADMIN — CALCITRIOL CAPSULES 0.25 MCG 0.25 MCG: 0.25 CAPSULE ORAL at 09:53

## 2021-12-21 RX ADMIN — MEROPENEM 500 MG: 500 INJECTION, POWDER, FOR SOLUTION INTRAVENOUS at 05:26

## 2021-12-21 RX ADMIN — CLONIDINE HYDROCHLORIDE 0.1 MG: 0.1 TABLET ORAL at 09:53

## 2021-12-21 RX ADMIN — CARVEDILOL 12.5 MG: 12.5 TABLET, FILM COATED ORAL at 09:53

## 2021-12-21 RX ADMIN — PANTOPRAZOLE SODIUM 40 MG: 40 TABLET, DELAYED RELEASE ORAL at 05:26

## 2021-12-21 NOTE — PROGRESS NOTES
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 Has followed with [facility] and recently Dr. Barr Prost  UTI on abx  GIB  HTN  Edema  Anemia with low Fe Sat    REC:  Scr better 4.7  Resume home lasix 40 mgBID  HB 10 falsely elevated  Recheck h&h NOW  Encourage POI  Monitor labs. Continue IV Fe and epo  No acute indication HD.  Close nephrology f/u p d/c       Subjective:   CC: f/u DRAKE  HPI: Cr 4.7  ROS: No sob +edema +leg pain/joint pain  Current Facility-Administered Medications   Medication Dose Route Frequency    furosemide (LASIX) tablet 40 mg  40 mg Oral BID    hydrALAZINE (APRESOLINE) 20 mg/mL injection 10 mg  10 mg IntraVENous Q6H PRN    cloNIDine HCL (CATAPRES) tablet 0.1 mg  0.1 mg Oral BID    0.9% sodium chloride infusion  125 mL/hr IntraVENous PRN    predniSONE (DELTASONE) tablet 40 mg  40 mg Oral DAILY    epoetin sylvie-epbx (RETACRIT) injection 20,000 Units  20,000 Units SubCUTAneous Q7D    NIFEdipine ER (PROCARDIA XL) tablet 30 mg  30 mg Oral DAILY AFTER BREAKFAST    pantoprazole (PROTONIX) tablet 40 mg  40 mg Oral ACB    calcitRIOL (ROCALTROL) capsule 0.25 mcg  0.25 mcg Oral DAILY    carvediloL (COREG) tablet 12.5 mg  12.5 mg Oral BID WITH MEALS    fluticasone propionate (FLONASE) 50 mcg/actuation nasal spray 2 Spray  2 Spray Both Nostrils DAILY    sodium bicarbonate tablet 650 mg  650 mg Oral TID    sodium chloride (NS) flush 5-40 mL  5-40 mL IntraVENous Q8H    sodium chloride (NS) flush 5-40 mL  5-40 mL IntraVENous PRN    acetaminophen (TYLENOL) tablet 650 mg  650 mg Oral Q6H PRN    Or    acetaminophen (TYLENOL) suppository 650 mg  650 mg Rectal Q6H PRN    bisacodyL (DULCOLAX) suppository 10 mg  10 mg Rectal DAILY PRN    promethazine (PHENERGAN) tablet 12.5 mg  12.5 mg Oral Q6H PRN    Or    ondansetron (ZOFRAN) injection 4 mg  4 mg IntraVENous Q6H PRN  HYDROmorphone (DILAUDID) syringe 0.5 mg  0.5 mg IntraVENous Q4H PRN    meropenem (MERREM) 500 mg in sterile water (preservative free) 10 mL IV syringe  0.5 g IntraVENous Q12H          Objective:     Vitals:  Blood pressure 131/88, pulse (!) 109, temperature 98.5 °F (36.9 °C), resp. rate 12, height 5' 10\" (1.778 m), weight 108.9 kg (240 lb), SpO2 99 %. Temp (24hrs), Av.2 °F (36.8 °C), Min:97.9 °F (36.6 °C), Max:98.5 °F (36.9 °C)      Intake and Output:  No intake/output data recorded.  1901 -  0700  In: 470 [P.O.:460; I.V.:10]  Out: 750 [Urine:750]    Physical Exam:               GENERAL ASSESSMENT: NAD  CHEST: CTA  HEART: S1S2  ABDOMEN: Soft,NT,  EXTREMITY: +EDEMA  NEURO:Grossly non focal          ECG/rhythm:    Data Review      No results for input(s): TNIPOC in the last 72 hours. No lab exists for component: ITNL   No results for input(s): CPK, CKMB, TROIQ in the last 72 hours. Recent Labs     21  0528 21  0503 21  0457    141 142   K 3.9 4.2 3.9    109* 110*   CO2 24  22   BUN 70* 70* 67*   CREA 4.78* 5.12* 4.67*   * 116* 152*   CA 6.8* 6.8* 6.8*   ALB 1.7*  --   --    WBC 5.4 7.7  --    HGB 10.2* 6.7*  --    HCT 31.9* 21.1*  --    * 148*  --       No results for input(s): INR, PTP, APTT, INREXT, INREXT in the last 72 hours. Needs: urine analysis, urine sodium, protein and creatinine  No results found for: Danyelle Andrade        : Mary Vu MD  2021        [facility]:  [email or web address]  Josué Herson office:  [address] [place] [address], [address] [place] [address],[address]  [place], [address] [place] [address]  Phone: [phone]  Fax :     [phone]    [place] office:  [address] [facility]  [facility]  Phone - [phone]  Fax - [phone]

## 2021-12-21 NOTE — PROGRESS NOTES
I have reviewed discharge instructions with the patient, spouse and caregiver. The patient, spouse and caregiver verbalized understanding. Reviewed AVS and medication changes. Signed copy of AVS placed in chart.

## 2021-12-21 NOTE — PROGRESS NOTES
Bedside shift change report given to Dedra Merida 1313 (oncoming nurse) by Yariel Myrick (offgoing nurse). Report included the following information SBAR, Kardex, MAR and Recent Results.

## 2021-12-21 NOTE — TELEPHONE ENCOUNTER
Pt's wife called to schedule appointment for follow up after discharge from hospital and will complete IV antibiotics 12/22/21. Please advise on where to schedule pt.  Roel

## 2021-12-21 NOTE — PROGRESS NOTES
Bedside shift change report given to [employer], RN (oncoming nurse) by Jen Ibarra RN (offgoing nurse). Report included the following information SBAR, Kardex, Intake/Output, MAR and Recent Results.

## 2021-12-21 NOTE — DISCHARGE SUMMARY
.     Discharge Summary       PATIENT ID: Alejandra Garcia  MRN: [id number]   YOB: 1950    DATE OF ADMISSION: 12/14/2021  4:04 AM    DATE OF DISCHARGE: 12/21/2021  PRIMARY CARE PROVIDER: Jessica Haddad MD     ATTENDING PHYSICIAN: Poli Sparks MD  DISCHARGING PROVIDER: Poli Sparks MD    To contact this individual call [phone] and ask the  to page. If unavailable ask to be transferred the Adult Hospitalist Department. CONSULTATIONS: IP CONSULT TO GASTROENTEROLOGY  IP CONSULT TO NEPHROLOGY    PROCEDURES/SURGERIES: Procedure(s):  COLONOSCOPY    ADMITTING DIAGNOSES & HOSPITAL COURSE:   Acute GI bleed. Acute blood loss anemia. Anemia of CKD. DRAKE on CKD. ESBL Klebsiella UTI. Chronic A. fib. DISCHARGE DIAGNOSES / PLAN:      Acute GI bleed/Acute blood loss anemia/Anemia of CKD stage V    Multifactorial, possibly anemia of chronic disease, anemia of CKD, due to diverticulosis and Eliquis and possibly due to recent polypectomy and resumption of Eliquis. Repeat colonoscopy on 12/14/21 showed post polypectomy site in the ascending colon was bleeding; Endo Clip was placed, follow up colonoscopy on 12/17/21 showed no further bleed, diverticulosis.      Held all anticoagulation during hospitalization. · Monitor Hgb closely. Status post 2 PRBC transfusion on this admission. · No anticoagulation for 10 days per GI recommendation   Patient and family were extensively counseled on importance of following up with nephrology for evaluation of anemia of chronic disease and Procrit administration, patient was also advised to follow-up with PCP and cardiology to discuss resumption of Eliquis.     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 Denzel Marroquin at [facility]   Nephrology was consulted, patient did not need any hemodialysis on admission. Please refer to nephrology note.      Recent ESBL E. coli UTI & Klebsiella Bacteremia   Patient had a PICC line placed, was on Invanz through PICC till 12/22.    Was switched to meropenem while in-house, was advised to continue IV antibiotics upon discharge. Advised to follow-up with PCP and infectious disease.       Chronic afib   Rate controlled   Off of anticoagulation due to GI bleed. Advised to follow-up with PCP and cardiology, patient advised to explore the option of watchman device placement given history of GI bleed, advised to resume home meds upon discharge.     HTN  Euvolemic, normotensive, was continued on clonidine and Coreg. I also added on diuretics per nephrology, was advised to to continue his diuretics and follow-up with nephrology.      Pulm HTN   Followed by SANDY Bee at [facility] pulmonologist team      Generalized edema/anasarca-  Resolved, was started on Lasix, nephrology was consulted. Please refer to note. Debility/deconditioning   Received PT OT while inpatient. Was discharged home with home PT.       Hx Gout. No acute flare, was resumed on home medications. Hx Rheumatism: No acute flare. On chronic prednisone use   Pt reported taking Prednisone for gout flare-ups. Was continued on home discharge, was advised to follow-up with rheumatology and possibly taper off of steroids and switch to alternative medications. PENDING TEST RESULTS:   At the time of discharge the following test results are still pending: None    FOLLOW UP APPOINTMENTS:    Follow-up Information     Follow up With Specialties Details Why Contact Info    Noe Ramirez MD Family Medicine In 3 days  [address] [address]      Ron Preston MD Nephrology, Hospitalist Schedule an appointment as soon as possible for a visit in 4 days  [address] [place] [address] AT [place]   Call  [address] [facility], [address] [place] [address]  [phone]           ADDITIONAL CARE RECOMMENDATIONS: PCP, cardiology, nephrology, rheumatology, pulmonology follow-up.     DIET: Cardiac Diet  Oral Nutritional Supplements: Boost Glucose ControlOnce daily    ACTIVITY: Activity as tolerated    WOUND CARE: None    EQUIPMENT needed: None      DISCHARGE MEDICATIONS:  Current Discharge Medication List      START taking these medications    Details   furosemide (LASIX) 40 mg tablet Take 1 Tablet by mouth two (2) times a day. Qty: 60 Tablet, Refills: 0  Start date: 12/21/2021         CONTINUE these medications which have NOT CHANGED    Details   ertapenem 1 gram 1 g IVPB 1 g by IntraVENous route every twenty-four (24) hours for 12 days. Qty: 13 Dose, Refills: 0      pantoprazole (PROTONIX) 40 mg tablet Take 1 Tablet by mouth Daily (before breakfast) for 30 days. Qty: 30 Tablet, Refills: 0      sodium bicarbonate 650 mg tablet Take 1 Tablet by mouth three (3) times daily for 30 days. Indications: excess body acid  Qty: 90 Tablet, Refills: 0      predniSONE (DELTASONE) 20 mg tablet Take 20 mg by mouth two (2) times a day. Taken as needed for inflammation may have up to 40mg a day  Qty: 60 Tablet, Refills: 0      calcitRIOL (ROCALTROL) 0.25 mcg capsule Take 0.25 mcg by mouth daily. carvediloL (COREG) 12.5 mg tablet Take 12.5 mg by mouth two (2) times daily (with meals). cloNIDine HCL (CATAPRES) 0.2 mg tablet Take 0.2 mg by mouth two (2) times a day. apixaban (Eliquis) 5 mg tablet Take 5 mg by mouth two (2) times a day. NIFEdipine ER (ADALAT CC) 30 mg ER tablet Take 30 mg by mouth daily. fluticasone propionate (FLONASE) 50 mcg/actuation nasal spray 2 Sprays by Both Nostrils route daily. STOP taking these medications       oxyCODONE-acetaminophen (PERCOCET) 5-325 mg per tablet Comments:   Reason for Stopping:                 NOTIFY YOUR PHYSICIAN FOR ANY OF THE FOLLOWING:   Fever over 101 degrees for 24 hours. Chest pain, shortness of breath, fever, chills, nausea, vomiting, diarrhea, change in mentation, falling, weakness, bleeding.  Severe pain or pain not relieved by medications. Or, any other signs or symptoms that you may have questions about.     DISPOSITION:    Home With:   OT  PT  HH  RN       Long term SNF/Inpatient Rehab    Independent/assisted living    Hospice    Other:       PATIENT CONDITION AT DISCHARGE:     Functional status    Poor     Deconditioned     Independent      Cognition     Lucid     Forgetful     Dementia      Catheters/lines (plus indication)    [place]     PICC     PEG     None      Code status     Full code     DNR      PHYSICAL EXAMINATION AT DISCHARGE:   Refer to Progress Note while inpatient      CHRONIC MEDICAL DIAGNOSES:  Problem List as of 12/21/2021 Date Reviewed: 12/14/2021          Codes Class Noted - Resolved    Current chronic use of systemic steroids (Chronic) ICD-10-CM: Z79.52  ICD-9-CM: V58.65  12/18/2021 - Present    Overview Signed 12/18/2021  2:05 PM by Susana Blank MD     Pt has been taking Prednisone for joint pain and swelling (unclear etiology)              * (Principal) Acute GI bleeding ICD-10-CM: K92.2  ICD-9-CM: 578.9  12/14/2021 - Present        Acute blood loss anemia ICD-10-CM: D62  ICD-9-CM: 285.1  12/14/2021 - Present        CKD (chronic kidney disease) stage 5, GFR less than 15 ml/min (HCC) (Chronic) ICD-10-CM: N18.5  ICD-9-CM: 585.5  12/14/2021 - Present        UTI due to extended-spectrum beta lactamase (ESBL) producing Escherichia coli ICD-10-CM: N39.0, B96.29, Z16.12  ICD-9-CM: 599.0, 041.49, V09.1  12/8/2021 - Present        Left knee pain ICD-10-CM: M25.562  ICD-9-CM: 719.46  12/8/2021 - Present        Bacteremia due to Klebsiella pneumoniae ICD-10-CM: R78.81, B96.1  ICD-9-CM: 790.7, 041.3  12/4/2021 - Present        Thrombocytopenia (HCC) ICD-10-CM: D69.6  ICD-9-CM: 287.5  12/4/2021 - Present        Weakness generalized ICD-10-CM: R53.1  ICD-9-CM: 780.79  12/3/2021 - Present        Anemia, chronic disease ICD-10-CM: D63.8  ICD-9-CM: 285.29  12/3/2021 - Present        Anemia of chronic kidney failure, stage 5 ([facility] (Chronic) ICD-10-CM: N18.5, D63.1  ICD-9-CM: 285.21, 585.5  Unknown - Present    Overview Signed 10/24/2021  3:36 PM by Marnell Canavan, MD [place] NP at [facility]             VIK on CPAP ICD-10-CM: G47.33, Z99.89  ICD-9-CM: 327.23, V46.8  Unknown - Present        Hypertension ICD-10-CM: I10  ICD-9-CM: 401.9  Unknown - Present        Heart failure ([facility] [address] ICD-10-CM: I50.9  ICD-9-CM: 428.9  Unknown - Present        Chronic atrial fibrillation ([facility] [address] ICD-10-CM: I48.20  ICD-9-CM: 427.31  Unknown - Present    Overview Signed 10/24/2021  3:36 PM by Marnell Canavan, MD     [facility]             Cervical disc disorder ICD-10-CM: M50.90  ICD-9-CM: 722.91  Unknown - Present    Overview Signed 10/24/2021  3:36 PM by Marnell Canavan, MD     C5,C6 fractured disc and lumbar disc slipped             RESOLVED: Cellulitis ICD-10-CM: L03.90  ICD-9-CM: 682.9  10/25/2021 - 12/3/2021        RESOLVED: Hyperkalemia ICD-10-CM: E87.5  ICD-9-CM: 276.7  10/24/2021 - 10/30/2021              Greater than 35 minutes were spent with the patient on counseling and coordination of care    Signed:   Angelita Hawkins MD  12/21/2021  2:48 PM

## 2021-12-21 NOTE — PROGRESS NOTES
12/21/2021  Case Management Progress Note    1:04 PM  Per hospitalist patient is able to discharge today with nephrology follow up. Sent message to Dr Leatha Locke and confirmed he does follow with one of their partners, a Dr Mandie De La Paz and they will arrange follow up for the patient. MICKY Matute    11:54 AM  Patient is 78year old male admitted 12/14 with acute GI bleeding  Patient's RUR is 25% red/high risk for readmission  Covid test: negative 12/14  Chart reviewed--patient discussed at IDR rounds  Messaged MD this morning--patient may be ok for discharge today depending on repeat labs today (following hgb drop yesterday). Patient's home health has been resumed with [address] already and [address] is on board to provide whatever IV antibiotics patient will still need at discharge--however if he stays through tomorrow he may not need them as the course is only through 12/22. Will continue to follow and assist as needed. Transition of Care Plan  1. Continue medical management/treatment  2. Home with [facility] when ready   3. [facility] [address] on board to finish IV antibiotics at home  4. Family to transport   5. Follow up with PCP, specialties as needed  6.  CM will continue to follow    MICKY Matute

## 2021-12-21 NOTE — DISCHARGE INSTRUCTIONS
Please follow-up with your primary doctor within a week to recheck your hemoglobin, please come back to ED immediately if any sign or symptoms of acute bleeding. Please hold your Eliquis until seen and evaluated by your cardiologist.    Please follow-up with your nephrologist within a week to continue receiving your erythropoietin for your anemia, please continue Lasix 40 mg twice a day until seen by a nephrologist or PCP. Please continue your antibiotics for 1 more day and follow-up with your primary doctor and infectious disease specialist.  Please remove your PICC line once your IV antibiotic is completed.

## 2021-12-29 ENCOUNTER — OFFICE VISIT (OUTPATIENT)
Dept: INFECTIOUS DISEASES | Age: 71
End: 2021-12-29
Payer: MEDICARE

## 2021-12-29 VITALS
HEART RATE: 83 BPM | DIASTOLIC BLOOD PRESSURE: 83 MMHG | SYSTOLIC BLOOD PRESSURE: 111 MMHG | HEIGHT: 70 IN | RESPIRATION RATE: 18 BRPM | WEIGHT: 243 LBS | BODY MASS INDEX: 34.79 KG/M2 | OXYGEN SATURATION: 99 % | TEMPERATURE: 97 F

## 2021-12-29 DIAGNOSIS — Z16.12 UTI DUE TO EXTENDED-SPECTRUM BETA LACTAMASE (ESBL) PRODUCING ESCHERICHIA COLI: Primary | ICD-10-CM

## 2021-12-29 DIAGNOSIS — B96.29 UTI DUE TO EXTENDED-SPECTRUM BETA LACTAMASE (ESBL) PRODUCING ESCHERICHIA COLI: Primary | ICD-10-CM

## 2021-12-29 DIAGNOSIS — N39.0 UTI DUE TO EXTENDED-SPECTRUM BETA LACTAMASE (ESBL) PRODUCING ESCHERICHIA COLI: Primary | ICD-10-CM

## 2021-12-29 PROCEDURE — G8417 CALC BMI ABV UP PARAM F/U: HCPCS | Performed by: INTERNAL MEDICINE

## 2021-12-29 PROCEDURE — 1111F DSCHRG MED/CURRENT MED MERGE: CPT | Performed by: INTERNAL MEDICINE

## 2021-12-29 PROCEDURE — G8428 CUR MEDS NOT DOCUMENT: HCPCS | Performed by: INTERNAL MEDICINE

## 2021-12-29 PROCEDURE — 3017F COLORECTAL CA SCREEN DOC REV: CPT | Performed by: INTERNAL MEDICINE

## 2021-12-29 PROCEDURE — G8536 NO DOC ELDER MAL SCRN: HCPCS | Performed by: INTERNAL MEDICINE

## 2021-12-29 PROCEDURE — G8510 SCR DEP NEG, NO PLAN REQD: HCPCS | Performed by: INTERNAL MEDICINE

## 2021-12-29 PROCEDURE — 99214 OFFICE O/P EST MOD 30 MIN: CPT | Performed by: INTERNAL MEDICINE

## 2021-12-29 PROCEDURE — 1101F PT FALLS ASSESS-DOCD LE1/YR: CPT | Performed by: INTERNAL MEDICINE

## 2021-12-29 NOTE — PROGRESS NOTES
[facility] Progress Note           Sybil Boyce DO    [phone] Office  [phone]  Fax    12/29/2021      Assessment & Plan:   · Klebsiella and E. coli urosepsis. (I suspect ESBL was a lab error)  Completed 14 days of ertapenem. Joshua catheter removed. I have recommended outpatient urology evaluation and patient was given number for Dr. Jason Mccullough practice  · LLE cellulitis in the setting of chronic lymphedema and venous stasis.     Resolved. I emphasized the importance of skin care as well as leg elevation and compression to help with lymphedema. .  Patient to follow-up with me if he develops further episodes of cellulitis  · Cervical spine and peripheral arthritis. Patient takes prednisone on a as needed basis for joint and back pain   · CKD. Stage V  · History of penicillin and levofloxacin allergies          Subjective:     No complaints. Lower extremity edema slowly improving    Objective:     Vitals:   Visit Vitals  /83   Pulse 83   Temp 97 °F (36.1 °C)   Resp 18   Ht 5' 10\" (1.778 m)   Wt 243 lb (110.2 kg)   SpO2 99%   BMI 34.87 kg/m²        Exam:     Physical Examination:   General:  Alert, cooperative, no distress   Head:  Normocephalic, atraumatic. Eyes:  Conjunctivae clear   Neck: Supple       Lungs:   No distress. Chest wall:     Heart:     Abdomen:   on-distended   Extremities:  Moves all. Bilateral lower extremity pitting edema   Skin:  No rash   Neurologic: CNII-XII intact. Normal strength     Labs:        No lab exists for component: ITNL   No results for input(s): CPK, CKMB, TROIQ in the last 72 hours. No results for input(s): NA, K, CL, CO2, BUN, CREA, GLU, PHOS, MG, ALB, WBC, HGB, HCT, PLT, HGBEXT, HCTEXT, PLTEXT in the last 72 hours. No lab exists for component:  CA  No results for input(s): INR, PTP, APTT, INREXT in the last 72 hours.   Needs: urine analysis, urine sodium, protein and creatinine  No results found for: JAILENE, JING      Cultures:     No results found for: [facility]  Lab Results   Component Value Date/Time    Culture result: NO GROWTH 7 DAYS 12/05/2021 12:27 PM    Culture result: NO GROWTH 7 DAYS 12/05/2021 12:27 PM    Culture result: (A) 12/04/2021 05:32 AM     ESCHERICHIA COLI ** (EXTENDED SPECTRUM BETA LACTAMASE ) **    Culture result: KLEBSIELLA PNEUMONIAE (A) 12/04/2021 05:32 AM    Culture result:  12/04/2021 05:32 AM     (NOTE) ESBL ON URINE CULTURE CALLED TO AND READ BACK BY SEAN WOLFF AT 13 58 ON 12/08/2021 RE        Radiology:     Medications       Current Outpatient Medications   Medication Sig Dispense    furosemide (LASIX) 40 mg tablet Take 1 Tablet by mouth two (2) times a day. 60 Tablet    pantoprazole (PROTONIX) 40 mg tablet Take 1 Tablet by mouth Daily (before breakfast) for 30 days. 30 Tablet    sodium bicarbonate 650 mg tablet Take 1 Tablet by mouth three (3) times daily for 30 days. Indications: excess body acid 90 Tablet    predniSONE (DELTASONE) 20 mg tablet Take 20 mg by mouth two (2) times a day. Taken as needed for inflammation may have up to 40mg a day 60 Tablet    calcitRIOL (ROCALTROL) 0.25 mcg capsule Take 0.25 mcg by mouth daily.  carvediloL (COREG) 12.5 mg tablet Take 12.5 mg by mouth two (2) times daily (with meals).  cloNIDine HCL (CATAPRES) 0.2 mg tablet Take 0.2 mg by mouth two (2) times a day.  NIFEdipine ER (ADALAT CC) 30 mg ER tablet Take 30 mg by mouth daily.  fluticasone propionate (FLONASE) 50 mcg/actuation nasal spray 2 Sprays by Both Nostrils route daily.  apixaban (Eliquis) 5 mg tablet Take 5 mg by mouth two (2) times a day. (Patient not taking: Reported on 12/29/2021)      No current facility-administered medications for this visit.            Case discussed with:      Ryan Vail DO

## 2021-12-30 NOTE — PROGRESS NOTES
Physician Progress Note      PATIENT:               [facility]  CSN #:                  [id number]  :                       1950  ADMIT DATE:       2021 4:04 AM  DISCH DATE:        2021 5:49 PM  RESPONDING  PROVIDER #:        Florida Race MD          QUERY TEXT:    Dr Jessica Gomez  Patient admitted with acute GI bleed. Noted documentation of CKD4 and CKD5. If possible, please document in progress notes and discharge summary if you are evaluating and /or treating any of the following: The medical record reflects the following:  Risk Factors: presents with GI bleed, ABLA,  Clinical Indicators: history of ESRD, gfr  noted Anemia of CKD stage V  Treatment: Close monitoring of lytes; Lasix switched to IV due to  Anasarca; Appreciate guidance from nephrologist  Options provided:  -- CKD 4 confirmed  -- CKD 5  confirmed  -- Other - I will add my own diagnosis  -- Disagree - Not applicable / Not valid  -- Disagree - Clinically unable to determine / Unknown  -- Refer to Clinical Documentation Reviewer    PROVIDER RESPONSE TEXT:    After study, CKD 4 confirmed. Query created by:  Danika Vizcarra on 2021 10:47 AM      Electronically signed by:  Rosa Toney MD 2021 3:34 PM

## 2022-01-17 ENCOUNTER — HOSPITAL ENCOUNTER (OUTPATIENT)
Dept: INFUSION THERAPY | Age: 72
Discharge: HOME | End: 2022-01-17
Payer: MEDICARE

## 2022-01-17 VITALS
HEIGHT: 71 IN | SYSTOLIC BLOOD PRESSURE: 128 MMHG | TEMPERATURE: 98 F | OXYGEN SATURATION: 96 % | DIASTOLIC BLOOD PRESSURE: 88 MMHG | WEIGHT: 233 LBS | RESPIRATION RATE: 18 BRPM | HEART RATE: 95 BPM | BODY MASS INDEX: 32.62 KG/M2

## 2022-01-17 LAB
ALBUMIN SERPL-MCNC: 2.8 G/DL (ref 3.5–5)
ANION GAP SERPL CALC-SCNC: 6 MMOL/L (ref 5–15)
BUN SERPL-MCNC: 77 MG/DL (ref 6–20)
BUN/CREAT SERPL: 16 (ref 12–20)
CALCIUM SERPL-MCNC: 7.5 MG/DL (ref 8.5–10.1)
CALCIUM SERPL-MCNC: 7.6 MG/DL (ref 8.5–10.1)
CHLORIDE SERPL-SCNC: 109 MMOL/L (ref 97–108)
CO2 SERPL-SCNC: 26 MMOL/L (ref 21–32)
COMMENT, HOLDF: NORMAL
CREAT SERPL-MCNC: 4.77 MG/DL (ref 0.7–1.3)
ERYTHROCYTE [DISTWIDTH] IN BLOOD BY AUTOMATED COUNT: 16.8 % (ref 11.5–14.5)
FERRITIN SERPL-MCNC: 266 NG/ML (ref 26–388)
GLUCOSE SERPL-MCNC: 95 MG/DL (ref 65–100)
HCT VFR BLD AUTO: 30.3 % (ref 36.6–50.3)
HGB BLD-MCNC: 9.6 G/DL (ref 12.1–17)
IRON SATN MFR SERPL: 20 % (ref 20–50)
IRON SERPL-MCNC: 46 UG/DL (ref 35–150)
MCH RBC QN AUTO: 31 PG (ref 26–34)
MCHC RBC AUTO-ENTMCNC: 31.7 G/DL (ref 30–36.5)
MCV RBC AUTO: 97.7 FL (ref 80–99)
NRBC # BLD: 0.03 K/UL (ref 0–0.01)
NRBC BLD-RTO: 0.4 PER 100 WBC
PHOSPHATE SERPL-MCNC: 3.2 MG/DL (ref 2.6–4.7)
PLATELET # BLD AUTO: 103 K/UL (ref 150–400)
PMV BLD AUTO: 12.5 FL (ref 8.9–12.9)
POTASSIUM SERPL-SCNC: 4.3 MMOL/L (ref 3.5–5.1)
PTH-INTACT SERPL-MCNC: 501.5 PG/ML (ref 18.4–88)
RBC # BLD AUTO: 3.1 M/UL (ref 4.1–5.7)
SAMPLES BEING HELD,HOLD: NORMAL
SODIUM SERPL-SCNC: 141 MMOL/L (ref 136–145)
TIBC SERPL-MCNC: 232 UG/DL (ref 250–450)
URATE SERPL-MCNC: 11.9 MG/DL (ref 3.5–7.2)
WBC # BLD AUTO: 8 K/UL (ref 4.1–11.1)

## 2022-01-17 PROCEDURE — 84550 ASSAY OF BLOOD/URIC ACID: CPT

## 2022-01-17 PROCEDURE — 83970 ASSAY OF PARATHORMONE: CPT

## 2022-01-17 PROCEDURE — 85027 COMPLETE CBC AUTOMATED: CPT

## 2022-01-17 PROCEDURE — 36415 COLL VENOUS BLD VENIPUNCTURE: CPT

## 2022-01-17 PROCEDURE — 82728 ASSAY OF FERRITIN: CPT

## 2022-01-17 PROCEDURE — 96372 THER/PROPH/DIAG INJ SC/IM: CPT

## 2022-01-17 PROCEDURE — 83540 ASSAY OF IRON: CPT

## 2022-01-17 PROCEDURE — 80069 RENAL FUNCTION PANEL: CPT

## 2022-01-17 PROCEDURE — 74011250636 HC RX REV CODE- 250/636: Performed by: INTERNAL MEDICINE

## 2022-01-17 RX ORDER — PANTOPRAZOLE SODIUM 40 MG/1
40 TABLET, DELAYED RELEASE ORAL DAILY
COMMUNITY

## 2022-01-17 RX ORDER — BUMETANIDE 2 MG/1
2 TABLET ORAL 2 TIMES DAILY
COMMUNITY

## 2022-01-17 RX ORDER — SODIUM BICARBONATE 650 MG/1
TABLET ORAL 3 TIMES DAILY
COMMUNITY

## 2022-01-17 RX ADMIN — EPOETIN ALFA-EPBX 40000 UNITS: 40000 INJECTION, SOLUTION INTRAVENOUS; SUBCUTANEOUS at 12:33

## 2022-02-14 ENCOUNTER — HOSPITAL ENCOUNTER (OUTPATIENT)
Dept: INFUSION THERAPY | Age: 72
Discharge: HOME | End: 2022-02-14
Payer: MEDICARE

## 2022-02-14 VITALS
HEART RATE: 86 BPM | DIASTOLIC BLOOD PRESSURE: 88 MMHG | SYSTOLIC BLOOD PRESSURE: 145 MMHG | RESPIRATION RATE: 18 BRPM | OXYGEN SATURATION: 99 % | TEMPERATURE: 98.2 F

## 2022-02-14 LAB
HCT VFR BLD AUTO: 29.6 % (ref 36.6–50.3)
HGB BLD-MCNC: 9.7 G/DL (ref 12.1–17)

## 2022-02-14 PROCEDURE — 36415 COLL VENOUS BLD VENIPUNCTURE: CPT

## 2022-02-14 PROCEDURE — 74011250636 HC RX REV CODE- 250/636: Performed by: INTERNAL MEDICINE

## 2022-02-14 PROCEDURE — 96372 THER/PROPH/DIAG INJ SC/IM: CPT

## 2022-02-14 PROCEDURE — 85018 HEMOGLOBIN: CPT

## 2022-02-14 RX ADMIN — EPOETIN ALFA-EPBX 40000 UNITS: 40000 INJECTION, SOLUTION INTRAVENOUS; SUBCUTANEOUS at 12:08

## 2022-02-14 NOTE — PROGRESS NOTES
Outpatient Infusion Center Short Visit Progress Note    Patient admitted to [facility] for [address] ambulatory in stable condition. Assessment completed. No new concerns voiced. Covid Screening      1. Do you have any symptoms of COVID-19? SOB, coughing, fever, or generally not feeling well ? no  2. Have you been exposed to COVID-19 recently?no  3. Have you had any recent contact with family/friend that has a pending COVID test?no    Vital Signs:  Visit Vitals  BP (!) 145/88   Pulse 86   Temp 98.2 °F (36.8 °C)   Resp 18   SpO2 99%         R arm with positive blood return. Drawn by Anabel Duke  Lab Results:  Recent Results (from the past 12 hour(s))   HGB & HCT    Collection Time: 02/14/22 11:38 AM   Result Value Ref Range    HGB 9.7 (L) 12.1 - 17.0 g/dL    HCT 29.6 (L) 36.6 - 50.3 %           Medications:  Medications Administered     epoetin sylvie-epbx (RETACRIT) injection 40,000 Units     Admin Date  02/14/2022 Action  Given Dose  40,000 Units Route  SubCUTAneous Administered By  Svetlana Iglesias              Patient tolerated treatment well. Patient discharged from [facility] ambulatory in no distress. Patient aware of next appointment.     Sushma Mcclure, JOSIAH    Future Appointments   Date Time Provider Vinod Page   3/14/2022 11:30 AM SS INF5 CH4 <1H [facility]   4/11/2022 11:30 AM SS INF5 CH4 <1H [facility]   5/9/2022 11:30 AM SS INF5 CH4 <1H [facility]   6/6/2022 11:30 AM SS INF5 CH4 <1H [facility]   7/5/2022 11:30 AM SS INF5 CH4 <1H [facility]   8/1/2022 11:30 AM SS INF5 CH4 <1H [facility]   8/29/2022 11:30 AM SS INF5 CH4 <1H [facility]   9/26/2022 11:30 AM SS INF5 CH4 <1H [facility] Eryn Strauss

## 2022-03-14 ENCOUNTER — APPOINTMENT (OUTPATIENT)
Dept: INFUSION THERAPY | Age: 72
End: 2022-03-14

## 2022-03-18 PROBLEM — N39.0 UTI DUE TO EXTENDED-SPECTRUM BETA LACTAMASE (ESBL) PRODUCING ESCHERICHIA COLI: Status: ACTIVE | Noted: 2021-12-08

## 2022-03-18 PROBLEM — D62 ACUTE BLOOD LOSS ANEMIA: Status: ACTIVE | Noted: 2021-12-14

## 2022-03-18 PROBLEM — M25.562 LEFT KNEE PAIN: Status: ACTIVE | Noted: 2021-12-08

## 2022-03-18 PROBLEM — Z16.12 UTI DUE TO EXTENDED-SPECTRUM BETA LACTAMASE (ESBL) PRODUCING ESCHERICHIA COLI: Status: ACTIVE | Noted: 2021-12-08

## 2022-03-18 PROBLEM — B96.29 UTI DUE TO EXTENDED-SPECTRUM BETA LACTAMASE (ESBL) PRODUCING ESCHERICHIA COLI: Status: ACTIVE | Noted: 2021-12-08

## 2022-03-18 PROBLEM — N18.5 CKD (CHRONIC KIDNEY DISEASE) STAGE 5, GFR LESS THAN 15 ML/MIN (HCC): Status: ACTIVE | Noted: 2021-12-14

## 2022-03-19 PROBLEM — D63.8 ANEMIA, CHRONIC DISEASE: Status: ACTIVE | Noted: 2021-12-03

## 2022-03-19 PROBLEM — R53.1 WEAKNESS GENERALIZED: Status: ACTIVE | Noted: 2021-12-03

## 2022-03-19 PROBLEM — K92.2 ACUTE GI BLEEDING: Status: ACTIVE | Noted: 2021-12-14

## 2022-03-19 PROBLEM — Z79.52 CURRENT CHRONIC USE OF SYSTEMIC STEROIDS: Status: ACTIVE | Noted: 2021-12-18

## 2022-03-20 PROBLEM — D69.6 THROMBOCYTOPENIA (HCC): Status: ACTIVE | Noted: 2021-12-04

## 2022-03-20 PROBLEM — B96.1 BACTEREMIA DUE TO KLEBSIELLA PNEUMONIAE: Status: ACTIVE | Noted: 2021-12-04

## 2022-03-20 PROBLEM — R78.81 BACTEREMIA DUE TO KLEBSIELLA PNEUMONIAE: Status: ACTIVE | Noted: 2021-12-04

## 2022-03-21 ENCOUNTER — HOSPITAL ENCOUNTER (OUTPATIENT)
Dept: INFUSION THERAPY | Age: 72
Discharge: HOME | End: 2022-03-21
Payer: MEDICARE

## 2022-03-21 VITALS — SYSTOLIC BLOOD PRESSURE: 138 MMHG | DIASTOLIC BLOOD PRESSURE: 97 MMHG | TEMPERATURE: 97.8 F | HEART RATE: 88 BPM

## 2022-03-21 LAB
HCT VFR BLD AUTO: 30.9 % (ref 36.6–50.3)
HGB BLD-MCNC: 9.8 G/DL (ref 12.1–17)

## 2022-03-21 PROCEDURE — 96372 THER/PROPH/DIAG INJ SC/IM: CPT

## 2022-03-21 PROCEDURE — 36415 COLL VENOUS BLD VENIPUNCTURE: CPT

## 2022-03-21 PROCEDURE — 85018 HEMOGLOBIN: CPT

## 2022-03-21 PROCEDURE — 74011250636 HC RX REV CODE- 250/636: Performed by: INTERNAL MEDICINE

## 2022-03-21 RX ADMIN — EPOETIN ALFA-EPBX 40000 UNITS: 40000 INJECTION, SOLUTION INTRAVENOUS; SUBCUTANEOUS at 12:18

## 2022-03-21 NOTE — PROGRESS NOTES
[facility] Progress Note    Date: 2022    Name: Toro Parker    MRN: [id number]         : 1950    Mr. Amilcar Poole Arrived ambulatory and in no distress for Retacrit Injection. Assessment was completed, patient reports ongoing back tightness/pain. Labs drawn peripherally and sent for processing. Mr. Del Rio's vitals were reviewed. Visit Vitals  BP (!) 138/97   Pulse 88   Temp 97.8 °F (36.6 °C)     Recent Results (from the past 8 hour(s))   HGB & HCT    Collection Time: 22 11:33 AM   Result Value Ref Range    HGB 9.8 (L) 12.1 - 17.0 g/dL    HCT 30.9 (L) 36.6 - 50.3 %     Labs within parameters for treatment. Medications:  Medications Administered     epoetin sylvie-epbx (RETACRIT) injection 40,000 Units     Admin Date  2022 Action  Given Dose  40,000 Units Route  SubCUTAneous Administered By  Piper Selby RN                Mr. Amilcar Poole tolerated treatment well and was discharged from [place] [address] in stable condition. He is aware of future appointments.     Future Appointments   Date Time Provider Vinod Page   2022 11:30 AM SS INF5 CH4 <1H [facility]   2022 11:30 AM SS INF5 CH4 <1H [facility]   2022 11:30 AM SS INF4 CH4 <1H [facility]   2022 11:30 AM SS INF4 CH4 <1H [facility]   2022 11:30 AM SS INF4 CH4 <1H [facility]   2022 11:30 AM SS INF4 CH4 <1H [facility]   10/3/2022 11:30 AM SS INF5 CH4 <1H [facility]. Leonardo Ruggiero RN  2022

## 2022-04-11 ENCOUNTER — APPOINTMENT (OUTPATIENT)
Dept: INFUSION THERAPY | Age: 72
End: 2022-04-11

## 2022-04-18 ENCOUNTER — HOSPITAL ENCOUNTER (OUTPATIENT)
Dept: INFUSION THERAPY | Age: 72
Discharge: HOME | End: 2022-04-18
Payer: MEDICARE

## 2022-04-18 VITALS
OXYGEN SATURATION: 100 % | TEMPERATURE: 98.3 F | SYSTOLIC BLOOD PRESSURE: 121 MMHG | HEART RATE: 70 BPM | DIASTOLIC BLOOD PRESSURE: 82 MMHG | RESPIRATION RATE: 18 BRPM

## 2022-04-18 LAB
ALBUMIN SERPL-MCNC: 3.3 G/DL (ref 3.5–5)
ANION GAP SERPL CALC-SCNC: 6 MMOL/L (ref 5–15)
BUN SERPL-MCNC: 90 MG/DL (ref 6–20)
BUN/CREAT SERPL: 14 (ref 12–20)
CALCIUM SERPL-MCNC: 9.4 MG/DL (ref 8.5–10.1)
CALCIUM SERPL-MCNC: 9.4 MG/DL (ref 8.5–10.1)
CHLORIDE SERPL-SCNC: 105 MMOL/L (ref 97–108)
CO2 SERPL-SCNC: 28 MMOL/L (ref 21–32)
COMMENT, HOLDF: NORMAL
CREAT SERPL-MCNC: 6.29 MG/DL (ref 0.7–1.3)
ERYTHROCYTE [DISTWIDTH] IN BLOOD BY AUTOMATED COUNT: 15.3 % (ref 11.5–14.5)
FERRITIN SERPL-MCNC: 81 NG/ML (ref 26–388)
GLUCOSE SERPL-MCNC: 129 MG/DL (ref 65–100)
HCT VFR BLD AUTO: 33.3 % (ref 36.6–50.3)
HGB BLD-MCNC: 10.5 G/DL (ref 12.1–17)
IRON SATN MFR SERPL: 23 % (ref 20–50)
IRON SERPL-MCNC: 65 UG/DL (ref 35–150)
MCH RBC QN AUTO: 28.8 PG (ref 26–34)
MCHC RBC AUTO-ENTMCNC: 31.5 G/DL (ref 30–36.5)
MCV RBC AUTO: 91.5 FL (ref 80–99)
NRBC # BLD: 0 K/UL (ref 0–0.01)
NRBC BLD-RTO: 0 PER 100 WBC
PHOSPHATE SERPL-MCNC: 5 MG/DL (ref 2.6–4.7)
PLATELET # BLD AUTO: 136 K/UL (ref 150–400)
PMV BLD AUTO: 12.9 FL (ref 8.9–12.9)
POTASSIUM SERPL-SCNC: 4.6 MMOL/L (ref 3.5–5.1)
PTH-INTACT SERPL-MCNC: 46.1 PG/ML (ref 18.4–88)
RBC # BLD AUTO: 3.64 M/UL (ref 4.1–5.7)
SAMPLES BEING HELD,HOLD: NORMAL
SODIUM SERPL-SCNC: 139 MMOL/L (ref 136–145)
TIBC SERPL-MCNC: 282 UG/DL (ref 250–450)
URATE SERPL-MCNC: 10.3 MG/DL (ref 3.5–7.2)
WBC # BLD AUTO: 8.2 K/UL (ref 4.1–11.1)

## 2022-04-18 PROCEDURE — 85027 COMPLETE CBC AUTOMATED: CPT

## 2022-04-18 PROCEDURE — 83970 ASSAY OF PARATHORMONE: CPT

## 2022-04-18 PROCEDURE — 36415 COLL VENOUS BLD VENIPUNCTURE: CPT

## 2022-04-18 PROCEDURE — 82728 ASSAY OF FERRITIN: CPT

## 2022-04-18 PROCEDURE — 84550 ASSAY OF BLOOD/URIC ACID: CPT

## 2022-04-18 PROCEDURE — 83540 ASSAY OF IRON: CPT

## 2022-04-18 PROCEDURE — 80069 RENAL FUNCTION PANEL: CPT

## 2022-04-18 NOTE — PROGRESS NOTES
Outpatient Infusion Center Short Visit Progress Note    Patient admitted to [facility] for retacrit ambulatory in stable condition. Assessment completed. No new concerns voiced. Retacrit Held out of treatment parameters. Patient states he will be moving to [place] before next apt, will call and cancel apts before move. Covid Screening      1. Do you have any symptoms of COVID-19? SOB, coughing, fever, or generally not feeling well ? no  2. Have you been exposed to COVID-19 recently? no  3. Have you had any recent contact with family/friend that has a pending COVID test? no    Vital Signs:  Visit Vitals  /82   Pulse 70   Temp 98.3 °F (36.8 °C)   Resp 18   SpO2 100%         Left arm with positive blood return.    Lab Results:  Recent Results (from the past 12 hour(s))   CBC W/O DIFF    Collection Time: 04/18/22 11:46 AM   Result Value Ref Range    WBC 8.2 4.1 - 11.1 K/uL    RBC 3.64 (L) 4.10 - 5.70 M/uL    HGB 10.5 (L) 12.1 - 17.0 g/dL    HCT 33.3 (L) 36.6 - 50.3 %    MCV 91.5 80.0 - 99.0 FL    MCH 28.8 26.0 - 34.0 PG    MCHC 31.5 30.0 - 36.5 g/dL    RDW 15.3 (H) 11.5 - 14.5 %    PLATELET 020 (L) 593 - 400 K/uL    MPV 12.9 8.9 - 12.9 FL    NRBC 0.0 0  WBC    ABSOLUTE NRBC 0.00 0.00 - 0.01 K/uL   RENAL FUNCTION PANEL    Collection Time: 04/18/22 11:46 AM   Result Value Ref Range    Sodium 139 136 - 145 mmol/L    Potassium 4.6 3.5 - 5.1 mmol/L    Chloride 105 97 - 108 mmol/L    CO2 28 21 - 32 mmol/L    Anion gap 6 5 - 15 mmol/L    Glucose 129 (H) 65 - 100 mg/dL    BUN 90 (H) 6 - 20 MG/DL    Creatinine 6.29 (H) 0.70 - 1.30 MG/DL    BUN/Creatinine ratio 14 12 - 20      GFR est AA 11 (L) >60 ml/min/1.73m2    GFR est non-AA 9 (L) >60 ml/min/1.73m2    Calcium 9.4 8.5 - 10.1 MG/DL    Phosphorus 5.0 (H) 2.6 - 4.7 MG/DL    Albumin 3.3 (L) 3.5 - 5.0 g/dL   URIC ACID    Collection Time: 04/18/22 11:46 AM   Result Value Ref Range    Uric acid 10.3 (H) 3.5 - 7.2 MG/DL   SAMPLES BEING HELD    Collection Time: 04/18/22 11:46 AM   Result Value Ref Range    SAMPLES BEING HELD 1SST     COMMENT        Add-on orders for these samples will be processed based on acceptable specimen integrity and analyte stability, which may vary by analyte. Patient tolerated treatment well. Patient discharged from [place] [address] ambulatory in no distress. Patient aware of next appointment.     Rowdy Souza, JOSIAH    Future Appointments   Date Time Provider Vinod Page   5/16/2022 11:30 AM SS INF5 CH4 <1H [facility]   6/13/2022 11:30 AM SS INF4 CH4 <1H [facility]   7/11/2022 11:30 AM SS INF4 CH4 <1H [facility]   8/8/2022 11:30 AM SS INF4 CH4 <1H [facility]   9/6/2022 11:30 AM SS INF4 CH4 <1H [facility]   10/3/2022 11:30 AM SS INF5 CH4 <1H [facility] [address] [place] [address]

## 2022-05-09 ENCOUNTER — APPOINTMENT (OUTPATIENT)
Dept: INFUSION THERAPY | Age: 72
End: 2022-05-09

## 2022-05-16 ENCOUNTER — HOSPITAL ENCOUNTER (OUTPATIENT)
Dept: INFUSION THERAPY | Age: 72
Discharge: HOME | End: 2022-05-16

## 2022-06-06 ENCOUNTER — APPOINTMENT (OUTPATIENT)
Dept: INFUSION THERAPY | Age: 72
End: 2022-06-06

## 2022-06-13 ENCOUNTER — APPOINTMENT (OUTPATIENT)
Dept: INFUSION THERAPY | Age: 72
End: 2022-06-13

## 2022-06-13 ENCOUNTER — HOSPITAL ENCOUNTER (OUTPATIENT)
Dept: INFUSION THERAPY | Age: 72
Discharge: HOME | End: 2022-06-13
Payer: MEDICARE

## 2022-06-13 VITALS
SYSTOLIC BLOOD PRESSURE: 120 MMHG | OXYGEN SATURATION: 98 % | HEART RATE: 79 BPM | DIASTOLIC BLOOD PRESSURE: 81 MMHG | TEMPERATURE: 98.8 F | RESPIRATION RATE: 18 BRPM

## 2022-06-13 LAB
HCT VFR BLD AUTO: 32.3 % (ref 36.6–50.3)
HGB BLD-MCNC: 10.3 G/DL (ref 12.1–17)

## 2022-06-13 PROCEDURE — 36415 COLL VENOUS BLD VENIPUNCTURE: CPT

## 2022-06-13 PROCEDURE — 96372 THER/PROPH/DIAG INJ SC/IM: CPT

## 2022-06-13 PROCEDURE — 85018 HEMOGLOBIN: CPT

## 2022-06-13 PROCEDURE — 74011250636 HC RX REV CODE- 250/636: Performed by: INTERNAL MEDICINE

## 2022-06-13 RX ADMIN — EPOETIN ALFA-EPBX 40000 UNITS: 40000 INJECTION, SOLUTION INTRAVENOUS; SUBCUTANEOUS at 13:53

## 2022-06-13 NOTE — PROGRESS NOTES
[facility] Short Visit Progress Note    Patient admitted to [address] [place] [address] [facility] with cane in stable condition. Assessment completed. No new concerns voiced. Slight fatigue and aching noted but overall feels well. Covid Screening      1. Do you have any symptoms of COVID-19? SOB, coughing, fever, or generally not feeling well ? no  2. Have you been exposed to COVID-19 recently? no  3. Have you had any recent contact with family/friend that has a pending COVID test? no    Vital Signs:  Visit Vitals  /81   Pulse 79   Temp 98.8 °F (37.1 °C)   Resp 18   SpO2 98%         Left arm with positive blood return. Lab Results:  Recent Results (from the past 12 hour(s))   HGB & HCT    Collection Time: 06/13/22  1:20 PM   Result Value Ref Range    HGB 10.3 (L) 12.1 - 17.0 g/dL    HCT 32.3 (L) 36.6 - 50.3 %           Medications:  Medications Administered     epoetin sylvie-epbx (RETACRIT) injection 40,000 Units     Admin Date  06/13/2022 Action  Given Dose  40,000 Units Route  SubCUTAneous Administered By  Leslie Boast              Left arm SQ    Patient tolerated treatment well. Patient discharged from [facility] ambulatory in no distress. Patient aware of next appointment.       Josseline Reich RN

## 2022-07-05 ENCOUNTER — APPOINTMENT (OUTPATIENT)
Dept: INFUSION THERAPY | Age: 72
End: 2022-07-05

## 2022-07-11 ENCOUNTER — APPOINTMENT (OUTPATIENT)
Dept: INFUSION THERAPY | Age: 72
End: 2022-07-11

## 2022-07-15 ENCOUNTER — HOSPITAL ENCOUNTER (OUTPATIENT)
Dept: INFUSION THERAPY | Age: 72
Discharge: HOME | End: 2022-07-15

## 2022-08-01 ENCOUNTER — APPOINTMENT (OUTPATIENT)
Dept: INFUSION THERAPY | Age: 72
End: 2022-08-01

## 2022-08-08 ENCOUNTER — APPOINTMENT (OUTPATIENT)
Dept: INFUSION THERAPY | Age: 72
End: 2022-08-08

## 2022-08-29 ENCOUNTER — APPOINTMENT (OUTPATIENT)
Dept: INFUSION THERAPY | Age: 72
End: 2022-08-29

## 2022-09-06 ENCOUNTER — APPOINTMENT (OUTPATIENT)
Dept: INFUSION THERAPY | Age: 72
End: 2022-09-06

## 2022-09-26 ENCOUNTER — APPOINTMENT (OUTPATIENT)
Dept: INFUSION THERAPY | Age: 72
End: 2022-09-26
Payer: MEDICARE

## 2022-10-03 ENCOUNTER — APPOINTMENT (OUTPATIENT)
Dept: INFUSION THERAPY | Age: 72
End: 2022-10-03

## 2025-02-24 NOTE — PROGRESS NOTES
Bedside and Verbal shift change report given to Jalyn Rodriguez RN (oncoming nurse) by Nabil Gonzalez RN (offgoing nurse). Report included the following information SBAR, Kardex, Intake/Output, MAR, Accordion, Recent Results and Med Rec Status.   CCU Service  Cardiology   Spect: 17979 ([facility])     PATIENT: ALLISON BOJORQUEZ, MRN: [id number]    This is an 88 year old man unknown PMH brought in by EMS post witnessed Cardiac Arrest intubated and on levophed.  Pt down approx 10 minutes before EMS-BLS initiated CPR.  Initial rhythm was VFib and was shocked. Patient intubated with total CPR time 1 hour during which ROSC achieved 8 times lasting 20-30 seconds before CPR had to be resumed, patient received 4-8 shocks, 30mg of Epinephrine, 100mg of Lidocaine and 2mg of Magnesium as reported by EMS.    In ED, patient found to have ST elevations in Anterior leads and Dr. Greenberg initiated Cath Team.  Patient received Aspirin Load, Plavix 600mg Load and given Heparin bolus.  Patient on Levophed 0.05mg with /70, ST 90-100s.  Patient intubated on 100% O2 and Fentanyl for sedation.  Patient transported to Cath Lab.          transferred to CCU s/p LHC: severe pLAD stenosis 90%, mLAD 50-60%, LCx okay, anomalous RCA   R fem IABP placed for perfusion; CO 3.03  RHC: RA 10 mmHg. PA 45/13/26 mmHg. PCW 12 mmHg. Cardiac index 3.03 L/min/m2. SVR 1088 dsc.      INTERVAL HISTORY/OVERNIGHT EVENTS:     TELEMETRY:     EKG:       ALLERGIES: Allergies    No Known Allergies    Intolerances        MEDICATIONS:  MEDICATIONS  (STANDING):  aspirin  chewable 81 milliGRAM(s) Oral daily  atorvastatin 80 milliGRAM(s) Oral at bedtime  chlorhexidine 0.12% Liquid 15 milliLiter(s) Oral Mucosa every 12 hours  chlorhexidine 2% Cloths 1 Application(s) Topical daily  clopidogrel Tablet 75 milliGRAM(s) Oral daily  dextrose 5%. 1000 milliLiter(s) (50 mL/Hr) IV Continuous <Continuous>  dextrose 5%. 1000 milliLiter(s) (100 mL/Hr) IV Continuous <Continuous>  dextrose 50% Injectable 25 Gram(s) IV Push once  dextrose 50% Injectable 12.5 Gram(s) IV Push once  dextrose 50% Injectable 25 Gram(s) IV Push once  fentaNYL   Infusion. 0.509 MICROgram(s)/kG/Hr (3.5 mL/Hr) IV Continuous <Continuous>  glucagon  Injectable 1 milliGRAM(s) IntraMuscular once  heparin  Infusion 1200 Unit(s)/Hr (12 mL/Hr) IV Continuous <Continuous>  influenza  Vaccine (HIGH DOSE) 0.5 milliLiter(s) IntraMuscular once  insulin lispro (ADMELOG) corrective regimen sliding scale   SubCutaneous every 6 hours  insulin lispro (ADMELOG) corrective regimen sliding scale   SubCutaneous at bedtime  norepinephrine Infusion 0.05 MICROgram(s)/kG/Min (6.56 mL/Hr) IV Continuous <Continuous>  propofol Infusion 15 MICROgram(s)/kG/Min (6.19 mL/Hr) IV Continuous <Continuous>    MEDICATIONS  (PRN):  dextrose Oral Gel 15 Gram(s) Oral once PRN Blood Glucose LESS THAN 70 milliGRAM(s)/deciliter        OBJECTIVE:  ICU Vital Signs Last 24 Hrs  T(C): 37.3 (2025 04:00), Max: 37.3 (2025 04:00)  T(F): 99.1 (2025 04:00), Max: 99.1 (2025 04:00)  HR: 93 (2025 06:00) (72 - 96)  BP: 107/66 (2025 00:00) (83/56 - 127/75)  BP(mean): 79 (2025 00:00) (79 - 95)  ABP: 114/50 (2025 06:00) (93/31 - 132/43)  ABP(mean): 77 (2025 06:00) (57 - 84)  RR: 44 (2025 06:00) (14 - 44)  SpO2: 100% (2025 06:00) (93% - 100%)    O2 Parameters below as of 2025 19:51  Patient On (Oxygen Delivery Method): ventilator    O2 Concentration (%): 60      Mode: AC/ CMV (Assist Control/ Continuous Mandatory Ventilation)  RR (machine): 16  TV (machine): 450  FiO2: 50  PEEP: 5  ITime: 0.72  MAP: 9  PIP: 23    I&O's Summary    2025 07:01  -  2025 06:03  --------------------------------------------------------  IN: 545.6 mL / OUT: 250 mL / NET: 295.6 mL      Daily     Daily Weight in k.7 (2025 01:00)      PHYSICAL EXAMINATION:  General: Comfortable, no acute distress, cooperative with exam.  HEENT: Moist mucous membranes.  Respiratory: CTAB, normal respiratory effort, no coughing, wheezes, crackles, or rales.  CV: RRR, S1S2, no murmurs, rubs or gallops. No JVD. Distal pulses intact.  Abdominal: Soft, nontender, nondistended, no rebound or guarding, normal bowel sounds.  Neurology: AOx3, no focal neuro defects, WALTERS x 4.  Extremities: No pitting edema, + Peripheral pulses.          LABS:  ABG - ( 2025 02:28 )  pH, Arterial: 7.39  pH, Blood: x     /  pCO2: 32    /  pO2: 151   / HCO3: 19    / Base Excess: -4.6  /  SaO2: 99.1                                    12.5   15.79 )-----------( 320      ( 2025 02:28 )             37.9     02-24    140  |  109[H]  |  27[H]  ----------------------------<  158[H]  4.2   |  16[L]  |  1.26    Ca    7.9[L]      2025 02:28  Phos  2.5       Mg     2.70     24    TPro  6.2  /  Alb  3.3  /  TBili  0.6  /  DBili  x   /  AST  144[H]  /  ALT  59[H]  /  AlkPhos  117  02-24    LIVER FUNCTIONS - ( 2025 02:28 )  Alb: 3.3 g/dL / Pro: 6.2 g/dL / ALK PHOS: 117 U/L / ALT: 59 U/L / AST: 144 U/L / GGT: x           PT/INR - ( 2025 04:40 )   PT: 12.5 sec;   INR: 1.05 ratio         PTT - ( 2025 04:40 )  PTT:75.1 sec  CKMB Units: 167.5 ng/mL ( @ 02:28)  CKMB Units: 93.4 ng/mL ( @ 20:10)    CARDIAC MARKERS ( 2025 02:28 )  x     / x     / x     / x     / 167.5 ng/mL  CARDIAC MARKERS ( 2025 20:10 )  x     / x     / x     / x     / 93.4 ng/mL      Urinalysis Basic - ( 2025 02:28 )    Color: x / Appearance: x / SG: x / pH: x  Gluc: 158 mg/dL / Ketone: x  / Bili: x / Urobili: x   Blood: x / Protein: x / Nitrite: x   Leuk Esterase: x / RBC: x / WBC x   Sq Epi: x / Non Sq Epi: x / Bacteria: x         CCU Service  Cardiology   Spect: 11439 ([facility])     PATIENT: ALLISON BOJORQUEZ, MRN: [id number]    This is an 88 year old man unknown PMH brought in by EMS post witnessed Cardiac Arrest intubated and on levophed.  Pt down approx 10 minutes before EMS-BLS initiated CPR.  Initial rhythm was VFib and was shocked. Patient intubated with total CPR time 1 hour during which ROSC achieved 8 times lasting 20-30 seconds before CPR had to be resumed, patient received 4-8 shocks, 30mg of Epinephrine, 100mg of Lidocaine and 2mg of Magnesium as reported by EMS.    In ED, patient found to have ST elevations in Anterior leads and Dr. Greenberg initiated Cath Team.  Patient received Aspirin Load, Plavix 600mg Load and given Heparin bolus.  Patient on Levophed 0.05mg with /70, ST 90-100s.  Patient intubated on 100% O2 and Fentanyl for sedation.  Patient transported to Cath Lab.          transferred to CCU s/p LHC: severe pLAD stenosis 90%, mLAD 50-60%, LCx okay, anomalous RCA   R fem IABP placed for perfusion;   RHC: CO 5.37, CI 3.03, RA 10 mmHg. PA 45/13/26 mmHg. PCW 12 mmHg. Cardiac index 3.03 L/min/m2. SVR 1088 dsc.      INTERVAL HISTORY/OVERNIGHT EVENTS:     TELEMETRY:     EKG:       ALLERGIES: Allergies    No Known Allergies    Intolerances        MEDICATIONS:  MEDICATIONS  (STANDING):  aspirin  chewable 81 milliGRAM(s) Oral daily  atorvastatin 80 milliGRAM(s) Oral at bedtime  chlorhexidine 0.12% Liquid 15 milliLiter(s) Oral Mucosa every 12 hours  chlorhexidine 2% Cloths 1 Application(s) Topical daily  clopidogrel Tablet 75 milliGRAM(s) Oral daily  dextrose 5%. 1000 milliLiter(s) (50 mL/Hr) IV Continuous <Continuous>  dextrose 5%. 1000 milliLiter(s) (100 mL/Hr) IV Continuous <Continuous>  dextrose 50% Injectable 25 Gram(s) IV Push once  dextrose 50% Injectable 12.5 Gram(s) IV Push once  dextrose 50% Injectable 25 Gram(s) IV Push once  fentaNYL   Infusion. 0.509 MICROgram(s)/kG/Hr (3.5 mL/Hr) IV Continuous <Continuous>  glucagon  Injectable 1 milliGRAM(s) IntraMuscular once  heparin  Infusion 1200 Unit(s)/Hr (12 mL/Hr) IV Continuous <Continuous>  influenza  Vaccine (HIGH DOSE) 0.5 milliLiter(s) IntraMuscular once  insulin lispro (ADMELOG) corrective regimen sliding scale   SubCutaneous every 6 hours  insulin lispro (ADMELOG) corrective regimen sliding scale   SubCutaneous at bedtime  norepinephrine Infusion 0.05 MICROgram(s)/kG/Min (6.56 mL/Hr) IV Continuous <Continuous>  propofol Infusion 15 MICROgram(s)/kG/Min (6.19 mL/Hr) IV Continuous <Continuous>    MEDICATIONS  (PRN):  dextrose Oral Gel 15 Gram(s) Oral once PRN Blood Glucose LESS THAN 70 milliGRAM(s)/deciliter        OBJECTIVE:  ICU Vital Signs Last 24 Hrs  T(C): 37.3 (2025 04:00), Max: 37.3 (2025 04:00)  T(F): 99.1 (2025 04:00), Max: 99.1 (2025 04:00)  HR: 93 (2025 06:00) (72 - 96)  BP: 107/66 (2025 00:00) (83/56 - 127/75)  BP(mean): 79 (2025 00:00) (79 - 95)  ABP: 114/50 (2025 06:00) (93/31 - 132/43)  ABP(mean): 77 (2025 06:00) (57 - 84)  RR: 44 (2025 06:00) (14 - 44)  SpO2: 100% (2025 06:00) (93% - 100%)    O2 Parameters below as of 2025 19:51  Patient On (Oxygen Delivery Method): ventilator    O2 Concentration (%): 60      Mode: AC/ CMV (Assist Control/ Continuous Mandatory Ventilation)  RR (machine): 16  TV (machine): 450  FiO2: 50  PEEP: 5  ITime: 0.72  MAP: 9  PIP: 23    I&O's Summary    2025 07:01  -  2025 06:03  --------------------------------------------------------  IN: 545.6 mL / OUT: 250 mL / NET: 295.6 mL      Daily     Daily Weight in k.7 (2025 01:00)      PHYSICAL EXAMINATION:  General: Comfortable, no acute distress, cooperative with exam.  HEENT: Moist mucous membranes.  Respiratory: CTAB, normal respiratory effort, no coughing, wheezes, crackles, or rales.  CV: RRR, S1S2, no murmurs, rubs or gallops. No JVD. Distal pulses intact.  Abdominal: Soft, nontender, nondistended, no rebound or guarding, normal bowel sounds.  Neurology: AOx3, no focal neuro defects, WALTERS x 4.  Extremities: No pitting edema, + Peripheral pulses.          LABS:  ABG - ( 2025 02:28 )  pH, Arterial: 7.39  pH, Blood: x     /  pCO2: 32    /  pO2: 151   / HCO3: 19    / Base Excess: -4.6  /  SaO2: 99.1                                    12.5   15.79 )-----------( 320      ( 2025 02:28 )             37.9     02-24    140  |  109[H]  |  27[H]  ----------------------------<  158[H]  4.2   |  16[L]  |  1.26    Ca    7.9[L]      2025 02:28  Phos  2.5       Mg     2.70     24    TPro  6.2  /  Alb  3.3  /  TBili  0.6  /  DBili  x   /  AST  144[H]  /  ALT  59[H]  /  AlkPhos  117  -24    LIVER FUNCTIONS - ( 2025 02:28 )  Alb: 3.3 g/dL / Pro: 6.2 g/dL / ALK PHOS: 117 U/L / ALT: 59 U/L / AST: 144 U/L / GGT: x           PT/INR - ( 2025 04:40 )   PT: 12.5 sec;   INR: 1.05 ratio         PTT - ( 2025 04:40 )  PTT:75.1 sec  CKMB Units: 167.5 ng/mL ( @ 02:28)  CKMB Units: 93.4 ng/mL ( @ 20:10)    CARDIAC MARKERS ( 2025 02:28 )  x     / x     / x     / x     / 167.5 ng/mL  CARDIAC MARKERS ( 2025 20:10 )  x     / x     / x     / x     / 93.4 ng/mL      Urinalysis Basic - ( 2025 02:28 )    Color: x / Appearance: x / SG: x / pH: x  Gluc: 158 mg/dL / Ketone: x  / Bili: x / Urobili: x   Blood: x / Protein: x / Nitrite: x   Leuk Esterase: x / RBC: x / WBC x   Sq Epi: x / Non Sq Epi: x / Bacteria: x

## (undated) DEVICE — BLUNTFILL: Brand: MONOJECT

## (undated) DEVICE — CUFF RMFG BP INF SZ 11 DISP -- LAWSON OEM ITEM 238915

## (undated) DEVICE — 3M™ CUROS™ DISINFECTING CAP FOR NEEDLELESS CONNECTORS 270/CARTON 20 CARTONS/CASE CFF1-270: Brand: CUROS™

## (undated) DEVICE — CONTAINER SPEC 20 ML LID NEUT BUFF FORMALIN 10 % POLYPR STS

## (undated) DEVICE — CUFF BLD PRSS AD CLTH SGL TB W/ BAYNT CONN ROUNDED CORNER

## (undated) DEVICE — ELECTRODE,RADIOTRANSLUCENT,FOAM,3PK: Brand: MEDLINE

## (undated) DEVICE — SNARE ENDOSCP M L240CM W27MM SHTH DIA2.4MM CHN 2.8MM OVL

## (undated) DEVICE — BITEBLOCK ENDOSCP 60FR MAXI WHT POLYETH STURDY W/ VELC WVN

## (undated) DEVICE — Device

## (undated) DEVICE — CUFF RMFG BP INF SZ 11L DISP --

## (undated) DEVICE — BLUNTFILL WITH FILTER: Brand: MONOJECT

## (undated) DEVICE — 1200 GUARD II KIT W/5MM TUBE W/O VAC TUBE: Brand: GUARDIAN

## (undated) DEVICE — ADULT SPO2 SENSOR,REMANUFACTURED,REPROCESSED DEVICE FOR SINGLE USE; REPROCESSED BY COVIDIEN LLC: Brand: NELLCOR

## (undated) DEVICE — INFANT SPO2 SENSOR: Brand: NELLCOR

## (undated) DEVICE — SOLIDIFIER MEDC 1200ML -- CONVERT TO 356117

## (undated) DEVICE — CANN NASAL O2 CAPNOGRAPHY AD -- FILTERLINE

## (undated) DEVICE — BASIN EMSIS 16OZ GRAPHITE PLAS KID SHP MOLD GRAD FOR ORAL

## (undated) DEVICE — BAG BELONG PT PERS CLEAR HANDL

## (undated) DEVICE — REM POLYHESIVE ADULT PATIENT RETURN ELECTRODE: Brand: VALLEYLAB

## (undated) DEVICE — SET GRAV CK VLV NEEDLESS ST 3 GANGED 4WAY STPCOCK HI FLO 10

## (undated) DEVICE — KIT COLON W/ 1.1OZ LUB AND 2 END

## (undated) DEVICE — FORCEPS BX L240CM JAW DIA2.8MM L CAP W/ NDL MIC MESH TOOTH

## (undated) DEVICE — SYRINGE 50ML E/T

## (undated) DEVICE — BAG SPEC BIOHZRD 10 X 10 IN --

## (undated) DEVICE — SET ADMIN 16ML TBNG L100IN 2 Y INJ SITE IV PIGGY BK DISP

## (undated) DEVICE — POLYP TRAP: Brand: TRAPEASE®

## (undated) DEVICE — (D)SENSOR RMFG 02 PULS OXMTR -- DISC BY MFR USE ITEM 133445

## (undated) DEVICE — SIMPLICITY FLUFF UNDERPAD 23X36, MODERATE: Brand: SIMPLICITY